# Patient Record
Sex: MALE | Race: WHITE | NOT HISPANIC OR LATINO | Employment: OTHER | URBAN - METROPOLITAN AREA
[De-identification: names, ages, dates, MRNs, and addresses within clinical notes are randomized per-mention and may not be internally consistent; named-entity substitution may affect disease eponyms.]

---

## 2017-01-19 ENCOUNTER — INITIAL CONSULT (OUTPATIENT)
Dept: DERMATOLOGY | Facility: CLINIC | Age: 82
End: 2017-01-19
Payer: MEDICARE

## 2017-01-19 VITALS — WEIGHT: 163 LBS | BODY MASS INDEX: 22.73 KG/M2

## 2017-01-19 DIAGNOSIS — L82.1 SEBORRHEIC KERATOSES: ICD-10-CM

## 2017-01-19 DIAGNOSIS — L57.0 ACTINIC KERATOSIS: Primary | ICD-10-CM

## 2017-01-19 PROCEDURE — 1157F ADVNC CARE PLAN IN RCRD: CPT | Mod: S$GLB,,, | Performed by: DERMATOLOGY

## 2017-01-19 PROCEDURE — 99999 PR PBB SHADOW E&M-EST. PATIENT-LVL II: CPT | Mod: PBBFAC,,, | Performed by: DERMATOLOGY

## 2017-01-19 PROCEDURE — 1159F MED LIST DOCD IN RCRD: CPT | Mod: S$GLB,,, | Performed by: DERMATOLOGY

## 2017-01-19 PROCEDURE — 99202 OFFICE O/P NEW SF 15 MIN: CPT | Mod: 25,S$GLB,, | Performed by: DERMATOLOGY

## 2017-01-19 PROCEDURE — 1160F RVW MEDS BY RX/DR IN RCRD: CPT | Mod: S$GLB,,, | Performed by: DERMATOLOGY

## 2017-01-19 NOTE — PROGRESS NOTES
Subjective:       Patient ID:  Phi Sainz is a 87 y.o. male who presents for   Chief Complaint   Patient presents with    Lesion     HPI Comments: Lesion on left cheek which has grown not painful no tx.  The actinic keratoses treated previously on his hands resolved, now with one on right hand no tx.      Lesion         Review of Systems   Constitutional: Negative for fever.   Skin: Negative for itching and rash.   Hematologic/Lymphatic: Does not bruise/bleed easily.        Objective:    Physical Exam   Constitutional: He appears well-developed and well-nourished. No distress.   Neurological: He is alert and oriented to person, place, and time. He is not disoriented.   Psychiatric: He has a normal mood and affect.   Skin:   Areas Examined (abnormalities noted in diagram):   Scalp / Hair Palpated and Inspected  Head / Face Inspection Performed  Neck Inspection Performed  RUE Inspected  LUE Inspection Performed                   Diagram Legend     Erythematous scaling macule/papule c/w actinic keratosis         Pigmented verrucoid papule/plaque c/w seborrheic keratosis       Assessment / Plan:        Actinic keratosis   Cryosurgery Procedure Note    Verbal consent from the patient is obtained and the patient is aware of the precancerous quality and need for treatment of these lesions. Liquid nitrogen cryosurgery is applied to the 1 actinic keratoses, as detailed in the physical exam, to produce a freeze injury.      Seborrheic keratoses  reassurance  Brochure provided               Return in about 1 year (around 1/19/2018).

## 2017-01-19 NOTE — LETTER
January 19, 2017      Yanna Santana MD  [address] [facility]  [place] [address]           [place] [facility]  [address] [facility]  [place] [address]  Phone: [phone]  Fax: [phone]          Patient: Phi Sainz   MR Number: [id number]   YOB: 1929   Date of Visit: 1/19/2017       Dear Dr. Yanna Santana:    Thank you for referring Phi Sainz to me for evaluation. Attached you will find relevant portions of my assessment and plan of care.    If you have questions, please do not hesitate to call me. I look forward to following Phi Sainz along with you.    Sincerely,    Lindsey Martinez MD    Enclosure  CC:  No Recipients    If you would like to receive this communication electronically, please contact [email or web address] or [phone] to request more information on [employer] Link access.    For providers and/or their staff who would like to refer a patient to [facility], please contact us through our one-stop-shop provider referral line, [facility] , at [phone].    If you feel you have received this communication in error or would no longer like to receive these types of communications, please e-mail [email or web address]

## 2017-01-19 NOTE — MR AVS SNAPSHOT
[place] - Dermatology   [facility]  [place] [address]  Phone: [phone]  Fax: [phone]                  Phi Sainz   2017 10:50 AM   Initial consult    Description:  Male : 10/26/1929   Provider:  Lindsey Martinez MD   Department:  [place] [facility] Dermatology           Reason for Visit     Lesion           Diagnoses this Visit        Comments    Actinic keratosis    -  Primary     Seborrheic keratoses                To Do List           Future Appointments        Provider Department Dept Phone    2017 9:30 AM LAB, [facility] [place] [facility] [phone]    2017 11:30 AM Rody Morrison MD [place] - Cardiology [phone]    3/13/2017 1:40 PM Yanna Santana MD [place] - Internal Medicine [phone]      Goals (5 Years of Data)     None      Follow-Up and Disposition     Return in about 1 year (around 2018).      [facility]     [facility] -  Assistance  Registered nurses in the [facility] Center provide clinical advisement, health education, appointment booking, and other advisory services.  Call for this free service at [phone].             Medications           Message regarding Medications     Verify the changes and/or additions to your medication regime listed below are the same as discussed with your clinician today.  If any of these changes or additions are incorrect, please notify your healthcare provider.             Verify that the below list of medications is an accurate representation of the medications you are currently taking.  If none reported, the list may be blank. If incorrect, please contact your healthcare provider. Carry this list with you in case of emergency.           Current Medications     diphenoxylate-atropine 2.5-0.025 mg (LOMOTIL) 2.5-0.025 mg per tablet Take 1 tablet by mouth 2 (two) times daily.    escitalopram oxalate (LEXAPRO) 10 MG tablet TAKE 1 TABLET BY MOUTH EVERY  DAY    lisinopril (PRINIVIL,ZESTRIL) 20 MG tablet Take 1 tablet (20 mg total) by mouth once daily.    mesalamine (LIALDA) 1.2 gram TbEC Take 1.2 g by mouth daily with breakfast.    pramipexole (MIRAPEX) 0.125 MG tablet TAKE 1 TABLET BY MOUTH 3 TIMES A DAY           Clinical Reference Information           Vital Signs - Last Recorded  Most recent update: 1/19/2017 11:11 AM by Marita Burgess MA    Wt BMI             73.9 kg (163 lb) 22.73 kg/m2         Allergies as of 1/19/2017     Combigan [Brimonidine-timolol]      Immunizations Administered on Date of Encounter - 1/19/2017     None

## 2017-01-27 ENCOUNTER — TELEPHONE (OUTPATIENT)
Dept: PAIN MEDICINE | Facility: CLINIC | Age: 82
End: 2017-01-27

## 2017-01-27 NOTE — TELEPHONE ENCOUNTER
----- Message from Joann Rhodes sent at 1/27/2017 11:33 AM CST -----  Contact: ms garcia pt's [phone] or [phone]  Ms garcia would like to speak with you regarding picking up mri reports etc.. For patient to see another

## 2017-01-31 ENCOUNTER — LAB VISIT (OUTPATIENT)
Dept: LAB | Facility: HOSPITAL | Age: 82
End: 2017-01-31
Attending: INTERNAL MEDICINE
Payer: MEDICARE

## 2017-01-31 DIAGNOSIS — E78.5 DYSLIPIDEMIA: ICD-10-CM

## 2017-01-31 DIAGNOSIS — I10 ESSENTIAL HYPERTENSION: ICD-10-CM

## 2017-01-31 LAB
ALBUMIN SERPL BCP-MCNC: 3.6 G/DL
ALP SERPL-CCNC: 68 U/L
ALT SERPL W/O P-5'-P-CCNC: 13 U/L
ANION GAP SERPL CALC-SCNC: 10 MMOL/L
AST SERPL-CCNC: 25 U/L
BILIRUB SERPL-MCNC: 1 MG/DL
BUN SERPL-MCNC: 15 MG/DL
CALCIUM SERPL-MCNC: 8.9 MG/DL
CHLORIDE SERPL-SCNC: 105 MMOL/L
CHOLEST/HDLC SERPL: 3 {RATIO}
CO2 SERPL-SCNC: 28 MMOL/L
CREAT SERPL-MCNC: 1 MG/DL
EST. GFR  (AFRICAN AMERICAN): >60 ML/MIN/1.73 M^2
EST. GFR  (NON AFRICAN AMERICAN): >60 ML/MIN/1.73 M^2
GLUCOSE SERPL-MCNC: 89 MG/DL
HDL/CHOLESTEROL RATIO: 32.8 %
HDLC SERPL-MCNC: 198 MG/DL
HDLC SERPL-MCNC: 65 MG/DL
LDLC SERPL CALC-MCNC: 119.2 MG/DL
NONHDLC SERPL-MCNC: 133 MG/DL
POTASSIUM SERPL-SCNC: 3.6 MMOL/L
PROT SERPL-MCNC: 6.8 G/DL
SODIUM SERPL-SCNC: 143 MMOL/L
TRIGL SERPL-MCNC: 69 MG/DL
TSH SERPL DL<=0.005 MIU/L-ACNC: 2.46 UIU/ML

## 2017-01-31 PROCEDURE — 80053 COMPREHEN METABOLIC PANEL: CPT

## 2017-01-31 PROCEDURE — 36415 COLL VENOUS BLD VENIPUNCTURE: CPT | Mod: PO

## 2017-01-31 PROCEDURE — 80061 LIPID PANEL: CPT

## 2017-01-31 PROCEDURE — 84443 ASSAY THYROID STIM HORMONE: CPT

## 2017-02-01 ENCOUNTER — TELEPHONE (OUTPATIENT)
Dept: CARDIOLOGY | Facility: CLINIC | Age: 82
End: 2017-02-01

## 2017-02-01 NOTE — TELEPHONE ENCOUNTER
----- Message from Rody Morrison MD sent at 2/1/2017 10:13 AM CST -----  Please mail patient the blood work results and inform the patient that we will discuss it in detail during the upcoming visit. It looks fine

## 2017-02-09 ENCOUNTER — OFFICE VISIT (OUTPATIENT)
Dept: CARDIOLOGY | Facility: CLINIC | Age: 82
End: 2017-02-09
Payer: MEDICARE

## 2017-02-09 VITALS
SYSTOLIC BLOOD PRESSURE: 119 MMHG | DIASTOLIC BLOOD PRESSURE: 58 MMHG | BODY MASS INDEX: 22.73 KG/M2 | HEIGHT: 71 IN | HEART RATE: 54 BPM | WEIGHT: 162.38 LBS

## 2017-02-09 DIAGNOSIS — R00.1 BRADYCARDIA: ICD-10-CM

## 2017-02-09 DIAGNOSIS — I10 ESSENTIAL HYPERTENSION: Primary | ICD-10-CM

## 2017-02-09 DIAGNOSIS — E78.5 DYSLIPIDEMIA: ICD-10-CM

## 2017-02-09 DIAGNOSIS — J84.9 ILD (INTERSTITIAL LUNG DISEASE): ICD-10-CM

## 2017-02-09 PROCEDURE — 1126F AMNT PAIN NOTED NONE PRSNT: CPT | Mod: S$GLB,,, | Performed by: INTERNAL MEDICINE

## 2017-02-09 PROCEDURE — 99499 UNLISTED E&M SERVICE: CPT | Mod: S$PBB,,, | Performed by: INTERNAL MEDICINE

## 2017-02-09 PROCEDURE — 1160F RVW MEDS BY RX/DR IN RCRD: CPT | Mod: S$GLB,,, | Performed by: INTERNAL MEDICINE

## 2017-02-09 PROCEDURE — 99999 PR PBB SHADOW E&M-EST. PATIENT-LVL III: CPT | Mod: PBBFAC,,, | Performed by: INTERNAL MEDICINE

## 2017-02-09 PROCEDURE — 1157F ADVNC CARE PLAN IN RCRD: CPT | Mod: S$GLB,,, | Performed by: INTERNAL MEDICINE

## 2017-02-09 PROCEDURE — 1159F MED LIST DOCD IN RCRD: CPT | Mod: S$GLB,,, | Performed by: INTERNAL MEDICINE

## 2017-02-09 PROCEDURE — 99213 OFFICE O/P EST LOW 20 MIN: CPT | Mod: S$GLB,,, | Performed by: INTERNAL MEDICINE

## 2017-02-09 RX ORDER — METHYLPREDNISOLONE 4 MG/1
4 TABLET ORAL
COMMUNITY
End: 2017-09-27

## 2017-02-09 NOTE — MR AVS SNAPSHOT
[place] - Cardiology   [facility]  [place] [address]  Phone: [phone]                  Phi Sainz   2017 1:00 PM   Office Visit    Description:  Male : 10/26/1929   Provider:  Rody Morrison MD   Department:  [place] - Cardiology           Reason for Visit     Hypertension                To Do List           Future Appointments        Provider Department Dept Phone    3/13/2017 1:40 PM Yanna Santana MD [place] - Internal Medicine [phone]      Goals (5 Years of Data)     None      [facility]     [facility] On Call Nurse Care Line -  Assistance  Registered nurses in the [facility] On Call Center provide clinical advisement, health education, appointment booking, and other advisory services.  Call for this free service at [phone].             Medications           Message regarding Medications     Verify the changes and/or additions to your medication regime listed below are the same as discussed with your clinician today.  If any of these changes or additions are incorrect, please notify your healthcare provider.        STOP taking these medications     pramipexole (MIRAPEX) 0.125 MG tablet TAKE 1 TABLET BY MOUTH 3 TIMES A DAY    mesalamine (LIALDA) 1.2 gram TbEC Take 1.2 g by mouth daily with breakfast.           Verify that the below list of medications is an accurate representation of the medications you are currently taking.  If none reported, the list may be blank. If incorrect, please contact your healthcare provider. Carry this list with you in case of emergency.           Current Medications     diphenoxylate-atropine 2.5-0.025 mg (LOMOTIL) 2.5-0.025 mg per tablet Take 1 tablet by mouth 2 (two) times daily.    escitalopram oxalate (LEXAPRO) 10 MG tablet TAKE 1 TABLET BY MOUTH EVERY DAY    lisinopril (PRINIVIL,ZESTRIL) 20 MG tablet Take 1 tablet (20 mg total) by mouth once daily.    methylPREDNISolone (MEDROL DOSEPACK) 4 mg tablet Take  "4 mg by mouth. 6 Pills on first day, 5 pills on the second day, 4 pills on third day, 3 pills on fourth day, 2 pill on fifth day, and one pill on sixth day.           Clinical Reference Information           Your Vitals Were     BP Pulse Height Weight BMI    119/58 54 5' 11" (1.803 m) 73.6 kg (162 lb 5.9 oz) 22.65 kg/m2      Blood Pressure          Most Recent Value    Right Arm BP - Sitting  114/57    Left Arm BP - Sitting  119/58    BP  (!)  119/58      Allergies as of 2/9/2017     Combigan [Brimonidine-timolol]      Immunizations Administered on Date of Encounter - 2/9/2017     None      Language Assistance Services     ATTENTION: Language assistance services are available, free of charge. Please call [phone].      ATENCIÓN: Si brennen nichols, tiene a bates disposición servicios gratuitos de asistencia lingüística. Llame al [phone].     CHÚ Ý: N?u b?n nói Ti?ng Vi?t, có các d?ch v? h? tr? ngôn ng? mi?n phí dành cho b?n. G?i s? [phone].         [place] [facility] complies with applicable Federal civil rights laws and does not discriminate on the basis of race, color, national origin, age, disability, or sex.        "

## 2017-02-09 NOTE — PROGRESS NOTES
Subjective:   Patient ID:  Phi Sainz is a 87 y.o. male who presents for follow-up of Hypertension      HPI: Overall patient is doing well.  He has chronic problems with lower back pain and now is followed by an outside orthopedic group.      Occasionally at night while getting up to urinate he feels short of breath and occasionally has swelling around both ankles (R>L).  Otherwise no specific complaints.    Nuclear stress test. CFD, normal. 6 min walk acceptable for his age and co morbidities.    Lab Results   Component Value Date     01/31/2017    K 3.6 01/31/2017     01/31/2017    CO2 28 01/31/2017    BUN 15 01/31/2017    CREATININE 1.0 01/31/2017    GLU 89 01/31/2017    AST 25 01/31/2017    ALT 13 01/31/2017    ALBUMIN 3.6 01/31/2017    PROT 6.8 01/31/2017    BILITOT 1.0 01/31/2017    WBC 6.95 09/07/2016    HGB 13.8 (L) 09/07/2016    HCT 41.8 09/07/2016    MCV 92 09/07/2016     09/07/2016    PSA 0.82 05/03/2016    TSH 2.461 01/31/2017    CHOL 198 01/31/2017    HDL 65 01/31/2017    LDLCALC 119.2 01/31/2017    TRIG 69 01/31/2017       Review of Systems   Constitution: Positive for weakness.   HENT: Negative.    Eyes: Negative.    Cardiovascular: Positive for dyspnea on exertion and leg swelling. Negative for chest pain, claudication, irregular heartbeat, near-syncope, palpitations and syncope.   Respiratory: Negative.  Negative for cough, shortness of breath, snoring and wheezing.    Endocrine: Negative.  Negative for cold intolerance, heat intolerance, polydipsia, polyphagia and polyuria.   Skin: Negative.    Musculoskeletal: Positive for arthritis, back pain and muscle weakness.   Gastrointestinal: Negative.    Genitourinary: Positive for bladder incontinence and frequency.   Neurological: Positive for loss of balance.   Psychiatric/Behavioral: Positive for depression. The patient is nervous/anxious.      @[facility]@  Objective:   Physical Exam   Constitutional: He is oriented to  "person, place, and time. He appears well-developed and well-nourished.   BP (!) 119/58  Pulse (!) 54  Ht 5' 11" (1.803 m)  Wt 73.6 kg (162 lb 5.9 oz)  BMI 22.65 kg/m2     HENT:   Head: Normocephalic.   Eyes: Pupils are equal, round, and reactive to light.   Neck: Normal range of motion. Neck supple. Carotid bruit is not present. No thyromegaly present.   Cardiovascular: Normal rate, regular rhythm, normal heart sounds and intact distal pulses.  Exam reveals no gallop and no friction rub.    No murmur heard.  Pulses:       Carotid pulses are 2+ on the right side, and 2+ on the left side.       Radial pulses are 2+ on the right side, and 2+ on the left side.        Femoral pulses are 2+ on the right side, and 2+ on the left side.       Popliteal pulses are 2+ on the right side, and 2+ on the left side.        Dorsalis pedis pulses are 2+ on the right side, and 2+ on the left side.        Posterior tibial pulses are 2+ on the right side, and 2+ on the left side.   Pulmonary/Chest: Effort normal and breath sounds normal. No respiratory distress. He has no wheezes. He has no rales. He exhibits no tenderness.   Abdominal: Soft. Bowel sounds are normal.   Musculoskeletal: Normal range of motion. He exhibits no edema.   Neurological: He is alert and oriented to person, place, and time.   Skin: Skin is warm and dry.   Psychiatric: He has a normal mood and affect.   Nursing note and vitals reviewed.      Assessment and Plan:     Problem List Items Addressed This Visit        Cardiology Problems    Hypertension - Primary    Relevant Orders    TSH    Comprehensive metabolic panel    Lipid panel       Other    Dyslipidemia    Relevant Orders    TSH    Comprehensive metabolic panel    Lipid panel    Bradycardia    ILD (interstitial lung disease)        Continue on the present regimen.  Return in about 6 months (around 8/9/2017).          "

## 2017-03-13 ENCOUNTER — OFFICE VISIT (OUTPATIENT)
Dept: INTERNAL MEDICINE | Facility: CLINIC | Age: 82
End: 2017-03-13
Payer: MEDICARE

## 2017-03-13 ENCOUNTER — LAB VISIT (OUTPATIENT)
Dept: LAB | Facility: HOSPITAL | Age: 82
End: 2017-03-13
Attending: INTERNAL MEDICINE
Payer: MEDICARE

## 2017-03-13 VITALS
SYSTOLIC BLOOD PRESSURE: 123 MMHG | WEIGHT: 164 LBS | BODY MASS INDEX: 22.88 KG/M2 | RESPIRATION RATE: 12 BRPM | HEART RATE: 57 BPM | TEMPERATURE: 98 F | DIASTOLIC BLOOD PRESSURE: 62 MMHG

## 2017-03-13 DIAGNOSIS — M48.061 SPINAL STENOSIS, LUMBAR REGION, WITHOUT NEUROGENIC CLAUDICATION: ICD-10-CM

## 2017-03-13 DIAGNOSIS — J84.9 INTERSTITIAL LUNG DISEASE: ICD-10-CM

## 2017-03-13 DIAGNOSIS — I10 ESSENTIAL HYPERTENSION: Primary | ICD-10-CM

## 2017-03-13 DIAGNOSIS — R35.1 NOCTURIA: ICD-10-CM

## 2017-03-13 LAB
BILIRUB UR QL STRIP: NEGATIVE
CLARITY UR REFRACT.AUTO: ABNORMAL
COLOR UR AUTO: YELLOW
GLUCOSE UR QL STRIP: NEGATIVE
HGB UR QL STRIP: NEGATIVE
KETONES UR QL STRIP: NEGATIVE
LEUKOCYTE ESTERASE UR QL STRIP: NEGATIVE
NITRITE UR QL STRIP: NEGATIVE
PH UR STRIP: 5 [PH] (ref 5–8)
PROT UR QL STRIP: NEGATIVE
SP GR UR STRIP: 1.02 (ref 1–1.03)
URN SPEC COLLECT METH UR: ABNORMAL
UROBILINOGEN UR STRIP-ACNC: NEGATIVE EU/DL

## 2017-03-13 PROCEDURE — 1126F AMNT PAIN NOTED NONE PRSNT: CPT | Mod: S$GLB,,, | Performed by: INTERNAL MEDICINE

## 2017-03-13 PROCEDURE — 1160F RVW MEDS BY RX/DR IN RCRD: CPT | Mod: S$GLB,,, | Performed by: INTERNAL MEDICINE

## 2017-03-13 PROCEDURE — 1157F ADVNC CARE PLAN IN RCRD: CPT | Mod: S$GLB,,, | Performed by: INTERNAL MEDICINE

## 2017-03-13 PROCEDURE — 99499 UNLISTED E&M SERVICE: CPT | Mod: S$PBB,,, | Performed by: INTERNAL MEDICINE

## 2017-03-13 PROCEDURE — 99213 OFFICE O/P EST LOW 20 MIN: CPT | Mod: S$GLB,,, | Performed by: INTERNAL MEDICINE

## 2017-03-13 PROCEDURE — 81003 URINALYSIS AUTO W/O SCOPE: CPT

## 2017-03-13 PROCEDURE — 99999 PR PBB SHADOW E&M-EST. PATIENT-LVL III: CPT | Mod: PBBFAC,,, | Performed by: INTERNAL MEDICINE

## 2017-03-13 PROCEDURE — 87086 URINE CULTURE/COLONY COUNT: CPT

## 2017-03-13 PROCEDURE — 1159F MED LIST DOCD IN RCRD: CPT | Mod: S$GLB,,, | Performed by: INTERNAL MEDICINE

## 2017-03-13 RX ORDER — GABAPENTIN 100 MG/1
300 CAPSULE ORAL 2 TIMES DAILY
COMMUNITY
End: 2020-02-14

## 2017-03-13 RX ORDER — LEVOCETIRIZINE DIHYDROCHLORIDE 5 MG/1
5 TABLET, FILM COATED ORAL NIGHTLY
Qty: 30 TABLET | Refills: 6 | Status: SHIPPED | OUTPATIENT
Start: 2017-03-13 | End: 2017-10-01 | Stop reason: SDUPTHER

## 2017-03-13 NOTE — PROGRESS NOTES
CC: followup of hypertension  HPI:  The patient is a 87 y.o. year old male with interstitial lung disease who presents to the office for followup of hypertension.  The patient denies any chest pain, shortness of breath, headache, blurred vision, excessive fatigue, nausea or vomiting.  He reports back pain persists primarily in the morning.  He is seeing another orthopedist and is taking prednisone and gabapentin, as well as doing physical therapy.  The patient reports nocturia over the last several years.  He denies any dysuria.    PAST MEDICAL HISTORY:  Past Medical History:   Diagnosis Date    Anemia     Arthritis     Herniation of intervertebral disc     Hyperlipidemia     Hypertension 3/4/2015    Macular degeneration     Spinal stenosis        SURGICAL HISTORY:  Past Surgical History:   Procedure Laterality Date    CATARACT EXTRACTION W/  INTRAOCULAR LENS IMPLANT Left 10/31/07    os ()    CATARACT EXTRACTION W/  INTRAOCULAR LENS IMPLANT Right 12/16/15    od ()       MEDS:  Medcard reviewed and updated    ALLERGIES: Allergy Card reviewed and updated    SOCIAL HISTORY:   The patient is a nonsmoker.    PE:   APPEARANCE: Well nourished, well developed, in no acute distress.    CHEST: Lungs clear to auscultation with unlabored respirations.  CARDIOVASCULAR: Normal S1, S2. No murmurs. No carotid bruits. No pedal edema.  ABDOMEN: Bowel sounds normal. Not distended. Soft. No tenderness or masses.   MUSCULOSKELETAL:  Abnormal gait, ambulates with a cane.  PSYCHIATRIC: The patient is oriented to person, place, and time and has a pleasant affect.        ASSESSMENT/PLAN:  Phi was seen today for 3 month f/u, hypertension and discuss prostate.    Diagnoses and all orders for this visit:    Essential hypertension  -     Blood pressure is controlled    Spinal stenosis, lumbar region, without neurogenic claudication  -     Continue gabapentin    Interstitial lung disease  -      Stable    Nocturia  -     Urinalysis; Future  -     Urine culture; Future    Other orders  -     levocetirizine (XYZAL) 5 MG tablet; Take 1 tablet (5 mg total) by mouth every evening.

## 2017-03-13 NOTE — MR AVS SNAPSHOT
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       These Medications        Disp Refills Start End    levocetirizine (XYZAL) 5 MG tablet 30 tablet 6 3/13/2017 3/13/2018    Take 1 tablet (5 mg total) by mouth every evening. - Oral    Pharmacy: [facility]/pharmacy #[id number] - [place] [address] Ph #: [phone]         [facility] On Call     [facility] On Call Nurse [facility] Line -  Assistance  Registered nurses in the [facility] Call Center provide clinical advisement, health education, appointment booking, and other advisory services.  Call for this free service at [phone].             Medications           Message regarding Medications     Verify the changes and/or additions to your medication regime listed below are the same as discussed with your clinician today.  If any of these changes or additions are incorrect, please notify your healthcare provider.        START taking these NEW medications        Refills    levocetirizine (XYZAL) 5 MG tablet 6    Sig: Take 1 tablet (5 mg total) by mouth every evening.    Class: Normal    Route: Oral           Verify  that the below list of medications is an accurate representation of the medications you are currently taking.  If none reported, the list may be blank. If incorrect, please contact your healthcare provider. Carry this list with you in case of emergency.           Current Medications     diphenoxylate-atropine 2.5-0.025 mg (LOMOTIL) 2.5-0.025 mg per tablet Take 1 tablet by mouth 2 (two) times daily.    escitalopram oxalate (LEXAPRO) 10 MG tablet TAKE 1 TABLET BY MOUTH EVERY DAY    gabapentin (NEURONTIN) 100 MG capsule Take 100 mg by mouth 3 (three) times daily.    lisinopril (PRINIVIL,ZESTRIL) 20 MG tablet Take 1 tablet (20 mg total) by mouth once daily.    methylPREDNISolone (MEDROL DOSEPACK) 4 mg tablet Take 4 mg by mouth. 6 Pills on first day, 5 pills on the second day, 4 pills on third day, 3 pills on fourth day, 2 pill on fifth day, and one pill on sixth day.    levocetirizine (XYZAL) 5 MG tablet Take 1 tablet (5 mg total) by mouth every evening.           Clinical Reference Information           Your Vitals Were     BP Pulse Temp Resp Weight BMI    123/62 (BP Location: Left arm, Patient Position: Sitting, BP Method: Automatic) 57 97.5 °F (36.4 °C) (Oral) 12 74.4 kg (164 lb 0.4 oz) 22.88 kg/m2      Blood Pressure          Most Recent Value    BP  123/62      Allergies as of 3/13/2017     Combigan [Brimonidine-timolol]      Immunizations Administered on Date of Encounter - 3/13/2017     None      Orders Placed During Today's Visit     Future Labs/Procedures Expected by Expires    Urinalysis  3/13/2017 3/13/2018    Urine culture  3/13/2017 3/13/2018      [facility] Sign-Up     Activating your [facility] account is as easy as 1-2-3!     1) Visit [email or web address], select Sign Up Now, enter this activation code and your date of birth, then select Next.  Activation code not generated  Current Patient Portal Status: Account disabled      2) Create a username and password to use when you visit [facility] in the future and  select a security question in case you lose your password and select Next.    3) Enter your e-mail address and click Sign Up!    Additional Information  If you have questions, please e-mail [email or web address] or call [phone] to talk to our [facility] staff. Remember, [facility] is NOT to be used for urgent needs. For medical emergencies, dial 911.         Language Assistance Services     ATTENTION: Language assistance services are available, free of charge. Please call [phone].      ATENCIÓN: Si habla español, tiene a bates disposición servicios gratuitos de asistencia lingüística. Llame al [phone].     CHÚ Ý: N?u b?n nói Ti?ng Vi?t, có các d?ch v? h? tr? ngôn ng? mi?n phí dành cho b?n. G?i s? [phone].         [place] [facility] complies with applicable Federal civil rights laws and does not discriminate on the basis of race, color, national origin, age, disability, or sex.

## 2017-03-13 NOTE — MR AVS SNAPSHOT
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       These Medications        Disp Refills Start End    levocetirizine (XYZAL) 5 MG tablet 30 tablet 6 3/13/2017 3/13/2018    Take 1 tablet (5 mg total) by mouth every evening. - Oral    Pharmacy: [facility]/pharmacy #[id number] - [place]  [address] Ph #: [phone]         [facility] On Call     [facility] On Call Nurse Care Line -  Assistance  Registered nurses in the [facility] provide clinical advisement, health education, appointment booking, and other advisory services  Call for this free service at [phone].                 Medications           Message regarding Medications     Verify the changes and/or additions to your medication regime listed below are the same as discussed with your clinician today.  If any of these changes or additions are incorrect, please notify your healthcare provider.        START taking these NEW medications        Refills    levocetirizine (XYZAL) 5 MG tablet 6    Sig: Take 1 tablet (5 mg total) by mouth every evening.    Class: Normal    Route: Oral            Verify that the below list of medications is an accurate representation of the medications you are currently taking.  If none reported, the list may be blank. If incorrect, please contact your healthcare provider. Carry this list with you in case of emergency.           Current Medications     diphenoxylate-atropine 2.5-0.025 mg (LOMOTIL) 2.5-0.025 mg per tablet Take 1 tablet by mouth 2 (two) times daily.    escitalopram oxalate (LEXAPRO) 10 MG tablet TAKE 1 TABLET BY MOUTH EVERY DAY    gabapentin (NEURONTIN) 100 MG capsule Take 100 mg by mouth 3 (three) times daily.    lisinopril (PRINIVIL,ZESTRIL) 20 MG tablet Take 1 tablet (20 mg total) by mouth once daily.    methylPREDNISolone (MEDROL DOSEPACK) 4 mg tablet Take 4 mg by mouth. 6 Pills on first day, 5 pills on the second day, 4 pills on third day, 3 pills on fourth day, 2 pill on fifth day, and one pill on sixth day.    levocetirizine (XYZAL) 5 MG tablet Take 1 tablet (5 mg total) by mouth every evening.           Clinical Reference Information           Vital Signs - Last Recorded  Most recent update: 3/13/2017  1:47 PM by Humaira Chowdhury LPN    BP Pulse Temp Resp Wt BMI    123/62 (BP Location: Left arm, Patient Position: Sitting, BP Method: Automatic) (!) 57 97.5 °F (36.4 °C) (Oral) 12 74.4 kg (164 lb 0.4 oz) 22.88 kg/m2      Blood Pressure          Most Recent Value    BP  123/62      Allergies as of 3/13/2017     Combigan [Brimonidine-timolol]      Immunizations Administered on Date of Encounter - 3/13/2017     None      Orders Placed During Today's Visit     Future Labs/Procedures Expected by Expires    Urinalysis  3/13/2017 3/13/2018    Urine culture  3/13/2017 3/13/2018      [facility] Sign-Up     Activating your [facility] account is as easy as 1-2-3!     1) Visit [email or web address], select Sign Up Now, enter this activation code and your date of birth, then select Next.  Activation code not generated  Current Patient Portal Status: Account disabled       2) Create a username and password to use when you visit [facility] in the future and select a security question in case you lose your password and select Next.    3) Enter your e-mail address and click Sign Up!    Additional Information  If you have questions, please e-mail [email or web address] or call [phone] to talk to our [facility] staff. Remember, [facility] is NOT to be used for urgent needs. For medical emergencies, dial 911.         Translation Services Information     ATTENTION: Language assistance services are available, free of charge. Please call [phone].    ATENCIÓN: Si habla simone, tiene a bates disposición servicios gratuitos de asistencia lingüística. Llame al [phone].     CHÚ Ý: N?u b?n nói Ti?ng Vi?t, có các d?ch v? h? tr? ngôn ng? mi?n phí dành cho b?n. G?i s? [phone].         [facility] complies with applicable Federal civil rights laws and does not discriminate on the basis of race, color, national origin, age, disability, or sex.

## 2017-03-15 LAB — BACTERIA UR CULT: NORMAL

## 2017-03-20 ENCOUNTER — TELEPHONE (OUTPATIENT)
Dept: INTERNAL MEDICINE | Facility: CLINIC | Age: 82
End: 2017-03-20

## 2017-03-20 NOTE — TELEPHONE ENCOUNTER
Called to inform the patient of the urine culture results there was no growth, There was no answer termed the call

## 2017-03-21 DIAGNOSIS — I10 ESSENTIAL HYPERTENSION: ICD-10-CM

## 2017-03-21 RX ORDER — LISINOPRIL 20 MG/1
20 TABLET ORAL DAILY
Qty: 30 TABLET | Refills: 6 | Status: SHIPPED | OUTPATIENT
Start: 2017-03-21 | End: 2017-11-13 | Stop reason: SDUPTHER

## 2017-03-28 ENCOUNTER — OFFICE VISIT (OUTPATIENT)
Dept: OPHTHALMOLOGY | Facility: CLINIC | Age: 82
End: 2017-03-28
Payer: MEDICARE

## 2017-03-28 DIAGNOSIS — H43.813 POSTERIOR VITREOUS DETACHMENT, BOTH EYES: ICD-10-CM

## 2017-03-28 DIAGNOSIS — H35.3130 AGE-RELATED MACULAR DEGENERATION, DRY, BOTH EYES: Primary | ICD-10-CM

## 2017-03-28 DIAGNOSIS — T81.89XS: ICD-10-CM

## 2017-03-28 DIAGNOSIS — Z96.1 PSEUDOPHAKIA OF BOTH EYES: ICD-10-CM

## 2017-03-28 DIAGNOSIS — H59.021 RETAINED LENS MATERIAL FOLLOWING CATARACT SURGERY OF RIGHT EYE: ICD-10-CM

## 2017-03-28 DIAGNOSIS — H52.209: ICD-10-CM

## 2017-03-28 PROCEDURE — 92014 COMPRE OPH EXAM EST PT 1/>: CPT | Mod: S$GLB,,, | Performed by: OPHTHALMOLOGY

## 2017-03-28 PROCEDURE — 99999 PR PBB SHADOW E&M-EST. PATIENT-LVL III: CPT | Mod: PBBFAC,,, | Performed by: OPHTHALMOLOGY

## 2017-03-28 NOTE — PROGRESS NOTES
HPI     DLS: 5/24/16    Pt here for 6 month check;    Meds: No GTTS    1. Pseudophakia, both eyes  2. Astigmatism due to surgery, sequela  3. Age-related macular degeneration, dry, both eyes        Last edited by Mary Beth Arnold on 3/28/2017  3:54 PM.         Assessment /Plan     For exam results, see Encounter Report.    There are no diagnoses linked to this encounter.  ***

## 2017-03-28 NOTE — PROGRESS NOTES
HPI     DLS: 5/24/16    Pt here for 6 month check;    Meds: No GTTS    1. Pseudophakia, both eyes  2. Astigmatism due to surgery, sequela  3. Age-related macular degeneration, dry, both eyes        Last edited by Mary Beth Arnold on 3/28/2017  3:54 PM.         Assessment /Plan     For exam results, see Encounter Report.    Age-related macular degeneration, dry, both eyes    Posterior vitreous detachment, both eyes    Retained lens material following cataract surgery of right eye    Astigmatism due to surgery, sequela    Pseudophakia of both eyes    1. Pt here for post op  complex phaco/IOL od - 12/16/2015  Dropped nucleus / lens 12/16/2015 - essentially entire lens in the vitreous cavity   -very dense mature lens - lens dislocated into the vitreous // no vitreous loss // PC IOL in sulcus with optic capture   S/P PPVx and removal of retained lens - 1/6/2016 - Ce       2.  pseudophakia OS with PCO - Mayra 10/31/2007  - BCVA 20/50-  - rec yag cap    3.  ARMD OU  - dry with drusen  - some rpe change  - hazy view od    PLAN   Phaco / IOL OD Date: 12/15/2015 -    Complicated by droped nucleus // no vit loss // PC iol in sulcus with optc capture in the intact anterior capsulorhexis   High IOP - POD #1 - decompressed    S/P PPVx - removal of retained lens 1/6/2016     Pt has done well with the PPVx and lens removal surgery   Cornea is clear / PC IOL is in place // VA improved   Pt is off all gtts   IOP ok  No iritis   Astigmatism improved with removal of all corneal sutures    OCT - few drusen ou // but no CME 4/12/2016    Refer back to colgrove - he has seen him a long time ago  He can F/U with him for yearly eye exams - re-consult me prn     I have seen and personally examined the patient.  I agree with the findings, assessment and plan of the resident and/or fellow.     Bettie Nunez MD

## 2017-04-17 ENCOUNTER — OFFICE VISIT (OUTPATIENT)
Dept: OPTOMETRY | Facility: CLINIC | Age: 82
End: 2017-04-17
Payer: MEDICARE

## 2017-04-17 DIAGNOSIS — H52.203 MYOPIA WITH ASTIGMATISM AND PRESBYOPIA, BILATERAL: Primary | ICD-10-CM

## 2017-04-17 DIAGNOSIS — H52.13 MYOPIA WITH ASTIGMATISM AND PRESBYOPIA, BILATERAL: Primary | ICD-10-CM

## 2017-04-17 DIAGNOSIS — H52.4 MYOPIA WITH ASTIGMATISM AND PRESBYOPIA, BILATERAL: Primary | ICD-10-CM

## 2017-04-17 PROCEDURE — 92015 DETERMINE REFRACTIVE STATE: CPT | Mod: S$GLB,,, | Performed by: OPTOMETRIST

## 2017-04-17 PROCEDURE — 99999 PR PBB SHADOW E&M-EST. PATIENT-LVL II: CPT | Mod: PBBFAC,,, | Performed by: OPTOMETRIST

## 2017-04-17 PROCEDURE — 99499 UNLISTED E&M SERVICE: CPT | Mod: S$GLB,,, | Performed by: OPTOMETRIST

## 2017-04-17 NOTE — PROGRESS NOTES
HPI     Wants bifocal prescription.        Last edited by Timothy Riley, OD on 4/17/2017  1:20 PM.     ROS     Negative for: Constitutional, Gastrointestinal, Neurological, Skin,   Genitourinary, Musculoskeletal, HENT, Endocrine, Cardiovascular, Eyes,   Respiratory, Psychiatric, Allergic/Imm, Heme/Lymph    Last edited by Timothy Riley, OD on 4/17/2017  1:20 PM. (History)        Assessment /Plan     For exam results, see Encounter Report.    Myopia with astigmatism and presbyopia, bilateral      1. Spec Rx given. Different lens options discussed with patient. RTC 1 year full exam.

## 2017-06-19 ENCOUNTER — TELEPHONE (OUTPATIENT)
Dept: INTERNAL MEDICINE | Facility: CLINIC | Age: 82
End: 2017-06-19

## 2017-06-19 DIAGNOSIS — R10.9 ABDOMINAL PAIN, UNSPECIFIED LOCATION: Primary | ICD-10-CM

## 2017-06-19 NOTE — TELEPHONE ENCOUNTER
----- Message from Alyssa Biswas sent at 6/16/2017 12:38 PM CDT -----  Contact: Yani Ellis/ daughter   Patient would like to get a referral.  Does the patient already have the specialty clinic appointment scheduled:  no  If yes, what date is the appointment scheduled:   no  Referral to what specialty: Gastroenterology   Reason (be specific):  Stomach pain   Does the patient want the referral with a specific physician:  no  [facility]   Comments:      ##Advise the patient that once the physician approves this either a nurse or the  will return their call##

## 2017-06-30 ENCOUNTER — OFFICE VISIT (OUTPATIENT)
Dept: GASTROENTEROLOGY | Facility: CLINIC | Age: 82
End: 2017-06-30
Payer: MEDICARE

## 2017-06-30 VITALS
HEART RATE: 73 BPM | SYSTOLIC BLOOD PRESSURE: 187 MMHG | DIASTOLIC BLOOD PRESSURE: 93 MMHG | BODY MASS INDEX: 23.55 KG/M2 | WEIGHT: 168.88 LBS

## 2017-06-30 DIAGNOSIS — R10.9 ABDOMINAL PAIN, UNSPECIFIED LOCATION: Primary | ICD-10-CM

## 2017-06-30 DIAGNOSIS — R19.8 CHANGE IN BOWEL FUNCTION: ICD-10-CM

## 2017-06-30 PROCEDURE — 1126F AMNT PAIN NOTED NONE PRSNT: CPT | Mod: S$GLB,,, | Performed by: INTERNAL MEDICINE

## 2017-06-30 PROCEDURE — 99999 PR PBB SHADOW E&M-EST. PATIENT-LVL II: CPT | Mod: PBBFAC,,, | Performed by: INTERNAL MEDICINE

## 2017-06-30 PROCEDURE — 99204 OFFICE O/P NEW MOD 45 MIN: CPT | Mod: S$GLB,,, | Performed by: INTERNAL MEDICINE

## 2017-06-30 PROCEDURE — 1159F MED LIST DOCD IN RCRD: CPT | Mod: S$GLB,,, | Performed by: INTERNAL MEDICINE

## 2017-06-30 NOTE — LETTER
July 5, 2017      Yanna Santana MD  [address]  [place] [address]           [facility]  [address]  [place] [address]  Phone: [phone]          Patient: Phi Sainz   MR Number: [id number]   YOB: 1929   Date of Visit: 6/30/2017       Dear Dr. Yanna Santana:    Thank you for referring Phi Sainz to me for evaluation. Attached you will find relevant portions of my assessment and plan of care.    If you have questions, please do not hesitate to call me. I look forward to following Phi Sainz along with you.    Sincerely,    Humphrey Kaba  CC:  No Recipients    If you would like to receive this communication electronically, please contact [email or web address] or [phone] to request more information on [employer] access.    For providers and/or their staff who would like to refer a patient to [facility], please contact us through our one-stop-shop provider referral line, Marianna Ceja, at [phone].    If you feel you have received this communication in error or would no longer like to receive these types of communications, please e-mail [email or web address]

## 2017-06-30 NOTE — PROGRESS NOTES
Subjective:       Patient ID: Phi Sainz is a 87 y.o. male.    Chief Complaint: Abdominal Pain and Diarrhea    Abdominal Pain   This is a recurrent problem. The current episode started more than 1 year ago. The onset quality is undetermined. The problem occurs intermittently. The problem has been unchanged. The pain is located in the generalized abdominal region. The pain is at a severity of 4/10. The pain is moderate. The quality of the pain is cramping. The abdominal pain does not radiate. Associated symptoms include arthralgias and diarrhea. Pertinent negatives include no constipation, fever, hematuria, nausea or vomiting. Nothing aggravates the pain. The pain is relieved by nothing. The treatment provided no relief. Prior diagnostic workup includes GI consult and lower endoscopy. There is no history of colon cancer or Crohn's disease.     - seen by outside GI, states has had endoscopic w/u, labs, stool studies so far etc without relief    Review of Systems   Constitutional: Negative for chills and fever.   HENT: Negative for postnasal drip and trouble swallowing.    Eyes: Negative for pain and visual disturbance.   Respiratory: Negative for cough, choking and shortness of breath.    Cardiovascular: Negative for chest pain and leg swelling.   Gastrointestinal: Positive for abdominal pain and diarrhea. Negative for abdominal distention, anal bleeding, blood in stool, constipation, nausea, rectal pain and vomiting.   Endocrine: Negative for cold intolerance and heat intolerance.   Genitourinary: Negative for difficulty urinating and hematuria.   Musculoskeletal: Positive for arthralgias. Negative for gait problem.   Neurological: Negative for dizziness and numbness.   Hematological: Negative for adenopathy. Does not bruise/bleed easily.   Psychiatric/Behavioral: Negative for agitation and confusion.       Objective:      Physical Exam   Constitutional: He is oriented to person, place, and time. He  appears well-developed and well-nourished. No distress.   HENT:   Head: Normocephalic and atraumatic.   Eyes: Conjunctivae are normal. No scleral icterus.   Neck: No tracheal deviation present. No thyromegaly present.   Cardiovascular: Normal rate, regular rhythm and normal heart sounds.  Exam reveals no gallop and no friction rub.    No murmur heard.  Pulmonary/Chest: Effort normal and breath sounds normal. He has no wheezes. He has no rales.   Abdominal: Soft. Bowel sounds are normal. He exhibits no distension. There is no tenderness. There is no rebound and no guarding.   Musculoskeletal: Normal range of motion. He exhibits no edema or tenderness.   Neurological: He is alert and oriented to person, place, and time.   Skin: He is not diaphoretic.   Psychiatric: He has a normal mood and affect. His behavior is normal.   Nursing note and vitals reviewed.      Urinalysis without protein  Assessment:       1. Abdominal pain, unspecified location    2. Change in bowel function        Plan:   1. Trial of Lomotil  2. Request records to avoid repeating testing

## 2017-07-10 ENCOUNTER — TELEPHONE (OUTPATIENT)
Dept: INTERNAL MEDICINE | Facility: CLINIC | Age: 82
End: 2017-07-10

## 2017-07-10 NOTE — TELEPHONE ENCOUNTER
Try and call Yani [phone] to discuss the concerns and the phone is not set up for a voice mail.. Termed the call

## 2017-07-10 NOTE — TELEPHONE ENCOUNTER
----- Message from Arlette Christianson sent at 7/5/2017  1:55 PM CDT -----  Contact: Daughter Yani [phone]  Patient would like to get medical advice.  Symptoms (please be specific):  Deep Depression    How long has patient had these symptoms:  Few weeks  Pharmacy name and phone #:  [facility] [phone] (phone) [phone] (fax)  Any drug allergies:    Comments:

## 2017-07-11 ENCOUNTER — TELEPHONE (OUTPATIENT)
Dept: GASTROENTEROLOGY | Facility: CLINIC | Age: 82
End: 2017-07-11

## 2017-07-11 ENCOUNTER — TELEPHONE (OUTPATIENT)
Dept: INTERNAL MEDICINE | Facility: CLINIC | Age: 82
End: 2017-07-11

## 2017-07-11 DIAGNOSIS — F32.A DEPRESSION, UNSPECIFIED DEPRESSION TYPE: Primary | ICD-10-CM

## 2017-07-11 RX ORDER — ESCITALOPRAM OXALATE 10 MG/1
10 TABLET ORAL DAILY
Qty: 30 TABLET | Refills: 8 | Status: SHIPPED | OUTPATIENT
Start: 2017-07-11 | End: 2018-02-24 | Stop reason: SDUPTHER

## 2017-07-11 NOTE — TELEPHONE ENCOUNTER
----- Message from Anali Ritter sent at 7/11/2017 10:26 AM CDT -----  Contact: Yani - daughter [phone]  Daughter requesting a call back regarding message she left about pt last week.

## 2017-07-11 NOTE — TELEPHONE ENCOUNTER
----- Message from Yee Aquino sent at 7/11/2017 10:36 AM CDT -----  Contact: [phone]  Pt requesting a nurse call back. Please advise.

## 2017-07-11 NOTE — TELEPHONE ENCOUNTER
Called patient @ Yani - daughter [phone] for the daughter and she states she has not been able to get the phone desiree.     Daughter states patient is Depressed, his wife is in the nursing home and he has been dealing with different concerns. The lexapro is at 5mg is not working'    Also he has been having some tremors and he cant do things because he can not do his normal ADL.

## 2017-07-11 NOTE — TELEPHONE ENCOUNTER
Recommend patient increase Lexapro to 10 mg.  And updated prescription has been sent to the pharmacy electronically.  Also, recommend referral to psychiatry.  I do not recommend Xanax, however.  Because of the sedating effects, it can increase patient's risk of fall.  Please book appointment for patient to come in one day at noon.

## 2017-07-11 NOTE — TELEPHONE ENCOUNTER
Left a message for patient to call the office back marquez regards to daughter calling for questions.

## 2017-07-12 ENCOUNTER — TELEPHONE (OUTPATIENT)
Dept: GASTROENTEROLOGY | Facility: CLINIC | Age: 82
End: 2017-07-12

## 2017-07-12 NOTE — TELEPHONE ENCOUNTER
Called and spoke with the daughter and advised she can come in the office on Friday,   Termed the call

## 2017-07-12 NOTE — TELEPHONE ENCOUNTER
Informed daughter records have not been received form Dr. Monzon/Domingo Perez.  Lomotil called into pharmacy since it was not received by pharmacy after clinic visit. Will call daughter with next steps after records are received. Daughter verbalized understanding.

## 2017-07-13 ENCOUNTER — TELEPHONE (OUTPATIENT)
Dept: INTERNAL MEDICINE | Facility: CLINIC | Age: 82
End: 2017-07-13

## 2017-07-13 NOTE — TELEPHONE ENCOUNTER
----- Message from Anali Ritter sent at 7/12/2017  1:44 PM CDT -----  Contact: Yani - daughter at [phone]  Patient would like to get medical advice.  Symptoms (please be specific):  Depression and handshakes    Pharmacy name and phone #:  [facility]/pharmacy #[id number] - [place] - [address]   [phone] (Phone)  [phone] (Fax)      Any drug allergies:  See chart  Comments: Daughter requesting Xanax to be called in to pharmacy.

## 2017-07-13 NOTE — TELEPHONE ENCOUNTER
----- Message from Sarah Bryan sent at 7/12/2017 12:04 PM CDT -----  Contact: pt daughter Yani [phone]  Would like a referral to see an Orthopedics Surgeon for his spine and legs.please advise

## 2017-07-13 NOTE — TELEPHONE ENCOUNTER
Called and spoke with Yani the daughter and advised per PCP the medication she has requested is not suggested appointment has been scheduled for 7/14/2017

## 2017-07-14 ENCOUNTER — OFFICE VISIT (OUTPATIENT)
Dept: INTERNAL MEDICINE | Facility: CLINIC | Age: 82
End: 2017-07-14
Payer: MEDICARE

## 2017-07-14 VITALS
HEART RATE: 56 BPM | TEMPERATURE: 98 F | WEIGHT: 169.56 LBS | BODY MASS INDEX: 23.65 KG/M2 | DIASTOLIC BLOOD PRESSURE: 90 MMHG | RESPIRATION RATE: 12 BRPM | SYSTOLIC BLOOD PRESSURE: 120 MMHG

## 2017-07-14 DIAGNOSIS — I10 ESSENTIAL HYPERTENSION: Primary | ICD-10-CM

## 2017-07-14 DIAGNOSIS — R10.9 ABDOMINAL PAIN, UNSPECIFIED LOCATION: ICD-10-CM

## 2017-07-14 DIAGNOSIS — M48.062 SPINAL STENOSIS, LUMBAR REGION, WITH NEUROGENIC CLAUDICATION: ICD-10-CM

## 2017-07-14 DIAGNOSIS — R25.1 TREMOR: ICD-10-CM

## 2017-07-14 DIAGNOSIS — F32.A DEPRESSION, UNSPECIFIED DEPRESSION TYPE: ICD-10-CM

## 2017-07-14 PROCEDURE — 1159F MED LIST DOCD IN RCRD: CPT | Mod: S$GLB,,, | Performed by: INTERNAL MEDICINE

## 2017-07-14 PROCEDURE — 1126F AMNT PAIN NOTED NONE PRSNT: CPT | Mod: S$GLB,,, | Performed by: INTERNAL MEDICINE

## 2017-07-14 PROCEDURE — 99999 PR PBB SHADOW E&M-EST. PATIENT-LVL III: CPT | Mod: PBBFAC,,, | Performed by: INTERNAL MEDICINE

## 2017-07-14 PROCEDURE — 99214 OFFICE O/P EST MOD 30 MIN: CPT | Mod: S$GLB,,, | Performed by: INTERNAL MEDICINE

## 2017-07-14 PROCEDURE — 99499 UNLISTED E&M SERVICE: CPT | Mod: S$PBB,,, | Performed by: INTERNAL MEDICINE

## 2017-07-14 RX ORDER — DIAZEPAM 2 MG/1
2 TABLET ORAL EVERY 6 HOURS PRN
Qty: 30 TABLET | Refills: 1 | Status: SHIPPED | OUTPATIENT
Start: 2017-07-14 | End: 2017-09-01 | Stop reason: SDUPTHER

## 2017-07-14 NOTE — PROGRESS NOTES
CC: followup of hypertension  HPI:  The patient is a 87 y.o. year old male who presents to the office for followup of hypertension.  The patient denies any chest pain, shortness of breath, headache, blurred vision, excessive fatigue, nausea or vomiting. Her daughter reports worsening tremors.  He has had no relief with medication.   The patient also continues to experience abdominal pain and diarrhea.  He also has persistent back pain and leg weakness due to spinal stenosis.  His daughter also reports depression.  His wife has been in a nursing home.  The patient reports rhinorrhea and watery eyes when eating.    PAST MEDICAL HISTORY:  Past Medical History:   Diagnosis Date    Anemia     Arthritis     Herniation of intervertebral disc     Hyperlipidemia     Hypertension 3/4/2015    Macular degeneration     Spinal stenosis        SURGICAL HISTORY:  Past Surgical History:   Procedure Laterality Date    CATARACT EXTRACTION W/  INTRAOCULAR LENS IMPLANT Left 10/31/07    os ()    CATARACT EXTRACTION W/  INTRAOCULAR LENS IMPLANT Right 12/16/15    od ()       MEDS:  Medcard reviewed and updated    ALLERGIES: Allergy Card reviewed and updated    SOCIAL HISTORY:   The patient is a nonsmoker.    PE:   APPEARANCE: Well nourished, well developed, in no acute distress.    CHEST: Lungs clear to auscultation with unlabored respirations.  CARDIOVASCULAR: Normal S1, S2. No murmurs. No carotid bruits. No pedal edema.  ABDOMEN: Bowel sounds normal. Not distended. Soft. No tenderness or masses.   MUSCULOSKELETAL:  Abnormal gait, ambulates with a walker.  PSYCHIATRIC: The patient is oriented to person, place, and time and has a pleasant affect.        ASSESSMENT/PLAN:  Phi was seen today for 3 month f/u.    Diagnoses and all orders for this visit:    Essential hypertension  -     Blood pressures mildly elevated, continue current medication    Abdominal pain, unspecified location  -     Continue Lomotil  as needed for diarrhea    Spinal stenosis, lumbar region, with neurogenic claudication  -     Continue gabapentin         Depression, unspecified depression type  -     Continue Lexapro    Tremor  -     Trial of low-dose Valium    Other orders  -     diazePAM (VALIUM) 2 MG tablet; Take 1 tablet (2 mg total) by mouth every 6 (six) hours as needed for Anxiety.

## 2017-07-18 ENCOUNTER — TELEPHONE (OUTPATIENT)
Dept: GASTROENTEROLOGY | Facility: CLINIC | Age: 82
End: 2017-07-18

## 2017-07-18 NOTE — TELEPHONE ENCOUNTER
Informed Ms. Lomeli that [facility] records have been received.  The office will call with recommendations after records have been reviewed. Verbalized understanding.

## 2017-07-24 ENCOUNTER — TELEPHONE (OUTPATIENT)
Dept: GASTROENTEROLOGY | Facility: HOSPITAL | Age: 82
End: 2017-07-24

## 2017-07-24 RX ORDER — BUDESONIDE 3 MG/1
9 CAPSULE, COATED PELLETS ORAL DAILY
Qty: 90 CAPSULE | Refills: 0 | Status: SHIPPED | OUTPATIENT
Start: 2017-07-24 | End: 2017-08-22 | Stop reason: SDUPTHER

## 2017-07-24 RX ORDER — BISMUTH SUBSALICYLATE 262 MG/1
3 TABLET, CHEWABLE ORAL 3 TIMES DAILY
COMMUNITY
End: 2017-10-16

## 2017-07-25 ENCOUNTER — TELEPHONE (OUTPATIENT)
Dept: GASTROENTEROLOGY | Facility: CLINIC | Age: 82
End: 2017-07-25

## 2017-07-25 NOTE — TELEPHONE ENCOUNTER
----- Message from Cathy Bowden sent at 7/24/2017  3:44 PM CDT -----  Contact: daughter/Yani   [phone]  Pt daughter requesting to speak with the nurse about prescriptions.  Please advise

## 2017-07-25 NOTE — TELEPHONE ENCOUNTER
----- Message from Marleny Summer sent at 7/25/2017  9:41 AM CDT -----  Contact: daughter, [phone]  Called in requesting patient's colitis medication sent to pharmacy. States she just called and they have not received it.

## 2017-07-26 ENCOUNTER — TELEPHONE (OUTPATIENT)
Dept: GASTROENTEROLOGY | Facility: CLINIC | Age: 82
End: 2017-07-26

## 2017-07-26 NOTE — TELEPHONE ENCOUNTER
----- Message from Cathy Bowden sent at 7/26/2017  9:25 AM CDT -----  Contact: [phone]/jarett Lomeli  Pt daughter requesting to speak with the nurse concerning the pt prescription . Pt daughter states this is the 3rd day she has been trying to get the prescription and speak with the nurse  Please advise

## 2017-07-26 NOTE — TELEPHONE ENCOUNTER
Spoke with daughter.  Answered all questions.  Apologized for the inconvenience. Patient will begin Entocort today and call back Monday with update.

## 2017-08-08 ENCOUNTER — OFFICE VISIT (OUTPATIENT)
Dept: PAIN MEDICINE | Facility: CLINIC | Age: 82
End: 2017-08-08
Payer: MEDICARE

## 2017-08-08 VITALS
BODY MASS INDEX: 23.66 KG/M2 | HEART RATE: 57 BPM | WEIGHT: 169 LBS | TEMPERATURE: 99 F | DIASTOLIC BLOOD PRESSURE: 92 MMHG | SYSTOLIC BLOOD PRESSURE: 163 MMHG | HEIGHT: 71 IN

## 2017-08-08 DIAGNOSIS — M51.36 DDD (DEGENERATIVE DISC DISEASE), LUMBAR: ICD-10-CM

## 2017-08-08 DIAGNOSIS — M47.819 SPONDYLOSIS WITHOUT MYELOPATHY: ICD-10-CM

## 2017-08-08 DIAGNOSIS — M48.062 SPINAL STENOSIS, LUMBAR REGION, WITH NEUROGENIC CLAUDICATION: Primary | ICD-10-CM

## 2017-08-08 DIAGNOSIS — M47.816 LUMBAR FACET ARTHROPATHY: ICD-10-CM

## 2017-08-08 PROCEDURE — 1159F MED LIST DOCD IN RCRD: CPT | Mod: S$GLB,,, | Performed by: ANESTHESIOLOGY

## 2017-08-08 PROCEDURE — 99999 PR PBB SHADOW E&M-EST. PATIENT-LVL III: CPT | Mod: PBBFAC,,, | Performed by: ANESTHESIOLOGY

## 2017-08-08 PROCEDURE — 1125F AMNT PAIN NOTED PAIN PRSNT: CPT | Mod: S$GLB,,, | Performed by: ANESTHESIOLOGY

## 2017-08-08 PROCEDURE — 99499 UNLISTED E&M SERVICE: CPT | Mod: S$PBB,,, | Performed by: ANESTHESIOLOGY

## 2017-08-08 PROCEDURE — 99214 OFFICE O/P EST MOD 30 MIN: CPT | Mod: S$GLB,,, | Performed by: ANESTHESIOLOGY

## 2017-08-08 NOTE — PROGRESS NOTES
Chronic patient Established Note (Follow up visit)      SUBJECTIVE:    Phi Sainz presents to the clinic for a follow-up appointment for lower back and legs. He is s/p Caudal RIGO with cath 1 year ago, he presents with daughter, he has been seeing Ortho spine for back and leg weakness.  Since the last visit, Phi Sainz states the pain has been worsening. Current pain intensity is 3/10.    Pain Disability Index Review:  Last 3 PDI Scores 8/8/2017 7/6/2016 6/1/2016   Pain Disability Index (PDI) 50 54 59.5       Pain Medications:    - Opioids: Lorcet (Hydrocodone/Acetaminophen)  - Adjuvant Medications: Neurontin (Gabapentin)  - Anti-Coagulants: None  - Others: see med list    Opioid Contract: no     report:  Reviewed and consistent with medication use as prescribed.    Pain Procedures:      6/3/2016   INJECTION-STEROID-EPIDURAL-CAUDAL- WITH CATH/    3/15/16 left L2-3-4-5 FACET MEDIAL BRANCH NERVE RADIOFREQUENCY NEUROTOMY (lumbar)  2/17/2016   RADIOFREQUENCY THERMOCOAGULATION ([facility])-NERVE-MEDIAN BRANCH-LUMBAR  Left L2, L3, L4, L5   2/16/16 bilateral L2-3-4-5 LUMBAR FACET MEDIAL BRANCH NERVE BLOCK  12/1/15 bilateral L2-3-4-5 LUMBAR FACET MEDIAL BRANCH NERVE BLOCK,   7/14/15 Bilateral L4-5 TRANSFORAMINAL EPIDURAL STEROID INJECTION     Physical Therapy/Home Exercise: yes    Imaging: MRI Lumbar Spine Without Contrast  Narrative     Lumbar spine without contrast    There is heterogeneous signal seen throughout the lumbar spine similar to what was seen in January of 2015. This may be seen with smoking, obesity or anemia.    The conus lies at T12.    L1/2: There is a posteriorly oriented disc protrusion resulting in mild central canal narrowing and mild narrowing of bilateral neural foramina.    L2/3: There is a circumferential disc protrusion resulting in severe central canal stenosis and mild bilateral neural foraminal narrowing.    L3/4: There is a circumferential disc protrusion. This in addition to  ligamentum flavum hypertrophy results in severe central canal stenosis and moderate bilateral neural foraminal narrowing.    L4/5: There is a circumferential disc protrusion and ligamentum flavum hypertrophy. These factors result in severe central canal stenosis, severe right neural foraminal narrowing and moderate left neural foraminal narrowing.    L5/S1: There is a circumferential disc protrusion with mild narrowing of bilateral neural foramina.    There are T2 hyperintense left renal lesions likely representing cysts but appear similar to prior exam.   Impression      There are degenerative changes at the lumbar spine which on MRI are most severe at L4-5.    ______________________________________     Electronically signed by: PAUL MCADAMS MD  Date: 09/13/16  Time: 13:55          X-Ray Lumbar Spine Ap And Lateral  Narrative     Lumbar spine AP lateral.    Findings: 2 views.  There is diffuse osteopenia.  There is loss of disc space height with degenerative endplate change and facet hypertrophy throughout the lumbar spine.  There is no fracture or dislocation.  There is calcification of the aorta.   Impression      As above.  ______________________________________     Electronically signed by: TL CHAPPELL MD  Date: 09/13/16  Time: 14:43          Allergies:   Review of patient's allergies indicates:   Allergen Reactions    Combigan [brimonidine-timolol] Other (See Comments)     dizziness       Current Medications:   Current Outpatient Prescriptions   Medication Sig Dispense Refill    bismuth subsalicylate (BISMUTH) 262 mg Chew Take 3 tablets by mouth 3 (three) times daily.       budesonide (ENTOCORT EC) 3 mg capsule Take 3 capsules (9 mg total) by mouth once daily. 90 capsule 0    diazePAM (VALIUM) 2 MG tablet Take 1 tablet (2 mg total) by mouth every 6 (six) hours as needed for Anxiety. 30 tablet 1    diphenoxylate-atropine 2.5-0.025 mg (LOMOTIL) 2.5-0.025 mg per tablet Take 1 tablet by mouth 2 (two) times  daily. (Patient taking differently: Take 1 tablet by mouth 2 (two) times daily. Pt taking prn.) 60 tablet 0    escitalopram oxalate (LEXAPRO) 10 MG tablet Take 1 tablet (10 mg total) by mouth once daily. 30 tablet 8    gabapentin (NEURONTIN) 100 MG capsule Take 100 mg by mouth 3 (three) times daily.      lisinopril (PRINIVIL,ZESTRIL) 20 MG tablet Take 1 tablet (20 mg total) by mouth once daily. 30 tablet 6    methylPREDNISolone (MEDROL DOSEPACK) 4 mg tablet Take 4 mg by mouth. 6 Pills on first day, 5 pills on the second day, 4 pills on third day, 3 pills on fourth day, 2 pill on fifth day, and one pill on sixth day.      levocetirizine (XYZAL) 5 MG tablet Take 1 tablet (5 mg total) by mouth every evening. 30 tablet 6     No current facility-administered medications for this visit.        REVIEW OF SYSTEMS:    GENERAL:  No weight loss, malaise or fevers.  HEENT:  Negative for frequent or significant headaches.  NECK:  Negative for lumps, goiter, pain and significant neck swelling.  RESPIRATORY:  Negative for cough, wheezing or shortness of breath.  CARDIOVASCULAR:  Negative for chest pain, leg swelling or palpitations.  GI:  Negative for abdominal discomfort, blood in stools or black stools or change in bowel habits.  MUSCULOSKELETAL:  See HPI.  SKIN:  Negative for lesions, rash, and itching.  PSYCH:  Positive for sleep disturbance, mood disorder and recent psychosocial stressors.  HEMATOLOGY/LYMPHOLOGY:  Negative for prolonged bleeding, bruising easily or swollen nodes.  NEURO:   No history of headaches, syncope, paralysis, seizures or tremors.  All other reviewed and negative other than HPI.       Past Medical History:  Past Medical History:   Diagnosis Date    Anemia     Arthritis     Herniation of intervertebral disc     Hyperlipidemia     Hypertension 3/4/2015    Macular degeneration     Spinal stenosis        Past Surgical History:  Past Surgical History:   Procedure Laterality Date    CATARACT  "EXTRACTION W/  INTRAOCULAR LENS IMPLANT Left 10/31/07    os ()    CATARACT EXTRACTION W/  INTRAOCULAR LENS IMPLANT Right 12/16/15    od ()       Family History:  Family History   Problem Relation Age of Onset    Cancer Father      prostate    Colon cancer Father     Hearing loss Paternal Aunt     Amblyopia Neg Hx     Blindness Neg Hx     Cataracts Neg Hx     Diabetes Neg Hx     Glaucoma Neg Hx     Hypertension Neg Hx     Macular degeneration Neg Hx     Retinal detachment Neg Hx     Strabismus Neg Hx     Stroke Neg Hx     Thyroid disease Neg Hx     Heart attack Neg Hx     Heart disease Neg Hx     Heart failure Neg Hx     Hyperlipidemia Neg Hx        Social History:  Social History     Social History    Marital status:      Spouse name: N/A    Number of children: N/A    Years of education: N/A     Occupational History    retired      Social History Main Topics    Smoking status: Never Smoker    Smokeless tobacco: Never Used    Alcohol use No    Drug use: No    Sexual activity: Yes     Partners: Female     Other Topics Concern    None     Social History Narrative    None       OBJECTIVE:    BP (!) 163/92   Pulse (!) 57   Temp 98.6 °F (37 °C)   Ht 5' 11" (1.803 m)   Wt 76.7 kg (169 lb)   BMI 23.57 kg/m²     PHYSICAL EXAMINATION:    General appearance: Well appearing, in no acute distress, alert and oriented x3.  Psych: Mood and affect appropriate.  Skin: Skin color, texture, turgor normal, no rashes or lesions, in both upper and lower body.  Head/face: Atraumatic, normocephalic. No palpable lymph nodes  Neck: No pain to palpation over the cervical paraspinous muscles. Spurling Negative. No pain with neck flexion, extension, or lateral flexion. .  Cor: RRR  Pulm: CTA  GI: Abdomen soft and non-tender.  Back: Straight leg raising in the sitting and supine positions is negative to radicular pain. moderate pain to palpation over the spine or costovertebral " angles. Normal range of motion without pain reproduction. Positive axial loading test bilateral. Positive tenderness over both SIJ, Positive FABERE and Yeoman's test on the both side.negative FADIR  Extremities: Peripheral joint ROM is full and pain free without obvious instability or laxity in all four extremities. No deformities, edema, or skin discoloration. Good capillary refill.  Musculoskeletal: Shoulder, hip  and knee provocative maneuvers are negative. Bilateral upper and lower extremity strength is normal and symmetric. No atrophy or tone abnormalities are noted.  Neuro: Bilateral upper and lower extremity coordination and muscle stretch reflexes are physiologic and symmetric. Plantar response are downgoing. No loss of sensation is noted.  Gait: antalgic uses a walker    ASSESSMENT: 87 y.o. year old male with low back pain, consistent with      Diagnosis:    1. Spinal stenosis, lumbar region, with neurogenic claudication     2. DDD (degenerative disc disease), lumbar     3. Lumbar facet arthropathy     4. Spondylosis without myelopathy           PLAN:     - I have stressed the importance of physical activity and a home exercise plan to help with pain and improve health.  - Patient can continue with medications for now since they are providing benefits, using them appropriately, and without side effects.   Counseled patient regarding the importance of activity modification, constant sleeping habits and physical therapy.  - Schedule for a Caudal RIGO with Racz Cath  to help withpain and progress with a Home exercise program.  - I have explained the risks, benefits, and alternatives of the procedure in detail. The patient voices understanding and all questions have been answered. The patient agrees to proceed as planned. Written Consent obtained.   -RTC 2-3 weeks following procedure.      Lonnie Jackson MD  08/08/2017

## 2017-08-18 ENCOUNTER — TELEPHONE (OUTPATIENT)
Dept: PAIN MEDICINE | Facility: CLINIC | Age: 82
End: 2017-08-18

## 2017-08-18 NOTE — TELEPHONE ENCOUNTER
Spoke with patient regarding time of arrival for procedure that is scheduled on 8/22/17 . Patient verbalized instructions and confirmed procedure date and time of arrival on 8/22/17 at 945 AM.

## 2017-08-22 RX ORDER — BUDESONIDE 3 MG/1
9 CAPSULE, COATED PELLETS ORAL DAILY
Qty: 90 EACH | Refills: 0 | Status: SHIPPED | OUTPATIENT
Start: 2017-08-22 | End: 2017-09-21

## 2017-09-01 RX ORDER — DIAZEPAM 2 MG/1
TABLET ORAL
Qty: 30 TABLET | Refills: 1 | Status: SHIPPED | OUTPATIENT
Start: 2017-09-01 | End: 2017-10-13 | Stop reason: SDUPTHER

## 2017-09-27 ENCOUNTER — OFFICE VISIT (OUTPATIENT)
Dept: PODIATRY | Facility: CLINIC | Age: 82
End: 2017-09-27
Payer: MEDICARE

## 2017-09-27 VITALS
HEIGHT: 71 IN | SYSTOLIC BLOOD PRESSURE: 135 MMHG | HEART RATE: 59 BPM | WEIGHT: 169 LBS | BODY MASS INDEX: 23.66 KG/M2 | DIASTOLIC BLOOD PRESSURE: 76 MMHG

## 2017-09-27 DIAGNOSIS — M48.062 SPINAL STENOSIS, LUMBAR REGION, WITH NEUROGENIC CLAUDICATION: ICD-10-CM

## 2017-09-27 DIAGNOSIS — B35.1 ONYCHOMYCOSIS WITH INGROWN TOENAIL: Primary | ICD-10-CM

## 2017-09-27 DIAGNOSIS — L60.0 ONYCHOMYCOSIS WITH INGROWN TOENAIL: Primary | ICD-10-CM

## 2017-09-27 DIAGNOSIS — R29.898 LEG WEAKNESS, BILATERAL: ICD-10-CM

## 2017-09-27 PROCEDURE — 99999 PR PBB SHADOW E&M-EST. PATIENT-LVL III: CPT | Mod: PBBFAC,,, | Performed by: PODIATRIST

## 2017-09-27 PROCEDURE — 99203 OFFICE O/P NEW LOW 30 MIN: CPT | Mod: S$GLB,,, | Performed by: PODIATRIST

## 2017-09-27 PROCEDURE — 1159F MED LIST DOCD IN RCRD: CPT | Mod: S$GLB,,, | Performed by: PODIATRIST

## 2017-09-27 PROCEDURE — 3008F BODY MASS INDEX DOCD: CPT | Mod: S$GLB,,, | Performed by: PODIATRIST

## 2017-09-27 PROCEDURE — 1126F AMNT PAIN NOTED NONE PRSNT: CPT | Mod: S$GLB,,, | Performed by: PODIATRIST

## 2017-09-27 RX ORDER — ECONAZOLE NITRATE 10 MG/G
CREAM TOPICAL DAILY
Qty: 30 G | Refills: 6 | Status: SHIPPED | OUTPATIENT
Start: 2017-09-27 | End: 2018-05-28 | Stop reason: ALTCHOICE

## 2017-09-27 RX ORDER — HYDROCODONE BITARTRATE AND ACETAMINOPHEN 5; 325 MG/1; MG/1
TABLET ORAL
Refills: 0 | Status: ON HOLD | COMMUNITY
Start: 2017-09-07 | End: 2019-03-18 | Stop reason: HOSPADM

## 2017-09-27 NOTE — PROGRESS NOTES
Subjective:      Patient ID: Phi Sainz is a 87 y.o. male.    Chief Complaint: Toe Pain (Left hallux)    Phi is a 87 y.o. male who presents to the clinic complaining of mild pain and thick and discolored toenails on both feet, especially at left hallux. Denies injury, bruising, bleeding, ingrown nail. Phi is inquiring about treatment options.      Review of Systems   Constitution: Negative for chills, fever and malaise/fatigue.   Cardiovascular: Negative for chest pain, leg swelling, orthopnea and palpitations.   Respiratory: Negative for cough, shortness of breath and wheezing.    Skin: Positive for color change, dry skin and nail changes. Negative for itching, poor wound healing and rash.   Musculoskeletal: Negative for arthritis, gout, joint pain, joint swelling, muscle weakness and myalgias.   Neurological: Positive for numbness, paresthesias and sensory change. Negative for disturbances in coordination, dizziness, focal weakness and tremors.           Objective:      Physical Exam   Cardiovascular:   Dorsalis pedis and posterior tibial pulses are diminished Bilaterally. Toes are cool to touch. Feet are warm proximally.There is decreased digital hair. Skin is atrophic, slightly hyperpigmented, and mildly edematous       Musculoskeletal:   Musculoskeletal:  Muscle strength is 5/5 in all groups bilaterally.  Metatarsophalangeal and subtalar range of motion are within normal limits without crepitus bilaterally. There is limitation of ankle dorsiflexion with knees extended and flexed Bilaterally.       Neurological:   [place] 5.07 monofilamant testing is diminished both feet. Sharp/dull sensation diminished Bilaterally. Light touch absent Bilaterally.       Skin:   Inflamed cryptotic border of nail with no drainage, pus nor malodor but mild erythema and edema of the associated nail fold, medial border, right hallux    Toenails 1-5 bilaterally are elongated by 2-3 mm, thickened by 2-3 mm,  discolored/yellowed, dystrophic, brittle with subungual debris. Mild xerosis noted, bilat. No open wounds.                 Assessment:       Encounter Diagnoses   Name Primary?    Onychomycosis with ingrown toenail - Right Foot Yes    Spinal stenosis, lumbar region, with neurogenic claudication     Leg weakness, bilateral          Plan:       Phi was seen today for toe pain.    Diagnoses and all orders for this visit:    Onychomycosis with ingrown toenail - Right Foot  -     econazole nitrate 1 % cream; Apply topically once daily. Toenails    Spinal stenosis, lumbar region, with neurogenic claudication    Leg weakness, bilateral      I counseled the patient on his conditions, their implications and medical management.    Discussed conservative vs surgical treatment of recurrent painful ingrown toenails with associated risks and benefits.  Slant back nail removal performed today. He agrees to avulsion if pain continues.    Econazole 1% topical cream prescribed for treatment of aforementioned tinea. Patient will use this medication as directed in addition to thourougly drying between toes daily, and applying powder as needed    Utilizing sterile toenail clippers I aggressively debrided the offending nail border approximately 3 mm from its edge and carried the nail plate incision down at an angle in order to wedge out the offending cryptotic portion of the nail plate. The offending border was then removed in toto. The area was cleansed with alcohol. Patient tolerated the procedure well and related significant relief.

## 2017-09-27 NOTE — PATIENT INSTRUCTIONS
Ingrown Toenail, Not Infected (Home Treatment)  An ingrown toenail occurs when the nail grows sideways into the skin next to the nail. This can cause pain, especially when wearing tight shoes. It can also lead to an infection with redness, swelling, and pus drainage. Most people respond to the treatments described here. But sometimes surgery is needed. The big toe is most often affected.   The most common cause of an ingrown toenail is trimming your nails wrong. Most people trim the nails too close to the skin and try to round the nail too tightly around the shape of the toe. When you do this, the nail can grow into the skin of your toe. It is safer to trim the nail ending in a straight line rather than a curve.  Home care  The following guidelines will help you care for your toenail at home:  · Soak the painful toe in warm water 3 to 4 times each day, for 10 to 20 minutes each time. Adding Epsom salt may be recommended by your healthcare provider. Wash the entire foot with an antibacterial soap. Then keep it dry.  · If there is redness or swelling around the toenail, apply an antibiotic ointment 3 times a day.  · Insert a small piece of rolled-up cotton under the corner of the nail. This helps the nail to grow outward, away from the cuticle.  · Wear shoes that dont put pressure on the toes, such as a sandal or open shoe. Closed shoes should be big enough in the toes so that there is no pressure on the painful toe.  · You may use acetaminophen or ibuprofen for pain, unless another pain medicine was prescribed. Talk with your healthcare provider before using these medicines if you have chronic liver or kidney disease. Also tell your provider if you have ever had a stomach ulcer or GI (gastrointestinal) bleeding.  Prevention  The following tips will help you prevent ingrown toenails:  · Avoid pointed, tight, or narrow shoes.  · Trim toenails once a month so they dont grow too long. Cut the nail straight  across.  Follow-up care  Follow up with your healthcare provider, or as advised.  When to seek medical advice  Call your healthcare provider right away if any of these occur:  · Increasing redness, pain, or swelling of the toe  · Tender red streaks in the skin leading toward the ankle  · Pus or fluid drainage from the toe  · Fever of 100.4°F (38°C) or higher, or as directed by your provider  Date Last Reviewed: 4/1/2017 © 2000-2017 [employer]. [address] [place] [address]. All rights reserved. This information is not intended as a substitute for professional medical care. Always follow your healthcare professional's instructions.        Understanding Ingrown Toenails    An ingrown nail is the result of a nail growing into the skin that surrounds it. This often occurs at either edge of the big toe. Ingrown nails may be caused by improper trimming, inherited nail deformities, injuries, fungal infections, or pressure.  Symptoms  Ingrown nails may cause pain at the tip of the toe or all the way to the base of the toe. The pain is often worse while walking. An ingrown nail may also lead to infection, inflammation, or a more serious condition. If its infected, you might see pus or redness.  Evaluation  To determine the extent of your problem, your healthcare provider examines and possibly presses the painful area. If other problems are suspected, blood tests, cultures, and X-rays may be done as well.  Treatment  If the nail isnt infected, your healthcare provider may trim the corner of it to help relieve your symptoms. He or she may need to remove one side of your nail back to the cuticle. The base of the nail may then be treated with a chemical to keep the ingrown part from growing back. Severe infections or ingrown nails may require antibiotics and temporary or permanent removal of a portion of the nail. To prevent pain, a local anesthetic may be used in these procedures. This treatment is  usually done at your healthcare provider's office.  Prevention  Many nail problems can be prevented by wearing the right shoes and trimming your nails properly. To help avoid infection, keep your feet clean and dry. If you have diabetes, talk with your healthcare provider before doing any foot self-care.  · The right shoes: Get your feet measured (your size may change as you age). Wear shoes that are supportive and roomy enough for your toes to wiggle. Look for shoes made of natural materials such as leather, which allow your feet to breathe.  · Proper trimming: To avoid problems, trim your toenails straight across without cutting down into the corners. If you cant trim your own nails, ask your healthcare provider to do so for you.  Date Last Reviewed: 10/1/2016  © 3804-0948 [employer]. [address] [place] [address], [place] [address]. All rights reserved. This information is not intended as a substitute for professional medical care. Always follow your healthcare professional's instructions.

## 2017-10-02 RX ORDER — LEVOCETIRIZINE DIHYDROCHLORIDE 5 MG/1
5 TABLET, FILM COATED ORAL NIGHTLY
Qty: 30 TABLET | Refills: 6 | Status: SHIPPED | OUTPATIENT
Start: 2017-10-02 | End: 2017-10-16

## 2017-10-09 ENCOUNTER — NURSE TRIAGE (OUTPATIENT)
Dept: ADMINISTRATIVE | Facility: CLINIC | Age: 82
End: 2017-10-09

## 2017-10-09 ENCOUNTER — HOSPITAL ENCOUNTER (EMERGENCY)
Facility: HOSPITAL | Age: 82
Discharge: HOME | End: 2017-10-09
Attending: EMERGENCY MEDICINE
Payer: MEDICARE

## 2017-10-09 VITALS
HEART RATE: 60 BPM | SYSTOLIC BLOOD PRESSURE: 175 MMHG | OXYGEN SATURATION: 96 % | RESPIRATION RATE: 18 BRPM | WEIGHT: 180 LBS | TEMPERATURE: 99 F | DIASTOLIC BLOOD PRESSURE: 95 MMHG | BODY MASS INDEX: 28.93 KG/M2 | HEIGHT: 66 IN

## 2017-10-09 DIAGNOSIS — I10 ESSENTIAL HYPERTENSION: ICD-10-CM

## 2017-10-09 DIAGNOSIS — R06.00 DYSPNEA, UNSPECIFIED TYPE: Primary | ICD-10-CM

## 2017-10-09 DIAGNOSIS — T78.40XA ALLERGIC REACTION, INITIAL ENCOUNTER: ICD-10-CM

## 2017-10-09 DIAGNOSIS — R06.00 DYSPNEA: ICD-10-CM

## 2017-10-09 LAB
ALBUMIN SERPL BCP-MCNC: 3.5 G/DL
ALP SERPL-CCNC: 74 U/L
ALT SERPL W/O P-5'-P-CCNC: 16 U/L
ANION GAP SERPL CALC-SCNC: 11 MMOL/L
AST SERPL-CCNC: 20 U/L
BASOPHILS # BLD AUTO: 0.01 K/UL
BASOPHILS NFR BLD: 0.1 %
BILIRUB SERPL-MCNC: 0.8 MG/DL
BNP SERPL-MCNC: 215 PG/ML
BUN SERPL-MCNC: 19 MG/DL
CALCIUM SERPL-MCNC: 9 MG/DL
CHLORIDE SERPL-SCNC: 106 MMOL/L
CO2 SERPL-SCNC: 26 MMOL/L
CREAT SERPL-MCNC: 1 MG/DL
D DIMER PPP IA.FEU-MCNC: 1.32 MG/L FEU
DIFFERENTIAL METHOD: ABNORMAL
EOSINOPHIL # BLD AUTO: 0 K/UL
EOSINOPHIL NFR BLD: 0 %
ERYTHROCYTE [DISTWIDTH] IN BLOOD BY AUTOMATED COUNT: 13 %
EST. GFR  (AFRICAN AMERICAN): >60 ML/MIN/1.73 M^2
EST. GFR  (NON AFRICAN AMERICAN): >60 ML/MIN/1.73 M^2
GLUCOSE SERPL-MCNC: 100 MG/DL
HCT VFR BLD AUTO: 44.7 %
HGB BLD-MCNC: 14.5 G/DL
LYMPHOCYTES # BLD AUTO: 1.5 K/UL
LYMPHOCYTES NFR BLD: 15.9 %
MCH RBC QN AUTO: 30 PG
MCHC RBC AUTO-ENTMCNC: 32.4 G/DL
MCV RBC AUTO: 93 FL
MONOCYTES # BLD AUTO: 0.8 K/UL
MONOCYTES NFR BLD: 8.5 %
NEUTROPHILS # BLD AUTO: 7.1 K/UL
NEUTROPHILS NFR BLD: 75.1 %
PLATELET # BLD AUTO: 168 K/UL
PMV BLD AUTO: 11 FL
POTASSIUM SERPL-SCNC: 3.7 MMOL/L
PROT SERPL-MCNC: 7.6 G/DL
RBC # BLD AUTO: 4.83 M/UL
SODIUM SERPL-SCNC: 143 MMOL/L
TROPONIN I SERPL DL<=0.01 NG/ML-MCNC: <0.006 NG/ML
TROPONIN I SERPL DL<=0.01 NG/ML-MCNC: <0.006 NG/ML
WBC # BLD AUTO: 9.41 K/UL

## 2017-10-09 PROCEDURE — 96361 HYDRATE IV INFUSION ADD-ON: CPT

## 2017-10-09 PROCEDURE — 25500020 PHARM REV CODE 255: Performed by: EMERGENCY MEDICINE

## 2017-10-09 PROCEDURE — 85379 FIBRIN DEGRADATION QUANT: CPT

## 2017-10-09 PROCEDURE — 80053 COMPREHEN METABOLIC PANEL: CPT

## 2017-10-09 PROCEDURE — 63600175 PHARM REV CODE 636 W HCPCS: Performed by: EMERGENCY MEDICINE

## 2017-10-09 PROCEDURE — 99285 EMERGENCY DEPT VISIT HI MDM: CPT | Mod: ,,, | Performed by: EMERGENCY MEDICINE

## 2017-10-09 PROCEDURE — 85025 COMPLETE CBC W/AUTO DIFF WBC: CPT

## 2017-10-09 PROCEDURE — 25000003 PHARM REV CODE 250: Performed by: EMERGENCY MEDICINE

## 2017-10-09 PROCEDURE — 83880 ASSAY OF NATRIURETIC PEPTIDE: CPT

## 2017-10-09 PROCEDURE — 93005 ELECTROCARDIOGRAM TRACING: CPT

## 2017-10-09 PROCEDURE — 99285 EMERGENCY DEPT VISIT HI MDM: CPT | Mod: 25

## 2017-10-09 PROCEDURE — 84484 ASSAY OF TROPONIN QUANT: CPT | Mod: 91

## 2017-10-09 PROCEDURE — 96374 THER/PROPH/DIAG INJ IV PUSH: CPT

## 2017-10-09 PROCEDURE — 93010 ELECTROCARDIOGRAM REPORT: CPT | Mod: ,,, | Performed by: INTERNAL MEDICINE

## 2017-10-09 RX ORDER — LISINOPRIL 20 MG/1
20 TABLET ORAL
Status: COMPLETED | OUTPATIENT
Start: 2017-10-09 | End: 2017-10-09

## 2017-10-09 RX ORDER — DIPHENHYDRAMINE HYDROCHLORIDE 50 MG/ML
50 INJECTION INTRAMUSCULAR; INTRAVENOUS
Status: COMPLETED | OUTPATIENT
Start: 2017-10-09 | End: 2017-10-09

## 2017-10-09 RX ORDER — DIPHENHYDRAMINE HYDROCHLORIDE 50 MG/ML
INJECTION INTRAMUSCULAR; INTRAVENOUS
Status: DISCONTINUED
Start: 2017-10-09 | End: 2017-10-09 | Stop reason: HOSPADM

## 2017-10-09 RX ADMIN — SODIUM CHLORIDE 500 ML: 0.9 INJECTION, SOLUTION INTRAVENOUS at 04:10

## 2017-10-09 RX ADMIN — LISINOPRIL 20 MG: 20 TABLET ORAL at 08:10

## 2017-10-09 RX ADMIN — IOHEXOL 100 ML: 350 INJECTION, SOLUTION INTRAVENOUS at 07:10

## 2017-10-09 RX ADMIN — DIPHENHYDRAMINE HYDROCHLORIDE 50 MG: 50 INJECTION, SOLUTION INTRAMUSCULAR; INTRAVENOUS at 07:10

## 2017-10-09 NOTE — ED TRIAGE NOTES
"Patient states "this morning I felt a shortness of breath and it was something new, something different, I didn't feel right." States that the SOB lasted for "several hours this morning, but it seems to have lessened now." Denies current SOB. States that this has never happened to him before. Denies CP, but C/O chest "tightness." States that he hasn't ever had chest tightness before now, and came "at the same time" as the SOB.  "

## 2017-10-09 NOTE — ED NOTES
Appearance: Patient resting comfortably and in no acute distress. Patient is clean and well groomed, with clothing properly fastened.  Cardiac: Heart sounds audible and without abnormalities noted. Patient has a normal rate and regular rhythm, no periphreal edema noted, capillary refill < 3 seconds.  Neuro/LOC: Eyes open spontaneously, behavior appropriate to situation, follows commands, facial expression symmetrical, purposeful motor response noted. AAOx4; The patient is awake, alert and aware of environment with an appropriate affect, and speaking appropriately.  Respiratory: Breath sounds audible, equal and clear bilaterally. Airway is open and patent, respirations are spontaneous, patient has a normal effort and rate, no accessory muscle use noted.  Skin: Intact, warm and dry. Color is consistent with ethnicity. Normal skin turgor and moist mucous membranes.  Gastro: Bowel sounds audible and active x4 quadrants. No tenderness and no distention are present.  Musculoskeletal: Patient moving all extremities spontaneously, no obvious swelling or deformities noted.  Peripheral vascular: Pulses +2 x4 upper/lower extremities.     -Patient identifiers verified and correct for this patient.  -Patient resting with bedrails up x2 for safety, bed locked in low position, and call bell within reach.

## 2017-10-09 NOTE — ED PROVIDER NOTES
Encounter Date: 10/9/2017    SCRIBE #1 NOTE: I, Dr. Au, am scribing for, and in the presence of,  Amber Rodriguez. I have scribed the entire note.       History     Chief Complaint   Patient presents with    Shortness of Breath     shortness of breath started this am.  warm to touch per ems.     Time patient was seen by the provider: 3:28 PM      The patient is a 87 y.o. male with hx of: stenosis, hyperlipidemia, DM, and CHF that presents to the ED with a complaint of acute SOB and chest pain that started after waking up this morning. This is the patient's first time having these symptoms and is usually feeling well otherwise. The pain is located in the center of his chest and described as a tightness when taking a deep breath with a severity of 5 on a 1-5 scale. His daughter stated that he looked pink and pale, but was not gasping for air. He took a trip 2 days ago and sat for a duration of 5 hours. He denies difficulty eating and drinking, fever, chills, rhinorrhea, cough, nausea, vomiting, recent falls, sick contact, or diaphoresis. He does have lower extremity edema which is baseline.  Pt did not take his morning medications this morning. He is on steroids and antihypertension medicines. He states that his pain has subsided upon arrival.         The history is provided by the patient and a relative.     Review of patient's allergies indicates:   Allergen Reactions    Combigan [brimonidine-timolol] Other (See Comments)     dizziness     Past Medical History:   Diagnosis Date    Anemia     Arthritis     Depression     Herniation of intervertebral disc     Hyperlipidemia     Hypertension 3/4/2015    Macular degeneration     Spinal stenosis      Past Surgical History:   Procedure Laterality Date    CATARACT EXTRACTION W/  INTRAOCULAR LENS IMPLANT Left 10/31/07    os ()    CATARACT EXTRACTION W/  INTRAOCULAR LENS IMPLANT Right 12/16/15    od ()     Family History   Problem  Relation Age of Onset    Cancer Father      prostate    Colon cancer Father     Hearing loss Paternal Aunt     Amblyopia Neg Hx     Blindness Neg Hx     Cataracts Neg Hx     Diabetes Neg Hx     Glaucoma Neg Hx     Hypertension Neg Hx     Macular degeneration Neg Hx     Retinal detachment Neg Hx     Strabismus Neg Hx     Stroke Neg Hx     Thyroid disease Neg Hx     Heart attack Neg Hx     Heart disease Neg Hx     Heart failure Neg Hx     Hyperlipidemia Neg Hx      Social History   Substance Use Topics    Smoking status: Never Smoker    Smokeless tobacco: Never Used    Alcohol use No     Review of Systems   Constitutional: Negative for chills, diaphoresis and fever.   HENT: Negative for rhinorrhea and sore throat.    Respiratory: Positive for shortness of breath. Negative for cough.    Cardiovascular: Positive for chest pain (Central) and leg swelling.   Gastrointestinal: Negative for nausea and vomiting.   Genitourinary: Negative for dysuria.   Musculoskeletal: Negative for back pain.   Skin: Negative for rash.   Neurological: Negative for weakness.   Hematological: Does not bruise/bleed easily.       Physical Exam     Initial Vitals [10/09/17 1243]   BP Pulse Resp Temp SpO2   (!) 180/100 64 18 98.7 °F (37.1 °C) 99 %      MAP       126.67         Physical Exam    Nursing note and vitals reviewed.  Constitutional: He appears well-developed and well-nourished. He is not diaphoretic. No distress.   Pt is resting comfortably.    HENT:   Head: Normocephalic and atraumatic.   Mouth/Throat: Oropharynx is clear and moist.   Neck: Normal range of motion. Neck supple. No JVD present.   Cardiovascular: Normal rate, regular rhythm, normal heart sounds and intact distal pulses.   Pulmonary/Chest: No accessory muscle usage. No tachypnea. No respiratory distress. He has no wheezes. He has no rhonchi. He has no rales.   Pt has bilateral crackles.   Abdominal: Soft. He exhibits no distension. There is no  tenderness.   Musculoskeletal: Normal range of motion.   Pt has no pitting edema, but has swelling bilaterally to feet which is baseline.   Lymphadenopathy:     He has no cervical adenopathy.   Neurological: He is alert and oriented to person, place, and time. He has normal strength. No cranial nerve deficit or sensory deficit.   Skin: Skin is warm and dry.         ED Course   Procedures  Labs Reviewed   CBC W/ AUTO DIFFERENTIAL - Abnormal; Notable for the following:        Result Value    Gran% 75.1 (*)     Lymph% 15.9 (*)     All other components within normal limits   B-TYPE NATRIURETIC PEPTIDE - Abnormal; Notable for the following:      (*)     All other components within normal limits   D DIMER, QUANTITATIVE - Abnormal; Notable for the following:     D-Dimer 1.32 (*)     All other components within normal limits   COMPREHENSIVE METABOLIC PANEL   TROPONIN I   TROPONIN I     EKG Readings: (Independently Interpreted)   Normal sinus at 64, normal intervals, and normal axis. There are no ischemic changes.        X-Rays:   Independently Interpreted Readings:   Chest X-Ray: No large consolidation or effusion. Chronic interstitial changes    Other Readings:  CTA of the chest: No pulmonary embolus    Medical Decision Making:   History:   Old Medical Records: I decided to obtain old medical records.  Initial Assessment:   Emergent evaluation of 87 y.o. Male with shortness of breath. Differentials include pneumonia, bronchitis, and new onset of CHF and ACS.Will order EKG, CXR, and labs and continue to monitor.    4:08  Labs reviewed and significant for mild elevation in BNP. D-dimer still pending      5:00 PM  D-dimer is elevated at 1.3.  CTA ordered.    7:00 PM  CTA performed.  On reevaluation of patient, he broke out in a diffuse erythematous rash located on the trunk.  No oral involvement.  Patient treated with Benadryl 50 g IV    9:00pm  Second troponin negative. Discussed with family CT and lab findings. I  have offered them observation. Pt states he prefer to be discharged. Discussed follow-up with pulmonology and cardiology. Pt is stable for discharge.   Independently Interpreted Test(s):   I have ordered and independently interpreted X-rays - see prior notes.  I have ordered and independently interpreted EKG Reading(s) - see prior notes  Clinical Tests:   Lab Tests: Ordered and Reviewed  Radiological Study: Ordered and Reviewed  Medical Tests: Ordered and Reviewed            Scribe Attestation:   Scribe #1: I performed the above scribed service and the documentation accurately describes the services I performed. I attest to the accuracy of the note.    Attending Attestation:           Physician Attestation for Scribe:      Comments: I, Dr. Ellen Au, personally performed the services described in this documentation. All medical record entries made by the scribe were at my direction and in my presence.  I have reviewed the chart and agree that the record reflects my personal performance and is accurate and complete. Ellen Au MD.  9:41 AM 10/10/2017            ED Course      Clinical Impression:   The primary encounter diagnosis was Dyspnea, unspecified type. Diagnoses of Dyspnea, Essential hypertension, and Allergic reaction, initial encounter were also pertinent to this visit.    Disposition:   Disposition: Discharged  Condition: Stable                        Ellen Au MD  10/10/17 0941       Ellen Au MD  10/10/17 0941

## 2017-10-09 NOTE — TELEPHONE ENCOUNTER
Reason for Disposition   MODERATE difficulty breathing (e.g., speaks in phrases, SOB even at rest, pulse 100-120) of new onset or worse than normal    Protocols used: ST BREATHING DIFFICULTY-A-OH    Spoke to patient's daughter Yani. She states patient has a new onset of sob at rest.

## 2017-10-10 NOTE — ED NOTES
Patient is resting on stretcher with call bell within reach, bed locked in the low position, and side rails up x2 for safety. Patient is in NAD. RR spontaneous, even, and unlabored. Denies pain. Will continue to monitor.

## 2017-10-10 NOTE — ED NOTES
Per family, patient covered in rash on torso immediately after returning to room from CT. Dr Au at the bedside to assess patient. Benadryl 50mg IV given. Family and patient notified to immediately notify nurse if any breathing difficulties or chest tightness occur.

## 2017-10-10 NOTE — ED NOTES
Patient looking much better after receiving benadryl. Rash that was previously noted to abdomen is almost all of the way diminished. Will continue to montior. Dr. Au made aware of patient's condition.

## 2017-10-13 RX ORDER — DIAZEPAM 2 MG/1
2 TABLET ORAL EVERY 6 HOURS PRN
Qty: 30 TABLET | Refills: 1 | Status: SHIPPED | OUTPATIENT
Start: 2017-10-13 | End: 2017-10-16 | Stop reason: SDUPTHER

## 2017-10-13 NOTE — TELEPHONE ENCOUNTER
----- Message from Anali Ritter sent at 10/13/2017  1:01 PM CDT -----  Contact: Mc CLARKE at [phone]  Mc calling to f/u on prior authorization on pt's diazePAM (VALIUM) 2 MG tablet.

## 2017-10-16 ENCOUNTER — OFFICE VISIT (OUTPATIENT)
Dept: INTERNAL MEDICINE | Facility: CLINIC | Age: 82
End: 2017-10-16
Payer: MEDICARE

## 2017-10-16 ENCOUNTER — TELEPHONE (OUTPATIENT)
Dept: INTERNAL MEDICINE | Facility: CLINIC | Age: 82
End: 2017-10-16

## 2017-10-16 VITALS
DIASTOLIC BLOOD PRESSURE: 90 MMHG | RESPIRATION RATE: 20 BRPM | BODY MASS INDEX: 22.64 KG/M2 | HEIGHT: 72 IN | WEIGHT: 167.13 LBS | TEMPERATURE: 98 F | SYSTOLIC BLOOD PRESSURE: 150 MMHG | HEART RATE: 70 BPM

## 2017-10-16 DIAGNOSIS — I10 ESSENTIAL HYPERTENSION: ICD-10-CM

## 2017-10-16 DIAGNOSIS — Z23 NEED FOR VACCINATION WITH 13-POLYVALENT PNEUMOCOCCAL CONJUGATE VACCINE: ICD-10-CM

## 2017-10-16 DIAGNOSIS — R06.02 SHORTNESS OF BREATH: Primary | ICD-10-CM

## 2017-10-16 DIAGNOSIS — J84.9 ILD (INTERSTITIAL LUNG DISEASE): ICD-10-CM

## 2017-10-16 PROCEDURE — 99999 PR PBB SHADOW E&M-EST. PATIENT-LVL IV: CPT | Mod: PBBFAC,,, | Performed by: INTERNAL MEDICINE

## 2017-10-16 PROCEDURE — G0008 ADMIN INFLUENZA VIRUS VAC: HCPCS | Mod: S$GLB,,, | Performed by: INTERNAL MEDICINE

## 2017-10-16 PROCEDURE — 99214 OFFICE O/P EST MOD 30 MIN: CPT | Mod: 25,S$GLB,, | Performed by: INTERNAL MEDICINE

## 2017-10-16 PROCEDURE — 99499 UNLISTED E&M SERVICE: CPT | Mod: S$PBB,,, | Performed by: INTERNAL MEDICINE

## 2017-10-16 PROCEDURE — 90670 PCV13 VACCINE IM: CPT | Mod: S$GLB,,, | Performed by: INTERNAL MEDICINE

## 2017-10-16 PROCEDURE — 90662 IIV NO PRSV INCREASED AG IM: CPT | Mod: S$GLB,,, | Performed by: INTERNAL MEDICINE

## 2017-10-16 PROCEDURE — G0009 ADMIN PNEUMOCOCCAL VACCINE: HCPCS | Mod: S$GLB,,, | Performed by: INTERNAL MEDICINE

## 2017-10-16 RX ORDER — ZALEPLON 5 MG/1
5 CAPSULE ORAL NIGHTLY PRN
Qty: 30 CAPSULE | Refills: 3 | Status: SHIPPED | OUTPATIENT
Start: 2017-10-16 | End: 2017-11-13 | Stop reason: SINTOL

## 2017-10-16 RX ORDER — DIAZEPAM 2 MG/1
4 TABLET ORAL EVERY 6 HOURS PRN
Qty: 60 TABLET | Refills: 1 | Status: SHIPPED | OUTPATIENT
Start: 2017-10-16 | End: 2017-11-14 | Stop reason: SDUPTHER

## 2017-10-16 NOTE — PROGRESS NOTES
CC: followup of hypertension  HPI:  The patient is a 87 y.o. year old male with interstitial lung disease who presents to the office for followup of hypertension.  The patient denies any chest pain, shortness of breath, headache, blurred vision, excessive fatigue, nausea or vomiting.  He went to the ED 1 week ago with shortness of breath and chest pain.  Cardiac enzymes and CTA were negative.  His daughter reports he complains of intermittent episodes of shortness of breath with minimal activity.  Ambulatory blood pressures have been good.  He complains of difficulty sleeping.    PAST MEDICAL HISTORY:  Past Medical History:   Diagnosis Date    Anemia     Arthritis     Depression     Herniation of intervertebral disc     Hyperlipidemia     Hypertension 3/4/2015    Macular degeneration     Spinal stenosis        SURGICAL HISTORY:  Past Surgical History:   Procedure Laterality Date    CATARACT EXTRACTION W/  INTRAOCULAR LENS IMPLANT Left 10/31/07    os ()    CATARACT EXTRACTION W/  INTRAOCULAR LENS IMPLANT Right 12/16/15    od ()       MEDS:  Medcard reviewed and updated    ALLERGIES: Allergy Card reviewed and updated    SOCIAL HISTORY:   The patient is a nonsmoker.    PE:   APPEARANCE: Well nourished, well developed, in no acute distress.    CHEST: Lungs clear to auscultation with unlabored respirations.  CARDIOVASCULAR: Normal S1, S2. No murmurs. No carotid bruits. No pedal edema.  ABDOMEN: Bowel sounds normal. Not distended. Soft. No tenderness or masses.   MUSCULOSKELETAL:  Unsteady gait.   PSYCHIATRIC: The patient is oriented to person, place, and time and has a pleasant affect.        ASSESSMENT/PLAN:  Phi was seen today for follow-up.    Diagnoses and all orders for this visit:    Shortness of breath  -     Ambulatory Referral to Pulmonology  -     2D echo with color flow doppler; Future    ILD (interstitial lung disease)  -     Ambulatory Referral to Pulmonology    Essential  hypertension  -     Blood pressure is elevated  -     Inconsistent with previous results  -     Nurse blood pressure check    Need for vaccination with 13-polyvalent pneumococcal conjugate vaccine  -     (In Office Administered) Pneumococcal Conjugate Vaccine (13 Valent) (IM)    Other orders  -     diazePAM (VALIUM) 2 MG tablet; Take 2 tablets (4 mg total) by mouth every 6 (six) hours as needed.  -     zaleplon (SONATA) 5 MG Cap; Take 1 capsule (5 mg total) by mouth nightly as needed.  -     Influenza - High Dose (65+) (PF) (IM)

## 2017-10-17 ENCOUNTER — TELEPHONE (OUTPATIENT)
Dept: PULMONOLOGY | Facility: CLINIC | Age: 82
End: 2017-10-17

## 2017-10-17 NOTE — TELEPHONE ENCOUNTER
Spoke to the patient's daughter, Yani and I offered a sooner appointment with Dr Sepulveda on 10/18/17 at 11 am. Yani declined the appointment and would like to keep the appointment made with Dr George on 10/24/17 at 1:30pm.

## 2017-10-17 NOTE — TELEPHONE ENCOUNTER
----- Message from James Sepulveda MD sent at 10/17/2017 12:44 PM CDT -----  Please put him on the waiting list then. You can use a new patient spot. Thanks, SJ  ----- Message -----  From: Martha Sy MA  Sent: 10/13/2017   4:08 PM  To: MD Dr Derick Terry,  I spoke with the patient's daughter and she does not want him to see a nurse practitioner. They're requesting an earlier appointment with you.  Thank you, Martha

## 2017-10-20 ENCOUNTER — TELEPHONE (OUTPATIENT)
Dept: INTERNAL MEDICINE | Facility: CLINIC | Age: 82
End: 2017-10-20

## 2017-10-20 ENCOUNTER — CLINICAL SUPPORT (OUTPATIENT)
Dept: CARDIOLOGY | Facility: CLINIC | Age: 82
End: 2017-10-20
Payer: MEDICARE

## 2017-10-20 DIAGNOSIS — R06.02 SHORTNESS OF BREATH: ICD-10-CM

## 2017-10-20 LAB
ESTIMATED PA SYSTOLIC PRESSURE: 42.94
MITRAL VALVE REGURGITATION: ABNORMAL
RETIRED EF AND QEF - SEE NOTES: 55 (ref 55–65)
TRICUSPID VALVE REGURGITATION: ABNORMAL

## 2017-10-20 PROCEDURE — 93306 TTE W/DOPPLER COMPLETE: CPT | Mod: S$GLB,,, | Performed by: INTERNAL MEDICINE

## 2017-10-26 ENCOUNTER — OFFICE VISIT (OUTPATIENT)
Dept: PULMONOLOGY | Facility: CLINIC | Age: 82
End: 2017-10-26
Payer: MEDICARE

## 2017-10-26 VITALS
HEART RATE: 66 BPM | SYSTOLIC BLOOD PRESSURE: 130 MMHG | BODY MASS INDEX: 23.83 KG/M2 | HEIGHT: 71 IN | DIASTOLIC BLOOD PRESSURE: 70 MMHG | WEIGHT: 170.19 LBS | OXYGEN SATURATION: 95 %

## 2017-10-26 DIAGNOSIS — J84.9 ILD (INTERSTITIAL LUNG DISEASE): Primary | ICD-10-CM

## 2017-10-26 PROCEDURE — 99499 UNLISTED E&M SERVICE: CPT | Mod: S$PBB,,, | Performed by: INTERNAL MEDICINE

## 2017-10-26 PROCEDURE — 99999 PR PBB SHADOW E&M-EST. PATIENT-LVL III: CPT | Mod: PBBFAC,,, | Performed by: INTERNAL MEDICINE

## 2017-10-26 PROCEDURE — 99214 OFFICE O/P EST MOD 30 MIN: CPT | Mod: S$GLB,,, | Performed by: INTERNAL MEDICINE

## 2017-10-26 NOTE — PROGRESS NOTES
Subjective:       Patient ID: Phi Sainz is a 88 y.o. male.    Chief Complaint: Interstitial Lung Disease and Shortness of Breath    HPI:   Phi Sainz is a 88 y.o. male who presents with     Three weeks ago-->ED visit for shortness of breath.  Dyspnea was sudden in onset and lasted most of the day.  Patient cannot specify that any particular treatment made it better, feels it just got better over time.   Discharged from the emergency room that same day having fully recovered.  No suggestion of infection.    Then seen by PMD who recommended pulmonary follow up.    No other ED visit or hospitalizations for respiratory symptoms.  Initially seen by Dr. Sepulveda and cleared for surgery, but ultimately decided against it.     Lives alone with consistent family support.  Wife in nursing.  Never smoker.  Retired  at [employer].            Review of Systems   Constitutional: Negative for fever, chills and activity change.   HENT: Negative for postnasal drip, rhinorrhea, sinus pressure and congestion.    Respiratory: Negative for cough, hemoptysis, shortness of breath and dyspnea on extertion.    Cardiovascular: Negative for chest pain and leg swelling.   Genitourinary: Negative for difficulty urinating.   Endocrine: Negative for cold intolerance and heat intolerance.    Musculoskeletal: Negative for joint swelling and myalgias.   Gastrointestinal: Negative for nausea, vomiting and abdominal pain.   Neurological: Negative for headaches.   Hematological: Negative for adenopathy. No excessive bruising.   Psychiatric/Behavioral: Negative for confusion and sleep disturbance.         Social History   Substance Use Topics    Smoking status: Never Smoker    Smokeless tobacco: Never Used    Alcohol use No       Review of patient's allergies indicates:   Allergen Reactions    Combigan [brimonidine-timolol] Other (See Comments)     dizziness    Contrast media Rash     Past Medical History:    Diagnosis Date    Anemia     Arthritis     Depression     Herniation of intervertebral disc     Hyperlipidemia     Hypertension 3/4/2015    Macular degeneration     Spinal stenosis      Past Surgical History:   Procedure Laterality Date    CATARACT EXTRACTION W/  INTRAOCULAR LENS IMPLANT Left 10/31/07    os ()    CATARACT EXTRACTION W/  INTRAOCULAR LENS IMPLANT Right 12/16/15    od ()     Current Outpatient Prescriptions on File Prior to Visit   Medication Sig    diazePAM (VALIUM) 2 MG tablet Take 2 tablets (4 mg total) by mouth every 6 (six) hours as needed.    diphenoxylate-atropine 2.5-0.025 mg (LOMOTIL) 2.5-0.025 mg per tablet Take 1 tablet by mouth 2 (two) times daily. (Patient taking differently: Take 1 tablet by mouth 2 (two) times daily. Pt taking prn.)    econazole nitrate 1 % cream Apply topically once daily. Toenails    escitalopram oxalate (LEXAPRO) 10 MG tablet Take 1 tablet (10 mg total) by mouth once daily.    gabapentin (NEURONTIN) 100 MG capsule Take 100 mg by mouth 3 (three) times daily.    hydrocodone-acetaminophen 5-325mg (NORCO) 5-325 mg per tablet TK 1 T PO Q 8 H PRN P    lisinopril (PRINIVIL,ZESTRIL) 20 MG tablet Take 1 tablet (20 mg total) by mouth once daily.    zaleplon (SONATA) 5 MG Cap Take 1 capsule (5 mg total) by mouth nightly as needed.     No current facility-administered medications on file prior to visit.        Objective:      Vitals:    10/26/17 1350   BP: 130/70   Pulse: 66     Physical Exam  Personal Diagnostic Review  {Pulmonary diagnostics:50332}  No flowsheet data found.     ECHO 10/8/17: EF=55%, trivial MR, Est. PASP: 42.94, mild TR.  CT Chest: 10/9/17: no evidence of PE, interval progression of chronic fibrotic changes consistent with UIP.      Assessment:     No orders of the defined types were placed in this encounter.    No diagnosis found.    Plan:       ***

## 2017-10-26 NOTE — LETTER
October 26, 2017      Yanna Santana MD  [address] [facility] [place] [address]           [facility]  [address]  [place] [address]  Phone: [phone]          Patient: Phi Sainz   MR Number: [id number]   YOB: 1929   Date of Visit: 10/26/2017       Dear Dr. Yanna Santana:    Thank you for referring Phi Sainz to me for evaluation. Attached you will find relevant portions of my assessment and plan of care.    If you have questions, please do not hesitate to call me. I look forward to following Phi Sainz along with you.    Sincerely,    Rohini George MD    Enclosure  CC:  No Recipients    If you would like to receive this communication electronically, please contact [email or web address] or [phone] to request more information on [employer] Link access.    For providers and/or their staff who would like to refer a patient to [facility], please contact us through our one-stop-shop provider referral line, Marianna Ceja, at [phone].    If you feel you have received this communication in error or would no longer like to receive these types of communications, please e-mail [email or web address]

## 2017-10-26 NOTE — PROGRESS NOTES
Subjective:       Patient ID: Phi Sainz is a 88 y.o. male.    Chief Complaint: Interstitial Lung Disease and Shortness of Breath    HPI:   Phi Sainz is a 88 y.o. male who presents with Interstitial Lung Disease    Three weeks ago-->ED visit for shortness of breath.  Dyspnea was sudden in onset and lasted most of the day.  Patient cannot specify that any particular treatment made it better, feels it just got better over time.   Discharged from the emergency room that same day having fully recovered.  No suggestion of infection.     Then seen by PMD who recommended pulmonary follow up.     No other ED visit or hospitalizations for respiratory symptoms.  Initially seen by Dr. Sepulveda and cleared for surgery, but ultimately decided against it.      Lives alone with consistent family support.  Wife in nursing.  Never smoker.  Retired  at [employer].    Review of Systems   Respiratory: Positive for shortness of breath and dyspnea on extertion. Negative for cough and wheezing.    Gastrointestinal: Positive for nausea and abdominal pain.        Colitis   Neurological: Positive for weakness (legs (hx/o spinal stenosis)).   All other systems reviewed and are negative.        Social History   Substance Use Topics    Smoking status: Never Smoker    Smokeless tobacco: Never Used    Alcohol use No       Review of patient's allergies indicates:   Allergen Reactions    Combigan [brimonidine-timolol] Other (See Comments)     dizziness    Contrast media Rash     Past Medical History:   Diagnosis Date    Anemia     Arthritis     Depression     Herniation of intervertebral disc     Hyperlipidemia     Hypertension 3/4/2015    Macular degeneration     Spinal stenosis      Past Surgical History:   Procedure Laterality Date    CATARACT EXTRACTION W/  INTRAOCULAR LENS IMPLANT Left 10/31/07    os ()    CATARACT EXTRACTION W/  INTRAOCULAR LENS IMPLANT Right 12/16/15    od  ()     Current Outpatient Prescriptions on File Prior to Visit   Medication Sig    diazePAM (VALIUM) 2 MG tablet Take 2 tablets (4 mg total) by mouth every 6 (six) hours as needed.    diphenoxylate-atropine 2.5-0.025 mg (LOMOTIL) 2.5-0.025 mg per tablet Take 1 tablet by mouth 2 (two) times daily. (Patient taking differently: Take 1 tablet by mouth 2 (two) times daily. Pt taking prn.)    econazole nitrate 1 % cream Apply topically once daily. Toenails    escitalopram oxalate (LEXAPRO) 10 MG tablet Take 1 tablet (10 mg total) by mouth once daily.    gabapentin (NEURONTIN) 100 MG capsule Take 100 mg by mouth 3 (three) times daily.    hydrocodone-acetaminophen 5-325mg (NORCO) 5-325 mg per tablet TK 1 T PO Q 8 H PRN P    lisinopril (PRINIVIL,ZESTRIL) 20 MG tablet Take 1 tablet (20 mg total) by mouth once daily.    zaleplon (SONATA) 5 MG Cap Take 1 capsule (5 mg total) by mouth nightly as needed.     No current facility-administered medications on file prior to visit.        Objective:      Vitals:    10/26/17 1350   BP: 130/70   Pulse: 66     Physical Exam   Constitutional: He is oriented to person, place, and time. He appears well-developed and well-nourished. No distress.   HENT:   Head: Normocephalic.   Mouth/Throat: Mallampati Score: II.   Neck: Normal range of motion.   Cardiovascular: Normal rate, regular rhythm and normal heart sounds.    No murmur heard.  Pulmonary/Chest: Effort normal and breath sounds normal. He has no wheezes. He has no rhonchi. He has no rales.   Bilateral harsh crackles (velcro) from base to mid-lung zone.   Abdominal: Soft. Bowel sounds are normal. He exhibits no distension. There is no tenderness.   Musculoskeletal: Normal range of motion. He exhibits no edema.   Neurological: He is alert and oriented to person, place, and time.   Skin: Skin is warm and dry. He is not diaphoretic. No cyanosis. Nails show no clubbing.   Psychiatric: He has a normal mood and affect. His  behavior is normal.     Personal Diagnostic Review  PFTs 2016: no evidence of obstruction or restriction.  ECHO 2017: normal EF=55%, Est PASP=43      Assessment:     Orders Placed This Encounter   Procedures    Ambulatory Referral to Pulmonary Rehab     Referral Priority:   Routine     Referral Type:   Consultation     Referral Reason:   Specialty Services Required     Requested Specialty:   Pulmonary Disease     Number of Visits Requested:   1    Stress test, pulmonary     Standing Status:   Future     Standing Expiration Date:   10/26/2018    Complete PFT with bronchodilator     Standing Status:   Future     Standing Expiration Date:   10/26/2018    LUNG VOLUMES     Standing Status:   Future     Standing Expiration Date:   10/26/2018     1. ILD (interstitial lung disease)        Plan:       Images consistent with UIP/IPF.  Patient looks far better than imaging would suggest.  PFTs from one year ago were underwhelming.      I discussed with both the patient and his daughter that this sort of ILD is a progressive, indolent disease that will quietly worsen over time resulting in substantial disability.  We talked about the limited options available for pharmacologic treatment.  The patient has a history of colitis with GI issues at baseline.  We spoke of how the side effects would likely diminish his quality of life.    For now:   -PFTs, 6MWT  -Referral to pulmonary rehab  -Follow up in three months.

## 2017-11-08 ENCOUNTER — LAB VISIT (OUTPATIENT)
Dept: LAB | Facility: HOSPITAL | Age: 82
End: 2017-11-08
Attending: INTERNAL MEDICINE
Payer: MEDICARE

## 2017-11-08 DIAGNOSIS — E78.5 DYSLIPIDEMIA: ICD-10-CM

## 2017-11-08 DIAGNOSIS — I10 ESSENTIAL HYPERTENSION: ICD-10-CM

## 2017-11-08 LAB
ALBUMIN SERPL BCP-MCNC: 3.2 G/DL
ALP SERPL-CCNC: 67 U/L
ALT SERPL W/O P-5'-P-CCNC: 12 U/L
ANION GAP SERPL CALC-SCNC: 5 MMOL/L
AST SERPL-CCNC: 16 U/L
BILIRUB SERPL-MCNC: 0.8 MG/DL
BUN SERPL-MCNC: 25 MG/DL
CALCIUM SERPL-MCNC: 9.3 MG/DL
CHLORIDE SERPL-SCNC: 104 MMOL/L
CHOLEST SERPL-MCNC: 196 MG/DL
CHOLEST/HDLC SERPL: 2.7 {RATIO}
CO2 SERPL-SCNC: 32 MMOL/L
CREAT SERPL-MCNC: 1.2 MG/DL
EST. GFR  (AFRICAN AMERICAN): >60 ML/MIN/1.73 M^2
EST. GFR  (NON AFRICAN AMERICAN): 53.7 ML/MIN/1.73 M^2
GLUCOSE SERPL-MCNC: 101 MG/DL
HDLC SERPL-MCNC: 73 MG/DL
HDLC SERPL: 37.2 %
LDLC SERPL CALC-MCNC: 111.8 MG/DL
NONHDLC SERPL-MCNC: 123 MG/DL
POTASSIUM SERPL-SCNC: 4.3 MMOL/L
PROT SERPL-MCNC: 7.3 G/DL
SODIUM SERPL-SCNC: 141 MMOL/L
TRIGL SERPL-MCNC: 56 MG/DL
TSH SERPL DL<=0.005 MIU/L-ACNC: 0.98 UIU/ML

## 2017-11-08 PROCEDURE — 80061 LIPID PANEL: CPT

## 2017-11-08 PROCEDURE — 84443 ASSAY THYROID STIM HORMONE: CPT

## 2017-11-08 PROCEDURE — 36415 COLL VENOUS BLD VENIPUNCTURE: CPT | Mod: PO

## 2017-11-08 PROCEDURE — 80053 COMPREHEN METABOLIC PANEL: CPT

## 2017-11-09 ENCOUNTER — TELEPHONE (OUTPATIENT)
Dept: CARDIOLOGY | Facility: CLINIC | Age: 82
End: 2017-11-09

## 2017-11-09 RX ORDER — LEVOCETIRIZINE DIHYDROCHLORIDE 5 MG/1
5 TABLET, FILM COATED ORAL NIGHTLY
Qty: 30 TABLET | Refills: 6 | Status: SHIPPED | OUTPATIENT
Start: 2017-11-09 | End: 2017-11-13

## 2017-11-09 NOTE — TELEPHONE ENCOUNTER
----- Message from Rody Morrison MD sent at 11/9/2017 12:36 PM CST -----  Please mail patient the blood work results and inform the patient that we will discuss it in detail during the upcoming visit. It looks good.

## 2017-11-13 ENCOUNTER — OFFICE VISIT (OUTPATIENT)
Dept: CARDIOLOGY | Facility: CLINIC | Age: 82
End: 2017-11-13
Payer: MEDICARE

## 2017-11-13 VITALS
HEART RATE: 60 BPM | SYSTOLIC BLOOD PRESSURE: 121 MMHG | WEIGHT: 169.56 LBS | HEIGHT: 71 IN | DIASTOLIC BLOOD PRESSURE: 65 MMHG | BODY MASS INDEX: 23.74 KG/M2

## 2017-11-13 DIAGNOSIS — M48.062 SPINAL STENOSIS, LUMBAR REGION, WITH NEUROGENIC CLAUDICATION: ICD-10-CM

## 2017-11-13 DIAGNOSIS — E78.5 DYSLIPIDEMIA: Primary | ICD-10-CM

## 2017-11-13 DIAGNOSIS — G25.0 ESSENTIAL TREMOR: ICD-10-CM

## 2017-11-13 DIAGNOSIS — R26.9 GAIT DISORDER: ICD-10-CM

## 2017-11-13 DIAGNOSIS — J84.9 ILD (INTERSTITIAL LUNG DISEASE): ICD-10-CM

## 2017-11-13 DIAGNOSIS — I10 ESSENTIAL HYPERTENSION: ICD-10-CM

## 2017-11-13 DIAGNOSIS — R00.1 BRADYCARDIA: ICD-10-CM

## 2017-11-13 PROCEDURE — 99999 PR PBB SHADOW E&M-EST. PATIENT-LVL III: CPT | Mod: PBBFAC,,, | Performed by: INTERNAL MEDICINE

## 2017-11-13 PROCEDURE — 99499 UNLISTED E&M SERVICE: CPT | Mod: S$PBB,,, | Performed by: INTERNAL MEDICINE

## 2017-11-13 PROCEDURE — 99214 OFFICE O/P EST MOD 30 MIN: CPT | Mod: S$GLB,,, | Performed by: INTERNAL MEDICINE

## 2017-11-13 RX ORDER — MESALAMINE 375 MG/1
1.5 CAPSULE, EXTENDED RELEASE ORAL DAILY
COMMUNITY
End: 2019-06-11 | Stop reason: SDUPTHER

## 2017-11-13 RX ORDER — LISINOPRIL 20 MG/1
20 TABLET ORAL DAILY
Qty: 30 TABLET | Refills: 6 | Status: SHIPPED | OUTPATIENT
Start: 2017-11-13 | End: 2018-05-28

## 2017-11-13 NOTE — PROGRESS NOTES
Subjective:   Patient ID:  Phi Sainz is a 88 y.o. male who presents for follow-up of Hypertension      HPI: Patient is here for follow-up of elevated blood pressure. Blood pressure is well controlled at home. Patient denies chest pain, dyspnea, palpitations.  He has chronic peripheral edema around the ankles ( DJD).  He was recently seen in ER with worsened shortness of breath.  CT showed chronic ILD. Patient is followed by pulmonary service. Repeat CFD was unchanged with preserved LV function andm mild degree of pulmonary hypertension.  Daughter hired /nurse to attend to the patient and do daily exercises        Lab Results   Component Value Date     11/08/2017    K 4.3 11/08/2017     11/08/2017    CO2 32 (H) 11/08/2017    BUN 25 (H) 11/08/2017    CREATININE 1.2 11/08/2017     11/08/2017    AST 16 11/08/2017    ALT 12 11/08/2017    ALBUMIN 3.2 (L) 11/08/2017    PROT 7.3 11/08/2017    BILITOT 0.8 11/08/2017    WBC 9.41 10/09/2017    HGB 14.5 10/09/2017    HCT 44.7 10/09/2017    MCV 93 10/09/2017     10/09/2017    PSA 0.82 05/03/2016    TSH 0.976 11/08/2017    CHOL 196 11/08/2017    HDL 73 11/08/2017    LDLCALC 111.8 11/08/2017    TRIG 56 11/08/2017       Review of Systems   Constitution: Negative.   HENT: Negative.    Eyes: Negative.    Cardiovascular: Positive for dyspnea on exertion and leg swelling. Negative for chest pain, claudication, irregular heartbeat, near-syncope, palpitations and syncope.   Respiratory: Negative.  Negative for cough, shortness of breath, snoring and wheezing.    Endocrine: Negative.  Negative for cold intolerance, heat intolerance, polydipsia, polyphagia and polyuria.   Skin: Negative.    Musculoskeletal: Positive for back pain and muscle weakness.   Gastrointestinal: Negative.    Genitourinary: Negative.    Neurological: Negative.    Psychiatric/Behavioral: Negative.        Objective:   Physical Exam   Constitutional: He is  "oriented to person, place, and time. He appears well-developed and well-nourished.   /65   Pulse 60   Ht 5' 11" (1.803 m)   Wt 76.9 kg (169 lb 8.5 oz)   BMI 23.65 kg/m²   Frail, elderly   HENT:   Head: Normocephalic.   Eyes: Pupils are equal, round, and reactive to light.   Neck: Normal range of motion. Neck supple. Carotid bruit is not present. No thyromegaly present.   Cardiovascular: Normal rate, regular rhythm, normal heart sounds and intact distal pulses.  Exam reveals no gallop and no friction rub.    No murmur heard.  Pulses:       Carotid pulses are 2+ on the right side, and 2+ on the left side.       Radial pulses are 2+ on the right side, and 2+ on the left side.        Femoral pulses are 2+ on the right side, and 2+ on the left side.       Popliteal pulses are 2+ on the right side, and 2+ on the left side.        Dorsalis pedis pulses are 2+ on the right side, and 2+ on the left side.        Posterior tibial pulses are 2+ on the right side, and 2+ on the left side.   Pulmonary/Chest: Effort normal and breath sounds normal. No respiratory distress. He has no wheezes. He has no rales. He exhibits no tenderness.   Abdominal: Soft. Bowel sounds are normal.   Musculoskeletal: Normal range of motion. He exhibits edema.   Mild bilateral everardo-ankle edema   Neurological: He is alert and oriented to person, place, and time.   Skin: Skin is warm and dry.   Psychiatric: He has a normal mood and affect.   Nursing note and vitals reviewed.        Assessment and Plan:     Problem List Items Addressed This Visit        Cardiology Problems    Hypertension    Relevant Medications    lisinopril (PRINIVIL,ZESTRIL) 20 MG tablet       Other    Essential tremor    Gait disorder    Dyslipidemia - Primary    Spinal stenosis, lumbar region, with neurogenic claudication    Bradycardia    ILD (interstitial lung disease)          Patient's Medications   New Prescriptions    No medications on file   Previous Medications    " DIAZEPAM (VALIUM) 2 MG TABLET    Take 2 tablets (4 mg total) by mouth every 6 (six) hours as needed.    DIPHENOXYLATE-ATROPINE 2.5-0.025 MG (LOMOTIL) 2.5-0.025 MG PER TABLET    Take 1 tablet by mouth 2 (two) times daily.    ECONAZOLE NITRATE 1 % CREAM    Apply topically once daily. Toenails    ESCITALOPRAM OXALATE (LEXAPRO) 10 MG TABLET    Take 1 tablet (10 mg total) by mouth once daily.    GABAPENTIN (NEURONTIN) 100 MG CAPSULE    Take 100 mg by mouth 3 (three) times daily.    HYDROCODONE-ACETAMINOPHEN 5-325MG (NORCO) 5-325 MG PER TABLET    TK 1 T PO Q 8 H PRN P    MESALAMINE (APRISO) 0.375 GRAM CP24    Take 1.5 g by mouth once daily.   Modified Medications    Modified Medication Previous Medication    LISINOPRIL (PRINIVIL,ZESTRIL) 20 MG TABLET lisinopril (PRINIVIL,ZESTRIL) 20 MG tablet       Take 1 tablet (20 mg total) by mouth once daily.    Take 1 tablet (20 mg total) by mouth once daily.   Discontinued Medications    LEVOCETIRIZINE (XYZAL) 5 MG TABLET    Take 1 tablet (5 mg total) by mouth every evening.    ZALEPLON (SONATA) 5 MG CAP    Take 1 capsule (5 mg total) by mouth nightly as needed.     Continue on the current regimen.   Exercises with home nurse encouraged.  Return in about 1 year (around 11/13/2018).

## 2017-11-15 RX ORDER — DIAZEPAM 2 MG/1
TABLET ORAL
Qty: 30 TABLET | Refills: 1 | Status: SHIPPED | OUTPATIENT
Start: 2017-11-15 | End: 2017-11-16 | Stop reason: SDUPTHER

## 2017-11-16 RX ORDER — DIAZEPAM 2 MG/1
TABLET ORAL
Qty: 30 TABLET | Refills: 1 | Status: SHIPPED | OUTPATIENT
Start: 2017-11-16 | End: 2018-01-16 | Stop reason: SDUPTHER

## 2017-11-21 ENCOUNTER — TELEPHONE (OUTPATIENT)
Dept: INTERNAL MEDICINE | Facility: CLINIC | Age: 82
End: 2017-11-21

## 2017-11-21 NOTE — TELEPHONE ENCOUNTER
----- Message from Angela Melgoza sent at 11/21/2017  2:41 PM CST -----  Contact: pt [phone]  pharmacy keeps denying refill on diazePAM (VALIUM) 2 MG tablet

## 2018-01-16 RX ORDER — DIAZEPAM 2 MG/1
TABLET ORAL
Qty: 30 TABLET | Refills: 1 | Status: SHIPPED | OUTPATIENT
Start: 2018-01-16 | End: 2018-03-22 | Stop reason: SDUPTHER

## 2018-01-22 ENCOUNTER — OFFICE VISIT (OUTPATIENT)
Dept: INTERNAL MEDICINE | Facility: CLINIC | Age: 83
End: 2018-01-22
Payer: MEDICARE

## 2018-01-22 ENCOUNTER — TELEPHONE (OUTPATIENT)
Dept: INTERNAL MEDICINE | Facility: CLINIC | Age: 83
End: 2018-01-22

## 2018-01-22 VITALS
WEIGHT: 168.19 LBS | TEMPERATURE: 98 F | BODY MASS INDEX: 23.55 KG/M2 | HEIGHT: 71 IN | SYSTOLIC BLOOD PRESSURE: 134 MMHG | RESPIRATION RATE: 18 BRPM | HEART RATE: 63 BPM | DIASTOLIC BLOOD PRESSURE: 74 MMHG

## 2018-01-22 DIAGNOSIS — J30.0 VASOMOTOR RHINITIS, UNSPECIFIED CHRONICITY: ICD-10-CM

## 2018-01-22 DIAGNOSIS — I10 ESSENTIAL HYPERTENSION: Primary | ICD-10-CM

## 2018-01-22 DIAGNOSIS — Z12.5 ENCOUNTER FOR PROSTATE CANCER SCREENING: ICD-10-CM

## 2018-01-22 PROCEDURE — 99213 OFFICE O/P EST LOW 20 MIN: CPT | Mod: S$GLB,,, | Performed by: INTERNAL MEDICINE

## 2018-01-22 PROCEDURE — 99999 PR PBB SHADOW E&M-EST. PATIENT-LVL III: CPT | Mod: PBBFAC,,, | Performed by: INTERNAL MEDICINE

## 2018-01-22 RX ORDER — AZELASTINE 1 MG/ML
1 SPRAY, METERED NASAL 2 TIMES DAILY
Qty: 30 ML | Refills: 1 | Status: SHIPPED | OUTPATIENT
Start: 2018-01-22 | End: 2018-07-24

## 2018-01-22 NOTE — PROGRESS NOTES
CC: followup of hypertension  HPI:  The patient is a 88 y.o. year old male who presents to the office for followup of hypertension.  The patient denies any chest pain, shortness of breath, headache, blurred vision, excessive fatigue, nausea or vomiting.  He does complain of watery eyes and runny nose after eating.    PAST MEDICAL HISTORY:  Past Medical History:   Diagnosis Date    Anemia     Arthritis     Depression     Herniation of intervertebral disc     Hyperlipidemia     Hypertension 3/4/2015    Macular degeneration     Spinal stenosis        SURGICAL HISTORY:  Past Surgical History:   Procedure Laterality Date    CATARACT EXTRACTION W/  INTRAOCULAR LENS IMPLANT Left 10/31/07    os ()    CATARACT EXTRACTION W/  INTRAOCULAR LENS IMPLANT Right 12/16/15    od ()       MEDS:  Medcard reviewed and updated    ALLERGIES: Allergy Card reviewed and updated    SOCIAL HISTORY:   The patient is a nonsmoker.    PE:   APPEARANCE: Well nourished, well developed, in no acute distress.    CHEST: Lungs with bilateral fine crackles.  CARDIOVASCULAR: Normal S1, S2. No murmurs. No carotid bruits. No pedal edema.  ABDOMEN: Bowel sounds normal. Not distended. Soft. No tenderness or masses.   PSYCHIATRIC: The patient is oriented to person, place, and time and has a pleasant affect.        ASSESSMENT/PLAN:  Phi was seen today for follow-up.    Diagnoses and all orders for this visit:    Essential hypertension  -     Blood pressure is controlled    Vasomotor rhinitis, unspecified chronicity  -     Astelin nasal spray    Other orders  -     azelastine (ASTELIN) 137 mcg (0.1 %) nasal spray; 1 spray (137 mcg total) by Nasal route 2 (two) times daily.

## 2018-01-31 ENCOUNTER — OFFICE VISIT (OUTPATIENT)
Dept: PODIATRY | Facility: CLINIC | Age: 83
End: 2018-01-31
Payer: MEDICARE

## 2018-01-31 VITALS
BODY MASS INDEX: 23.52 KG/M2 | HEIGHT: 71 IN | DIASTOLIC BLOOD PRESSURE: 71 MMHG | WEIGHT: 168 LBS | HEART RATE: 65 BPM | SYSTOLIC BLOOD PRESSURE: 117 MMHG

## 2018-01-31 DIAGNOSIS — B35.1 ONYCHOMYCOSIS DUE TO DERMATOPHYTE: ICD-10-CM

## 2018-01-31 DIAGNOSIS — G60.9 IDIOPATHIC PERIPHERAL NEUROPATHY: Primary | ICD-10-CM

## 2018-01-31 PROCEDURE — 99999 PR PBB SHADOW E&M-EST. PATIENT-LVL III: CPT | Mod: PBBFAC,,, | Performed by: PODIATRIST

## 2018-01-31 PROCEDURE — 99213 OFFICE O/P EST LOW 20 MIN: CPT | Mod: S$GLB,,, | Performed by: PODIATRIST

## 2018-01-31 PROCEDURE — 1159F MED LIST DOCD IN RCRD: CPT | Mod: S$GLB,,, | Performed by: PODIATRIST

## 2018-01-31 PROCEDURE — 1126F AMNT PAIN NOTED NONE PRSNT: CPT | Mod: S$GLB,,, | Performed by: PODIATRIST

## 2018-01-31 PROCEDURE — 3008F BODY MASS INDEX DOCD: CPT | Mod: S$GLB,,, | Performed by: PODIATRIST

## 2018-01-31 NOTE — PATIENT INSTRUCTIONS
Peripheral Neuropathy  Peripheral neuropathy is a condition that affects the nerves of the arms or legs. It causes a change in physical feeling. Sometimes it causes weakness in the muscles. You may feel tingling, numbness or shooting pains. Symptoms may be more common at night. Skin may be extra sensitive to light touch or temperature changes.  Neuropathy may be a complication of a chronic disease such as diabetes. A ruptured disk with pressure on the spinal nerve may also lead to the problem. Certain vitamin deficiencies may lead to it. It may also be caused by exposure to certain drugs or chemicals.  Home care  · Tell the healthcare provider about all medicines you take. This includes prescription and over-the-counter medicines, vitamins, and herbs. Ask if any of the medicines may be causing your problems. Do not make any changes to prescription medicines without talking to your healthcare provider first.  · You may be prescribed medicines to help relieve the tingling feeling or for pain. Take all medicines as directed.  · A numb hand or foot may be more prone to injury. To help protect it:  ¨ Always use oven mitts.  ¨ Test water with an unaffected hand or foot.  ¨ Use caution when trimming nails. File sharp areas.  ¨ Wear shoes that fit well to avoid pressure points, blisters, and ulcers.  ¨ Inspect your hands and feet carefully (including the soles of your feet and between your toes) at least once a week. If you see red areas, sores, or other problems, tell your healthcare provider.  Follow-up care  Follow up with your doctor or as advised by our staff. You may need further testing or evaluation.  When to seek medical advice  Call your healthcare provider right away if any of the following occur:  · Redness, swelling, cracking, or ulcer on any numb area, especially the feet  · New symptoms of numbness or muscle weakness numbness  · Loss of bowel or bladder control  · Slurred speech, confusion, or trouble  speaking, walking, or seeing  Date Last Reviewed: 9/26/2015  © 9043-1982 [employer]. [address] [place] [address], [place] [address]. All rights reserved. This information is not intended as a substitute for professional medical care. Always follow your healthcare professional's instructions.

## 2018-01-31 NOTE — PROGRESS NOTES
Subjective:      Patient ID: Phi Sainz is a 88 y.o. male.    Chief Complaint: No chief complaint on file.    Phi is a 88 y.o. male who presents for f/u neuropathy footcare. Reports painful nails. No other comlaints      Review of Systems   Constitution: Negative for chills, fever and malaise/fatigue.   Cardiovascular: Negative for chest pain, leg swelling, orthopnea and palpitations.   Respiratory: Negative for cough, shortness of breath and wheezing.    Skin: Positive for color change, dry skin and nail changes. Negative for itching, poor wound healing and rash.   Musculoskeletal: Negative for arthritis, gout, joint pain, joint swelling, muscle weakness and myalgias.   Neurological: Positive for numbness, paresthesias and sensory change. Negative for disturbances in coordination, dizziness, focal weakness and tremors.           Objective:      Physical Exam   Cardiovascular:   Dorsalis pedis and posterior tibial pulses are diminished Bilaterally. Toes are cool to touch. Feet are warm proximally.There is decreased digital hair. Skin is atrophic, slightly hyperpigmented, and mildly edematous       Musculoskeletal:   Musculoskeletal:  Muscle strength is 5/5 in all groups bilaterally.  Metatarsophalangeal and subtalar range of motion are within normal limits without crepitus bilaterally. There is limitation of ankle dorsiflexion with knees extended and flexed Bilaterally.       Neurological:   [place] 5.07 monofilamant testing is diminished both feet. Sharp/dull sensation diminished Bilaterally. Light touch absent Bilaterally.       Skin:   Inflamed cryptotic border of nail with no drainage, pus nor malodor but mild erythema and edema of the associated nail fold, medial border, right hallux    Toenails 1-5 bilaterally are elongated by 2-3 mm, thickened by 2-3 mm, discolored/yellowed, dystrophic, brittle with subungual debris. Mild xerosis noted, bilat. No open wounds.                 Assessment:        Encounter Diagnoses   Name Primary?    Idiopathic peripheral neuropathy Yes    Onychomycosis due to dermatophyte          Plan:       Diagnoses and all orders for this visit:    Idiopathic peripheral neuropathy    Onychomycosis due to dermatophyte    Procedures  I counseled the patient on his conditions, their implications and medical management.    Discussed conservative vs surgical treatment of recurrent painful ingrown toenails with associated risks and benefits.    Slant back nail removal performed today. He agrees to avulsion if pain continues.    Follow-up in about 6 months (around 7/31/2018).

## 2018-02-26 RX ORDER — ESCITALOPRAM OXALATE 10 MG/1
10 TABLET ORAL DAILY
Qty: 30 TABLET | Refills: 5 | Status: SHIPPED | OUTPATIENT
Start: 2018-02-26 | End: 2018-08-20 | Stop reason: SDUPTHER

## 2018-03-20 ENCOUNTER — HOSPITAL ENCOUNTER (OUTPATIENT)
Dept: PULMONOLOGY | Facility: CLINIC | Age: 83
Discharge: HOME | End: 2018-03-20
Payer: MEDICARE

## 2018-03-20 ENCOUNTER — OFFICE VISIT (OUTPATIENT)
Dept: PULMONOLOGY | Facility: CLINIC | Age: 83
End: 2018-03-20
Payer: MEDICARE

## 2018-03-20 VITALS
HEART RATE: 67 BPM | DIASTOLIC BLOOD PRESSURE: 68 MMHG | SYSTOLIC BLOOD PRESSURE: 116 MMHG | OXYGEN SATURATION: 98 % | BODY MASS INDEX: 23.05 KG/M2 | WEIGHT: 161 LBS | HEIGHT: 70 IN

## 2018-03-20 VITALS — HEIGHT: 70 IN | BODY MASS INDEX: 23.05 KG/M2 | WEIGHT: 161 LBS

## 2018-03-20 DIAGNOSIS — J84.9 ILD (INTERSTITIAL LUNG DISEASE): Primary | ICD-10-CM

## 2018-03-20 DIAGNOSIS — J84.9 ILD (INTERSTITIAL LUNG DISEASE): ICD-10-CM

## 2018-03-20 LAB
POST FEV1 FVC: 0.83
POST FEV1: 1.94
POST FVC: 2.33
PRE FEV1 FVC: 82
PRE FEV1: 1.95
PRE FVC: 2.37
PREDICTED FEV1 FVC: 76
PREDICTED FEV1: 2.76
PREDICTED FVC: 3.58

## 2018-03-20 PROCEDURE — 94729 DIFFUSING CAPACITY: CPT | Mod: S$GLB,,, | Performed by: INTERNAL MEDICINE

## 2018-03-20 PROCEDURE — 99213 OFFICE O/P EST LOW 20 MIN: CPT | Mod: S$GLB,,, | Performed by: INTERNAL MEDICINE

## 2018-03-20 PROCEDURE — 94060 EVALUATION OF WHEEZING: CPT | Mod: S$GLB,,, | Performed by: INTERNAL MEDICINE

## 2018-03-20 PROCEDURE — 94618 PULMONARY STRESS TESTING: CPT | Mod: S$GLB,,, | Performed by: INTERNAL MEDICINE

## 2018-03-20 PROCEDURE — 99999 PR PBB SHADOW E&M-EST. PATIENT-LVL III: CPT | Mod: PBBFAC,,, | Performed by: INTERNAL MEDICINE

## 2018-03-20 PROCEDURE — 94727 GAS DIL/WSHOT DETER LNG VOL: CPT | Mod: S$GLB,,, | Performed by: INTERNAL MEDICINE

## 2018-03-20 NOTE — PROCEDURES
Phi Sainz is a 88 y.o.  male patient, who presents for a 6 minute walk test ordered by MD. Ariel.  The diagnosis is Shortness of Breath.  The patient's BMI is 23.1 kg/m2.  Predicted distance (lower limit of normal) is 246.69 meters.      Test Results:    The test was completed without stopping.   The total time walked was 360 seconds.  During walking, the patient reported:  Dyspnea, Leg pain (leg fatigue). The patient used no assistive devices  during testing.     03/20/2018---------Distance: 83.82 meters (275 feet)     O2 Sat % Supplemental Oxygen Heart Rate Blood Pressure Vera Scale   Pre-exercise  (Resting) 98 % Room Air 64 bpm 123/69 mmHg 0   During Exercise 93 % Room Air 91 bpm 135/71 mmHg 1   Post-exercise  (Recovery) 97 % Room Air  77 bpm   mmHg       Recovery Time: 60 seconds    Performing nurse/tech: Zeina bledsoe RRT      PREVIOUS STUDY:   The patient had a previous study.  10/18/2016---------Distance: 170.69 meters (560 feet)       O2 Sat % Supplemental Oxygen Heart Rate Blood Pressure Vera Scale   Pre-exercise  (Resting) 95 % Room Air 61 bpm 136/70 mmHg 0   During Exercise 92 % Room Air 88 bpm 177/79 mmHg 3   Post-exercise  (Recovery) 96 % Room Air  71 bpm   mmHg          CLINICAL INTERPRETATION:  Six minute walk distance is 83.82 meters (275 feet) with very light dyspnea.  During exercise, there was significant desaturation while breathing room air.  Blood pressure remained stable and Heart rate increased significantly with walking.  This may represent a tachycardic response to exercise.  The patient reported non-pulmonary symptoms during exercise.  Severe exercise impairment is likely due to subjective symptoms.  Since the previous study in October 2016, exercise capacity is significantly worse.  Based upon age and body mass index, exercise capacity is less than predicted.

## 2018-03-21 NOTE — PROGRESS NOTES
Subjective:       Patient ID: Phi Sainz is a 88 y.o. male.    Chief Complaint: Breathing Problem    HPI:   Phi Sainz is a 88 y.o. male who is followed for Interstitial Lung Disease.    Since his last visit he was started on mesalamine for management of his colitis, but he is unsure of his exact diagnosis (Inflamatory bowel disease?).  He states that his abdominal issues are much better controlled since starting the medication.    He reports that he is 'about the same'.  He reports occasional bouts of shortness of breath, but says they resolve quickly and that mostly he is not bothered by respiratory symptoms.    He seems a little down today. Reports he's had to give up reading because of his vision.     Lives alone with consistent family support. (accompanied by his home health aid)  Daughter in nursing.  Never smoker.  Retired  at [employer].    Review of Systems   Respiratory: Positive for shortness of breath. Negative for cough, wheezing and dyspnea on extertion.    Musculoskeletal: Positive for gait problem (related to spinal stenosis).   Gastrointestinal: Negative for nausea and abdominal pain.        Colitis   Neurological: Positive for weakness (legs (hx/o spinal stenosis)).   All other systems reviewed and are negative.        Social History   Substance Use Topics    Smoking status: Never Smoker    Smokeless tobacco: Never Used    Alcohol use No       Review of patient's allergies indicates:   Allergen Reactions    Combigan [brimonidine-timolol] Other (See Comments)     dizziness    Contrast media Rash     Past Medical History:   Diagnosis Date    Anemia     Arthritis     Depression     Herniation of intervertebral disc     Hyperlipidemia     Hypertension 3/4/2015    Macular degeneration     Spinal stenosis      Past Surgical History:   Procedure Laterality Date    CATARACT EXTRACTION W/  INTRAOCULAR LENS IMPLANT Left 10/31/07    os ()     CATARACT EXTRACTION W/  INTRAOCULAR LENS IMPLANT Right 12/16/15    od ()     Current Outpatient Prescriptions on File Prior to Visit   Medication Sig    azelastine (ASTELIN) 137 mcg (0.1 %) nasal spray 1 spray (137 mcg total) by Nasal route 2 (two) times daily.    diazePAM (VALIUM) 2 MG tablet TAKE 1 TABLET BY MOUTH EVERY DAY AS NEEDED FOR ANXIETY    escitalopram oxalate (LEXAPRO) 10 MG tablet TAKE 1 TABLET (10 MG TOTAL) BY MOUTH ONCE DAILY.    gabapentin (NEURONTIN) 100 MG capsule Take 100 mg by mouth 3 (three) times daily.    hydrocodone-acetaminophen 5-325mg (NORCO) 5-325 mg per tablet TK 1 T PO Q 8 H PRN P    lisinopril (PRINIVIL,ZESTRIL) 20 MG tablet Take 1 tablet (20 mg total) by mouth once daily.    mesalamine (APRISO) 0.375 gram Cp24 Take 1.5 g by mouth once daily.    diphenoxylate-atropine 2.5-0.025 mg (LOMOTIL) 2.5-0.025 mg per tablet Take 1 tablet by mouth 2 (two) times daily. (Patient taking differently: Take 1 tablet by mouth 2 (two) times daily. Pt taking 5 pills a day.)    econazole nitrate 1 % cream Apply topically once daily. Toenails     No current facility-administered medications on file prior to visit.        Objective:      Vitals:    03/20/18 1330   BP: 116/68   Pulse: 67     Physical Exam   Constitutional: He is oriented to person, place, and time. He appears well-developed and well-nourished. No distress.   HENT:   Head: Normocephalic.   Mouth/Throat: Mallampati Score: II.   Neck: Normal range of motion.   Cardiovascular: Normal rate, regular rhythm and normal heart sounds.    No murmur heard.  Pulmonary/Chest: Effort normal and breath sounds normal. He has no wheezes. He has no rhonchi. He has no rales.   Bilateral harsh crackles (velcro) from base to mid-lung zone.   Abdominal: Soft. Bowel sounds are normal. He exhibits no distension. There is no tenderness.   Musculoskeletal: Normal range of motion. He exhibits no edema.   Neurological: He is alert and oriented to  person, place, and time.   Skin: Skin is warm and dry. He is not diaphoretic. No cyanosis. Nails show no clubbing.   Psychiatric: He has a normal mood and affect. His behavior is normal.     Personal Diagnostic Review  PFTs 2016: no evidence of obstruction or restriction.  ECHO 2017: normal EF=55%, Est PASP=43   10/2016 3/20/18   FVC 77PPD, 3.64L 65PPD, 2.3L   FEV1 78 PPD 71PPD   Ratio 78 76   TLC  54PPD   DLCO 80 57               O2Sat is 98% on room air.  Patient is mostly WC bound at this juncture due to spinal stenosis.      Assessment:     Orders Placed This Encounter   Procedures    Ambulatory Referral to Pulmonary Rehab     Referral Priority:   Routine     Referral Type:   Consultation     Referral Reason:   Specialty Services Required     Requested Specialty:   Pulmonary Disease     Number of Visits Requested:   1     1. ILD (interstitial lung disease)        Plan:       Imaging consistent with UIP/IPF.    · PFTs are significantly worse today, but patient denies worsening symptoms.  · We spoke about the fact that his ILD may be linked to an IBD disorder and the importance of controlling his GI symptoms to prevent worsening of his lung issues.    · He does not need oxygen at this juncture.  · Talked about the importance of advance directives and communication with his daughter (POA).  · We re-visited the concept that pharmacologic treatments for ILD are limited and often have GI side effects which may further compromise his QOL.  · Referral for pulmonary rehab

## 2018-03-22 RX ORDER — DIAZEPAM 2 MG/1
TABLET ORAL
Qty: 30 TABLET | Refills: 1 | Status: SHIPPED | OUTPATIENT
Start: 2018-03-22 | End: 2018-06-01 | Stop reason: SDUPTHER

## 2018-04-19 ENCOUNTER — TELEPHONE (OUTPATIENT)
Dept: CARDIOLOGY | Facility: CLINIC | Age: 83
End: 2018-04-19

## 2018-04-19 NOTE — TELEPHONE ENCOUNTER
----- Message from Madison Vicente MA sent at 4/19/2018  1:26 PM CDT -----  Contact: Yani Gates [phone]  She is having episodes of weakness,almost falls to the ground,when she talk,.has trouble communicating. Has been going on for 6 weeks,has happen 4-5 times. Please call last visit 11-13-17.

## 2018-04-19 NOTE — TELEPHONE ENCOUNTER
Yani called stating that pt has been having episodes of weakness, almost falls to the ground and has trouble communicating when this happens. Yani stated that this has been going on for 6 weeks and has happen 4-5 times. Yani stated that last week pt couldn't get out of the car because he was weak and couldn't respond to his nephew calling his name. Yani stated that pt has been low around 70's/60's. Yani stated that she is worried pt may be having seizures or mini strokes. Yani stated that she called Dr. Santana's office and was told that they didn't have an appointment w/Dr. Santana until 6/2018.Please advise.

## 2018-05-07 ENCOUNTER — TELEPHONE (OUTPATIENT)
Dept: INTERNAL MEDICINE | Facility: CLINIC | Age: 83
End: 2018-05-07

## 2018-05-23 ENCOUNTER — OFFICE VISIT (OUTPATIENT)
Dept: PODIATRY | Facility: CLINIC | Age: 83
End: 2018-05-23
Payer: MEDICARE

## 2018-05-23 VITALS
WEIGHT: 161 LBS | DIASTOLIC BLOOD PRESSURE: 79 MMHG | BODY MASS INDEX: 23.05 KG/M2 | HEART RATE: 58 BPM | SYSTOLIC BLOOD PRESSURE: 146 MMHG | HEIGHT: 70 IN

## 2018-05-23 DIAGNOSIS — G60.9 IDIOPATHIC PERIPHERAL NEUROPATHY: ICD-10-CM

## 2018-05-23 DIAGNOSIS — B35.1 ONYCHOMYCOSIS DUE TO DERMATOPHYTE: ICD-10-CM

## 2018-05-23 DIAGNOSIS — L60.0 INGROWN NAIL: Primary | ICD-10-CM

## 2018-05-23 PROCEDURE — 99999 PR PBB SHADOW E&M-EST. PATIENT-LVL III: CPT | Mod: PBBFAC,,, | Performed by: PODIATRIST

## 2018-05-23 PROCEDURE — 99213 OFFICE O/P EST LOW 20 MIN: CPT | Mod: 25,S$GLB,, | Performed by: PODIATRIST

## 2018-05-23 PROCEDURE — 11721 DEBRIDE NAIL 6 OR MORE: CPT | Mod: Q9,S$GLB,, | Performed by: PODIATRIST

## 2018-05-23 RX ORDER — DIPHENOXYLATE HCL/ATROPINE 2.5-.025/5
LIQUID (ML) ORAL
COMMUNITY
End: 2018-05-28 | Stop reason: SDUPTHER

## 2018-05-23 NOTE — PROGRESS NOTES
Subjective:      Patient ID: Phi Sainz is a 88 y.o. male.    Chief Complaint: Ingrown Toenail (Bilateral hallux)    Phi is a 88 y.o. male who presents to the clinic complaining of painful ingrown toenail on both feet.    Review of Systems   Constitution: Negative for chills, fever and malaise/fatigue.   HENT: Negative for hearing loss.    Cardiovascular: Positive for leg swelling. Negative for claudication.   Respiratory: Negative for shortness of breath.    Skin: Positive for color change, dry skin, nail changes and unusual hair distribution. Negative for flushing and rash.   Musculoskeletal: Negative for joint pain and myalgias.   Neurological: Negative for loss of balance, numbness, paresthesias and sensory change.   Psychiatric/Behavioral: Negative for altered mental status.           Objective:      Physical Exam   Constitutional: He is oriented to person, place, and time. He appears well-developed and well-nourished. He is cooperative.   Cardiovascular:   Pulses:       Dorsalis pedis pulses are 0 on the right side, and 0 on the left side.        Posterior tibial pulses are 0 on the right side, and 0 on the left side.   +2 PE left  +1 right   Musculoskeletal: He exhibits edema and tenderness.        Right knee: He exhibits no swelling and no ecchymosis.        Left knee: He exhibits no swelling and no ecchymosis.        Right ankle: He exhibits decreased range of motion and abnormal pulse. He exhibits no swelling and no ecchymosis. No lateral malleolus, no medial malleolus and no head of 5th metatarsal tenderness found. Achilles tendon exhibits no pain, no defect and normal Wilkinson's test results.        Left ankle: He exhibits decreased range of motion and abnormal pulse. He exhibits no swelling and no ecchymosis. No lateral malleolus, no medial malleolus and no head of 5th metatarsal tenderness found. Achilles tendon exhibits no pain and normal Wilkinson's test results.        Right lower leg: He  exhibits no tenderness, no bony tenderness, no swelling, no edema and no deformity.        Left lower leg: He exhibits no tenderness, no swelling and no edema.        Right foot: There is decreased range of motion. There is no deformity.        Left foot: There is decreased range of motion. There is no deformity.   Decreased ROM with out joint pain nor crepitation to bilateral feet and ankle joints.  Muscle strength 4/5 in all groups bilaterally  Decreased height of medial arches noted with loading of the foot b/L     Neurological: He is alert and oriented to person, place, and time.   Diminished protective sensation noted b/L       Skin: Skin is warm and dry. Capillary refill takes more than 3 seconds. No abrasion, no bruising, no burn and no ecchymosis noted.   B/L hallux medial borders mildly ingrowing. No erythema or paronychia noted. No drainage.  Nails x10 are elongated by  3-4mm's, thickened by 2-4 mm's, dystrophic, and are yellowish in  coloration . Xerosis Bilaterally. No open lesions noted.     Webspaces 1-4 b/L clean, dry, and intact.     Psychiatric: He has a normal mood and affect. His speech is normal and behavior is normal. He is attentive.   Vitals reviewed.            Assessment:       Encounter Diagnoses   Name Primary?    Ingrown nail Yes    Idiopathic peripheral neuropathy     Onychomycosis due to dermatophyte          Plan:       Phi was seen today for ingrown toenail.    Diagnoses and all orders for this visit:    Ingrown nail    Idiopathic peripheral neuropathy    Onychomycosis due to dermatophyte      I counseled the patient on his conditions, their implications and medical management.        - Shoe inspection. Patient instructed on proper foot hygeine. We discussed wearing proper shoe gear, daily foot inspections, never walking without protective shoe gear, never putting sharp instruments to feet, routine podiatric nail visits every 2-3 months.    Utilizing sterile toenail clippers I  aggressively trimmed  the offending b/L hallux medial  nail border approximately 3 mm from its edge and carried the nail plate incision down at an angle in order to wedge out the offending cryptotic portion of the nail plate. The offending border was then removed in toto. The remaining nail was grinded down with an electric  down to nail bed.  No blood was drawn. Patient tolerated the procedure well and related significant relief.  - With patient's permission, nails were aggressively reduced and debrided x 10 to their soft tissue attachment mechanically and with electric , removing all offending nail and debris. Patient relates relief following the procedure. He will continue to monitor the areas daily, inspect his feet, wear protective shoe gear when ambulatory, moisturizer to maintain skin integrity and follow in this office in approximately 2-3 months, sooner p.r.n.

## 2018-05-28 ENCOUNTER — LAB VISIT (OUTPATIENT)
Dept: LAB | Facility: HOSPITAL | Age: 83
End: 2018-05-28
Attending: INTERNAL MEDICINE
Payer: MEDICARE

## 2018-05-28 ENCOUNTER — OFFICE VISIT (OUTPATIENT)
Dept: CARDIOLOGY | Facility: CLINIC | Age: 83
End: 2018-05-28
Payer: MEDICARE

## 2018-05-28 VITALS
HEART RATE: 57 BPM | HEIGHT: 71 IN | SYSTOLIC BLOOD PRESSURE: 130 MMHG | BODY MASS INDEX: 23.12 KG/M2 | DIASTOLIC BLOOD PRESSURE: 65 MMHG | WEIGHT: 165.13 LBS

## 2018-05-28 DIAGNOSIS — I10 ESSENTIAL HYPERTENSION: Primary | ICD-10-CM

## 2018-05-28 DIAGNOSIS — I10 ESSENTIAL HYPERTENSION: ICD-10-CM

## 2018-05-28 DIAGNOSIS — R93.89 ABNORMAL CHEST CT: ICD-10-CM

## 2018-05-28 DIAGNOSIS — R00.1 BRADYCARDIA: ICD-10-CM

## 2018-05-28 DIAGNOSIS — G25.0 ESSENTIAL TREMOR: ICD-10-CM

## 2018-05-28 DIAGNOSIS — M51.37 DEGENERATION OF LUMBAR OR LUMBOSACRAL INTERVERTEBRAL DISC: ICD-10-CM

## 2018-05-28 DIAGNOSIS — E78.5 DYSLIPIDEMIA: ICD-10-CM

## 2018-05-28 DIAGNOSIS — R26.9 GAIT DISORDER: ICD-10-CM

## 2018-05-28 LAB
ALBUMIN SERPL BCP-MCNC: 3.2 G/DL
ALP SERPL-CCNC: 72 U/L
ALT SERPL W/O P-5'-P-CCNC: 11 U/L
ANION GAP SERPL CALC-SCNC: 8 MMOL/L
AST SERPL-CCNC: 19 U/L
BILIRUB SERPL-MCNC: 0.5 MG/DL
BUN SERPL-MCNC: 15 MG/DL
CALCIUM SERPL-MCNC: 8.7 MG/DL
CHLORIDE SERPL-SCNC: 105 MMOL/L
CO2 SERPL-SCNC: 28 MMOL/L
CREAT SERPL-MCNC: 1.2 MG/DL
EST. GFR  (AFRICAN AMERICAN): >60 ML/MIN/1.73 M^2
EST. GFR  (NON AFRICAN AMERICAN): 53.7 ML/MIN/1.73 M^2
GLUCOSE SERPL-MCNC: 98 MG/DL
HCT VFR BLD AUTO: 36.1 %
HGB BLD-MCNC: 11 G/DL
POTASSIUM SERPL-SCNC: 4 MMOL/L
PROT SERPL-MCNC: 6.7 G/DL
SODIUM SERPL-SCNC: 141 MMOL/L
TSH SERPL DL<=0.005 MIU/L-ACNC: 2.42 UIU/ML

## 2018-05-28 PROCEDURE — 85018 HEMOGLOBIN: CPT

## 2018-05-28 PROCEDURE — 99999 PR PBB SHADOW E&M-EST. PATIENT-LVL III: CPT | Mod: PBBFAC,,, | Performed by: INTERNAL MEDICINE

## 2018-05-28 PROCEDURE — 84443 ASSAY THYROID STIM HORMONE: CPT

## 2018-05-28 PROCEDURE — 80053 COMPREHEN METABOLIC PANEL: CPT

## 2018-05-28 PROCEDURE — 99214 OFFICE O/P EST MOD 30 MIN: CPT | Mod: S$GLB,,, | Performed by: INTERNAL MEDICINE

## 2018-05-28 PROCEDURE — 85014 HEMATOCRIT: CPT

## 2018-05-28 PROCEDURE — 99499 UNLISTED E&M SERVICE: CPT | Mod: S$GLB,,, | Performed by: INTERNAL MEDICINE

## 2018-05-28 PROCEDURE — 36415 COLL VENOUS BLD VENIPUNCTURE: CPT | Mod: PO

## 2018-05-28 NOTE — PROGRESS NOTES
"Subjective:   Patient ID:  Phi Sainz is a 88 y.o. male who presents for follow-up of Loss of Consciousness and Hypotension      HPI: Patient seen for symptoms of general weakness and "slumping".  Recently he developed diarrhea and his appetite is poor. BP record indicate low blood pressure.  Presently his nurse is holding Lisinopril.  Patient's daughter was advised to have patient evaluated in ER, but instead made an appointment with cardiology.    He is very debilitated, essentially wheel chair bound.  Patient was prescribed physical therapy , but decided not to proceed with it.    He has no specific complaints.  Today vital signs are stable.    Lab Results   Component Value Date     11/08/2017    K 4.3 11/08/2017     11/08/2017    CO2 32 (H) 11/08/2017    BUN 25 (H) 11/08/2017    CREATININE 1.2 11/08/2017     11/08/2017    AST 16 11/08/2017    ALT 12 11/08/2017    ALBUMIN 3.2 (L) 11/08/2017    PROT 7.3 11/08/2017    BILITOT 0.8 11/08/2017    WBC 9.41 10/09/2017    HGB 14.5 10/09/2017    HCT 44.7 10/09/2017    MCV 93 10/09/2017     10/09/2017    PSA 0.82 05/03/2016    TSH 0.976 11/08/2017    CHOL 196 11/08/2017    HDL 73 11/08/2017    LDLCALC 111.8 11/08/2017    TRIG 56 11/08/2017       Review of Systems   Constitution: Positive for weakness and malaise/fatigue.   HENT: Negative.    Eyes: Negative.    Cardiovascular: Positive for leg swelling. Negative for chest pain, claudication, dyspnea on exertion, irregular heartbeat, near-syncope, palpitations and syncope.   Respiratory: Positive for cough and wheezing. Negative for shortness of breath and snoring.    Endocrine: Negative.  Negative for cold intolerance, heat intolerance, polydipsia, polyphagia and polyuria.   Skin: Negative.    Musculoskeletal: Positive for arthritis, back pain and falls.   Gastrointestinal: Negative.    Genitourinary: Negative.    Neurological: Positive for dizziness, light-headedness, loss of balance " "and numbness.   Psychiatric/Behavioral: Negative.        Objective:   Physical Exam   Constitutional: He is oriented to person, place, and time. He appears well-developed and well-nourished.   /65   Pulse (!) 57   Ht 5' 11" (1.803 m)   Wt 74.9 kg (165 lb 2 oz)   BMI 23.03 kg/m²  frail      HENT:   Head: Normocephalic.   Eyes: Pupils are equal, round, and reactive to light.   Neck: Normal range of motion. Neck supple. Carotid bruit is not present. No thyromegaly present.   Cardiovascular: Normal rate, regular rhythm, normal heart sounds and intact distal pulses.  Exam reveals no gallop and no friction rub.    No murmur heard.  Pulses:       Carotid pulses are 2+ on the right side, and 2+ on the left side.       Radial pulses are 2+ on the right side, and 2+ on the left side.        Femoral pulses are 2+ on the right side, and 2+ on the left side.       Popliteal pulses are 2+ on the right side, and 2+ on the left side.        Dorsalis pedis pulses are 2+ on the right side, and 2+ on the left side.        Posterior tibial pulses are 2+ on the right side, and 2+ on the left side.   Pulmonary/Chest: Effort normal and breath sounds normal. No respiratory distress. He has no wheezes. He has no rales. He exhibits no tenderness.   celophane crackles bilaterally   Abdominal: Soft. Bowel sounds are normal.   Musculoskeletal: Normal range of motion. He exhibits edema.   1-2 + on ly left, no erythema, or tenderness   Neurological: He is alert and oriented to person, place, and time.   Skin: Skin is warm and dry.   Psychiatric: He has a normal mood and affect.   Nursing note and vitals reviewed.        Assessment and Plan:     Problem List Items Addressed This Visit        Cardiology Problems    Hypertension - Primary    Relevant Orders    Hematocrit    Hemoglobin    Comprehensive metabolic panel    TSH       Other    Essential tremor    Gait disorder    Dyslipidemia    Relevant Orders    Hematocrit    Hemoglobin    " Comprehensive metabolic panel    TSH    Degeneration of lumbar or lumbosacral intervertebral disc    Bradycardia    Relevant Orders    Hematocrit    Hemoglobin    Comprehensive metabolic panel    TSH    Abnormal chest CT          Patient's Medications   New Prescriptions    No medications on file   Previous Medications    AZELASTINE (ASTELIN) 137 MCG (0.1 %) NASAL SPRAY    1 spray (137 mcg total) by Nasal route 2 (two) times daily.    DIAZEPAM (VALIUM) 2 MG TABLET    TAKE 1 TABLET BY MOUTH EVERY DAY FOR ANXIETY    DIPHENOXYLATE-ATROPINE 2.5-0.025 MG (LOMOTIL) 2.5-0.025 MG PER TABLET    Take 1 tablet by mouth 2 (two) times daily.    ESCITALOPRAM OXALATE (LEXAPRO) 10 MG TABLET    TAKE 1 TABLET (10 MG TOTAL) BY MOUTH ONCE DAILY.    GABAPENTIN (NEURONTIN) 100 MG CAPSULE    Take 100 mg by mouth 3 (three) times daily.    HYDROCODONE-ACETAMINOPHEN 5-325MG (NORCO) 5-325 MG PER TABLET    TK 1 T PO Q 8 H PRN P    MESALAMINE (APRISO) 0.375 GRAM CP24    Take 1.5 g by mouth once daily.   Modified Medications    No medications on file   Discontinued Medications    DIPHENOXYLATE-ATROPINE 2.5-0.025 MG/5 ML (LOMOTIL) 2.5-0.025 MG/5 ML LIQUID    Lomotil    ECONAZOLE NITRATE 1 % CREAM    Apply topically once daily. Toenails    LISINOPRIL (PRINIVIL,ZESTRIL) 20 MG TABLET    Take 1 tablet (20 mg total) by mouth once daily.     40 minutes face to face time with Counseling More Than 50% of the Appointment Time was spent discussing  With patient, his daughter and a nurse caring of the elderly, debilitated patient, need for proper, balanced nutrition, and hydration, establishing physical therapy and avoiding falls and infections.   For now hold Lisinopril and administer only if BP is >140 syst,  Patient is to follow with his PCP regarding any further questions and orders as to his general follow up  Follow up as indicated

## 2018-05-29 ENCOUNTER — TELEPHONE (OUTPATIENT)
Dept: CARDIOLOGY | Facility: CLINIC | Age: 83
End: 2018-05-29

## 2018-05-29 NOTE — TELEPHONE ENCOUNTER
----- Message from Rody Morrison MD sent at 5/29/2018 10:58 AM CDT -----  Please inform the patient that blood work was fine except for lower proteins.  He needs to improve his nutrition.  There is also mild anemia, but it should not cause any problems.

## 2018-06-01 RX ORDER — DIAZEPAM 2 MG/1
TABLET ORAL
Qty: 30 TABLET | Refills: 1 | Status: SHIPPED | OUTPATIENT
Start: 2018-06-01 | End: 2018-06-27 | Stop reason: SDUPTHER

## 2018-06-12 ENCOUNTER — TELEPHONE (OUTPATIENT)
Dept: INTERNAL MEDICINE | Facility: CLINIC | Age: 83
End: 2018-06-12

## 2018-06-12 NOTE — TELEPHONE ENCOUNTER
----- Message from Bee Duque sent at 6/12/2018  1:07 PM CDT -----  Contact: Shimon @ [facility] [phone] Ext # [phone]  He is calling in regards to the patient being diagnosed with DVT he said they sent the patient to the emergency room at [facility] and they just wanted to notify you about it.

## 2018-06-12 NOTE — TELEPHONE ENCOUNTER
Daughter wanted to know who was sending the Rx for the patient as she needs to have his meds for tonight at 9. Advised since he is there at  she will need to speak with on call doctor there that is seeing him she agreed and termed the call

## 2018-06-20 ENCOUNTER — TELEPHONE (OUTPATIENT)
Dept: PULMONOLOGY | Facility: CLINIC | Age: 83
End: 2018-06-20

## 2018-06-20 NOTE — TELEPHONE ENCOUNTER
Spoke with Mr. Sainz's daughter.  Patient was diagnosed with DVT ~6/12/18 and has been placed on Lovenox since diagnosis.  In my opinion, there is no contraindication to proceeding with pulmonary rehab (studies have shown that early ambulation after DVT does not increase the risk of PE); and pulmonary rehab primarily involves ambulation and ergometry.

## 2018-06-20 NOTE — TELEPHONE ENCOUNTER
Please see message from patient's daughter re: pulmonary rehab. Patient being treated for blood clot.

## 2018-06-20 NOTE — TELEPHONE ENCOUNTER
----- Message from Madison Quinn sent at 6/20/2018  1:23 PM CDT -----  Contact: Yani / Daughter / tel: [phone]   Needs Advice    Reason for call:    Supposed to schedule and go to resp. Therapy at [facility], do they postpone the therapy until a later date when pt.'s blood clot is dissolved?   Communication Preference:  Phone   Additional Information:   Found a blood clot in his left ankle.    He gets two shots a day of a blood thinner   :   Enoxaparin.       Pls call to discuss this.

## 2018-06-22 ENCOUNTER — OFFICE VISIT (OUTPATIENT)
Dept: INTERNAL MEDICINE | Facility: CLINIC | Age: 83
End: 2018-06-22
Payer: MEDICARE

## 2018-06-22 VITALS
WEIGHT: 162.5 LBS | HEIGHT: 71 IN | SYSTOLIC BLOOD PRESSURE: 124 MMHG | DIASTOLIC BLOOD PRESSURE: 80 MMHG | HEART RATE: 60 BPM | BODY MASS INDEX: 22.75 KG/M2 | TEMPERATURE: 98 F

## 2018-06-22 DIAGNOSIS — R60.9 EDEMA, UNSPECIFIED TYPE: ICD-10-CM

## 2018-06-22 DIAGNOSIS — R13.10 DYSPHAGIA, UNSPECIFIED TYPE: ICD-10-CM

## 2018-06-22 DIAGNOSIS — I82.4Y2 DEEP VEIN THROMBOSIS (DVT) OF PROXIMAL VEIN OF LEFT LOWER EXTREMITY, UNSPECIFIED CHRONICITY: ICD-10-CM

## 2018-06-22 DIAGNOSIS — I10 ESSENTIAL HYPERTENSION: Primary | ICD-10-CM

## 2018-06-22 PROCEDURE — 99214 OFFICE O/P EST MOD 30 MIN: CPT | Mod: S$GLB,,, | Performed by: INTERNAL MEDICINE

## 2018-06-22 PROCEDURE — 99999 PR PBB SHADOW E&M-EST. PATIENT-LVL III: CPT | Mod: PBBFAC,,, | Performed by: INTERNAL MEDICINE

## 2018-06-22 RX ORDER — ENOXAPARIN SODIUM 100 MG/ML
60 INJECTION SUBCUTANEOUS EVERY 12 HOURS
Start: 2018-06-22 | End: 2018-07-24

## 2018-06-22 NOTE — PROGRESS NOTES
CC: followup of left leg DVT  HPI:  The patient is a 88 y.o. year old male who presents to the office for followup of left leg DVT.  The patient presented to pain management about 2 weeks ago.  He was noted to have swelling of his left leg.  He denied any pain at the time.  He went to the emergency department where an ultrasound was performed and showed a DVT.  He was started on Lovenox.  The patient has also had syncopal episodes secondary to low blood pressure.  His symptoms have resolved with cessation of blood pressure medication.      PAST MEDICAL HISTORY:  Past Medical History:   Diagnosis Date    Anemia     Arthritis     Depression     Herniation of intervertebral disc     Hyperlipidemia     Hypertension 3/4/2015    Macular degeneration     Spinal stenosis        SURGICAL HISTORY:  Past Surgical History:   Procedure Laterality Date    CATARACT EXTRACTION W/  INTRAOCULAR LENS IMPLANT Left 10/31/07    os ()    CATARACT EXTRACTION W/  INTRAOCULAR LENS IMPLANT Right 12/16/15    od ()       MEDS:  Medcard reviewed and updated    ALLERGIES: Allergy Card reviewed and updated    SOCIAL HISTORY:   The patient is a nonsmoker.    PE:   APPEARANCE: Well nourished, well developed, in no acute distress.    CHEST: Lungs clear to auscultation with unlabored respirations.  CARDIOVASCULAR: Normal S1, S2. No murmurs. No carotid bruits. No pedal edema.  ABDOMEN: Bowel sounds normal. Not distended. Soft. No tenderness or masses.   MUSCULOSKELETAL:  Abormal gait.  PSYCHIATRIC: The patient is oriented to person, place, and time and has a pleasant affect.        ASSESSMENT/PLAN:  Phi was seen today for follow-up.    Diagnoses and all orders for this visit:    Essential hypertension  -     Blood pressure is controlled    Deep vein thrombosis (DVT) of proximal vein of left lower extremity, unspecified chronicity  -     Cardiology Lab US Lower Extremity Veins Left; Future    Dysphagia, unspecified  type  -     Ambulatory Referral to Speech Therapy    Edema, unspecified type   -     Cardiology Lab US Lower Extremity Veins Left; Future    Other orders  -     enoxaparin (LOVENOX) 60 mg/0.6 mL Syrg; Inject 0.6 mLs (60 mg total) into the skin every 12 (twelve) hours.

## 2018-06-27 ENCOUNTER — TELEPHONE (OUTPATIENT)
Dept: INTERNAL MEDICINE | Facility: CLINIC | Age: 83
End: 2018-06-27

## 2018-06-27 RX ORDER — DIAZEPAM 2 MG/1
TABLET ORAL
Qty: 30 TABLET | Refills: 1 | Status: SHIPPED | OUTPATIENT
Start: 2018-06-27 | End: 2018-10-21 | Stop reason: SDUPTHER

## 2018-06-27 NOTE — TELEPHONE ENCOUNTER
[phone] voicemail box not set up.  I called home number and reached her.  I reminded her of all appts on books with [facility].  He has fol up 7/27 to see you.    Her only concerns:   He is on blood thinner shots  Twice daily. Has   About another week left at home.    Daughter says you were going to speak to gastro dr finley (by abbi)-    To see if he prefers Steroid for colitis or rx instead of shots.. ??    Need me to call for you?  Please advise.  Thanks payton

## 2018-06-27 NOTE — TELEPHONE ENCOUNTER
----- Message from Shari Christianson sent at 6/27/2018 11:58 AM CDT -----  Contact: Pt Daughter Yani [phone]  Pt daughter would like a call back in regards in to recent visit for blood clot. Pt daughter states she was supposed to receive a call from nurse to follow up. Please call and advise

## 2018-06-28 ENCOUNTER — HOSPITAL ENCOUNTER (OUTPATIENT)
Dept: PULMONOLOGY | Facility: OTHER | Age: 83
Discharge: HOME | End: 2018-06-28
Attending: INTERNAL MEDICINE
Payer: MEDICARE

## 2018-06-28 VITALS — HEIGHT: 71 IN | WEIGHT: 161 LBS | BODY MASS INDEX: 22.54 KG/M2

## 2018-06-28 DIAGNOSIS — J84.9 ILD (INTERSTITIAL LUNG DISEASE): ICD-10-CM

## 2018-06-28 PROCEDURE — 94618 PULMONARY STRESS TESTING: CPT

## 2018-06-28 NOTE — TELEPHONE ENCOUNTER
Called in xanax to recorder at [facility]/pharmacy #[id number] - [place], [place] [address] [phone]   As dr funez approved.

## 2018-06-28 NOTE — TELEPHONE ENCOUNTER
Spoke to patient daughter told her Dr Santana got approval to start Eliquis   Instruction given once on Eliquis to stop lovenox  Family had a question  about if patient should take second dosage  Of lovenox to night are should they just start new med  I was unsure so I spoke to DR Bran and he told me that they should just start eliquis tonight and discontinue lovenox   Patient family was given the instruction

## 2018-06-28 NOTE — TELEPHONE ENCOUNTER
Please inform patient and patient's daughter that Dr. Ray okayed the use of Eliquis.  The prescription has been sent to [facility] electronically.  Lovenox can be discontinued once Eliquis started.

## 2018-07-02 ENCOUNTER — CLINICAL SUPPORT (OUTPATIENT)
Dept: REHABILITATION | Facility: HOSPITAL | Age: 83
End: 2018-07-02
Attending: INTERNAL MEDICINE
Payer: MEDICARE

## 2018-07-02 DIAGNOSIS — R13.19 OTHER DYSPHAGIA: ICD-10-CM

## 2018-07-02 PROCEDURE — 92610 EVALUATE SWALLOWING FUNCTION: CPT | Mod: PN

## 2018-07-02 PROCEDURE — G8997 SWALLOW GOAL STATUS: HCPCS | Mod: CI,PN

## 2018-07-02 PROCEDURE — G8996 SWALLOW CURRENT STATUS: HCPCS | Mod: CK,PN

## 2018-07-03 PROBLEM — R13.19 OTHER DYSPHAGIA: Status: ACTIVE | Noted: 2018-07-03

## 2018-07-03 NOTE — PLAN OF CARE
Outpatient Neurological Rehabilitation  Speech and Language Therapy Evaluation    Date: 7/2/2018   Start Time:  1315  Stop Time:  1400    Name: Phi Sainz   MRN: [id number]    Therapy Diagnosis: Dysphagia Physician: Yanna Santana MD  Physician Orders: speech therapy evaluate and treat for dysphagia  Medical Diagnosis: dysphagia    Visit #:  1  Visits Authorized: 1  Date of Evaluation:  7/2/18  Insurance Authorization Period: June 22, 2018 to June 22, 2019  Plan of Care Certification:    7/2/18 to 9/2/18     Procedure Min.   Swallowing and Oral Function Evaluation    45 minutes        Total Minutes: 45  Total Untimed Units: 1  Charges Billed/# of units: 1    Precautions:Standard and Fall  Subjective   Date of Onset: approx 2016 per pt report  History of Current Condition:   Pt reports h/o watery eyes for 1-2 years during meal times.  Past Medical History: Phi Sainz  has a past medical history of Anemia; Arthritis; Depression; Herniation of intervertebral disc; Hyperlipidemia; Hypertension (3/4/2015); Macular degeneration; and Spinal stenosis.  Phi Sainz  has a past surgical history that includes Cataract extraction w/  intraocular lens implant (Left, 10/31/07) and Cataract extraction w/  intraocular lens implant (Right, 12/16/15).  Medical Hx and Allergies:  Phi has a current medication list which includes the following prescription(s): apixaban, azelastine, diazepam, diphenoxylate-atropine 2.5-0.025 mg, enoxaparin, escitalopram oxalate, gabapentin, hydrocodone-acetaminophen, and apriso.   Review of patient's allergies indicates:   Allergen Reactions    Combigan [brimonidine-timolol] Other (See Comments)     dizziness    Contrast media Rash     Imaging: None found.  Prior Therapy:  None reported.   Social History:  Pt lives at home by himself with intermittent supervision from home health nurses and family. Wife in a nursing home.   Prior Level of Function: Assisted  Current  Level of Function: Assisted  Pain:   0/10  Pain Location / Description: n/a  Nutrition:  Regular and thin liquid diet  Patient Therapy Goals:  Family concerned and would like to improve swallow function. Pt does not appear to have any particular goals.     Objective   Auditory Comprehension: Appeared adequate in conversation    Reading Comprehension: -Not addressed this session.     Verbal Expression: Not formally assessed. Appeared to be WFL for conversation.    Written Expression: -Not addressed this session.     Cognition: Pt alert and attentive. Pt participate readily in trials.  Further cognition not addressed.     Motor Speech/Fluency/Voice:  No significant dysarthria or dysphonia noted.  Adequate fluency in conversation.     Oral Motor Exam:  Generalized weakness  Oral Musculature:  Structure Abnormalities:  Dentition: no dentures. Multiple teeth missing including molars.   Secretion Management: adequate  Mucosal Quality: adequate  Mandibular Strength and Mobility: adequate  Oral Labial Strength and Mobility: decreased strength  Lingual Strength and Mobility: decreased strength   Velar Elevation: adequate  Buccal Strength and Mobility: decreased strength  Volitional Cough: weak  Volitional Swallow: difficult to initiate  Voice Prior to PO Intake: clear vocal quality  Oral Musculature Comments: grossly symmetrical     Diadochokinetic (DDK) rates for rapid repetition of 1 - 3 syllable utterances were WNL. Pt produced /pu/ x 27 in 10 seconds, /tu/ x 20 in 10 seconds, /ku/ x 21 in 10 seconds.  /putuku/ x 10 in 10 seconds.     Swallowing: Mr. Sainz completed a clinical swallow evaluation.  He readily accepted po trials of thin liquids 2 oz via cup and straw, pudding x 3, peaches x 3, cracker x 2.  He presents with mild-moderate oropharyngeal dysphagia c/b decreased oral transit of soft solids and solid consistencies, trace oral residue post soft solid and mod oral residue post solid consistencies both cleared  "via liquid wash, coughing post thin liquids, significant watery eyes. No anterior spillage noted. Pt reports "watery eyes during meal times" has been occurring for 1-2 years. Overt signs/symptoms of aspiration noted including immediate coughing post thin liquids and watery eyes. Pt would benefit from a modified barium swallow study to observe the swallow process, visualize pharyngeal function and strength, rule out aspiration, determine safest form of po, and determine best course of treatment.    Note: no pacemaker, defibrillator, or history of seizure disorder. Pt may be a good candidate for neuromuscular electrical stimulation pending results of MBSS.  Discussed briefly with pt; however, pt appeared hesitant to proceed with therapy.  Pt did not want to make additional appointments at this time. Pt stated "can you guarantee improvement?" Discussed possible outcomes dependent on participation in therapy and home program.      Hearing: No hearing aids. Adequate in conversation.    Functional Communication Measure (FCM):   Severity Modifier for Medicare G-Code:  Swallowing  Current status:  FCM:     - CJ  Projected status:  FCM:      - CI    Treatment   Treatment Time In: n/a  Treatment Time Out: n/a  Total Treatment Time: n/a  n/a    Education: POC, role of SLP, aspiration precautions, course of medical disease affect on therapy diagnosis, scheduling/ cancellation policy and insurance limitations  was discussed with pt. Pt's student nurse was present. Reviewed home exercise program with pt and student nurse.  Patient and student nurse expressed understanding.     Home Program: Oral motor exercises including labial and lingual exercises to improve oral musculature and improve swallow safety.    Assessment   Pierre presents to [facility] s/p medical diagnosis of  dysphagia.  Demonstrates impairments including limitations as described in the problem list. he presents with " mild-moderate oropharyngeal dysphagia c/b decreased oral transit of soft solids and solid consistencies, trace oral residue post soft solid and mod oral residue post solid consistencies both cleared via liquid wash, coughing post thin liquids, significant watery eyes. Positive prognostic factors include transportation. Negative prognostic factors include age, motivation, questionable compliance. Barriers to progress include none.  Patient will benefit from skilled, outpatient neurological rehabilitation speech therapy.  Rehab Potential: fair  Environmental Concerns/Cultural/Spiritual/Developmental/Educational Needs: Pt's spiritual, cultural and educational needs considered and pt agreeable to plan of care and goals.    Short Term Goals (4 weeks):   1. Pt will participate in oral motor exercises (labial, lingual, and buccal) exercises x 40 to improve oral strength with and without neuromuscular electrical stimulation.   2. Pt will recall and utilize safe swallow strategies with min A to improve swallow safety.   3. Pt will participate in trials of thin liquids x 10 with no overt signs/symptoms of aspiration with and without neuromuscular electrical stimulation.   4. Pt will participate in trials of solids x 10 with no oral residue to improve swallow safety  with and without neuromuscular electrical stimulation.   Goals to be updated post MBSS.     Long Term Goals (8 weeks):   1. Pt will participate in swallow exercises and utilize strategies to improve overall swallow safety and decrease the risk of aspiration in his everyday environment.     Plan     Plan of Care Certification Period: 7/2/18 to 9/2/18    Recommended Treatment Plan:  Patient will participate in the [facility] neurological rehabilitation program for speech therapy 2 times per week for 8 weeks to address his  Swallow deficits, to educate patient and their family, and to participate in a home exercise program.    Other Recommendations:   1. MBSS to  "evaluate the swallow process, view function of swallow muscles, rule out aspiration, and determine safest form of po    July Null,  Clinician    "I certify that I was present in the room directing the student and service delivery and guiding them using my skilled judgement. As the co-signing therapist, I have reviewed the student's documentation, and am responsible for the treatment, assessment and plan."       Therapist's Name:   Luz Stratton M.S.CCC-SLP  Speech Language Pathologist      Date: 7/2/2018     "

## 2018-07-09 ENCOUNTER — HOSPITAL ENCOUNTER (OUTPATIENT)
Dept: PULMONOLOGY | Facility: OTHER | Age: 83
Discharge: HOME | End: 2018-07-09
Attending: INTERNAL MEDICINE
Payer: MEDICARE

## 2018-07-09 VITALS — BODY MASS INDEX: 22.45 KG/M2 | WEIGHT: 161 LBS

## 2018-07-09 PROCEDURE — G0237 THERAPEUTIC PROCD STRG ENDUR: HCPCS

## 2018-07-09 PROCEDURE — G0238 OTH RESP PROC, INDIV: HCPCS

## 2018-07-09 NOTE — PROGRESS NOTES
[facility]  Pulmonary Rehab  Session Summary    SUMMARY     Session Data  Session Number: 2  Time In: 1330  Time Out: 1500  Weight: 73 kg (161 lb)  Target Heart Rate Zone: Minimum: 79 bpm  Target Heart Rate Zone: Maximum: 106 bpm      Pre Exercise Vitals  SpO2: 96 %  Pulse: 66  BP: 156/85  Respiration Rate: 18 per minute  Rating of Perceived Exertion (RPE) Scale: 5      During Exercise Vitals  SpO2: 96 %  Pulse: 60  Rating of Perceived Exertion (RPE) Scale: 5      Post Exercise Vitals  SpO2: 96 %  Supplemental O2?: No  Pulse: 63  BP: 157/84  Respiration Rate: 18 per minute  Rating of Perceived Exertion (RPE) Scale: 5       Modality  Modality: Leg Free Weights            Arm Ergometer  Time: 5 minutes  RPM: 30 RPMs  Level: 1            Nustep  Time: 10 minutes  Steps: 50  Load: 1      Recumbent Bike  Time: 5 minutes  RPM: 32 RPMs  Level: 1                  Biceps  lbs: 3 lbs  Sets: 3  Reps: 10          Triceps  lbs: 3 lbs  Sets: 3  Reps: 10      Lower Body  lbs: 3 lbs  Sets: 2  Reps: 10    Education        Therapist Notes  Patient arrived on time with a good attitude. Blood pressure was elevated. RPE 2-5. No complaints. Overall patient done well.Randee Harrison, CRT

## 2018-07-11 ENCOUNTER — TELEPHONE (OUTPATIENT)
Dept: CARDIOLOGY | Facility: CLINIC | Age: 83
End: 2018-07-11

## 2018-07-11 ENCOUNTER — HOSPITAL ENCOUNTER (OUTPATIENT)
Dept: PULMONOLOGY | Facility: OTHER | Age: 83
Discharge: HOME | End: 2018-07-11
Attending: INTERNAL MEDICINE
Payer: MEDICARE

## 2018-07-11 VITALS — WEIGHT: 162.06 LBS | BODY MASS INDEX: 22.6 KG/M2

## 2018-07-11 PROCEDURE — G0239 OTH RESP PROC, GROUP: HCPCS

## 2018-07-11 PROCEDURE — G0238 OTH RESP PROC, INDIV: HCPCS

## 2018-07-11 NOTE — TELEPHONE ENCOUNTER
Pt's daughter, Yani called stating that she wanted you to know that pt was in  for a blood clot.  Yani stated that pt had an x-ray at  a few days after his appointment w/you and it showed a blood clot in his left leg and he was admitted. Yani stated that the ER doctor spoke w/Dr. Santana and she advised them to start pt on Lovenox and he took it for 3 weeks and is now on Eliquis 5mg. Yani stated that she will try to get the records from  for you to review. Please advise.

## 2018-07-11 NOTE — TELEPHONE ENCOUNTER
----- Message from Tawnya Roman sent at 7/11/2018  1:51 PM CDT -----  Contact: Patient's daughter  The Pt's daughter is calling to report that her father was in the hospital with a blood clot. Please call her back @ [phone]. They did see his PCP. Thanks, Tawnya

## 2018-07-11 NOTE — PROGRESS NOTES
[facility]  Pulmonary Rehab  Session Summary    SUMMARY     Session Data  Session Number: 3  Time In: 1330  Time Out: 1500  Weight: 73.5 kg (162 lb 0.6 oz)  Target Heart Rate Zone: Minimum: 79 bpm  Target Heart Rate Zone: Maximum: 106 bpm  Patient Motivation: Good  Patient Effort: Good      Pre Exercise Vitals  SpO2: 95 %  Pulse: 66  BP: 155/87  Respiration Rate: 18 per minute  Rating of Perceived Exertion (RPE) Scale: 5      During Exercise Vitals  SpO2: 95 %  Pulse: 66  Rating of Perceived Exertion (RPE) Scale: 5      Post Exercise Vitals  SpO2: 94 %  Pulse: 62  BP: 152/86  Respiration Rate: 18 per minute  Rating of Perceived Exertion (RPE) Scale: 5       Modality  Modality: Leg Free Weights             Arm Ergometer  Time: 10 minutes  RPM: 16 RPMs  Level: 1            Nustep  Time: 10 minutes  Steps: 60  Load: 1      Recumbent Bike  Time: 10 minutes  RPM: 45 RPMs  Level: 1                  Biceps  lbs: 3 lbs  Sets: 3  Reps: 10            Triceps  lbs: 3 lbs  Sets: 3  Reps: 10      Lower Body  lbs: 3 lbs  Sets: 2  Reps: 10    Education        Therapist Notes Patient arrived on time with a good attitude. Blood pressure was in normal range. RPE 3-5. Overall patient done well.Randee Harrison, CRT

## 2018-07-16 ENCOUNTER — HOSPITAL ENCOUNTER (OUTPATIENT)
Dept: PULMONOLOGY | Facility: OTHER | Age: 83
Discharge: HOME | End: 2018-07-16
Attending: INTERNAL MEDICINE
Payer: MEDICARE

## 2018-07-16 VITALS — BODY MASS INDEX: 22.6 KG/M2 | WEIGHT: 162.06 LBS

## 2018-07-16 PROCEDURE — G0237 THERAPEUTIC PROCD STRG ENDUR: HCPCS

## 2018-07-16 PROCEDURE — 27000221 HC OXYGEN, UP TO 24 HOURS

## 2018-07-16 PROCEDURE — G0238 OTH RESP PROC, INDIV: HCPCS

## 2018-07-16 NOTE — PROGRESS NOTES
[facility]  Pulmonary Rehab  Session Summary    SUMMARY     Session Data  Session Number: 4  Time In: 1330  Time Out: 1500  Weight: 73.5 kg (162 lb 0.6 oz)  Target Heart Rate Zone: Minimum: 79 bpm  Target Heart Rate Zone: Maximum: 106 bpm  Patient Motivation: Good  Patient Effort: Good      Pre Exercise Vitals  SpO2: 95 %  Pulse: 63  BP: 142/73  Respiration Rate: 18 per minute  Rating of Perceived Exertion (RPE) Scale: 5      During Exercise Vitals  SpO2: 95 %  Pulse: 63  Rating of Perceived Exertion (RPE) Scale: 5      Post Exercise Vitals  SpO2: 96 %  Pulse: 82  BP: 145/77  Rating of Perceived Exertion (RPE) Scale: 5       Modality  Modality: Leg Free Weights            Arm Ergometer  Time: 10 minutes  RPM: 20 RPMs  Level: 2            Nustep  Time: 10 minutes  Steps: 51  Load: 2      Recumbent Bike  Time: 5 minutes  RPM: 30 RPMs  Level: 2                  Biceps  lbs: 3 lbs  Sets: 3  Reps: 10            Triceps  lbs: 3 lbs  Sets: 3  Reps: 10      Lower Body  lbs: 3 lbs  Sets: 2  Reps: 10    Education        Therapist Notes Patient arrived on time with a great attitude. Blood pressure was in elevated. RPE 3-5. No complaints. Overall patient done well.Randee Harrison, CRT

## 2018-07-18 ENCOUNTER — HOSPITAL ENCOUNTER (OUTPATIENT)
Dept: PULMONOLOGY | Facility: OTHER | Age: 83
Discharge: HOME | End: 2018-07-18
Attending: INTERNAL MEDICINE
Payer: MEDICARE

## 2018-07-18 VITALS — BODY MASS INDEX: 22.72 KG/M2 | WEIGHT: 162.94 LBS

## 2018-07-18 PROCEDURE — G0238 OTH RESP PROC, INDIV: HCPCS

## 2018-07-18 PROCEDURE — G0237 THERAPEUTIC PROCD STRG ENDUR: HCPCS

## 2018-07-18 NOTE — PROGRESS NOTES
[facility]  Pulmonary Rehab  Session Summary    SUMMARY     Session Data  Session Number: 5  Time In: 1330  Time Out: 1500  Weight: 73.9 kg (162 lb 14.7 oz)  Target Heart Rate Zone: Minimum: 79 bpm  Target Heart Rate Zone: Maximum: 106 bpm  Patient Motivation: Good  Patient Effort: Good      Pre Exercise Vitals  SpO2: 97 %  Pulse: 92  BP: 129/72  Respiration Rate: 20 per minute      During Exercise Vitals  SpO2: 97 %  Pulse: 89  Respiration Rate: 20 per minute      Post Exercise Vitals  SpO2: 98 %  Pulse: 54  BP: 161/84  Respiration Rate: 20 per minute  Rating of Perceived Exertion (RPE) Scale: 3       Modality  Modality: Nustep      Arm Ergometer  Time: 10 minutes  RPM: 18 RPMs (.58 distance)  Level: 2      Nustep  Time: 10 minutes  Steps: 50 (.31 distance)  Load: 2      Recumbent Bike  Time: 5 minutes  RPM: 36 RPMs (.7 distance)  Level: 2      Biceps  lbs: 3 lbs  Sets: 3  Reps: 10      Chest  lbs: 3 lbs  Sets: 3  Reps: 10      Triceps  lbs: 3 lbs  Sets: 3  Reps: 10      Lower Body  lbs: 3 lbs (Right leg only)  Sets: 2  Reps: 10    Education    Therapist Notes Pt arrived to therapy session and completed exercise routine without complications.  Pt appears to be in no apparent respiratory distress.Daniel Cardenas, CRT

## 2018-07-23 ENCOUNTER — HOSPITAL ENCOUNTER (OUTPATIENT)
Dept: PULMONOLOGY | Facility: OTHER | Age: 83
Discharge: HOME | End: 2018-07-23
Attending: INTERNAL MEDICINE
Payer: MEDICARE

## 2018-07-23 VITALS — BODY MASS INDEX: 22.69 KG/M2 | WEIGHT: 162.69 LBS

## 2018-07-23 PROCEDURE — G0237 THERAPEUTIC PROCD STRG ENDUR: HCPCS

## 2018-07-23 PROCEDURE — G0238 OTH RESP PROC, INDIV: HCPCS

## 2018-07-23 NOTE — PROGRESS NOTES
[facility]  Pulmonary Rehab  Session Summary    SUMMARY     Session Data  Session Number: 6  Time In: 1330  Time Out: 1500  Weight: 73.8 kg (162 lb 11.2 oz)  Target Heart Rate Zone: Minimum: 79 bpm  Target Heart Rate Zone: Maximum: 106 bpm  Patient Motivation: Good  Patient Effort: Good      Pre Exercise Vitals  SpO2: 91 %  Supplemental O2?: No  Pulse: 68  BP: 126/72  Respiration Rate: 18 per minute      During Exercise Vitals  SpO2: 91 %  Pulse: 68      Post Exercise Vitals  SpO2: 96 %  Supplemental O2?: No  Pulse: 61  BP: 119/78  Respiration Rate: 18 per minute  Rating of Perceived Exertion (RPE) Scale: 4       Modality  Modality: Leg Free Weights      Arm Ergometer  Time: 10 minutes  RPM: 20 RPMs (.68 distance)  Level: 2      Nustep  Time: 10 minutes  Steps: 57 (.32 distance)  Load: 2      Recumbent Bike  Time: 5 minutes  RPM: 30 RPMs (.7 distance)  Level: 2      Biceps  lbs: 3 lbs  Sets: 3  Reps: 10      Chest  lbs: 3 lbs  Sets: 3  Reps: 10      Triceps  lbs: 3 lbs  Sets: 3  Reps: 10      Lower Body  lbs: 3 lbs  Sets: 2  Reps: 10    Education        Therapist Notes   Patient arrived to session on time. Exercises completed with no complaints, RPE 3-4. No changes made this session, plans to maintain levels and durations at next session. -Cheryl Humphrey, RRT

## 2018-07-24 ENCOUNTER — CLINICAL SUPPORT (OUTPATIENT)
Dept: CARDIOLOGY | Facility: CLINIC | Age: 83
End: 2018-07-24
Attending: INTERNAL MEDICINE
Payer: MEDICARE

## 2018-07-24 ENCOUNTER — OFFICE VISIT (OUTPATIENT)
Dept: OPHTHALMOLOGY | Facility: CLINIC | Age: 83
End: 2018-07-24
Payer: MEDICARE

## 2018-07-24 DIAGNOSIS — H04.203 BILATERAL EPIPHORA: ICD-10-CM

## 2018-07-24 DIAGNOSIS — Z96.1 PSEUDOPHAKIA OF BOTH EYES: ICD-10-CM

## 2018-07-24 DIAGNOSIS — G51.8 CROCODILE TEARS SYNDROME: ICD-10-CM

## 2018-07-24 DIAGNOSIS — H35.3130 BILATERAL NONEXUDATIVE AGE-RELATED MACULAR DEGENERATION, UNSPECIFIED STAGE: ICD-10-CM

## 2018-07-24 DIAGNOSIS — H02.883 MEIBOMIAN GLAND DYSFUNCTION (MGD) OF BOTH EYES: ICD-10-CM

## 2018-07-24 DIAGNOSIS — G51.8: Primary | ICD-10-CM

## 2018-07-24 DIAGNOSIS — H01.02A SQUAMOUS BLEPHARITIS OF UPPER AND LOWER EYELIDS OF BOTH EYES: ICD-10-CM

## 2018-07-24 DIAGNOSIS — H01.02B SQUAMOUS BLEPHARITIS OF UPPER AND LOWER EYELIDS OF BOTH EYES: ICD-10-CM

## 2018-07-24 DIAGNOSIS — H59.021 RETAINED LENS MATERIAL FOLLOWING CATARACT SURGERY OF RIGHT EYE: ICD-10-CM

## 2018-07-24 DIAGNOSIS — H43.813 POSTERIOR VITREOUS DETACHMENT, BOTH EYES: ICD-10-CM

## 2018-07-24 DIAGNOSIS — H02.886 MEIBOMIAN GLAND DYSFUNCTION (MGD) OF BOTH EYES: ICD-10-CM

## 2018-07-24 DIAGNOSIS — R60.9 EDEMA, UNSPECIFIED TYPE: ICD-10-CM

## 2018-07-24 DIAGNOSIS — I82.4Y2 DEEP VEIN THROMBOSIS (DVT) OF PROXIMAL VEIN OF LEFT LOWER EXTREMITY, UNSPECIFIED CHRONICITY: ICD-10-CM

## 2018-07-24 PROCEDURE — 99999 PR PBB SHADOW E&M-EST. PATIENT-LVL II: CPT | Mod: PBBFAC,,, | Performed by: OPHTHALMOLOGY

## 2018-07-24 PROCEDURE — 92012 INTRM OPH EXAM EST PATIENT: CPT | Mod: S$GLB,,, | Performed by: OPHTHALMOLOGY

## 2018-07-24 PROCEDURE — 93971 EXTREMITY STUDY: CPT | Mod: S$GLB,,, | Performed by: INTERNAL MEDICINE

## 2018-07-24 RX ORDER — LISINOPRIL 5 MG/1
5 TABLET ORAL DAILY PRN
COMMUNITY
End: 2018-09-11

## 2018-07-24 RX ORDER — ERYTHROMYCIN 5 MG/G
OINTMENT OPHTHALMIC NIGHTLY
Qty: 1 TUBE | Refills: 12 | Status: SHIPPED | OUTPATIENT
Start: 2018-07-24 | End: 2018-08-03

## 2018-07-24 NOTE — PROGRESS NOTES
"        Assessment /Plan     For exam results, see Encounter Report.    Gustatory lacrimation syndrome    Bilateral epiphora    Bilateral nonexudative age-related macular degeneration, unspecified stage    Meibomian gland dysfunction (MGD) of both eyes  -     erythromycin (ROMYCIN) ophthalmic ointment; Place into both eyes every evening. for 10 days  Dispense: 1 Tube; Refill: 12    Squamous blepharitis of upper and lower eyelids of both eyes  -     erythromycin (ROMYCIN) ophthalmic ointment; Place into both eyes every evening. for 10 days  Dispense: 1 Tube; Refill: 12    Posterior vitreous detachment, both eyes    Retained lens material following cataract surgery of right eye    Pseudophakia of both eyes    Crocodile tears syndrome        1.  epiphora / gustatory / lacrimal reflex "crocodile tears "   When chews his food - eyes and nose start running a lot    - very dramatic according to pt and to his daughter and his nurse     2. MGD / blepharitis    Burning   / itchy eyes       3. S/P   complex phaco/IOL od - 12/16/2015  Dropped nucleus / lens 12/16/2015 - essentially entire lens in the vitreous cavity   -very dense mature lens - lens dislocated into the vitreous // no vitreous loss // PC IOL in sulcus with optic capture   S/P PPVx and removal of retained lens - 1/6/2016 - [facility]     4.  pseudophakia OS with PCO - Mayra 10/31/2007    5.  ARMD OU  - dry with drusen - seen on OCT   - some rpe change  - hazy view od    PLAN   Treat blepharitis - WC/LH/AT's EES ointment   If epiphora persist - can consult Dr Maldonado     F/U w/  holly -if he needs / wants new glasses / yearly exams     I have seen and personally examined the patient.  I agree with the findings, assessment and plan of the resident and/or fellow.     Bettie Nunez MD  "

## 2018-07-25 ENCOUNTER — HOSPITAL ENCOUNTER (OUTPATIENT)
Dept: PULMONOLOGY | Facility: OTHER | Age: 83
Discharge: HOME | End: 2018-07-25
Attending: INTERNAL MEDICINE
Payer: MEDICARE

## 2018-07-25 VITALS — BODY MASS INDEX: 22.23 KG/M2 | WEIGHT: 159.38 LBS

## 2018-07-25 PROCEDURE — G0237 THERAPEUTIC PROCD STRG ENDUR: HCPCS

## 2018-07-25 PROCEDURE — G0238 OTH RESP PROC, INDIV: HCPCS

## 2018-07-25 NOTE — PROGRESS NOTES
[facility]  Pulmonary Rehab  Session Summary    SUMMARY     Session Data  Session Number: 7  Time In: 1330  Time Out: 1500  Weight: 72.3 kg (159 lb 6.3 oz)  Target Heart Rate Zone: Minimum: 79 bpm  Target Heart Rate Zone: Maximum: 106 bpm  Patient Motivation: Good  Patient Effort: Good      Pre Exercise Vitals  SpO2: 92 %  Supplemental O2?: No  Pulse: 70  BP: 98/56  Respiration Rate: 18 per minute      During Exercise Vitals  SpO2: 94 %  Pulse: 70      Post Exercise Vitals  SpO2: 96 %  Supplemental O2?: No  Pulse: 59  BP: 118/65  Respiration Rate: 18 per minute  Rating of Perceived Exertion (RPE) Scale: 4       Modality  Modality: Leg Free Weights      Arm Ergometer  Time: 10 minutes  RPM: 20 RPMs (.65 distance)  Level: 2      Nustep  Time: 10 minutes  Steps: 55 (.33 distance)  Load: 2      Recumbent Bike  Time: 5 minutes  RPM: 30 RPMs (.7 distance)  Level: 2      Biceps  lbs: 3 lbs  Sets: 3  Reps: 10      Chest  lbs: 3 lbs  Sets: 3  Reps: 10      Triceps  lbs: 3 lbs  Sets: 3  Reps: 10      Lower Body  lbs: 3 lbs  Sets: 2  Reps: 10    Education      Therapist Notes   Patient arrived to session with positive attitude. Patient's blood pressure was slightly low, patient reported no symptoms and sitter reported blood pressure was also low this morning. Patient completed all exercises with no complaints, RPE 4. No changes made this session. -Cheryl Humphrey, RRT

## 2018-07-27 ENCOUNTER — HOSPITAL ENCOUNTER (OUTPATIENT)
Dept: RADIOLOGY | Facility: HOSPITAL | Age: 83
Discharge: HOME | End: 2018-07-27
Attending: INTERNAL MEDICINE
Payer: MEDICARE

## 2018-07-27 ENCOUNTER — OFFICE VISIT (OUTPATIENT)
Dept: INTERNAL MEDICINE | Facility: CLINIC | Age: 83
End: 2018-07-27
Payer: MEDICARE

## 2018-07-27 VITALS
WEIGHT: 164.88 LBS | BODY MASS INDEX: 23.08 KG/M2 | SYSTOLIC BLOOD PRESSURE: 128 MMHG | TEMPERATURE: 98 F | RESPIRATION RATE: 18 BRPM | HEART RATE: 62 BPM | OXYGEN SATURATION: 95 % | DIASTOLIC BLOOD PRESSURE: 78 MMHG | HEIGHT: 71 IN

## 2018-07-27 DIAGNOSIS — K59.00 CONSTIPATION, UNSPECIFIED CONSTIPATION TYPE: ICD-10-CM

## 2018-07-27 DIAGNOSIS — I82.4Y2 DEEP VEIN THROMBOSIS (DVT) OF PROXIMAL VEIN OF LEFT LOWER EXTREMITY, UNSPECIFIED CHRONICITY: Primary | ICD-10-CM

## 2018-07-27 DIAGNOSIS — R22.42 LOCALIZED SWELLING, MASS AND LUMP, LEFT LOWER LIMB: ICD-10-CM

## 2018-07-27 DIAGNOSIS — H61.20 IMPACTED CERUMEN, UNSPECIFIED LATERALITY: ICD-10-CM

## 2018-07-27 PROCEDURE — 74018 RADEX ABDOMEN 1 VIEW: CPT | Mod: TC,PO

## 2018-07-27 PROCEDURE — 99999 PR PBB SHADOW E&M-EST. PATIENT-LVL IV: CPT | Mod: PBBFAC,,, | Performed by: INTERNAL MEDICINE

## 2018-07-27 PROCEDURE — 99499 UNLISTED E&M SERVICE: CPT | Mod: S$GLB,,, | Performed by: INTERNAL MEDICINE

## 2018-07-27 PROCEDURE — 74018 RADEX ABDOMEN 1 VIEW: CPT | Mod: 26,,, | Performed by: RADIOLOGY

## 2018-07-27 PROCEDURE — 99214 OFFICE O/P EST MOD 30 MIN: CPT | Mod: S$GLB,,, | Performed by: INTERNAL MEDICINE

## 2018-07-27 NOTE — PROGRESS NOTES
CC: followup of DVT  HPI:  The patient is a 88 y.o. year old male who presents to the office for followup of DVT.  The patient denies any chest pain, shortness of breath or headache.  Repeat ultrasound reveals persistent DVT.  Patient is taking Eliquis without any evidence of bleeding.  He does report intermittent left leg swelling.  His daughter reports almost daily loose stools, occasionally twice a day and occasional accidents.  He had taken magnesium and Colace recently for impaction.      PAST MEDICAL HISTORY:  Past Medical History:   Diagnosis Date    Anemia     Arthritis     Depression     Herniation of intervertebral disc     Hyperlipidemia     Hypertension 3/4/2015    Macular degeneration     Spinal stenosis        SURGICAL HISTORY:  Past Surgical History:   Procedure Laterality Date    CATARACT EXTRACTION W/  INTRAOCULAR LENS IMPLANT Left 10/31/07    Dr. Nunez    CATARACT EXTRACTION W/  INTRAOCULAR LENS IMPLANT Right 12/16/15    Dr. Nunez       MEDS:  Medcard reviewed and updated    ALLERGIES: Allergy Card reviewed and updated    SOCIAL HISTORY:   The patient is a nonsmoker.    PE:   APPEARANCE: Well nourished, well developed, in no acute distress.    EARS: TM is obscured on the left.    NOSE: Mucosa pink. Airway clear.  CHEST: Lungs clear to auscultation with unlabored respirations.  CARDIOVASCULAR: Normal S1, S2. No murmurs. No carotid bruits. Positive pedal edema of left leg.  ABDOMEN: Bowel sounds normal. Not distended. Soft. No tenderness or masses.   MUSCULOSKELETAL:  In wheelchair.  PSYCHIATRIC: The patient is oriented to person, place, and time and has a pleasant affect.        ASSESSMENT/PLAN:  Phi was seen today for follow-up.    Diagnoses and all orders for this visit:    Deep vein thrombosis (DVT) of proximal vein of left lower extremity, unspecified chronicity  -     Cardiology Lab US Lower Extremity Veins Left; Future    Constipation, unspecified constipation type  -      X-Ray Abdomen AP 1 View; Future    Localized swelling, mass and lump, left lower limb   -     Cardiology Lab US Lower Extremity Veins Left; Future    Impacted cerumen, unspecified laterality  -     Ambulatory Referral to ENT

## 2018-07-29 ENCOUNTER — TELEPHONE (OUTPATIENT)
Dept: INTERNAL MEDICINE | Facility: CLINIC | Age: 83
End: 2018-07-29

## 2018-07-29 NOTE — TELEPHONE ENCOUNTER
Please inform patient that x-ray of abdomen shows no acute abnormalities.  Recommend patient increase fiber in diet.  Once he stops having stools daily, recommend restarting Colace daily.  If he develops diarrhea while on Colace, he will need to stop the Colace.

## 2018-07-30 ENCOUNTER — HOSPITAL ENCOUNTER (OUTPATIENT)
Dept: PULMONOLOGY | Facility: OTHER | Age: 83
Discharge: HOME | End: 2018-07-30
Attending: INTERNAL MEDICINE
Payer: MEDICARE

## 2018-07-30 VITALS — WEIGHT: 165.13 LBS | BODY MASS INDEX: 23.03 KG/M2

## 2018-07-30 PROCEDURE — G0238 OTH RESP PROC, INDIV: HCPCS

## 2018-07-30 PROCEDURE — G0237 THERAPEUTIC PROCD STRG ENDUR: HCPCS

## 2018-07-30 NOTE — PROGRESS NOTES
[facility]  Pulmonary Rehab  30 Day Evaluation and Recertification    SUMMARY       Summary of Progress: Phi Sainz has been doing good in rehab. He is increasing his  exercise tolerance and endurance.    Attends:     Patient attends 2 days a week and has completed 8 visits.  The patient's current compliance is 100%.    Problems this Certification Period:   none    Exercise Capacity Summary    Pulmonary Rehab First Session Flowsheet Values      Pulmonary Rehab Session from 7/25/2018 in [facility] Pulmonary Rehab Session from 7/23/2018 in [facility] Pulmonary Rehab Session from 7/18/2018 in [facility] Pulmonary Rehab Session from 7/16/2018 in [facility] Pulmonary Rehab Session from 7/11/2018 in [facility] Pulmonary Rehab Session from 7/9/2018 in [facility]   Session Number  7  6  5  4  3  2   Time  10 minutes  10 minutes  10 minutes  10 minutes  10 minutes  5 minutes   RPM  20 RPMs [.65 distance]  20 RPMs [.68 distance]  18 RPMs [.58 distance]  20 RPMs  16 RPMs  30 RPMs   Level  2  2  2  2  1  1   Time  10 minutes  10 minutes  10 minutes  10 minutes  10 minutes  10 minutes   Steps  55 [.33 distance]  57 [.32 distance]  50 [.31 distance]  51  60  50   Load  2  2  2  2  1  1   Time  5 minutes  5 minutes  5 minutes  5 minutes  10 minutes  5 minutes   RPM  30 RPMs [.7 distance]  30 RPMs [.7 distance]  36 RPMs [.7 distance]  30 RPMs  45 RPMs  32 RPMs   Level  2  2  2  2  1  1   lbs  3 lbs  3 lbs  3 lbs  3 lbs  3 lbs  3 lbs   Sets  3  3  3  3  3  3   Reps  10  10  10  10  10  10   lbs  3 lbs  3 lbs  3 lbs  --  --  --   Sets  3  3  3  --  --  --   Reps  10  10  10  --  --  --   lbs  3 lbs  3 lbs  3 lbs  3 lbs  3 lbs  3 lbs   Sets  3  3  3  3  3  3   Reps  10  10  10  10  10  10   lbs  3 lbs  3 lbs  3 lbs [Right leg only]  3 lbs  3 lbs  3 lbs   Sets  2  2  2  2  2  2   Reps   10  10  10  10  10  10          Pulmonary Rehab Last Session Flowsheet Values      Most Recent Value   Session Number  8   Time  10 minutes   RPM  20 RPMs [.70 distance]   Level  2   Time  10 minutes   Steps  55 [.33 distance]   Load  2   Time  5 minutes   RPM  30 RPMs [.7 distance]   Level  2   lbs  3 lbs   Sets  3   Reps  10   lbs  3 lbs   Sets  3   Reps  10   lbs  3 lbs   Sets  3   Reps  10   lbs  3 lbs   Sets  2   Reps  10          Education: Received literature on home exercise routine      Goals: To maintain healthy eating habits and get stronger       Updated Exercise Prescription:  Endurance Training: increase as tolerated  Strength Training: increase as tolerated  Re-Evaluate in 30 days: yes       I certify that the patient is making progress in pulmonary rehabilitation, is physically able to participate, medically stable and remains motivated.

## 2018-07-30 NOTE — PROGRESS NOTES
[facility]  Pulmonary Rehab  Session Summary    SUMMARY     Session Data  Session Number: 8  Time In: 1330  Time Out: 1500  Weight: 74.9 kg (165 lb 2 oz)  Target Heart Rate Zone: Minimum: 79 bpm  Target Heart Rate Zone: Maximum: 106 bpm  Patient Motivation: Good  Patient Effort: Good      Pre Exercise Vitals  SpO2: 97 %  Pulse: 62  BP: 130/68  Respiration Rate: 20 per minute      During Exercise Vitals  SpO2: 97 %  Pulse: 64  Respiration Rate: 20 per minute      Post Exercise Vitals  SpO2: 95 %  Pulse: 60  BP: 146/81  Respiration Rate: 20 per minute  Rating of Perceived Exertion (RPE) Scale: 4      Nustep  Time: 10 minutes  Steps: 55 (.33 distance)  Load: 2      Recumbent Bike  Time: 5 minutes  RPM: 30 RPMs (.7 distance)  Level: 2    Biceps  lbs: 3 lbs  Sets: 3  Reps: 10      Chest  lbs: 3 lbs  Sets: 3  Reps: 10      Triceps  lbs: 3 lbs  Sets: 3  Reps: 10      Lower Body  lbs: 3 lbs  Sets: 2  Reps: 10    Education    Therapist Notes Pt arrived to therapy session and completed exercise routine without complications.  Pt appears to be in no apparent respiratory distress.Daniel Cardenas, CRT

## 2018-08-01 ENCOUNTER — HOSPITAL ENCOUNTER (OUTPATIENT)
Dept: PULMONOLOGY | Facility: OTHER | Age: 83
Discharge: HOME | End: 2018-08-01
Attending: INTERNAL MEDICINE
Payer: MEDICARE

## 2018-08-01 VITALS — WEIGHT: 164.69 LBS | BODY MASS INDEX: 22.97 KG/M2

## 2018-08-01 PROCEDURE — G0238 OTH RESP PROC, INDIV: HCPCS

## 2018-08-01 PROCEDURE — G0237 THERAPEUTIC PROCD STRG ENDUR: HCPCS

## 2018-08-01 NOTE — PROGRESS NOTES
[facility]  Pulmonary Rehab  Session Summary    SUMMARY     Session Data  Session Number: 9  Time In: 1330  Time Out: 1500  Weight: 74.7 kg (164 lb 10.9 oz)  Target Heart Rate Zone: Minimum: 79 bpm  Target Heart Rate Zone: Maximum: 106 bpm  Patient Motivation: Good  Patient Effort: Good      Pre Exercise Vitals  SpO2: 97 %  Pulse: 60  BP: 103/66  Respiration Rate: 20 per minute      During Exercise Vitals  SpO2: 97 %  Pulse: 76  Respiration Rate: 20 per minute      Post Exercise Vitals  SpO2: 97 %  Pulse: 58  BP: 124/69  Respiration Rate: 20 per minute  Rating of Perceived Exertion (RPE) Scale: 4       Modality  Modality: Recumbent Bike      Arm Ergometer  Time: 10 minutes  RPM: 22 RPMs (.70 distance)  Level: 2      Nustep  Time: 10 minutes  Steps: 59 (.33 distance)  Load: 2      Recumbent Bike  Time: 5 minutes  RPM: 30 RPMs (1.7 distance)  Level: 2      Biceps  lbs: 3 lbs  Sets: 3  Reps: 10      Chest  lbs: 3 lbs  Sets: 3  Reps: 10      Triceps  lbs: 3 lbs  Sets: 3  Reps: 10      Lower Body  lbs: 3 lbs  Sets: 2  Reps: 10    Education    Therapist Notes Pt arrived to therapy session and completed exercise routine without complications.  Pt appears to be in no apparent respiratory distress.Daniel Cardenas, CRT

## 2018-08-06 ENCOUNTER — HOSPITAL ENCOUNTER (OUTPATIENT)
Dept: PULMONOLOGY | Facility: OTHER | Age: 83
Discharge: HOME | End: 2018-08-06
Attending: INTERNAL MEDICINE
Payer: MEDICARE

## 2018-08-06 VITALS — BODY MASS INDEX: 22.69 KG/M2 | WEIGHT: 162.69 LBS

## 2018-08-06 PROCEDURE — G0237 THERAPEUTIC PROCD STRG ENDUR: HCPCS

## 2018-08-06 PROCEDURE — G0238 OTH RESP PROC, INDIV: HCPCS

## 2018-08-06 NOTE — PROGRESS NOTES
[facility]  Pulmonary Rehab  Session Summary    SUMMARY     Session Data  Session Number: 10  Time In: 1330  Time Out: 1500  Weight: 73.8 kg (162 lb 11.2 oz)  Target Heart Rate Zone: Minimum: 79 bpm  Target Heart Rate Zone: Maximum: 106 bpm  Patient Motivation: Good  Patient Effort: Good      Pre Exercise Vitals  SpO2: 95 %  Pulse: 60  BP: 156/80  Respiration Rate: 20 per minute      During Exercise Vitals  SpO2: 95 %  Pulse: 66  Respiration Rate: 20 per minute      Post Exercise Vitals  SpO2: 97 %  Pulse: 58  BP: 157/88  Respiration Rate: 20 per minute  Rating of Perceived Exertion (RPE) Scale: 6       Modality  Modality: Recumbent Bike        Arm Ergometer  Time: 10 minutes  RPM: 20 RPMs (.63 distance)  Level: 2      Nustep  Time: 10 minutes  Steps: 64 (.36 distance)  Load: 2      Recumbent Bike  Time: 5 minutes  RPM: 30 RPMs (.9 distance)  Level: 2      Biceps  lbs: 3 lbs  Sets: 3  Reps: 10      Chest  lbs: 3 lbs  Sets: 3  Reps: 10      Triceps  lbs: 3 lbs  Sets: 3  Reps: 10      Lower Body  lbs: 3 lbs  Sets: 2  Reps: 10    Education    Therapist Notes Pt arrived to therapy session and completed exercise routine without complications.  Pt appears to be in no apparent respiratory distress.Daniel Cardenas, CRT

## 2018-08-08 ENCOUNTER — HOSPITAL ENCOUNTER (OUTPATIENT)
Dept: PULMONOLOGY | Facility: OTHER | Age: 83
Discharge: HOME | End: 2018-08-08
Attending: INTERNAL MEDICINE
Payer: MEDICARE

## 2018-08-08 VITALS — BODY MASS INDEX: 22.38 KG/M2 | WEIGHT: 160.5 LBS

## 2018-08-08 PROCEDURE — G0238 OTH RESP PROC, INDIV: HCPCS

## 2018-08-08 PROCEDURE — G0237 THERAPEUTIC PROCD STRG ENDUR: HCPCS

## 2018-08-08 NOTE — PROGRESS NOTES
[facility]  Pulmonary Rehab  Session Summary    SUMMARY     Session Data  Session Number: 11  Time In: 1330  Time Out: 1500  Weight: 72.8 kg (160 lb 7.9 oz)  Target Heart Rate Zone: Minimum: 79 bpm  Target Heart Rate Zone: Maximum: 106 bpm      Pre Exercise Vitals  SpO2: 96 %  Pulse: 82  BP: 144/74  Respiration Rate: 18 per minute      During Exercise Vitals  SpO2: 96 %  Pulse: 88  Respiration Rate: 18 per minute      Post Exercise Vitals  SpO2: 98 %  Pulse: 58  BP: 167/87  Respiration Rate: 20 per minute       Modality  Modality: Recumbent Bike      Arm Ergometer  Time: 10 minutes  RPM: 18 RPMs (.65 distance)  Level: 2      Nustep  Time: 10 minutes  Steps: 64 (.34 distance)  Load: 2      Recumbent Bike  Time: 5 minutes  RPM: 30 RPMs (.7 distance)  Level: 2        Biceps  lbs: 3 lbs  Sets: 3  Reps: 10      Chest  lbs: 3 lbs  Sets: 3  Reps: 10      Triceps  lbs: 3 lbs  Sets: 3  Reps: 10      Lower Body  lbs: 3 lbs  Sets: 2  Reps: 10    Education    Therapist Notes Pt arrived to therapy session and completed exercise routine without complications.  Pt appears to be in no apparent respiratory distress.Daniel Cardenas, CRT

## 2018-08-13 ENCOUNTER — HOSPITAL ENCOUNTER (OUTPATIENT)
Dept: PULMONOLOGY | Facility: OTHER | Age: 83
Discharge: HOME | End: 2018-08-13
Attending: INTERNAL MEDICINE
Payer: MEDICARE

## 2018-08-13 VITALS — BODY MASS INDEX: 21.37 KG/M2 | WEIGHT: 153.25 LBS

## 2018-08-13 PROCEDURE — G0238 OTH RESP PROC, INDIV: HCPCS

## 2018-08-13 PROCEDURE — G0237 THERAPEUTIC PROCD STRG ENDUR: HCPCS

## 2018-08-13 NOTE — PROGRESS NOTES
[facility]  Pulmonary Rehab  Session Summary    SUMMARY     Session Data  Session Number: 12  Time In: 1330  Time Out: 1500  Weight: 69.5 kg (153 lb 3.5 oz)  Target Heart Rate Zone: Minimum: 79 bpm  Target Heart Rate Zone: Maximum: 106 bpm  Patient Motivation: Good  Patient Effort: Good      Pre Exercise Vitals  SpO2: 97 %  Pulse: 72  BP: 140/75  Respiration Rate: 20 per minute      During Exercise Vitals  SpO2: 97 %  Pulse: 78  Respiration Rate: 20 per minute      Post Exercise Vitals  SpO2: 98 %  Pulse: 65  BP: 142/88  Respiration Rate: 18 per minute  Rating of Perceived Exertion (RPE) Scale: 5       Modality  Modality: Recumbent Bike             Arm Ergometer  Time: 10 minutes  RPM: 20 RPMs(.27 distance)  Level: 2      Nustep  Time: 10 minutes  Steps: 61(.37 distance)  Load: 2      Recumbent Bike  Time: 5 minutes  RPM: 21 RPMs(.7 distance)  Level: 2      Biceps  lbs: 3 lbs  Sets: 3  Reps: 10      Chest  lbs: 3 lbs  Sets: 3  Reps: 10      Triceps  lbs: 3 lbs  Sets: 3  Reps: 10      Lower Body  lbs: 3 lbs  Sets: 2  Reps: 10    Education    Therapist Notes Pt arrived to therapy session and completed exercise routine without complications.  Pt appears to be in no  Apparent respiratory distress.Daniel Cardenas, CRT

## 2018-08-15 ENCOUNTER — HOSPITAL ENCOUNTER (OUTPATIENT)
Dept: PULMONOLOGY | Facility: OTHER | Age: 83
Discharge: HOME | End: 2018-08-15
Attending: INTERNAL MEDICINE
Payer: MEDICARE

## 2018-08-15 VITALS — BODY MASS INDEX: 21.98 KG/M2 | WEIGHT: 157.63 LBS

## 2018-08-15 PROCEDURE — G0237 THERAPEUTIC PROCD STRG ENDUR: HCPCS

## 2018-08-15 PROCEDURE — G0238 OTH RESP PROC, INDIV: HCPCS

## 2018-08-15 NOTE — PROGRESS NOTES
[facility]  Pulmonary Rehab  Session Summary    SUMMARY     Session Data  Session Number: 13  Time In: 1330  Time Out: 1500  Weight: 71.5 kg (157 lb 10.1 oz)  Target Heart Rate Zone: Minimum: 79 bpm  Target Heart Rate Zone: Maximum: 106 bpm  Patient Motivation: Good  Patient Effort: Good      Pre Exercise Vitals  SpO2: 97 %  Pulse: 60  BP: 128/73  Respiration Rate: 20 per minute      During Exercise Vitals  SpO2: 97 %  Pulse: 64  Respiration Rate: 20 per minute      Post Exercise Vitals  SpO2: 99 %  Pulse: 56  BP: 159/80  Respiration Rate: 20 per minute  Rating of Perceived Exertion (RPE) Scale: 5       Modality  Modality: Leg Free Weights      Arm Ergometer  Time: 7 minutes  RPM: 21 RPMs(.45 distance)  Level: 2      Nustep  Time: 10 minutes  Steps: 60(.35 distance)  Load: 2      Recumbent Bike  Time: 5 minutes  RPM: 25 RPMs(.8 distance)  Level: 2      Biceps  lbs: 3 lbs  Sets: 3  Reps: 10      Chest  lbs: 3 lbs  Sets: 3  Reps: 10      Triceps  lbs: 3 lbs  Sets: 3  Reps: 10      Lower Body  lbs: 3 lbs  Sets: 2  Reps: 10    Education    Therapist Notes Pt arrived to therapy session and completed exercise routine without complications.  Pt appears to be in no apparent respiratory distress.Daniel Cardenas, CRT

## 2018-08-17 ENCOUNTER — OFFICE VISIT (OUTPATIENT)
Dept: UROLOGY | Facility: CLINIC | Age: 83
End: 2018-08-17
Payer: MEDICARE

## 2018-08-17 VITALS
SYSTOLIC BLOOD PRESSURE: 167 MMHG | HEART RATE: 60 BPM | DIASTOLIC BLOOD PRESSURE: 90 MMHG | BODY MASS INDEX: 21.98 KG/M2 | TEMPERATURE: 98 F | WEIGHT: 157 LBS | HEIGHT: 71 IN

## 2018-08-17 DIAGNOSIS — I10 ESSENTIAL HYPERTENSION: ICD-10-CM

## 2018-08-17 DIAGNOSIS — N13.8 BPH WITH OBSTRUCTION/LOWER URINARY TRACT SYMPTOMS: Primary | ICD-10-CM

## 2018-08-17 DIAGNOSIS — N40.1 BPH WITH OBSTRUCTION/LOWER URINARY TRACT SYMPTOMS: Primary | ICD-10-CM

## 2018-08-17 DIAGNOSIS — N32.81 OAB (OVERACTIVE BLADDER): ICD-10-CM

## 2018-08-17 PROCEDURE — 51798 US URINE CAPACITY MEASURE: CPT | Mod: S$GLB,,, | Performed by: UROLOGY

## 2018-08-17 PROCEDURE — 99204 OFFICE O/P NEW MOD 45 MIN: CPT | Mod: S$GLB,,, | Performed by: UROLOGY

## 2018-08-17 PROCEDURE — 99999 PR PBB SHADOW E&M-EST. PATIENT-LVL III: CPT | Mod: PBBFAC,,, | Performed by: UROLOGY

## 2018-08-17 PROCEDURE — 51798 US URINE CAPACITY MEASURE: CPT | Mod: PBBFAC,PO | Performed by: UROLOGY

## 2018-08-17 RX ORDER — TAMSULOSIN HYDROCHLORIDE 0.4 MG/1
0.4 CAPSULE ORAL
Qty: 30 CAPSULE | Refills: 12 | Status: SHIPPED | OUTPATIENT
Start: 2018-08-17 | End: 2018-12-17

## 2018-08-17 NOTE — PROGRESS NOTES
HPI:  Phi Sainz is a 88 y.o. year old male that  presents with   Chief Complaint   Patient presents with    Urinary Incontinence    Urinary Frequency   .  This patient referred himself to the office with urinary urgency and urinary frequency.    He comes with his daughter and also with his caregiver    The symptoms have been present for while.  Patient denies dysuria.  He has no case strength stream with nocturia times 2-3.  He last voided a couple hours ago and bladder scan postvoid residual in the office today is okay approximately 150 cc    Patient has significant mobility issues and uses a walker and also needs help while walking    There is no family history of kidney bladder prostate cancer the patient has never had urologic operations    Chart review shows progress no from PCP from last month being followup for DVT.  Cardiology note from May of this year shows hypertension.  PSA in May 2016 was 0.82.  In May of this year GFR was 53.7.  In March of last year patient negative urine culture.  In July of this year patient had KUB which showed no stones.  This image reviewed by me in agree with this finding      Past Medical History:   Diagnosis Date    Allergy     Anemia     Arthritis     Depression     Herniation of intervertebral disc     Hyperlipidemia     Hypertension 3/4/2015    Macular degeneration     Spinal stenosis      Social History     Socioeconomic History    Marital status:      Spouse name: Not on file    Number of children: Not on file    Years of education: Not on file    Highest education level: Not on file   Social Needs    Financial resource strain: Not on file    Food insecurity - worry: Not on file    Food insecurity - inability: Not on file    Transportation needs - medical: Not on file    Transportation needs - non-medical: Not on file   Occupational History    Occupation: retired   Tobacco Use    Smoking status: Never Smoker    Smokeless tobacco:  "Never Used   Substance and Sexual Activity    Alcohol use: No     Alcohol/week: 0.0 oz    Drug use: No    Sexual activity: Yes     Partners: Female   Other Topics Concern    Not on file   Social History Narrative    Not on file     Past Surgical History:   Procedure Laterality Date    CATARACT EXTRACTION W/  INTRAOCULAR LENS IMPLANT Left 10/31/07    Dr. Nunez    CATARACT EXTRACTION W/  INTRAOCULAR LENS IMPLANT Right 12/16/15    Dr. Nunez     Family History   Problem Relation Age of Onset    Cancer Father         prostate    Colon cancer Father     Hearing loss Paternal Aunt     Amblyopia Neg Hx     Blindness Neg Hx     Cataracts Neg Hx     Diabetes Neg Hx     Glaucoma Neg Hx     Hypertension Neg Hx     Macular degeneration Neg Hx     Retinal detachment Neg Hx     Strabismus Neg Hx     Stroke Neg Hx     Thyroid disease Neg Hx     Heart attack Neg Hx     Heart disease Neg Hx     Heart failure Neg Hx     Hyperlipidemia Neg Hx            Review of Systems  The patient has no chest pains.  The patient has no shortness of breath  Patient wears  no      glasses.  All other review of systems are negative.      Physical Exam:  BP (!) 167/90 (BP Location: Left arm, Patient Position: Sitting, BP Method: Medium (Automatic))   Pulse 60   Temp 98 °F (36.7 °C) (Oral)   Ht 5' 11" (1.803 m)   Wt 71.2 kg (157 lb)   BMI 21.90 kg/m²   General appearance: alert, cooperative, no distress.  Patient is frail looking and sitting in a wheelchair  Constitutional:Oriented to person, place, and time.appears well-developed and well-nourished.   HEENT: Normocephalic, atraumatic, neck symmetrical, no nasal discharge   Eyes: conjunctivae/corneas clear, PERRL, EOM's intact  Lungs: clear to auscultation bilaterally, no dullness to percussion bilaterally  Heart: regular rate and rhythm without rub; no displacement of the PMI   Abdomen: soft, non-tender; bowel sounds normoactive; no organomegaly  :  " Penis/perineum without lesions, scrotum without rash/cysts, epididymis nontender bilaterally, urethral meatus in normal location normal size, no penile plaques palpated, prostate:   Smooth, enlarged, and benign-feeling                    seminal vesicles not palpated.  No rectal masses, sphincter tone normal.  Testes equal in size without masses  Extremities: extremities symmetric; no clubbing, cyanosis, or edema  Integument: Skin color, texture, turgor normal; no rashes; hair distrubution normal  Neurologic: Alert and oriented X 3, normal strength, normal coordination and gait  Psychiatric: no pressured speech; normal affect; no evidence of impaired cognition     LABS:    Complete Blood Count  Lab Results   Component Value Date    RBC 4.83 10/09/2017    HGB 11.0 (L) 05/28/2018    HCT 36.1 (L) 05/28/2018    MCV 93 10/09/2017    MCH 30.0 10/09/2017    MCHC 32.4 10/09/2017    RDW 13.0 10/09/2017     10/09/2017    MPV 11.0 10/09/2017    GRAN 7.1 10/09/2017    GRAN 75.1 (H) 10/09/2017    LYMPH 1.5 10/09/2017    LYMPH 15.9 (L) 10/09/2017    MONO 0.8 10/09/2017    MONO 8.5 10/09/2017    EOS 0.0 10/09/2017    BASO 0.01 10/09/2017    EOSINOPHIL 0.0 10/09/2017    BASOPHIL 0.1 10/09/2017    DIFFMETHOD Automated 10/09/2017       Comprehensive Metabolic Panel  Lab Results   Component Value Date    GLU 98 05/28/2018    BUN 15 05/28/2018    CREATININE 1.2 05/28/2018     05/28/2018    K 4.0 05/28/2018     05/28/2018    PROT 6.7 05/28/2018    ALBUMIN 3.2 (L) 05/28/2018    BILITOT 0.5 05/28/2018    AST 19 05/28/2018    ALKPHOS 72 05/28/2018    CO2 28 05/28/2018    ALT 11 05/28/2018    ANIONGAP 8 05/28/2018    EGFRNONAA 53.7 (A) 05/28/2018    ESTGFRAFRICA >60.0 05/28/2018       PSA  Lab Results   Component Value Date    PSA 0.82 05/03/2016         Assessment:    ICD-10-CM ICD-9-CM    1. BPH with obstruction/lower urinary tract symptoms N40.1 600.01 POCT Bladder Scan    N13.8 599.69    2. OAB (overactive bladder)  N32.81 596.51    3. Essential hypertension I10 401.9      The primary encounter diagnosis was BPH with obstruction/lower urinary tract symptoms. Diagnoses of OAB (overactive bladder) and Essential hypertension were also pertinent to this visit.      Plan:  1 and 2.  BPH and overactive bladder. Plan.  I discussed at length in detail with the patient and his daughter and caregiver that I think his symptoms are related to BPH and resulted overactive bladder. I recommend avoiding anticholinergics.  We will give Flomax a try.  Follow-up 6 weeks for recheck.  BPH.  Medical therapy for BPH discussed with the patient.  Patient wants to give it a try.  Prescription therefore written for tamsulosin 0.4 mg daily.  Possible side effects including stuffy nose,dizziness, discussed.    3.   Elevated blood pressure.  Plan.  This finding pointed out to the patient.  I recommend that the patient follow up with the patient's PCP for this.  Orders Placed This Encounter   Procedures    POCT Bladder Scan           Vito Morales MD    PLEASE NOTE:  Please be advised that portions of this note were dictated using voice recognition software and may contain dictation related errors in spelling/grammar/appropriate pronouns/syntax or other errors that might have not been found and or corrected on text review.

## 2018-08-20 RX ORDER — ESCITALOPRAM OXALATE 10 MG/1
10 TABLET ORAL DAILY
Qty: 30 TABLET | Refills: 5 | Status: SHIPPED | OUTPATIENT
Start: 2018-08-20 | End: 2019-03-13

## 2018-08-28 ENCOUNTER — OFFICE VISIT (OUTPATIENT)
Dept: OPTOMETRY | Facility: CLINIC | Age: 83
End: 2018-08-28
Payer: MEDICARE

## 2018-08-28 DIAGNOSIS — H01.02B SQUAMOUS BLEPHARITIS OF UPPER AND LOWER EYELIDS OF BOTH EYES: Primary | ICD-10-CM

## 2018-08-28 DIAGNOSIS — H01.02A SQUAMOUS BLEPHARITIS OF UPPER AND LOWER EYELIDS OF BOTH EYES: Primary | ICD-10-CM

## 2018-08-28 DIAGNOSIS — H52.4 MYOPIA WITH ASTIGMATISM AND PRESBYOPIA, BILATERAL: ICD-10-CM

## 2018-08-28 DIAGNOSIS — H52.203 MYOPIA WITH ASTIGMATISM AND PRESBYOPIA, BILATERAL: ICD-10-CM

## 2018-08-28 DIAGNOSIS — H52.13 MYOPIA WITH ASTIGMATISM AND PRESBYOPIA, BILATERAL: ICD-10-CM

## 2018-08-28 PROCEDURE — 99999 PR PBB SHADOW E&M-EST. PATIENT-LVL II: CPT | Mod: PBBFAC,,, | Performed by: OPTOMETRIST

## 2018-08-28 PROCEDURE — 92012 INTRM OPH EXAM EST PATIENT: CPT | Mod: S$GLB,,, | Performed by: OPTOMETRIST

## 2018-08-28 PROCEDURE — 92015 DETERMINE REFRACTIVE STATE: CPT | Mod: S$GLB,,, | Performed by: OPTOMETRIST

## 2018-08-28 NOTE — PROGRESS NOTES
HPI     VIMAL 07/2018 with Dr. Nunez. Pt began using erythromycin sarah x 10 days   and warm compresses bid since VIMAL. Pt reports tearing of eyes when trying   to read through his bifocal. No scratches on lenses and pt reports comfort   with wear.    Last edited by Timothy Riley, OD on 8/28/2018  1:34 PM. (History)            Assessment /Plan     For exam results, see Encounter Report.    Squamous blepharitis of upper and lower eyelids of both eyes    Myopia with astigmatism and presbyopia, bilateral      1. Cont warm compress and use emycin oint for flare ups. Lids look good today.   Add art tears for lubrication.PF.  2. Spec Rx given. Different lens options discussed with patient. RTC 1 year full exam.

## 2018-09-05 ENCOUNTER — TELEPHONE (OUTPATIENT)
Dept: CARDIOLOGY | Facility: CLINIC | Age: 83
End: 2018-09-05

## 2018-09-05 DIAGNOSIS — E78.5 DYSLIPIDEMIA: ICD-10-CM

## 2018-09-05 DIAGNOSIS — I10 HYPERTENSION, ESSENTIAL: Primary | ICD-10-CM

## 2018-09-07 ENCOUNTER — TELEPHONE (OUTPATIENT)
Dept: INTERNAL MEDICINE | Facility: CLINIC | Age: 83
End: 2018-09-07

## 2018-09-07 ENCOUNTER — CLINICAL SUPPORT (OUTPATIENT)
Dept: CARDIOLOGY | Facility: CLINIC | Age: 83
End: 2018-09-07
Attending: INTERNAL MEDICINE
Payer: MEDICARE

## 2018-09-07 DIAGNOSIS — R22.42 LOCALIZED SWELLING, MASS AND LUMP, LEFT LOWER LIMB: ICD-10-CM

## 2018-09-07 DIAGNOSIS — I82.4Y2 DEEP VEIN THROMBOSIS (DVT) OF PROXIMAL VEIN OF LEFT LOWER EXTREMITY, UNSPECIFIED CHRONICITY: ICD-10-CM

## 2018-09-07 PROCEDURE — 99284 EMERGENCY DEPT VISIT MOD MDM: CPT | Mod: 25

## 2018-09-07 PROCEDURE — 93971 EXTREMITY STUDY: CPT | Mod: PBBFAC,PO | Performed by: INTERNAL MEDICINE

## 2018-09-07 NOTE — TELEPHONE ENCOUNTER
Called and spoke with Daughter and advised per  PCP she needs to come to UC appt was scheduled with Dr Escobedo for Saturday clinic @ 8:40 am. Also advised per PCP she can administer Plain Robitusin she understood and advised she will Keep her scheduled appt with PCP. Termed the call

## 2018-09-07 NOTE — TELEPHONE ENCOUNTER
"----- Message from Angy Doe sent at 9/7/2018  1:05 PM CDT -----  Contact: jose daughter  [phone]  Patient would like to get medical advice.    Symptoms (please be specific):  Cough, breathing is crackly with cough, a lot of mucus     How long has patient had these symptoms:  Couple of days     Pharmacy name and phone # (DON'T enter "on file" or "in chart"):  [id number] - [place], [place] - [address] [phone] (Phone)     Any drug allergies:      Would you prefer a response via [employer]?:  no    Comments:    "

## 2018-09-07 NOTE — PROGRESS NOTES
Routine Venous ultrasound complete. Paperwork emailed to reading physician. Civas entered and images sent.

## 2018-09-08 ENCOUNTER — HOSPITAL ENCOUNTER (EMERGENCY)
Facility: HOSPITAL | Age: 83
Discharge: HOME | End: 2018-09-08
Attending: EMERGENCY MEDICINE
Payer: MEDICARE

## 2018-09-08 VITALS
HEART RATE: 58 BPM | TEMPERATURE: 98 F | RESPIRATION RATE: 16 BRPM | WEIGHT: 165 LBS | BODY MASS INDEX: 23.1 KG/M2 | HEIGHT: 71 IN | SYSTOLIC BLOOD PRESSURE: 134 MMHG | DIASTOLIC BLOOD PRESSURE: 97 MMHG | OXYGEN SATURATION: 95 %

## 2018-09-08 DIAGNOSIS — J06.9 VIRAL URI: Primary | ICD-10-CM

## 2018-09-08 DIAGNOSIS — R05.9 COUGH: ICD-10-CM

## 2018-09-08 DIAGNOSIS — J84.112 CHRONIC IDIOPATHIC PULMONARY FIBROSIS: ICD-10-CM

## 2018-09-08 LAB
ALBUMIN SERPL BCP-MCNC: 3.4 G/DL
ALP SERPL-CCNC: 72 U/L
ALT SERPL W/O P-5'-P-CCNC: 15 U/L
ANION GAP SERPL CALC-SCNC: 8 MMOL/L
AST SERPL-CCNC: 16 U/L
BILIRUB SERPL-MCNC: 0.6 MG/DL
BUN SERPL-MCNC: 25 MG/DL
CALCIUM SERPL-MCNC: 8.8 MG/DL
CHLORIDE SERPL-SCNC: 102 MMOL/L
CO2 SERPL-SCNC: 28 MMOL/L
CREAT SERPL-MCNC: 1.3 MG/DL
ERYTHROCYTE [DISTWIDTH] IN BLOOD BY AUTOMATED COUNT: 13.6 %
EST. GFR  (AFRICAN AMERICAN): 56 ML/MIN/1.73 M^2
EST. GFR  (NON AFRICAN AMERICAN): 49 ML/MIN/1.73 M^2
GLUCOSE SERPL-MCNC: 98 MG/DL
HCT VFR BLD AUTO: 39.6 %
HGB BLD-MCNC: 12.1 G/DL
MCH RBC QN AUTO: 29.1 PG
MCHC RBC AUTO-ENTMCNC: 30.6 G/DL
MCV RBC AUTO: 95 FL
PLATELET # BLD AUTO: 161 K/UL
PMV BLD AUTO: 11 FL
POTASSIUM SERPL-SCNC: 4.1 MMOL/L
PROT SERPL-MCNC: 6.7 G/DL
RBC # BLD AUTO: 4.16 M/UL
SODIUM SERPL-SCNC: 138 MMOL/L
WBC # BLD AUTO: 9.04 K/UL

## 2018-09-08 PROCEDURE — 85027 COMPLETE CBC AUTOMATED: CPT

## 2018-09-08 PROCEDURE — 80053 COMPREHEN METABOLIC PANEL: CPT

## 2018-09-08 RX ORDER — BENZONATATE 100 MG/1
100 CAPSULE ORAL 3 TIMES DAILY PRN
Qty: 20 CAPSULE | Refills: 0 | Status: SHIPPED | OUTPATIENT
Start: 2018-09-08 | End: 2018-09-15

## 2018-09-08 NOTE — ED NOTES
PT RESTING QUIETLY ON ED STRETCHER WITH EYES CLOSED. SIDE RAILS UP X2. FAMILY AT BEDSIDE. RESPIRATIONS EVEN AND UNLABORED. NADN. PT AWAITING MD BLACK-JANIE. PT AND FAMILY UPDATED WITH POC. V/U.

## 2018-09-08 NOTE — ED NOTES
Dr. Denny at bedside, updating pt and family on test results, discharge plan, and follow up care.

## 2018-09-08 NOTE — ED PROVIDER NOTES
"Encounter Date: 9/7/2018    SCRIBE #1 NOTE: I, Tali Castelan, am scribing for, and in the presence of, Dr. Denny.       History     Chief Complaint   Patient presents with    Cough     88y M to ED with family. pt with cough and congestion x3 days. family reports pt having difficulty clearing secretions tonight and "gurgling."     Phi Sainz is a 88 y.o. male who  has a past medical history of Allergy, Anemia, Arthritis, Depression, Herniation of intervertebral disc, Hyperlipidemia, Hypertension (3/4/2015), Macular degeneration, and Spinal stenosis.    The patient presents to the ED due to cough and congestion for 3-4 days. Family reports pt having postnasal drip and difficulty clearing secretions. Pt is not able to bring up any sputum with cough. He has taken 2 doses of mucinex today. He does not use any types of breathing tx or inhaler at home. Denies any fever, n/v. No hx of COPD or CHF.       The history is provided by the patient.     Review of patient's allergies indicates:   Allergen Reactions    Combigan [brimonidine-timolol]      Made him dizziness    Contrast media Rash     Past Medical History:   Diagnosis Date    Allergy     Anemia     Arthritis     Depression     Herniation of intervertebral disc     Hyperlipidemia     Hypertension 3/4/2015    Macular degeneration     Spinal stenosis      Past Surgical History:   Procedure Laterality Date    Bilateral MBB  Bilateral 2/16/2016    [facility]    BLOCK-NERVE-MEDIAL BRANCH-LUMBAR** Bilateral MBB ** Bilateral 12/22/2015    [facility]    BLOCK-NERVE-MEDIAL BRANCH-LUMBAR** BILATERAL MBB ** Bilateral 12/1/2015    [facility]    CATARACT EXTRACTION W/  INTRAOCULAR LENS IMPLANT Left 10/31/07    Dr. Nunez    CATARACT EXTRACTION W/  INTRAOCULAR LENS IMPLANT Right 12/16/15    Dr. Nunez    " INJECTION-STEROID-EPIDURAL-CAUDAL- WITH CATH N/A 6/14/2016    [facility]    INSERTION-INTRAOCULAR LENS (IOL) Right 12/16/2015    [facility] at [facility]    PHACOEMULSIFICATION-ASPIRATION-CATARACT Right 12/16/2015    [facility]    RADIOFREQUENCY THERMOCOAGULATION (RFTC)-NERVE-MEDIAN BRANCH-LUMBAR Left L2, L3, L4, L5 Left 3/15/2016    [facility]    REPAIR-RETINA (VITRECTOMY) Right 1/6/2016    [facility]     Family History   Problem Relation Age of Onset    Cancer Father         prostate    Colon cancer Father     Hearing loss Paternal Aunt     Amblyopia Neg Hx     Blindness Neg Hx     Cataracts Neg Hx     Diabetes Neg Hx     Glaucoma Neg Hx     Hypertension Neg Hx     Macular degeneration Neg Hx     Retinal detachment Neg Hx     Strabismus Neg Hx     Stroke Neg Hx     Thyroid disease Neg Hx     Heart attack Neg Hx     Heart disease Neg Hx     Heart failure Neg Hx     Hyperlipidemia Neg Hx      Social History     Tobacco Use    Smoking status: Never Smoker    Smokeless tobacco: Never Used   Substance Use Topics    Alcohol use: No     Alcohol/week: 0.0 oz    Drug use: No     Review of Systems   Constitutional: Negative for chills, fatigue and fever.   HENT: Positive for congestion and postnasal drip. Negative for sore throat and voice change.    Eyes: Negative for photophobia, pain and redness.   Respiratory: Positive for cough. Negative for choking and shortness of breath.    Cardiovascular: Negative for chest pain, palpitations and leg swelling.   Gastrointestinal: Negative for abdominal pain, diarrhea, nausea and vomiting.   Genitourinary: Negative for dysuria, frequency and urgency.   Musculoskeletal: Negative for back pain, neck pain and neck stiffness.   Skin: Negative for rash.    Neurological: Negative for seizures, speech difficulty, light-headedness, numbness and headaches.   Hematological: Does not bruise/bleed easily.       Physical Exam     Initial Vitals [09/07/18 2254]   BP Pulse Resp Temp SpO2   138/73 71 16 98.4 °F (36.9 °C) (!) 92 %      MAP       --         Physical Exam    Nursing note and vitals reviewed.  Constitutional: He appears well-developed and well-nourished. No distress.   Well-appearing, NAD   HENT:   Head: Normocephalic and atraumatic.   Mouth/Throat: Oropharynx is clear and moist.   Eyes: EOM are normal. Pupils are equal, round, and reactive to light.   Neck: Normal range of motion. Neck supple. No tracheal deviation present.   Cardiovascular: Normal rate, regular rhythm, normal heart sounds and intact distal pulses.   Pulmonary/Chest: Effort normal and breath sounds normal. No accessory muscle usage or stridor. No tachypnea and no bradypnea. No respiratory distress. He has no decreased breath sounds. He has no wheezes. Rales: mild, bilateral bases.   No distress.   Abdominal: Soft. Bowel sounds are normal. He exhibits no distension and no mass. There is no tenderness.   Musculoskeletal: Normal range of motion. He exhibits no edema.   Neurological: He is alert and oriented to person, place, and time. He has normal strength. No cranial nerve deficit or sensory deficit.   Skin: Skin is warm and dry. No rash noted.   Psychiatric: He has a normal mood and affect. His behavior is normal.         ED Course   Procedures  Labs Reviewed   CBC WITHOUT DIFFERENTIAL - Abnormal; Notable for the following components:       Result Value    RBC 4.16 (*)     Hemoglobin 12.1 (*)     Hematocrit 39.6 (*)     MCHC 30.6 (*)     All other components within normal limits   COMPREHENSIVE METABOLIC PANEL - Abnormal; Notable for the following components:    BUN, Bld 25 (*)     Albumin 3.4 (*)     eGFR if  56 (*)     eGFR if non  49 (*)     All other components  within normal limits          Imaging Results          X-Ray Chest 1 View (Final result)  Result time 09/08/18 01:28:58    Final result by Virgilio Amaya MD (09/08/18 01:28:58)                 Impression:      Stable examination findings of chronic pulmonary interstitial disease.      Electronically signed by: Virgilio Amaya MD  Date:    09/08/2018  Time:    01:28             Narrative:    EXAMINATION:  XR CHEST 1 VIEW    CLINICAL HISTORY:  Cough    TECHNIQUE:  Single frontal view of the chest was performed.    COMPARISON:  Chest x-ray dated 10/09/2017 and CT scan of the chest dated 10/09/2017.    FINDINGS:  The trachea is unremarkable.  There is stable enlargement of the cardiomediastinal silhouette.  The hemidiaphragms are unremarkable.  There is no evidence of free air beneath the hemidiaphragms.  No pleural effusions are present.  There is no evidence of a pneumothorax.  There is no evidence of pneumomediastinum.  There is stable appearance of chronic interstitial opacities.  No new focal consolidation is present.  There are degenerative changes in the osseous structures.                                 Medical Decision Making:   History:   Old Medical Records: I decided to obtain old medical records.  Initial Assessment:   88 y.o. Male presents with cough and nasal congestion for the last couple of days. No fever or significant sputum production.  Vitals unremarkable on arrival.  Plan to obtain labs, CXR and reassess.  Differential Diagnosis:   Differential Diagnosis includes, but is not limited to:  Sepsis, meningitis, cavernous sinus thrombosis, nasal foreign body, otitis media/external, nasal polyp, bacterial sinusitis, allergic rhinitis, influenza, bacterial/viral pharyngitis, peritonsillar abscess, retropharyngeal abscess, bacterial/viral pneumonia.    Clinical Tests:   Lab Tests: Reviewed and Ordered  Radiological Study: Reviewed and Ordered  ED Management:  Labs unremarkable.  CXR revealed stable, chronic  pulmonary interstitial disease, no acute infiltrates.  Will prescribe tessalon for cough. No indication for ABX at this time.  Counseled extensively on symptomatic and supportive care and importance of f/u with PCP.  Given strict return precautions including fever, worsening symptoms, SOB, or any other concerns.  Upon re-evaluation, the patient's status has remained stable.  After complete ED evaluation, clinical impression is most consistent with viral URI, cough.  At this time, I feel there is no emergent condition requiring further evaluation or admission. I believe the patient is stable for discharge from the ED. The patient and any additional family present were updated with test results, overall clinical impression, and recommended further plan of care. All questions were answered. The patient expressed understanding and agreed with current plan for discharge with PCP follow-up within 1 week. Strict return precautions were provided, including return/worsening of current symptoms or any other concerns.                         Clinical Impression:     1. Viral URI    2. Cough    3. Chronic idiopathic pulmonary fibrosis           Disposition:   Disposition: Discharged  Condition: Stable           I, Dr. Heath Denny, personally performed the services described in this documentation. All medical record entries made by the scribe were at my direction and in my presence.  I have reviewed the chart and agree that the record reflects my personal performance and is accurate and complete.     Heath Denny MD.             Heath Denny MD  10/06/18 [id number]

## 2018-09-11 ENCOUNTER — OFFICE VISIT (OUTPATIENT)
Dept: INTERNAL MEDICINE | Facility: CLINIC | Age: 83
End: 2018-09-11
Payer: MEDICARE

## 2018-09-11 ENCOUNTER — TELEPHONE (OUTPATIENT)
Dept: INTERNAL MEDICINE | Facility: CLINIC | Age: 83
End: 2018-09-11

## 2018-09-11 VITALS
SYSTOLIC BLOOD PRESSURE: 113 MMHG | RESPIRATION RATE: 14 BRPM | BODY MASS INDEX: 22.04 KG/M2 | TEMPERATURE: 99 F | WEIGHT: 157.44 LBS | DIASTOLIC BLOOD PRESSURE: 62 MMHG | HEIGHT: 71 IN | HEART RATE: 58 BPM

## 2018-09-11 DIAGNOSIS — I82.4Y2 DEEP VEIN THROMBOSIS (DVT) OF PROXIMAL VEIN OF LEFT LOWER EXTREMITY, UNSPECIFIED CHRONICITY: Primary | ICD-10-CM

## 2018-09-11 DIAGNOSIS — J06.9 UPPER RESPIRATORY TRACT INFECTION, UNSPECIFIED TYPE: ICD-10-CM

## 2018-09-11 DIAGNOSIS — I95.9 HYPOTENSION, UNSPECIFIED HYPOTENSION TYPE: ICD-10-CM

## 2018-09-11 PROCEDURE — 99213 OFFICE O/P EST LOW 20 MIN: CPT | Mod: S$PBB,,, | Performed by: INTERNAL MEDICINE

## 2018-09-11 PROCEDURE — 1101F PT FALLS ASSESS-DOCD LE1/YR: CPT | Mod: CPTII,,, | Performed by: INTERNAL MEDICINE

## 2018-09-11 PROCEDURE — 99999 PR PBB SHADOW E&M-EST. PATIENT-LVL IV: CPT | Mod: PBBFAC,,, | Performed by: INTERNAL MEDICINE

## 2018-09-11 PROCEDURE — 99214 OFFICE O/P EST MOD 30 MIN: CPT | Mod: PBBFAC,PO | Performed by: INTERNAL MEDICINE

## 2018-09-11 NOTE — PROGRESS NOTES
CC: followup of DVT  HPI:  The patient is a 88 y.o. year old male who presents to the office for followup of DVT.  The patient denies any chest pain, shortness of breath, headache, blurred vision, excessive fatigue, nausea or vomiting.  Review of ultrasound reveals essentially no change.  Lower extremity swelling has improved    PAST MEDICAL HISTORY:  Past Medical History:   Diagnosis Date    Allergy     Anemia     Arthritis     Depression     Herniation of intervertebral disc     Hyperlipidemia     Hypertension 3/4/2015    Macular degeneration     Spinal stenosis        SURGICAL HISTORY:  Past Surgical History:   Procedure Laterality Date    Bilateral MBB  Bilateral 2/16/2016    [facility]    BLOCK-NERVE-MEDIAL BRANCH-LUMBAR** Bilateral MBB ** Bilateral 12/22/2015    [facility] by [facility] at [facility]    BLOCK-NERVE-MEDIAL BRANCH-LUMBAR** BILATERAL MBB ** Bilateral 12/1/2015    [facility] by [facility] at [facility]    CATARACT EXTRACTION W/  INTRAOCULAR LENS IMPLANT Left 10/31/07    Dr. Nunez    CATARACT EXTRACTION W/  INTRAOCULAR LENS IMPLANT Right 12/16/15    Dr. Nunez    INJECTION-STEROID-EPIDURAL-CAUDAL- WITH CATH N/A 6/14/2016    [facility] by [facility] at [facility]    INSERTION-INTRAOCULAR LENS (IOL) Right 12/16/2015    [facility]    PHACOEMULSIFICATION-ASPIRATION-CATARACT Right 12/16/2015    [facility]    RADIOFREQUENCY THERMOCOAGULATION (RFTC)-NERVE-MEDIAN BRANCH-LUMBAR  Left L2, L3, L4, L5 Left 3/15/2016    Performed by [facility] at [facility]    REPAIR-RETINA (VITRECTOMY) Right 1/6/2016    [facility] by [facility] at [facility]       MEDS:  Medcard reviewed and updated    ALLERGIES: Allergy Card reviewed and updated    SOCIAL HISTORY:   The patient is a  nonsmoker.    PE:   APPEARANCE: Well nourished, well developed, in no acute distress.    CHEST: Lungs with coarse breath sounds bilaterally.  CARDIOVASCULAR: Normal S1, S2. No murmurs. No carotid bruits. No pedal edema.  ABDOMEN: Bowel sounds normal. Not distended. Soft. No tenderness or masses. .  PSYCHIATRIC: The patient is oriented to person, place, and time and has a pleasant affect.        ASSESSMENT/PLAN:  Phi was seen today for follow-up.    Diagnoses and all orders for this visit:    Deep vein thrombosis (DVT) of proximal vein of left lower extremity, unspecified chronicity  -     Ambulatory consult to Vascular Medicine    Upper respiratory tract infection, unspecified type  -     take Mucinex over-the-counter and encourage hydration    Hypotension, unspecified hypotension type  -     encourage hydration

## 2018-09-14 ENCOUNTER — CLINICAL SUPPORT (OUTPATIENT)
Dept: OTOLARYNGOLOGY | Facility: CLINIC | Age: 83
End: 2018-09-14
Payer: MEDICARE

## 2018-09-14 ENCOUNTER — OFFICE VISIT (OUTPATIENT)
Dept: OTOLARYNGOLOGY | Facility: CLINIC | Age: 83
End: 2018-09-14
Payer: MEDICARE

## 2018-09-14 VITALS
WEIGHT: 157.44 LBS | SYSTOLIC BLOOD PRESSURE: 108 MMHG | HEART RATE: 66 BPM | HEIGHT: 71 IN | BODY MASS INDEX: 22.04 KG/M2 | DIASTOLIC BLOOD PRESSURE: 63 MMHG

## 2018-09-14 DIAGNOSIS — H61.23 BILATERAL IMPACTED CERUMEN: ICD-10-CM

## 2018-09-14 DIAGNOSIS — R13.10 DYSPHAGIA, UNSPECIFIED TYPE: Primary | ICD-10-CM

## 2018-09-14 DIAGNOSIS — H90.3 SENSORINEURAL HEARING LOSS, BILATERAL: Primary | ICD-10-CM

## 2018-09-14 DIAGNOSIS — H90.3 SENSORINEURAL HEARING LOSS (SNHL), BILATERAL: ICD-10-CM

## 2018-09-14 DIAGNOSIS — J38.7 PRESBYLARYNX: ICD-10-CM

## 2018-09-14 PROCEDURE — 92553 AUDIOMETRY AIR & BONE: CPT | Mod: PBBFAC,PO | Performed by: AUDIOLOGIST-HEARING AID FITTER

## 2018-09-14 PROCEDURE — 92567 TYMPANOMETRY: CPT | Mod: PBBFAC,PO | Performed by: AUDIOLOGIST-HEARING AID FITTER

## 2018-09-14 PROCEDURE — 99214 OFFICE O/P EST MOD 30 MIN: CPT | Mod: PBBFAC,PO,25 | Performed by: OTOLARYNGOLOGY

## 2018-09-14 PROCEDURE — 99211 OFF/OP EST MAY X REQ PHY/QHP: CPT | Mod: PBBFAC,27,PO | Performed by: AUDIOLOGIST-HEARING AID FITTER

## 2018-09-14 PROCEDURE — 1101F PT FALLS ASSESS-DOCD LE1/YR: CPT | Mod: CPTII,,, | Performed by: OTOLARYNGOLOGY

## 2018-09-14 PROCEDURE — 99204 OFFICE O/P NEW MOD 45 MIN: CPT | Mod: 25,S$PBB,, | Performed by: OTOLARYNGOLOGY

## 2018-09-14 PROCEDURE — 31575 DIAGNOSTIC LARYNGOSCOPY: CPT | Mod: S$PBB,,, | Performed by: OTOLARYNGOLOGY

## 2018-09-14 PROCEDURE — 99999 PR PBB SHADOW E&M-EST. PATIENT-LVL IV: CPT | Mod: PBBFAC,,, | Performed by: OTOLARYNGOLOGY

## 2018-09-14 PROCEDURE — 99999 PR PBB SHADOW E&M-EST. PATIENT-LVL I: CPT | Mod: PBBFAC,,, | Performed by: AUDIOLOGIST-HEARING AID FITTER

## 2018-09-14 PROCEDURE — 31575 DIAGNOSTIC LARYNGOSCOPY: CPT | Mod: PBBFAC,PO | Performed by: OTOLARYNGOLOGY

## 2018-09-14 NOTE — PROGRESS NOTES
Starla Hickey, CCC-A  [facility] [address]  [address]  [place] [address]      Patient: Phi Sainz   MRN: [id number]  : 10/26/1929  RIVERA: 2018       AUDIOLOGICAL EVALUATION    CASE HISTORY:    Phi Sainz, 88 y.o., was seen on the above date for an audiological evaluation following cerumen removal by Dr. Walls. Patient reported hearing loss in both ears. No further history significant to hearing loss was reported.    TEST RESULTS:    Imittance testing revealed normal middle ear compliance (Type A) in both ears. Could not assess speech as patient was tested outside of [place] in wheelchair. Pure tone testing indicated that Phi Sainz has a mild sloping to profound sensorineural hearing loss in both ears.    RECOMMENDATIONS:   It is recommended that he:  Continue to receive audiological monitoring annually.  Receive binaural hearing aids to improve speech understanding.    If you should have any questions or concerns regarding the above information, please do not hesitate to contact me at [phone].      _______________________________  Rosario Hickey, CCC-A  Clinical Audiologist    enclosure: audiogram

## 2018-09-14 NOTE — PROGRESS NOTES
"  Chief Complaint   Patient presents with    Ear Fullness     right ear worse    Cough     started two months ago, coughing when eating and drinking    .     HPI:    Mr. Sainz is an 88 year old male who presents with dysphagia. The patient describes multiple episode(s) with gradual onset, located in the high throat, and symptoms lasting a few seconds. Patient reports the feeling of "food or liquids going down the wrong way and a choking sensation with swallowing.". Associated with the dysphagia, patient also complains of aspiration. Patient denies food sticking.  Patient denies regurgitation of undigested food.    Also here with reports of bilateral aural fullness right worse than left. This has been present for a few weeks and seems to be worsening. He notes decreased haring bilaterally.  He denies tinnitus.  He denies vertigo.  He denies previous noise exposure.         Past Medical History:   Diagnosis Date    Allergy     Anemia     Arthritis     Depression     Herniation of intervertebral disc     Hyperlipidemia     Hypertension 3/4/2015    Macular degeneration     Spinal stenosis      Social History     Socioeconomic History    Marital status:      Spouse name: Not on file    Number of children: Not on file    Years of education: Not on file    Highest education level: Not on file   Social Needs    Financial resource strain: Not on file    Food insecurity - worry: Not on file    Food insecurity - inability: Not on file    Transportation needs - medical: Not on file    Transportation needs - non-medical: Not on file   Occupational History    Occupation: retired   Tobacco Use    Smoking status: Never Smoker    Smokeless tobacco: Never Used   Substance and Sexual Activity    Alcohol use: No     Alcohol/week: 0.0 oz    Drug use: No    Sexual activity: Yes     Partners: Female   Other Topics Concern    Not on file   Social History Narrative    Not on file     Past Surgical " History:   Procedure Laterality Date    Bilateral MBB  Bilateral 2/16/2016    [facility] at [facility]    BLOCK-NERVE-MEDIAL BRANCH-LUMBAR** Bilateral MBB ** Bilateral 12/22/2015    [facility] by [facility] at [facility]    BLOCK-NERVE-MEDIAL BRANCH-LUMBAR** BILATERAL MBB ** Bilateral 12/1/2015    [facility] by [facility] at [facility]    CATARACT EXTRACTION W/  INTRAOCULAR LENS IMPLANT Left 10/31/07    Dr. Nunez    CATARACT EXTRACTION W/  INTRAOCULAR LENS IMPLANT Right 12/16/15    Dr. Nunez    INJECTION-STEROID-EPIDURAL-CAUDAL- WITH CATH N/A 6/14/2016    [facility] by [facility] at [facility]    INSERTION-INTRAOCULAR LENS (IOL) Right 12/16/2015    [facility]    PHACOEMULSIFICATION-ASPIRATION-CATARACT Right 12/16/2015    [facility]    RADIOFREQUENCY THERMOCOAGULATION (RFTC)-NERVE-MEDIAN BRANCH-LUMBAR Left L2, L3, L4, L5 Left 3/15/2016    [facility] by [facility] at [facility]    REPAIR-RETINA (VITRECTOMY) Right 1/6/2016    [facility]     Family History   Problem Relation Age of Onset    Cancer Father         prostate    Colon cancer Father     Hearing loss Paternal Aunt     Amblyopia Neg Hx     Blindness Neg Hx     Cataracts Neg Hx     Diabetes Neg Hx     Glaucoma Neg Hx     Hypertension Neg Hx     Macular degeneration Neg Hx     Retinal detachment Neg Hx     Strabismus Neg Hx     Stroke Neg Hx     Thyroid disease Neg Hx     Heart attack Neg Hx     Heart disease Neg Hx     Heart failure Neg Hx     Hyperlipidemia Neg Hx            Review of Systems  General: negative for chills, fever or weight loss  Psychological: negative for mood changes or depression  Ophthalmic: negative for blurry vision, photophobia or eye pain  ENT: see HPI  Respiratory: no cough,  shortness of breath, or wheezing  Cardiovascular: no chest pain or dyspnea on exertion  Gastrointestinal: no abdominal pain, change in bowel habits, or black/ bloody stools  Musculoskeletal: negative for gait disturbance or muscular weakness  Neurological: no syncope or seizures; no ataxia  Dermatological: negative for puritis,  rash and jaundice  Hematologic/lymphatic: no easy bruising, no new lumps or bumps      Physical Exam:    Vitals:    09/14/18 1102   BP: 108/63   Pulse: 66       Constitutional: Well appearing / communicating without difficutly.  NAD.  Eyes: EOM I Bilaterally  Head/Face: Normocephalic.  Negative paranasal sinus pressure/tenderness.  Salivary glands WNL.  House Brackmann I Bilaterally.    Right Ear: Auricle normal appearance. External Auditory Canal with complete cerumen impaction present.  Left Ear: Auricle normal appearance. External Auditory Canal with complete cerumen impaction present.   Nose: No gross nasal septal deviation. Inferior Turbinates 3+ bilaterally. No septal perforation. No masses/lesions. External nasal skin appears normal without masses/lesions.  Oral Cavity: Gingiva/lips within normal limits.  Dentition/gingiva healthy appearing. Mucus membranes moist. Floor of mouth soft, no masses palpated. Oral Tongue mobile. Hard Palate appears normal.    Oropharynx: Base of tongue appears normal. No masses/lesions noted. Tonsillar fossa/pharyngeal wall without lesions. Posterior oropharynx WNL.  Soft palate without masses. Midline uvula.   Neck/Lymphatic: No LAD I-VI bilaterally.  No thyromegaly.  No masses noted on exam.    Mirror laryngoscopy/nasopharyngoscopy: Active gag reflex.  Unable to perform.    Neuro/Psychiatric: AOx3.  Normal mood and affect.   Cardiovascular: Normal carotid pulses bilaterally, no increasing jugular venous distention noted at cervical region bilaterally.    Respiratory: Normal respiratory effort, no stridor, no retractions noted.      See separate procedure  note for FFL.         Audiogram reviewed personally by myself and in detail with the patient today.           Assessment:    ICD-10-CM ICD-9-CM    1. 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 Sensorineural hearing loss (SNHL), bilateral H90.3 389.18    4. Bilateral impacted cerumen H61.23 380.4      The primary encounter diagnosis was Dysphagia, unspecified type. Diagnoses of Presbylarynx, Sensorineural hearing loss (SNHL), bilateral, and Bilateral impacted cerumen were also pertinent to this visit.      Plan:  Orders Placed This Encounter   Procedures    Fl Modified Barium Swallow Speech    Ambulatory referral to Speech Therapy    SLP video swallow     Dysphagia with concern for aspiration.  Refer to speech therapy and obtain MBSS.     We reviewed the findings from his recent scope examination and that the findings are consistent with presbylarynx.  Presbylarynx describes age-related changes to the soft tissues of the larynx including the loss of vocal fold tone and elasticity and bowing of the vocal cords.  This can affect voice quality including hoarseness, breathy voice, reduced voice volume, and unstable pitch.  I would recommend speech therapy when the patient is motivated.    We reviewed the patient's recent audiogram and hearing loss in detail.  We also discussed that he is a good candidate for hearing aids, if and when he the patient is motivated.  He was given handouts with information and pricing of hearing aids, and will contact audiology when ready to proceed.  We also discussed the use hearing protection when exposed to loud noise, including lawn equipment.       Cerumen removed today. Advised to avoid the use of q-tips and that she may use an OTC removal aid such as Debrox.     Thank you kindly for allowing me to participate in the patient's care.       Katerina  Titi Maravilla MD

## 2018-09-14 NOTE — LETTER
September 14, 2018      Yanna Santana MD  [address] [address]           [facility]  [address], [address]  [place] [place] [facility] [address]  Phone: [phone]  Fax: [phone]          Patient: Phi Sainz   MR Number: [id number]   YOB: 1929   Date of Visit: 9/14/2018       Dear Dr. Yanna Santana:    Thank you for referring Phi Sainz to me for evaluation. Attached you will find relevant portions of my assessment and plan of care.    If you have questions, please do not hesitate to call me. I look forward to following Phi Sainz along with you.    Sincerely,    Katerina Maravilla MD    Enclosure  CC:  No Recipients    If you would like to receive this communication electronically, please contact [email or web address] or [phone] to request more information on [employer] access.    For providers and/or their staff who would like to refer a patient to [facility], please contact us through our one-stop-shop provider referral line, [facility], at [phone].    If you feel you have received this communication in error or would no longer like to receive these types of communications, please e-mail [email or web address]

## 2018-09-14 NOTE — PROCEDURES
Procedures     Due to indication in patient's history, presentation or risk factors,  a fiber optic exam was performed.    SEPARATE PROCEDURE NOTE:    ANESTHESIA:  Topical xylocaine with shona-synephrine    FINDINGS:  Normal exam      PROCEDURE:  After verbal consent was obtained, the flexible scope was passed through the patient's nasal cavity without difficulty.  The nasopharynx (adenoid pad) and eustachian tube orifices were first visualized and were found to be normal, without masses or irregularity.  The posterior pharyngeal wall and base of tongue were then examined and no mass or irregular tissue was seen.  The scope was then advanced to the larynx, and the epiglottis, valleculae, and piriform sinuses were normal, without masses or mucosal irregularity.  The false vocal folds and true vocal folds were then examined and were found to have normal mobility (full abduction and adduction). There was atrophy of bilateral TVC with bowing of the vocal cords with 1 mm glottic gap at the mid cord level.  no masses or mucosal irregularity was seen.  The arytenoids and the interarytenoid area were normal. The patient tolerated the procedure well without complications.

## 2018-09-19 ENCOUNTER — TELEPHONE (OUTPATIENT)
Dept: PULMONOLOGY | Facility: CLINIC | Age: 83
End: 2018-09-19

## 2018-09-19 NOTE — TELEPHONE ENCOUNTER
----- Message from Catie Green sent at 9/17/2018  2:09 PM CDT -----  Contact: Yani / Daughter [phone]  PT had to visit ED for shortness of breath. Yani will like to schedule an appointment with the doctor as soon as possible.

## 2018-09-20 ENCOUNTER — OFFICE VISIT (OUTPATIENT)
Dept: CARDIOLOGY | Facility: CLINIC | Age: 83
End: 2018-09-20
Payer: MEDICARE

## 2018-09-20 VITALS
HEIGHT: 71 IN | WEIGHT: 156.44 LBS | SYSTOLIC BLOOD PRESSURE: 101 MMHG | DIASTOLIC BLOOD PRESSURE: 56 MMHG | HEART RATE: 64 BPM | BODY MASS INDEX: 21.9 KG/M2

## 2018-09-20 DIAGNOSIS — G25.0 ESSENTIAL TREMOR: ICD-10-CM

## 2018-09-20 DIAGNOSIS — E78.5 DYSLIPIDEMIA: ICD-10-CM

## 2018-09-20 DIAGNOSIS — R53.81 PHYSICAL DEBILITY: ICD-10-CM

## 2018-09-20 DIAGNOSIS — I10 ESSENTIAL HYPERTENSION: Primary | ICD-10-CM

## 2018-09-20 DIAGNOSIS — R00.1 BRADYCARDIA: ICD-10-CM

## 2018-09-20 DIAGNOSIS — J84.9 ILD (INTERSTITIAL LUNG DISEASE): ICD-10-CM

## 2018-09-20 DIAGNOSIS — I82.509 CHRONIC DEEP VEIN THROMBOSIS (DVT) OF LOWER EXTREMITY, UNSPECIFIED LATERALITY, UNSPECIFIED VEIN: ICD-10-CM

## 2018-09-20 PROBLEM — O22.30 DVT (DEEP VEIN THROMBOSIS) IN PREGNANCY: Status: ACTIVE | Noted: 2018-09-20

## 2018-09-20 PROCEDURE — 99213 OFFICE O/P EST LOW 20 MIN: CPT | Mod: PBBFAC,PO | Performed by: INTERNAL MEDICINE

## 2018-09-20 PROCEDURE — 99215 OFFICE O/P EST HI 40 MIN: CPT | Mod: S$PBB,,, | Performed by: INTERNAL MEDICINE

## 2018-09-20 PROCEDURE — 99499 UNLISTED E&M SERVICE: CPT | Mod: S$GLB,,, | Performed by: INTERNAL MEDICINE

## 2018-09-20 PROCEDURE — 99999 PR PBB SHADOW E&M-EST. PATIENT-LVL III: CPT | Mod: PBBFAC,,, | Performed by: INTERNAL MEDICINE

## 2018-09-20 PROCEDURE — 1101F PT FALLS ASSESS-DOCD LE1/YR: CPT | Mod: CPTII,,, | Performed by: INTERNAL MEDICINE

## 2018-09-20 NOTE — LETTER
September 20, 2018      Yanna Santana MD  [address] [facility]  [place] [address]           [place] [facility]  [address] [facility] [address]  Phone: [phone]          Patient: Phi Sainz   MR Number: [id number]   YOB: 1929   Date of Visit: 9/20/2018       Dear Dr. Yanna Santana:    Thank you for referring Phi Sainz to me for evaluation. Attached you will find relevant portions of my assessment and plan of care.    If you have questions, please do not hesitate to call me. I look forward to following Phi Sainz along with you.    Sincerely,    Rody Morrison MD    Enclosure  CC:  No Recipients    If you would like to receive this communication electronically, please contact [email or web address] or [phone] to request more information on [employer] access.    For providers and/or their staff who would like to refer a patient to [facility], please contact us through our one-stop-shop provider referral line, [facility], at [phone].    If you feel you have received this communication in error or would no longer like to receive these types of communications, please e-mail [email or web address]

## 2018-09-20 NOTE — PROGRESS NOTES
"Subjective:   Patient ID:  Phi Sainz is a 88 y.o. male who presents for follow-up of Deep Vein Thrombosis and Hypotension      HPI: Since the last visit patient was diagnosed with DVT.  He is now on Eliquis.      He has episodes of low blood pressure and periods of "black outs' with no loss of postural tone.  He is otherwise asymptomatic; wheel chair bound.  He has a sitter, who also helps with physical activity.    Lab Results   Component Value Date     09/08/2018    K 4.1 09/08/2018     09/08/2018    CO2 28 09/08/2018    BUN 25 (H) 09/08/2018    CREATININE 1.3 09/08/2018    GLU 98 09/08/2018    AST 16 09/08/2018    ALT 15 09/08/2018    ALBUMIN 3.4 (L) 09/08/2018    PROT 6.7 09/08/2018    BILITOT 0.6 09/08/2018    WBC 9.04 09/08/2018    HGB 12.1 (L) 09/08/2018    HCT 39.6 (L) 09/08/2018    MCV 95 09/08/2018     09/08/2018    PSA 0.82 05/03/2016    TSH 2.425 05/28/2018    CHOL 196 11/08/2017    HDL 73 11/08/2017    LDLCALC 111.8 11/08/2017    TRIG 56 11/08/2017       Review of Systems   Constitution: Positive for weakness, malaise/fatigue and weight loss.   HENT: Negative.    Eyes: Negative.    Cardiovascular: Positive for dyspnea on exertion and leg swelling. Negative for chest pain, claudication, irregular heartbeat, near-syncope, palpitations and syncope.   Respiratory: Positive for cough and sputum production. Negative for shortness of breath, snoring and wheezing.    Endocrine: Negative.  Negative for cold intolerance, heat intolerance, polydipsia, polyphagia and polyuria.   Skin: Negative.    Musculoskeletal: Positive for back pain, falls and muscle weakness.   Gastrointestinal: Negative.    Genitourinary: Negative.    Neurological: Positive for dizziness, light-headedness, loss of balance and tremors.   Psychiatric/Behavioral: Negative.        Objective:   Physical Exam   Constitutional: He is oriented to person, place, and time. He appears well-developed and well-nourished. " "  BP (!) 101/56   Pulse 64   Ht 5' 11" (1.803 m)   Wt 70.9 kg (156 lb 6.7 oz)   BMI 21.82 kg/m²      HENT:   Head: Normocephalic.   Eyes: Pupils are equal, round, and reactive to light.   Neck: Normal range of motion. Neck supple. Carotid bruit is not present. No thyromegaly present.   Cardiovascular: Normal rate, regular rhythm, normal heart sounds and intact distal pulses. Exam reveals no gallop and no friction rub.   No murmur heard.  Pulses:       Carotid pulses are 2+ on the right side, and 2+ on the left side.       Radial pulses are 2+ on the right side, and 2+ on the left side.        Femoral pulses are 2+ on the right side, and 2+ on the left side.       Popliteal pulses are 2+ on the right side, and 2+ on the left side.        Dorsalis pedis pulses are 2+ on the right side, and 2+ on the left side.        Posterior tibial pulses are 2+ on the right side, and 2+ on the left side.   Pulmonary/Chest: Effort normal and breath sounds normal. No respiratory distress. He has no wheezes. He has no rales. He exhibits no tenderness.   Abdominal: Soft. Bowel sounds are normal.   Musculoskeletal: Normal range of motion. He exhibits no edema.   Neurological: He is alert and oriented to person, place, and time.   Skin: Skin is warm and dry.   Psychiatric: He has a normal mood and affect.   Nursing note and vitals reviewed.        Assessment and Plan:     Problem List Items Addressed This Visit        Cardiology Problems    Hypertension - Primary    DVT (deep vein thrombosis) in pregnancy       Other    Essential tremor    Dyslipidemia    Bradycardia    ILD (interstitial lung disease)    Physical debility             Medication List           Accurate as of 9/20/18  4:47 PM. If you have any questions, ask your nurse or doctor.               CHANGE how you take these medications    diphenoxylate-atropine 2.5-0.025 mg 2.5-0.025 mg per tablet  Commonly known as:  LOMOTIL  Take 1 tablet by mouth 2 (two) times " daily.  What changed:  additional instructions        CONTINUE taking these medications    apixaban 5 mg Tab  Commonly known as:  ELIQUIS  Take 1 tablet (5 mg total) by mouth 2 (two) times daily.     APRISO 0.375 gram Cp24  Generic drug:  mesalamine     diazePAM 2 MG tablet  Commonly known as:  VALIUM  TAKE 1 TABLET BY MOUTH EVERY DAY AS NEEDED FOR ANXIETY     escitalopram oxalate 10 MG tablet  Commonly known as:  LEXAPRO  TAKE 1 TABLET (10 MG TOTAL) BY MOUTH ONCE DAILY.     gabapentin 100 MG capsule  Commonly known as:  NEURONTIN     HYDROcodone-acetaminophen 5-325 mg per tablet  Commonly known as:  NORCO     tamsulosin 0.4 mg Cap  Commonly known as:  FLOMAX  Take 1 capsule (0.4 mg total) by mouth after dinner.        Reviewed venous Doppler.  DVT is probably chronic.  The echolucent mass in the left calf is probably cystic in nature. Will review with Dr. Lockhart.  Would recommend 5 mg BID Eliquis up to 6 months and then either d/c or continue 2.5mg BID to prevent further DVT in a patient who is very sedentary.  Symptoms may be suggestive of autonomic dysfunction and exacerbated by certain medications patient takes. Suggest Wilson sodium intake and hydration. If this will not suffice will try Midodrine.  Suggest neurology for transient loss of consciousness without loss pf postural tone evaluation. Defer to PCP.        Follow-up in about 6 months (around 3/20/2019).

## 2018-09-26 ENCOUNTER — TELEPHONE (OUTPATIENT)
Dept: NEUROLOGY | Facility: CLINIC | Age: 83
End: 2018-09-26

## 2018-09-26 NOTE — TELEPHONE ENCOUNTER
----- Message from Reyes Cabrera sent at 9/26/2018 12:10 PM CDT -----  Needs Advice    Reason for call: Yani is calling to schedule a NP w/ the doctor for seizures, per Dr. Morrison's request. [facility] would not allow me to schedule, due to the doctor not showing up in list for seizures. Pt last saw Dr. Lal on 08/10/15.        Communication Preference: Yani(daughter) @ [phone]    Additional Information:

## 2018-09-27 ENCOUNTER — HOSPITAL ENCOUNTER (OUTPATIENT)
Dept: RADIOLOGY | Facility: OTHER | Age: 83
Discharge: HOME | End: 2018-09-27
Attending: OTOLARYNGOLOGY
Payer: MEDICARE

## 2018-09-27 DIAGNOSIS — R13.10 DYSPHAGIA, UNSPECIFIED TYPE: ICD-10-CM

## 2018-09-27 PROCEDURE — 74230 X-RAY XM SWLNG FUNCJ C+: CPT | Mod: 26,,, | Performed by: RADIOLOGY

## 2018-09-27 PROCEDURE — G8998 SWALLOW D/C STATUS: HCPCS | Mod: CK

## 2018-09-27 PROCEDURE — 92611 MOTION FLUOROSCOPY/SWALLOW: CPT

## 2018-09-27 PROCEDURE — G8996 SWALLOW CURRENT STATUS: HCPCS | Mod: CK

## 2018-09-27 PROCEDURE — 74230 X-RAY XM SWLNG FUNCJ C+: CPT | Mod: TC

## 2018-09-27 PROCEDURE — G8997 SWALLOW GOAL STATUS: HCPCS | Mod: CK

## 2018-09-28 ENCOUNTER — OFFICE VISIT (OUTPATIENT)
Dept: UROLOGY | Facility: CLINIC | Age: 83
End: 2018-09-28
Payer: MEDICARE

## 2018-09-28 VITALS
HEIGHT: 71 IN | WEIGHT: 156 LBS | HEART RATE: 67 BPM | BODY MASS INDEX: 21.84 KG/M2 | SYSTOLIC BLOOD PRESSURE: 134 MMHG | DIASTOLIC BLOOD PRESSURE: 82 MMHG | TEMPERATURE: 98 F

## 2018-09-28 DIAGNOSIS — N13.8 BPH WITH OBSTRUCTION/LOWER URINARY TRACT SYMPTOMS: Primary | ICD-10-CM

## 2018-09-28 DIAGNOSIS — N40.1 BPH WITH OBSTRUCTION/LOWER URINARY TRACT SYMPTOMS: Primary | ICD-10-CM

## 2018-09-28 LAB
BILIRUB SERPL-MCNC: NORMAL MG/DL
BLOOD URINE, POC: NORMAL
COLOR, POC UA: YELLOW
GLUCOSE UR QL STRIP: NORMAL
KETONES UR QL STRIP: NORMAL
LEUKOCYTE ESTERASE URINE, POC: NORMAL
NITRITE, POC UA: NORMAL
PH, POC UA: 5
POC RESIDUAL URINE VOLUME: 198 ML (ref 0–100)
POC RESIDUAL URINE VOLUME: 198 ML (ref 0–100)
PROTEIN, POC: NORMAL
SPECIFIC GRAVITY, POC UA: 1.01
UROBILINOGEN, POC UA: NORMAL

## 2018-09-28 PROCEDURE — 99214 OFFICE O/P EST MOD 30 MIN: CPT | Mod: S$PBB,25,, | Performed by: UROLOGY

## 2018-09-28 PROCEDURE — 99999 PR PBB SHADOW E&M-EST. PATIENT-LVL III: CPT | Mod: PBBFAC,,, | Performed by: UROLOGY

## 2018-09-28 PROCEDURE — 51798 US URINE CAPACITY MEASURE: CPT | Mod: PBBFAC,PO | Performed by: UROLOGY

## 2018-09-28 PROCEDURE — 99213 OFFICE O/P EST LOW 20 MIN: CPT | Mod: PBBFAC,PO | Performed by: UROLOGY

## 2018-09-28 PROCEDURE — 51741 ELECTRO-UROFLOWMETRY FIRST: CPT | Mod: 26,S$PBB,, | Performed by: UROLOGY

## 2018-09-28 PROCEDURE — 81002 URINALYSIS NONAUTO W/O SCOPE: CPT | Mod: PBBFAC,PO | Performed by: UROLOGY

## 2018-09-28 PROCEDURE — 1101F PT FALLS ASSESS-DOCD LE1/YR: CPT | Mod: CPTII,,, | Performed by: UROLOGY

## 2018-09-28 PROCEDURE — 51741 ELECTRO-UROFLOWMETRY FIRST: CPT | Mod: PBBFAC,PO | Performed by: UROLOGY

## 2018-09-28 RX ORDER — FINASTERIDE 5 MG/1
5 TABLET, FILM COATED ORAL DAILY
Qty: 90 TABLET | Refills: 3 | Status: SHIPPED | OUTPATIENT
Start: 2018-09-28 | End: 2018-12-17

## 2018-09-28 NOTE — PROGRESS NOTES
"This patient was last seen by me August 17, 2018 for evaluation of BPH and overactive bladder at which time he was started on Flomax    Patient now comes in follow-up and has a peak urinary flow of 5.8 mL/sec on a voided volume of 35 cc.  Patient has an okay postvoid residual of 198 cc    Patient has no dysuria    Patient comes with his caregiver who helps with a history    Patient states that the Flomax has helped with his voiding pattern.  He is to still has some urgency and urge incontinence mostly at night and mostly related to his decreased mobility    Physical exam reveals reveals a well-developed well-nourished patient  in no acute distress.  Patient is alert and oriented ×3 with normal mood and affect.  Respiratory effort is normal and there is no peripheral edema.  Skin is normal to inspection and palpation    /82 (BP Location: Left arm, Patient Position: Sitting, BP Method: Medium (Automatic))   Pulse 67   Temp 97.8 °F (36.6 °C) (Oral)   Ht 5' 11" (1.803 m)   Wt 70.8 kg (156 lb)   BMI 21.76 kg/m²   Review of Systems  General ROS: negative for chills, fever or weight loss  Respiratory ROS: no cough, shortness of breath, or wheezing  Cardiovascular ROS: no chest pain or dyspnea on exertion  Musculoskeletal ROS: negative for gait disturbance or muscular weakness    Family History   Problem Relation Age of Onset    Cancer Father         prostate    Colon cancer Father     Hearing loss Paternal Aunt     Amblyopia Neg Hx     Blindness Neg Hx     Cataracts Neg Hx     Diabetes Neg Hx     Glaucoma Neg Hx     Hypertension Neg Hx     Macular degeneration Neg Hx     Retinal detachment Neg Hx     Strabismus Neg Hx     Stroke Neg Hx     Thyroid disease Neg Hx     Heart attack Neg Hx     Heart disease Neg Hx     Heart failure Neg Hx     Hyperlipidemia Neg Hx      Past Medical History:   Diagnosis Date    Allergy     Anemia     Arthritis     Depression     Herniation of intervertebral " disc     Hyperlipidemia     Hypertension 3/4/2015    Macular degeneration     Spinal stenosis      Family History   Problem Relation Age of Onset    Cancer Father         prostate    Colon cancer Father     Hearing loss Paternal Aunt     Amblyopia Neg Hx     Blindness Neg Hx     Cataracts Neg Hx     Diabetes Neg Hx     Glaucoma Neg Hx     Hypertension Neg Hx     Macular degeneration Neg Hx     Retinal detachment Neg Hx     Strabismus Neg Hx     Stroke Neg Hx     Thyroid disease Neg Hx     Heart attack Neg Hx     Heart disease Neg Hx     Heart failure Neg Hx     Hyperlipidemia Neg Hx      Social History     Tobacco Use    Smoking status: Never Smoker    Smokeless tobacco: Never Used   Substance Use Topics    Alcohol use: No     Alcohol/week: 0.0 oz       Impression:      ICD-10-CM ICD-9-CM    1. BPH with obstruction/lower urinary tract symptoms N40.1 600.01 POCT URINE DIPSTICK WITHOUT MICROSCOPE    N13.8 599.69 POCT Bladder Scan       Plan:    #1.  BPH.  Plan.  Patient is symptoms have improved on Flomax.  Will add finasteride which I discussed can take up to 6 months to help.  Patient understands that both these medicines are indefinite medications.    At this point follow up with me on an as-needed basis    Today I spent 25 minutes with the patient, more than 50% of which was spent counseling and coordinating care concerning treatment of issues as detailed above.    PLEASE NOTE:  Please be advised that portions of this note were dictated using voice recognition software and may contain dictation related errors in spelling/grammar/appropriate pronouns/syntax or other errors that might have not been found and or corrected on text review.

## 2018-09-29 NOTE — PROCEDURES
Modified Barium Swallow    Patient Name:  Phi Saniz   MRN:  [id number]      Recommendations:     Recommendations:    General Recommendations:    1. Recommend outpatient speech pathology for remediation of dysphagia, training of compensatory strategies to reduce instances of aspiration    Diet recommendations, ASPIRATION Precautions  1. Mechanical soft solids, thin liquids  2. Small 3-5 ml sips of liquids with head in a chin down/tuck position  3. Small bites of food with controlled oral swallow: maintain bolus in oral cavity while chewing, do not allow passive leak to pharyngeal while chewing  4. Effortful swallows  5. 2 swallows per bite of food and sip of liquid  6.  1 bite/sip at a time, A  7. Avoid talking while eating,   8. Eliminate distractions,   9. HOB to 90 degrees and   10. Remain upright 30 minutes post meal     Referral     Reason for Referral  Patient was referred for a Modified Barium Swallow Study to assess the oral and pharyngeal swallow due to coughing noted with meals. Pt seated at a 90 degree angle and a lateral view of the head and neck was taken     Diagnosis: <principal problem not specified>     History:     Phi Sainz is a 88 y.o. male who  has a past medical history of Allergy, Anemia, Arthritis, Depression, Herniation of intervertebral disc, Hyperlipidemia, Hypertension (3/4/2015), Macular degeneration, and Spinal stenosis.     The patient presents to the ED 9/7 due to cough and congestion for 3-4 days. Family reports pt having postnasal drip and difficulty clearing secretions. Pt is not able to bring up any sputum with cough. No hx of COPD or CHF.     Caregiver present for session. Pt and caregiver reporting significant cough with meals. He uses liquid supplements for nutrition.     Past Medical History:   Diagnosis Date    Allergy     Anemia     Arthritis     Depression     Herniation of intervertebral disc     Hyperlipidemia     Hypertension 3/4/2015     Macular degeneration     Spinal stenosis        Objective:       Consistencies Assessed  · Thin 3, 5 mls sips via spoon and cup  · Nectar thick 3-5 mls via spoon and cup  · Puree 1/2 tsp amounts  · Solids small pieces of solids    Oral Preparation/Oral Phase  · Mildly dcr lip seal  · dcr ability to form a cohesive bolus  · Slow mastication of solids  · dcr controlled oral swallow and controlled a-p transport of liquids, purees and solids  · Premature bolus loss to the pharynx after the swallow    Pharyngeal Phase   THIN LIQUIDS: 3,5 mls via spoon and cup: delayed trigger of the swallow reflex, 1 second longer than normal, with premature spillage to the valleculae. AIRWAY PENETRATION to the laryngeal vestibule and to the  vocal cords on 5/6 swallows. SILENT TRACE ASPIRATION during the swallow 2/6 swallows. Use of a Chin tuck during the swallow prevent airway penetration to the vocal cords.  Volitional cough did not eject trace aspiration amounts from the airway. MINIMAL RESIDUE after the swallow in the valleculae and pyriform sinuses.     NECTAR THICK LIQUIDS: 3. 5 mls via spoon and cup.delayed trigger of the swallow reflex, 1 second longer than normal, with premature spillage to the valleculae. AIRWAY PENETRATION to the laryngeal ventricle and to the  vocal cords on 5/6 swallows. SILENT ASPIRATION during the swallow 1/6 swallows. MINIMAL to MILD RESIDUE after the swallow in the valleculae and pyriform sinuses, on the posterior pharyngeal walls, epiglottic undercoating. Residuals noted to leak into airway entrance. Volitional cough was weak and he was not able to eject material from the airway or airway entrance.    PUREES: delayed trigger of the swallow reflex, 1 sec longer than normal, with premature spillage to the valleculae. PENETRATION of the airway before and during the swallow 3/4 swallows. TRACE, SILENT ASPIRATION before and during the swallow 2/4 swallows. MIINIMAL to MILD RESIDUE after the swallow in the  valleculae and pyriform sinuses, on the posterior pharyngeal walls, epiglottic undercoating.     SOLIDS: delayed trigger of the swallow reflex, 2-4 secs longer than normal, with premature spillage to the valleculae and into the airway entrance. Premature bolus loss from the oral cavity into the pharynx while chewing, increased airway threat. PENETRATION of the airway before and during the swallow and SILENT ASPIRATION of a trace amount 1/3 swallows . MINIMAL RESIDUE after the swallow in the valleculae and pyriform sinuses.      Cervical Esophageal Phase  · Reduced degree of opening of the cricopharyngeal muscle during the swallow    Assessment:     Impressions  · Moderate oral and pharyngeal dysphagia due to: oral motor weakness, delayed trigger of the swallow reflex, dcr laryngeal elevation, dcr laryngeal sensation, dcr cricopharyngeal opening.  · NOMS: LEVEL 4: Swallowing is safe however will require moderate cues to use compensatory strategies, and/or the individual has moderate diet restrictions and/or still requires tube feeding and/or oral supplements     Prognosis: Good with speech pathology intervention    Education  Recorded results, instance of aspiration, use of small 3 mls sips with chin tuck to reduce aspiration, use of controlled oral swallow to reduce aspiration of solids and airway threat while chewing discussed. Pt able to demonstrate understanding. However, will need ongoing training of strategies to improve safety of oral intake. Friend/caregiver present for education.  Written instructions to be provided to help with carryover at home. [facility]       Plan:   ·       Discharge recommendations:    outpatient or home health SLP services for remediation of dysphagia    Time Tracking:   SLP Treatment Date:   09/27/18  Speech Start Time:  1335  Speech Stop Time:  1400     Speech Total Time (min):  25 min     G Codes 8996 CK                   8997 CK                  8998 CK    Christina Coto, CCC-SLP  09/29/2018

## 2018-10-01 ENCOUNTER — TELEPHONE (OUTPATIENT)
Dept: OTOLARYNGOLOGY | Facility: CLINIC | Age: 83
End: 2018-10-01

## 2018-10-01 DIAGNOSIS — R13.10 DYSPHAGIA, UNSPECIFIED TYPE: Primary | ICD-10-CM

## 2018-10-01 NOTE — TELEPHONE ENCOUNTER
Please notify patient that the swallow test identified weakness in his swallowing with evidence of liquids going down the wrong way intermittently on the test.  I recommend speech therapy for swallow therapy.  I have placed and order. Please ensure the patient has follow up appointment scheduled as per my last note.

## 2018-10-03 ENCOUNTER — TELEPHONE (OUTPATIENT)
Dept: OTOLARYNGOLOGY | Facility: CLINIC | Age: 83
End: 2018-10-03

## 2018-10-03 NOTE — TELEPHONE ENCOUNTER
Spoke with Micheal she states on the form that was faxed it was marked off patient needing medical equipment. Told her will refax the form marked off patient is needing home health. Form faxed on 10/3

## 2018-10-03 NOTE — TELEPHONE ENCOUNTER
----- Message from Melissa Salas sent at 10/3/2018 10:18 AM CDT -----  Contact: Micehal bernstein/ [employer]/ [phone]  Rep called in to speak about patient's authorization request. She needs to get some clarification.    Please call.

## 2018-10-11 ENCOUNTER — TELEPHONE (OUTPATIENT)
Dept: PODIATRY | Facility: CLINIC | Age: 83
End: 2018-10-11

## 2018-10-11 ENCOUNTER — OFFICE VISIT (OUTPATIENT)
Dept: NEUROLOGY | Facility: CLINIC | Age: 83
End: 2018-10-11
Payer: MEDICARE

## 2018-10-11 VITALS
DIASTOLIC BLOOD PRESSURE: 70 MMHG | HEIGHT: 71 IN | SYSTOLIC BLOOD PRESSURE: 121 MMHG | HEART RATE: 65 BPM | BODY MASS INDEX: 21.76 KG/M2

## 2018-10-11 DIAGNOSIS — G20.A1 PARKINSON'S DISEASE: Primary | ICD-10-CM

## 2018-10-11 PROCEDURE — 99205 OFFICE O/P NEW HI 60 MIN: CPT | Mod: S$PBB,,, | Performed by: PSYCHIATRY & NEUROLOGY

## 2018-10-11 PROCEDURE — 99212 OFFICE O/P EST SF 10 MIN: CPT | Mod: PBBFAC | Performed by: PSYCHIATRY & NEUROLOGY

## 2018-10-11 PROCEDURE — 99999 PR PBB SHADOW E&M-EST. PATIENT-LVL II: CPT | Mod: PBBFAC,,, | Performed by: PSYCHIATRY & NEUROLOGY

## 2018-10-11 PROCEDURE — 1101F PT FALLS ASSESS-DOCD LE1/YR: CPT | Mod: CPTII,,, | Performed by: PSYCHIATRY & NEUROLOGY

## 2018-10-11 RX ORDER — CARBIDOPA AND LEVODOPA 25; 100 MG/1; MG/1
1 TABLET ORAL 3 TIMES DAILY
Qty: 90 TABLET | Refills: 11 | Status: SHIPPED | OUTPATIENT
Start: 2018-10-11 | End: 2019-01-08 | Stop reason: SDUPTHER

## 2018-10-11 NOTE — TELEPHONE ENCOUNTER
----- Message from Nohelia Julian sent at 10/11/2018 12:05 PM CDT -----  Contact: Daughter(jose)  Daughter is calling to see if pt can be seen on 10/16 anything before 1pm can be reached @ [phone]

## 2018-10-11 NOTE — PROGRESS NOTES
Name: Phi Sainz  MRN: [id number]   CSN: [id number]      Date: 10/11/2018    HISTORY OF PRESENT ILLNESS (HPI)  The patient is 89 y/o WM  The patient was initially referred for consultation by Cardiologist Dr Shirley for possible epilepsy  The patient was  accompanied today by family members who provided some of the history.    HISTORY OF PRESENT ILLNESS:  Phi Sainz presents along with his caretaker   and daughter for evaluation of brief blackout episodes, which have been ongoing   on and off for the past several months.  His caretaker describes them to me as   consisting of very brief pauses in his ability to respond verbally, typically at   breakfast, lasting only a few seconds.  He is able to remember these episodes   and after each individual episode, can recall exactly what the caretaker has   said to him.  It is described as more of a motor freezing type behavior.  He has   been worked up by Cardiology for this and there was some suspicion that these   might be epileptic phenomena.  He has no motor symptoms during these episodes   nor any other suggestions of seizure-like behavior.    Mr. Sainz has had a history of what was diagnosed as essential tremor   several years ago, which has progressed over time.  In addition to this, he has   significant back pain and gait disturbance and uses a walker and/or wheelchair.    In my assessment of him today, it was fairly obvious that Mr. Sainz was   extremely bradykinetic and exhibited multiple signs of parkinsonism, which   prompted me to ask questions about this.  The patient exhibits significant   postural instability and bradykinesia as well as micrographia and hypophonia on   a regular basis.  He often pauses while walking as well before being prompted to   initiate gait again.  His tremor is both postural and resting and involves the   bilateral upper extremities at this point.  His movements are slow and his   digestion is slow as well.  To my  knowledge, he has never been diagnosed or   treated for parkinsonism in the past.  His neurological decline has been   attributed to a combination of failure to thrive and back pain from what I can   tell.      NBB/HN  dd: 10/11/2018 13:48:46 (CDT)  td: 10/11/2018 14:16:33 (CDT)  Doc ID   #[id number]  Job ID #[id number]    CC:       [id number]        Seizure Triggers  Sleep Deprivation - None  Other medications - None  Psych/stress - None  Photic stimulation - None  Hyperventilation - None  Medical Problems - None  Menses - No  Sensory Stimulation (light, sound, etc) - None  Missed dose of meds - None    AED Treatments  Present regimen  None    Prior treatments  None    Not tried  acetazolamide (Diamox, AZM)  amantadine  carbamazepine (Tegretol, CBZ)  clobezam (Onfi or Frizium, CLB)  ethosuximide (Zarontin, ESM)  eslicarbazine (Aptiom, ESL)  felbamate (felbatol, FBM)  gabapentin (Neurontin, GBP)  lacosamide (Vimpat, LCM)   lamotrigine (Lamictal, LTG)   levetiracetam (Keppra, LEV)  methsuximide (Celontin, MSM)  methyphenytoin (Mesantion, MHT)  oxcarbazepine (Trileptal OXC)  perampanel (Fycompa, FCP)   phenobarbital (Pb)  phenytoin (Dilantin, PHT)  pregabalin (Lyrica, PGB)  primidone (Mysoline, PRM)  retigabine (Potiga, RTG)  rufinamide (Banzel, RUF)  tiagabine (Gabatril,  TGB)  topiramate (Topamax, TPM)  viagabatrin, (Sabril, VGB)  vagal nerve stimulator (VNS)  valproic acid (Depakote, VPA)  zonisamide (Zonegran, ZNS)  Benzodiazepines  diazepam - rectal (Diastatl)  diazepam - oral (Valium, DZ)  clonazepam (Klonopin, CZP)  clorazepate (Tranxene, CLZ)  Ativan  Brain Stimulation  Vagal Nerve Stimulation-n/a  DBS- n/a    Compliance method  Memory - yes  Mom or Spouse - Yes  Pill Box - no  [place] calendar - no  Turn over medication bottle - no  Phone alarm - no        Potential Epilepsy Risk Factors:   Pregnancy/Labor/Delivery - full term uncomplicated pregnancy labor and vaginal delivery  Febrile seizures - none  Head injury  -  none  CNS infection - none     Stroke - none  Family Hx of Sz - none    PAST MEDICAL HISTORY:   Active Ambulatory Problems     Diagnosis Date Noted    Pseudophakia - Left Eye 08/14/2012    ARMD (age related macular degeneration) - Both Eyes 08/14/2012    Essential tremor 03/05/2013    Gait disorder 03/05/2013    Hypertension 03/04/2015    Dyslipidemia 03/04/2015    Lumbago 04/23/2015    Thoracic or lumbosacral neuritis or radiculitis, unspecified 04/23/2015    Acquired spondylolisthesis 04/23/2015    Degeneration of lumbar or lumbosacral intervertebral disc 04/23/2015    Spondylosis without myelopathy 04/23/2015    Spinal stenosis, lumbar region, 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/01/2015    DDD (degenerative disc disease), lumbar 07/01/2015    Lumbar facet arthropathy 07/01/2015    Posterior vitreous detachment, both eyes 12/29/2015    Osteoarthritis of spine 01/21/2016    Leg weakness, bilateral 06/30/2016    Bradycardia 07/12/2016    Abnormal chest CT     ILD (interstitial lung disease)     Other dysphagia 07/03/2018    Physical debility 09/20/2018    DVT (deep vein thrombosis) in pregnancy 09/20/2018    Chronic deep vein thrombosis (DVT) 09/20/2018     Resolved Ambulatory Problems     Diagnosis Date Noted    Nuclear sclerosis - Right Eye 08/14/2012    Senile nuclear sclerosis 02/04/2015    Spinal stenosis, lumbar region, without neurogenic claudication 04/23/2015    Senile nuclear cataract 12/16/2015    Retained lens material following cataract surgery of right eye 12/29/2015     Past Medical History:   Diagnosis Date    Allergy     Anemia     Arthritis     Depression     Herniation of intervertebral disc     Hyperlipidemia     Hypertension 3/4/2015    Macular degeneration     Spinal stenosis         PAST SURGICAL HISTORY:        FAMILY HISTORY:   Family History   Problem Relation Age of Onset    Cancer Father         prostate    Colon cancer Father     Hearing loss Paternal  Aunt     Amblyopia Neg Hx     Blindness Neg Hx     Cataracts Neg Hx     Diabetes Neg Hx     Glaucoma Neg Hx     Hypertension Neg Hx     Macular degeneration Neg Hx     Retinal detachment Neg Hx     Strabismus Neg Hx     Stroke Neg Hx     Thyroid disease Neg Hx     Heart attack Neg Hx     Heart disease Neg Hx     Heart failure Neg Hx     Hyperlipidemia Neg Hx          SOCIAL HISTORY:   Social History     Socioeconomic History    Marital status:      Spouse name: Not on file    Number of children: Not on file    Years of education: Not on file    Highest education level: Not on file   Social Needs    Financial resource strain: Not on file    Food insecurity - worry: Not on file    Food insecurity - inability: Not on file    Transportation needs - medical: Not on file    Transportation needs - non-medical: Not on file   Occupational History    Occupation: retired   Tobacco Use    Smoking status: Never Smoker    Smokeless tobacco: Never Used   Substance and Sexual Activity    Alcohol use: No     Alcohol/week: 0.0 oz    Drug use: No    Sexual activity: Yes     Partners: Female   Other Topics Concern    Not on file   Social History Narrative    Not on file        SUBSTANCE USE:  Social History     Tobacco Use    Smoking status: Never Smoker    Smokeless tobacco: Never Used   Substance and Sexual Activity    Alcohol use: No     Alcohol/week: 0.0 oz    Drug use: No    Sexual activity: Yes     Partners: Female      Social History     Tobacco Use    Smoking status: Never Smoker    Smokeless tobacco: Never Used   Substance Use Topics    Alcohol use: No     Alcohol/week: 0.0 oz        ALLERGIES: Combigan [brimonidine-timolol] and Contrast media       Review of Systems   Constitutional: Negative for chills, diaphoresis, fever, malaise/fatigue and weight loss.   HENT: Negative for ear pain, hearing loss, nosebleeds and tinnitus.    Eyes: Negative for blurred vision, double vision,  photophobia and pain.   Respiratory: Negative for cough, hemoptysis and shortness of breath.    Cardiovascular: Negative for chest pain, palpitations, orthopnea and leg swelling.   Gastrointestinal: Positive for constipation. Negative for abdominal pain, blood in stool, diarrhea, heartburn, nausea and vomiting.   Genitourinary: Negative for dysuria and hematuria.   Musculoskeletal: Positive for back pain and falls. Negative for joint pain and myalgias.   Skin: Negative for itching and rash.   Neurological: Positive for dizziness, tremors, focal weakness and weakness. Negative for tingling, sensory change, speech change, seizures, loss of consciousness and headaches.   Endo/Heme/Allergies: Negative for environmental allergies. Does not bruise/bleed easily.   Psychiatric/Behavioral: Negative for depression, hallucinations, memory loss and substance abuse. The patient is not nervous/anxious and does not have insomnia.          PHYSICAL EXAM:    There were no vitals taken for this visit.      Neurologic Exam      Higher Cortical Function:    Patient is a well developed, pleasant, well groomed individual appearing their stated age  Oriented - intact to person, place and time    Fund of knowledge was appropriate.    Language - Speech was fluent without evidence for an aphasia.  Recent and remote memory intact  Attention span and concentration intact  Shoulder shrug normal  Cranial Nerves II - XII:    EOMs were intact with normal smooth and no nystagmus.    PERRLA. Funduscopic exam - disc were flat with normal A/V ratio and no exudates or hemorrhages.   Visual fields were full to confrontation.    Motor - facial movement was symmetrical and normal.    Facial sensory - Light touch and pin prick sensations were normal.    Bradykinetic overall with masked facies and hypophonia  Hearing was normal.  Palate moved well and was symmetrical    Tongue movement was full & the patient could speak without difficulty.  Motor: Power,  bulk and tone were normal in all extremities.  Cogwheel rigidity bilaterally  Coordination:  Normal  Gait:  Very unsteady, stooped with retropulsion, magnetic at times    Deep tendon reflexes:    Reflex L R   Bicpets 2+ 2+   Tricepts 2+ 2+   Brachio-radialis 2+ 2+   Knee 2+ 2+   Ankle 2+ 2+   Babinski No No     Tremor: resting, postural, R>L  Cardiovascular:  No edema  Skin: No rash         IMPRESSION/PLAN:    Phi Sainz presents for evaluation of possible seizures, but on further   questioning, his spells consist of brief motor arrests only and had been   improving in recent months and did not have strongly suggestive features of   seizure-like activity.  On the other hand, Mr. Sainz has multiple signs and   symptoms of Parkinson disease in my opinion and it is very possible that these   episodes could represent motor freezes.  Other evidence of Parkinsonism includes   dramatic bradykinesia and masked facies with hypophonia evident today on exam.    He has a multiyear history of a resting and postural tremor.  He has   significant gait instability with retropulsion and falls.  He has cogwheel   rigidity on exam today and slow digestion.  He also has micrographia.  He has   not been formally diagnosed with Parkinsonism and was told in fact that he had   essential tremor several years ago and this has not been followed up since then.    My suspicion is that many of his current complaints including the spells and   freezes and gait disturbances are likely related to Parkinsonism.  Since I do   not see Parkinson's patient in my practice I am going to refer him for further   evaluation and followup with Dr. Harshad Forrester, our movement disorders   specialist here.  In the meantime, I am going to prescribe Sinemet 25/100 mg to   him to take three times a day in the interim as a therapeutic trial.  I am   hoping that he will have some positive clinical response to the Sinemet before   meeting with Dr. Forrester to  assist in diagnosis and future therapy.  I do not   have a high suspicion of epilepsy in this gentleman.  I gave them my card and   asked them to call me if the situation changes or if they are unable to meet   with Dr. Forrester at the prescribed time.      JADEN/IN  dd: 10/11/2018 14:01:30 (CDT)  td: 10/11/2018 14:15:29 (CDT)  Doc ID   #[id number]  Job ID #[id number]    CC:       [id number]

## 2018-10-11 NOTE — LETTER
October 11, 2018      Allen Lal MD  [address]  [place] [address]           [facility] - Neurology Epilepsy  [address] [facility]  [facility] [address]  Phone: [phone]  Fax: [phone]          Patient: Phi Sainz   MR Number: [id number]   YOB: 1929   Date of Visit: 10/11/2018       Dear Dr. Allen Lal:    Thank you for referring Phi Sainz to me for evaluation. Attached you will find relevant portions of my assessment and plan of care.    If you have questions, please do not hesitate to call me. I look forward to following Phi Sainz along with you.    Sincerely,    Yaya Ni MD    Enclosure  CC:  No Recipients    If you would like to receive this communication electronically, please contact [email or web address] or [phone] to request more information on [facility] access.    For providers and/or their staff who would like to refer a patient to [facility], please contact us through our one-stop-shop provider referral line, [facility], at [phone].    If you feel you have received this communication in error or would no longer like to receive these types of communications, please e-mail [email or web address]

## 2018-10-11 NOTE — TELEPHONE ENCOUNTER
attempted to contact pt daughter, unable to leave message at provided number. Provider is not in [place] on requested date.

## 2018-10-15 RX ORDER — APIXABAN 5 MG/1
TABLET, FILM COATED ORAL
Qty: 60 TABLET | Refills: 3 | Status: SHIPPED | OUTPATIENT
Start: 2018-10-15 | End: 2018-12-19 | Stop reason: DRUGHIGH

## 2018-10-16 ENCOUNTER — OFFICE VISIT (OUTPATIENT)
Dept: NEUROLOGY | Facility: CLINIC | Age: 83
End: 2018-10-16
Payer: MEDICARE

## 2018-10-16 ENCOUNTER — TELEPHONE (OUTPATIENT)
Dept: NEUROLOGY | Facility: CLINIC | Age: 83
End: 2018-10-16

## 2018-10-16 VITALS
DIASTOLIC BLOOD PRESSURE: 85 MMHG | SYSTOLIC BLOOD PRESSURE: 157 MMHG | WEIGHT: 159.63 LBS | HEIGHT: 71 IN | HEART RATE: 58 BPM | BODY MASS INDEX: 22.35 KG/M2

## 2018-10-16 DIAGNOSIS — G20.A1 PARKINSON'S DISEASE: Primary | ICD-10-CM

## 2018-10-16 PROCEDURE — 99213 OFFICE O/P EST LOW 20 MIN: CPT | Mod: PBBFAC | Performed by: PSYCHIATRY & NEUROLOGY

## 2018-10-16 PROCEDURE — 1101F PT FALLS ASSESS-DOCD LE1/YR: CPT | Mod: CPTII,,, | Performed by: PSYCHIATRY & NEUROLOGY

## 2018-10-16 PROCEDURE — 99999 PR PBB SHADOW E&M-EST. PATIENT-LVL III: CPT | Mod: PBBFAC,,, | Performed by: PSYCHIATRY & NEUROLOGY

## 2018-10-16 PROCEDURE — 99215 OFFICE O/P EST HI 40 MIN: CPT | Mod: S$PBB,,, | Performed by: PSYCHIATRY & NEUROLOGY

## 2018-10-16 NOTE — TELEPHONE ENCOUNTER
----- Message from Lazaro Flanagan sent at 10/16/2018  4:49 PM CDT -----  Contact: Pt. [phone]  Needs Advice    Reason for call:The patient would like to speak to someone regarding rescheduling his appointment for January. He cancelled the appointment by accident. Please contact the patient to discuss further.          Communication Preference:PHONE     Additional Information:

## 2018-10-16 NOTE — TELEPHONE ENCOUNTER
Spoke with patient and will book patient with same appointment as before. Patient canceled on accident.

## 2018-10-16 NOTE — PROGRESS NOTES
"Name: Phi Sainz  MRN: [id number]   CSN: [id number]      Date: 10/16/2018    Referring physician:  Yaya Ni MD  [address] [place] [address]    Chief Complaint / Interval History: No chief complaint on file.      History of Present Illness (HPI):  Patient is 87 yo M presents with 4 years duration of tremors, bradykinesia, and gait abnormality. Patient was seen in neurologist 4 years ago with resting tremor, managed conservatively. His tremor had progressed, and progressively developed shuffling gait. Daughter and granddaughter also reports that patient had been having "staring spells" where patient would appear to be locked in with blank expression that self-resolves. Recently patient developed orthostatic hypotension, and was assessed by Cardiologist who then referred patient to Neurologist Dr. Lal. Patient was seen in Neurology clinic last week, and his symptoms were suspicious for Parkinson's Disease. Patient was prescribed Sinemet 25/100 TID. Today in clinic, patient reports significant improvement of his symptoms. He has not had another staring spell since initiating medication. Patient continues to have dysphagia, and chokes on thin liquids. He is scheduled for Speech therapy and ENT assessment.     Nonmotor/Premotor ROS:  Hyposmia (HENT)?N/A  RBD/sleep issues (Constitutional)?Yes  Depression/anxiety (Psychiatric)?Yes  Fatigue (Constitutional)?No  Constipation (GI)?No  Urinary issues ()?No  Sexual dysfunction ()?N/A  Orthostasis (Cardiovascular)?Yes  Leg swelling (Cardiovascular)? No  Falls (Musculoskeletal)?Yes  Cognitive impairment (Neurologic)?No  Psychoses (Psychiatric)?No  Pain/Paresthesia (Neurologic)?No  Visual changes (Eyes)?No  Moles / skin changes (Skin)?N/A  Stridor / SOB (Pulm)?No  Bruising (Heme)?No    Past Medical History: The patient  has a past medical history of Allergy, Anemia, Arthritis, Depression, Herniation of intervertebral disc, Hyperlipidemia, " "Hypertension (3/4/2015), Macular degeneration, and Spinal stenosis.    Social History: The patient  reports that  has never smoked. he has never used smokeless tobacco. He reports that he does not drink alcohol or use drugs.    Family History: Their family history includes Cancer in his father; Colon cancer in his father; Hearing loss in his paternal aunt.    Allergies: Combigan [brimonidine-timolol] and Contrast media     Meds:   Current Outpatient Medications on File Prior to Visit   Medication Sig Dispense Refill    carbidopa-levodopa  mg (SINEMET)  mg per tablet Take 1 tablet by mouth 3 (three) times daily. 90 tablet 11    diazePAM (VALIUM) 2 MG tablet TAKE 1 TABLET BY MOUTH EVERY DAY AS NEEDED FOR ANXIETY 30 tablet 1    diphenoxylate-atropine 2.5-0.025 mg (LOMOTIL) 2.5-0.025 mg per tablet Take 1 tablet by mouth 2 (two) times daily. (Patient taking differently: Take 1 tablet by mouth 2 (two) times daily. Pt taking one pill twice a day.) 60 tablet 0    ELIQUIS 5 mg Tab TAKE 1 TABLET BY MOUTH TWICE A DAY 60 tablet 3    escitalopram oxalate (LEXAPRO) 10 MG tablet TAKE 1 TABLET (10 MG TOTAL) BY MOUTH ONCE DAILY. 30 tablet 5    finasteride (PROSCAR) 5 mg tablet Take 1 tablet (5 mg total) by mouth once daily. 90 tablet 3    gabapentin (NEURONTIN) 100 MG capsule Take 100 mg by mouth 3 (three) times daily. Pt taking one pill twice a day.      hydrocodone-acetaminophen 5-325mg (NORCO) 5-325 mg per tablet Pt taking one pill once a day.  0    mesalamine (APRISO) 0.375 gram Cp24 Take 1.5 g by mouth once daily. Pt taking 4 pills in am.      tamsulosin (FLOMAX) 0.4 mg Cap Take 1 capsule (0.4 mg total) by mouth after dinner. 30 capsule 12     No current facility-administered medications on file prior to visit.        Exam:  BP (!) 157/85   Pulse (!) 58   Ht 5' 11" (1.803 m)   Wt 72.4 kg (159 lb 9.8 oz)   BMI 22.26 kg/m²     Constitutional  Well-developed, well-nourished, appears stated age "   Ophthalmoscopic  No papilledema with no hemorrhages or exudates bilaterally   Cardiovascular  Radial pulses 2+ and symmetric, no LE edema bilaterally   Neurological    * Mental status  MOCA = N/A     - Orientation  Oriented to person, place, time, and situation     - Memory   Intact recent and remote     - Attention/concentration  Attentive, vigilant during exam     - Language  Naming & repetition intact, +2-step commands     - Fund of knowledge  Aware of current events     - Executive  Well-organized thoughts     - Other     * Cranial nerves       - CN II  PERRL, visual fields full to confrontation     - CN III, IV, VI  Extraocular movements full, normal pursuits and saccades     - CN V  Sensation V1 - V3 intact     - CN VII  Face strong and symmetric bilaterally     - CN VIII  Hearing intact bilaterally     - CN IX, X  Palate raises midline and symmetric     - CN XI  SCM and trapezius 5/5 bilaterally     - CN XII  Tongue midline   * Motor  Muscle bulk normal, strength 5/5 throughout   * Sensory   Intact to temperature and vibration throughout   * Coordination  No dysmetria with finger-to-nose or heel-to-shin   * Gait  See below.   * Deep tendon reflexes  2+ and symmetric throughout   Babinski downgoing bilaterally   * Specialized movement exam  No hypophonic speech.    Yes facial masking.   No cogwheel rigidity.     No bradykinesia.   Mild tremor with rest, posture, kinesis, or intention.    No other dystonia, chorea, athetosis, myoclonus, or tics.   No motor impersistence.   Shuffled gait.   No shortened stride length.   No abnormal arm swing.     Yes postural instability.           Medical Record Review:  - Lab Results:  Office Visit on 09/28/2018   Component Date Value Ref Range Status    Color,  09/28/2018 yellow   Final    Spec Grav  09/28/2018 1.015   Final    pH,  09/28/2018 5   Final    WBC,  09/28/2018 neg   Final    Nitrite,  09/28/2018 neg   Final    Protein 09/28/2018 neg   Final     Glucose, UA 09/28/2018 normal   Final    Ketones, UA 09/28/2018 neg   Final    Urobilinogen, UA 09/28/2018 normal   Final    Bilirubin 09/28/2018 neg   Final    Blood, UA 09/28/2018 neg   Final    POC Residual Urine Volume 09/28/2018 198* 0 - 100 mL Final   Admission on 09/08/2018, Discharged on 09/08/2018   Component Date Value Ref Range Status    WBC 09/08/2018 9.04  3.90 - 12.70 K/uL Final    RBC 09/08/2018 4.16* 4.60 - 6.20 M/uL Final    Hemoglobin 09/08/2018 12.1* 14.0 - 18.0 g/dL Final    Hematocrit 09/08/2018 39.6* 40.0 - 54.0 % Final    MCV 09/08/2018 95  82 - 98 fL Final    MCH 09/08/2018 29.1  27.0 - 31.0 pg Final    MCHC 09/08/2018 30.6* 32.0 - 36.0 g/dL Final    RDW 09/08/2018 13.6  11.5 - 14.5 % Final    Platelets 09/08/2018 161  150 - 350 K/uL Final    MPV 09/08/2018 11.0  9.2 - 12.9 fL Final    Sodium 09/08/2018 138  136 - 145 mmol/L Final    Potassium 09/08/2018 4.1  3.5 - 5.1 mmol/L Final    Chloride 09/08/2018 102  95 - 110 mmol/L Final    CO2 09/08/2018 28  23 - 29 mmol/L Final    Glucose 09/08/2018 98  70 - 110 mg/dL Final    BUN, Bld 09/08/2018 25* 8 - 23 mg/dL Final    Creatinine 09/08/2018 1.3  0.5 - 1.4 mg/dL Final    Calcium 09/08/2018 8.8  8.7 - 10.5 mg/dL Final    Total Protein 09/08/2018 6.7  6.0 - 8.4 g/dL Final    Albumin 09/08/2018 3.4* 3.5 - 5.2 g/dL Final    Total Bilirubin 09/08/2018 0.6  0.1 - 1.0 mg/dL Final    Alkaline Phosphatase 09/08/2018 72  55 - 135 U/L Final    AST 09/08/2018 16  10 - 40 U/L Final    ALT 09/08/2018 15  10 - 44 U/L Final    Anion Gap 09/08/2018 8  8 - 16 mmol/L Final    eGFR if  09/08/2018 56* >60 mL/min/1.73 m^2 Final    eGFR if non African American 09/08/2018 49* >60 mL/min/1.73 m^2 Final   Office Visit on 08/17/2018   Component Date Value Ref Range Status    POC Residual Urine Volume 09/28/2018 198* 0 - 100 mL Final           Diagnoses:          1. Parkinson's disease         Medical Decision Making:  Stage  4 Parkinson's Disease, reports significant improvement of symptoms since starting Sinemet 25/100 TID. Still has postural instability and gait difficulty. Will continue current dosing and re-assess in 3 months.       Juanpablo Olivera MD  PGY-1  Pager: [phone]    Patient assessed and management discussed with Dr. Forrester.   ====================    Patient seen and examined.  I agree with the history, exam, assessment and plan within the resident's note.        Harshad Forrester MD, MPH  Division of Movement and Memory Disorders  [facility]

## 2018-10-22 RX ORDER — DIAZEPAM 2 MG/1
TABLET ORAL
Qty: 30 TABLET | Refills: 1 | Status: SHIPPED | OUTPATIENT
Start: 2018-10-22 | End: 2019-02-01

## 2018-10-23 ENCOUNTER — TELEPHONE (OUTPATIENT)
Dept: CARDIOLOGY | Facility: CLINIC | Age: 83
End: 2018-10-23

## 2018-10-23 ENCOUNTER — TELEPHONE (OUTPATIENT)
Dept: NEUROLOGY | Facility: CLINIC | Age: 83
End: 2018-10-23

## 2018-10-23 DIAGNOSIS — R00.1 BRADYCARDIA: Primary | ICD-10-CM

## 2018-10-23 NOTE — TELEPHONE ENCOUNTER
Pt's daughter, Yani called stating that pt has continued to have low b/p ranging in the 80's/60's P80's. Yani stated that pt stood up today and felt weak and felt like he was going to fall. Yani stated that pt has been eating salt and drinking Powerade and it has not helped w/low b/p. Yani stated that you wanted pt to change his diet to see if it would help w/low b/p and it has not helped. Yani wants to know if you want pt to decrease or stop his Eliquis and if you want to give pt some medication for his low b/p. Yani also wanted you to know that pt was diagnosed w/stage 4 Parkinson's and has been taking medication for it. Yani stated that since pt has been taking the medication for the Parkinson's he has been doing much better. Please advise.

## 2018-10-23 NOTE — TELEPHONE ENCOUNTER
----- Message from Reyes Cabrera sent at 10/23/2018 11:34 AM CDT -----  Needs Advice    Reason for call: Yani is calling to reschedule canceled appt on 01/23/19. Yani said she accidentally canceled appt by text.        Communication Preference: Yani (daughter) @ [phone]    Additional Information:

## 2018-10-25 NOTE — TELEPHONE ENCOUNTER
Thanks for the info.  Spot on.  Push as much cold H2O as can tolerate early in the day.  Other alternatives to midodrine are fludrocortisone 0.1 to start and then droxidopa (Northera) titrating from 100-600 TID.

## 2018-10-26 ENCOUNTER — PATIENT MESSAGE (OUTPATIENT)
Dept: UROLOGY | Facility: CLINIC | Age: 83
End: 2018-10-26

## 2018-10-26 ENCOUNTER — OFFICE VISIT (OUTPATIENT)
Dept: OTOLARYNGOLOGY | Facility: CLINIC | Age: 83
End: 2018-10-26
Payer: MEDICARE

## 2018-10-26 VITALS
HEART RATE: 58 BPM | SYSTOLIC BLOOD PRESSURE: 105 MMHG | DIASTOLIC BLOOD PRESSURE: 58 MMHG | TEMPERATURE: 98 F | WEIGHT: 164 LBS | BODY MASS INDEX: 22.88 KG/M2

## 2018-10-26 DIAGNOSIS — R49.0 DYSPHONIA: ICD-10-CM

## 2018-10-26 DIAGNOSIS — H90.3 SENSORINEURAL HEARING LOSS (SNHL), BILATERAL: ICD-10-CM

## 2018-10-26 DIAGNOSIS — G20.A1 PARKINSON'S DISEASE: ICD-10-CM

## 2018-10-26 DIAGNOSIS — R13.10 DYSPHAGIA, UNSPECIFIED TYPE: Primary | ICD-10-CM

## 2018-10-26 DIAGNOSIS — J38.7 PRESBYLARYNX: ICD-10-CM

## 2018-10-26 PROCEDURE — 99999 PR PBB SHADOW E&M-EST. PATIENT-LVL IV: CPT | Mod: PBBFAC,,, | Performed by: OTOLARYNGOLOGY

## 2018-10-26 PROCEDURE — 1101F PT FALLS ASSESS-DOCD LE1/YR: CPT | Mod: CPTII,,, | Performed by: OTOLARYNGOLOGY

## 2018-10-26 PROCEDURE — 31575 DIAGNOSTIC LARYNGOSCOPY: CPT | Mod: S$PBB,,, | Performed by: OTOLARYNGOLOGY

## 2018-10-26 PROCEDURE — 99214 OFFICE O/P EST MOD 30 MIN: CPT | Mod: PBBFAC,PN | Performed by: OTOLARYNGOLOGY

## 2018-10-26 PROCEDURE — 99214 OFFICE O/P EST MOD 30 MIN: CPT | Mod: 25,S$PBB,, | Performed by: OTOLARYNGOLOGY

## 2018-10-26 PROCEDURE — 31575 DIAGNOSTIC LARYNGOSCOPY: CPT | Mod: PBBFAC,PN | Performed by: OTOLARYNGOLOGY

## 2018-10-26 PROCEDURE — 99499 UNLISTED E&M SERVICE: CPT | Mod: S$GLB,,, | Performed by: OTOLARYNGOLOGY

## 2018-10-26 NOTE — PROGRESS NOTES
"  Chief Complaint   Patient presents with    Follow-up   .     HPI:    Mr. Sainz is an 88 year old male who presents with dysphagia. The patient describes multiple episode(s) with gradual onset, located in the high throat, and symptoms lasting a few seconds. Patient reports the feeling of "food or liquids going down the wrong way and a choking sensation with swallowing.". Associated with the dysphagia, patient also complains of aspiration. Patient denies food sticking.  Patient denies regurgitation of undigested food.    Also here with reports of bilateral aural fullness right worse than left. This has been present for a few weeks and seems to be worsening. He notes decreased haring bilaterally.  He denies tinnitus.  He denies vertigo.  He denies previous noise exposure.     Interval HPI 10/25/2018:  Mr. Sainz is following up today regarding dysphagia.  He reports that he has been on modified diet but has not been in formal speech therapy. Since his last visit, he has been recently diagnosed with Parkinson's disorder.  He has recently been started on Sinemet.  He feels some of his swallowing difficulty is improving since starting the medication on 10/12/2018. He relays that he has not started speech therapy and would like to see if his swallowing improves with his Parkinson's therapy.     Past Medical History:   Diagnosis Date    Allergy     Anemia     Arthritis     Depression     Herniation of intervertebral disc     Hyperlipidemia     Hypertension 3/4/2015    Macular degeneration     Spinal stenosis      Social History     Socioeconomic History    Marital status:      Spouse name: Not on file    Number of children: Not on file    Years of education: Not on file    Highest education level: Not on file   Social Needs    Financial resource strain: Not on file    Food insecurity - worry: Not on file    Food insecurity - inability: Not on file    Transportation needs - medical: Not on " file    Transportation needs - non-medical: Not on file   Occupational History    Occupation: retired   Tobacco Use    Smoking status: Never Smoker    Smokeless tobacco: Never Used   Substance and Sexual Activity    Alcohol use: No     Alcohol/week: 0.0 oz    Drug use: No    Sexual activity: Yes     Partners: Female   Other Topics Concern    Not on file   Social History Narrative    Not on file     Past Surgical History:   Procedure Laterality Date    Bilateral MBB  Bilateral 2/16/2016    Performed by [facility] at [facility]    BLOCK-NERVE-MEDIAL BRANCH-LUMBAR** Bilateral MBB ** Bilateral 12/22/2015    [facility] at [facility]    BLOCK-NERVE-MEDIAL BRANCH-LUMBAR** BILATERAL MBB ** Bilateral 12/1/2015    [facility] by [facility] at [facility]    CATARACT EXTRACTION W/  INTRAOCULAR LENS IMPLANT Left 10/31/07    Dr. Nunez    CATARACT EXTRACTION W/  INTRAOCULAR LENS IMPLANT Right 12/16/15    Dr. Nunez    INJECTION-STEROID-EPIDURAL-CAUDAL- WITH CATH N/A 6/14/2016    [facility] by [facility]    INSERTION-INTRAOCULAR LENS (IOL) Right 12/16/2015    [facility]    PHACOEMULSIFICATION-ASPIRATION-CATARACT Right 12/16/2015    [facility]    RADIOFREQUENCY THERMOCOAGULATION (RFTC)-NERVE-MEDIAN BRANCH-LUMBAR Left L2, L3, L4, L5 Left 3/15/2016    Per[facility] by [facility] at [facility]    REPAIR-RETINA (VITRECTOMY) Right 1/6/2016    [facility]cinas [facility] at [facility]     Family History   Problem Relation Age of Onset    Cancer Father         prostate    Colon cancer Father     Hearing loss Paternal Aunt     Amblyopia Neg Hx     Blindness Neg Hx     Cataracts Neg Hx     Diabetes Neg Hx     Glaucoma Neg Hx     Hypertension Neg Hx     Macular degeneration Neg Hx     Retinal  detachment Neg Hx     Strabismus Neg Hx     Stroke Neg Hx     Thyroid disease Neg Hx     Heart attack Neg Hx     Heart disease Neg Hx     Heart failure Neg Hx     Hyperlipidemia Neg Hx            Review of Systems  General: negative for chills, fever or weight loss  Psychological: negative for mood changes or depression  Ophthalmic: negative for blurry vision, photophobia or eye pain  ENT: see HPI  Respiratory: no cough, shortness of breath, or wheezing  Cardiovascular: no chest pain or dyspnea on exertion  Gastrointestinal: no abdominal pain, change in bowel habits, or black/ bloody stools  Musculoskeletal: negative for gait disturbance or muscular weakness  Neurological: no syncope or seizures; no ataxia  Dermatological: negative for puritis,  rash and jaundice  Hematologic/lymphatic: no easy bruising, no new lumps or bumps      Physical Exam:    Vitals:    10/26/18 1114   BP: (!) 105/58   Pulse: (!) 58   Temp: 97.6 °F (36.4 °C)       Constitutional: Well appearing / communicating without difficutly.  NAD.  Eyes: EOM I Bilaterally  Head/Face: Normocephalic.  Negative paranasal sinus pressure/tenderness.  Salivary glands WNL.  House Brackmann I Bilaterally.    Right Ear: Auricle normal appearance. External Auditory Canal with complete cerumen impaction present.  Left Ear: Auricle normal appearance. External Auditory Canal with complete cerumen impaction present.   Nose: No gross nasal septal deviation. Inferior Turbinates 3+ bilaterally. No septal perforation. No masses/lesions. External nasal skin appears normal without masses/lesions.  Oral Cavity: Gingiva/lips within normal limits.  Dentition/gingiva healthy appearing. Mucus membranes moist. Floor of mouth soft, no masses palpated. Oral Tongue mobile. Hard Palate appears normal.    Oropharynx: Base of tongue appears normal. No masses/lesions noted. Tonsillar fossa/pharyngeal wall without lesions. Posterior oropharynx WNL.  Soft palate without masses.  Midline uvula.   Neck/Lymphatic: No LAD I-VI bilaterally.  No thyromegaly.  No masses noted on exam.    Mirror laryngoscopy/nasopharyngoscopy: Active gag reflex.  Unable to perform.    Neuro/Psychiatric: AOx3.  Normal mood and affect.   Cardiovascular: Normal carotid pulses bilaterally, no increasing jugular venous distention noted at cervical region bilaterally.    Respiratory: Normal respiratory effort, no stridor, no retractions noted.      See separate procedure note for FFL.     MBSS 10/27/2018: Impressions  · Moderate oral and pharyngeal dysphagia due to: oral motor weakness, delayed trigger of the swallow reflex, dcr laryngeal elevation, dcr laryngeal sensation, dcr cricopharyngeal opening.  · NOMS: LEVEL 4: Swallowing is safe however will require moderate cues to use compensatory strategies, and/or the individual has moderate diet restrictions and/or still requires tube feeding and/or oral supplements        Assessment:    ICD-10-CM ICD-9-CM    1. 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 Sensorineural hearing loss (SNHL), bilateral H90.3 389.18    4. Dysphonia R49.0 784.42    5. Parkinson's disease G20 332.0      The primary encounter diagnosis was Dysphagia, unspecified type. Diagnoses of Presbylarynx, Sensorineural hearing loss (SNHL), bilateral, Dysphonia, and Parkinson's disease were also pertinent to this visit.      Plan:  Orders Placed This Encounter   Procedures    Fl Modified Barium Swallow Speech    SLP video swallow     Parkinson's can certainly lead to both dysphonia and dysphagia. Continue current therapy as per Dr. Forrester.   Dysphagia with concern for aspiration.  Continue aspiration precautions, modified diet, and recommend speech therapy.  He relays that his neurologist has mentioned placing a referral for Parkinson's specific PT/ST.  I reiterated that I feel he should be in speech therapy for  his dysphagia. We discussed that Parkinson's is a progressive disorder and therapy is an adjunct to pharmaceutical medications.   Follow up in 3 months with repeat MBSS.      We reviewed the findings from his recent scope examination and that the findings are consistent with presbylarynx.  Presbylarynx describes age-related changes to the soft tissues of the larynx including the loss of vocal fold tone and elasticity and bowing of the vocal cords.  This can affect voice quality including hoarseness, breathy voice, reduced voice volume, and unstable pitch.  I would recommend speech therapy when the patient is motivated.    Thank you kindly for allowing me to participate in the patient's care.   This visit was 25 minutes in duration, with over 50% of the time spent in direct face-to-face counseling and coordination of care regarding the issues outlined above      Katerina Maravilla MD

## 2018-10-26 NOTE — TELEPHONE ENCOUNTER
Yani notified, verbalized understanding and stated that she will call Dr. Forrester's office to discuss Northera, Flurocortisone and Midodrine.

## 2018-10-31 ENCOUNTER — CLINICAL SUPPORT (OUTPATIENT)
Dept: CARDIOLOGY | Facility: CLINIC | Age: 83
End: 2018-10-31
Attending: INTERNAL MEDICINE
Payer: MEDICARE

## 2018-10-31 DIAGNOSIS — R00.1 BRADYCARDIA: ICD-10-CM

## 2018-10-31 PROCEDURE — 93224 XTRNL ECG REC UP TO 48 HRS: CPT | Mod: S$GLB,,, | Performed by: INTERNAL MEDICINE

## 2018-10-31 NOTE — PROGRESS NOTES
24 Holter monitor placed as ordered. Patient and/or family verbalized understanding of instructions.

## 2018-11-07 ENCOUNTER — TELEPHONE (OUTPATIENT)
Dept: UROLOGY | Facility: CLINIC | Age: 83
End: 2018-11-07

## 2018-11-07 ENCOUNTER — OFFICE VISIT (OUTPATIENT)
Dept: PODIATRY | Facility: CLINIC | Age: 83
End: 2018-11-07
Payer: MEDICARE

## 2018-11-07 ENCOUNTER — TELEPHONE (OUTPATIENT)
Dept: CARDIOLOGY | Facility: CLINIC | Age: 83
End: 2018-11-07

## 2018-11-07 ENCOUNTER — PATIENT MESSAGE (OUTPATIENT)
Dept: UROLOGY | Facility: CLINIC | Age: 83
End: 2018-11-07

## 2018-11-07 VITALS
HEART RATE: 62 BPM | SYSTOLIC BLOOD PRESSURE: 110 MMHG | BODY MASS INDEX: 22.96 KG/M2 | WEIGHT: 164 LBS | DIASTOLIC BLOOD PRESSURE: 64 MMHG | HEIGHT: 71 IN

## 2018-11-07 DIAGNOSIS — B35.1 ONYCHOMYCOSIS DUE TO DERMATOPHYTE: Primary | ICD-10-CM

## 2018-11-07 DIAGNOSIS — M48.062 SPINAL STENOSIS, LUMBAR REGION, WITH NEUROGENIC CLAUDICATION: ICD-10-CM

## 2018-11-07 DIAGNOSIS — G60.9 IDIOPATHIC PERIPHERAL NEUROPATHY: ICD-10-CM

## 2018-11-07 PROCEDURE — 11721 DEBRIDE NAIL 6 OR MORE: CPT | Mod: Q9,S$GLB,, | Performed by: PODIATRIST

## 2018-11-07 PROCEDURE — 99999 PR PBB SHADOW E&M-EST. PATIENT-LVL III: CPT | Mod: PBBFAC,,, | Performed by: PODIATRIST

## 2018-11-07 PROCEDURE — 99499 UNLISTED E&M SERVICE: CPT | Mod: S$GLB,,, | Performed by: PODIATRIST

## 2018-11-07 RX ORDER — MIDODRINE HYDROCHLORIDE 2.5 MG/1
2.5 TABLET ORAL 3 TIMES DAILY
COMMUNITY
End: 2018-11-07 | Stop reason: CLARIF

## 2018-11-07 NOTE — TELEPHONE ENCOUNTER
Okay to stop both Flomax and Proscar.    Have patient follow up with me on an as-needed basis.

## 2018-11-07 NOTE — TELEPHONE ENCOUNTER
----- Message from Rody Morrison MD sent at 11/7/2018 12:34 PM CST -----  Mr. Sainz,  Please review results of your Holter monitor.  It showed extra beats from the lower and the upper chamber of your heart. Because you do have slower heart beat at times and your heart function is normal I think we should focus on treating your low blood pressure.  I would again encourage you to wear support socks, be liberal with sodium and water and start low dose Midodrine 2.5mg TID.  Please monitor your blood pressure and let us know if your symptoms have improved.

## 2018-11-07 NOTE — TELEPHONE ENCOUNTER
Yani notified, verbalized understanding. Yani stated that she will speak w/Dr. Forrester's office for Midodrine prescription.

## 2018-11-08 NOTE — PROGRESS NOTES
Subjective:      Patient ID: Phi Sainz is a 89 y.o. male.    Chief Complaint: Nail Problem (ingrown nail bilateral big toe ) and Routine Foot Care (bilateral feet )    Phi Sainz is a 89 y.o. male who presents to the clinic complaining of painful ingrown toenail on both feet.  Worse in tight shoes.  No drainage noted from the area.  No recent history of trauma to the feet.  He has history of neurogenic claudication and chronic DVT.  He is on Eliquis.      PCP:  Yanna Santana MD  Last PCP visit:  9/11/18      Past Medical History:   Diagnosis Date    Allergy     Anemia     Arthritis     Depression     Herniation of intervertebral disc     Hyperlipidemia     Hypertension 3/4/2015    Macular degeneration     Spinal stenosis          Current Outpatient Medications on File Prior to Visit   Medication Sig Dispense Refill    carbidopa-levodopa  mg (SINEMET)  mg per tablet Take 1 tablet by mouth 3 (three) times daily. 90 tablet 11    diazePAM (VALIUM) 2 MG tablet TAKE 1 TABLET BY MOUTH EVERY DAY AS NEEDED 30 tablet 1    diphenoxylate-atropine 2.5-0.025 mg (LOMOTIL) 2.5-0.025 mg per tablet Take 1 tablet by mouth 2 (two) times daily. (Patient taking differently: Take 1 tablet by mouth 2 (two) times daily. Pt taking one pill twice a day.) 60 tablet 0    ELIQUIS 5 mg Tab TAKE 1 TABLET BY MOUTH TWICE A DAY 60 tablet 3    escitalopram oxalate (LEXAPRO) 10 MG tablet TAKE 1 TABLET (10 MG TOTAL) BY MOUTH ONCE DAILY. 30 tablet 5    finasteride (PROSCAR) 5 mg tablet Take 1 tablet (5 mg total) by mouth once daily. 90 tablet 3    gabapentin (NEURONTIN) 100 MG capsule Take 300 mg by mouth 2 (two) times daily. Pt taking one pill twice a day.      hydrocodone-acetaminophen 5-325mg (NORCO) 5-325 mg per tablet Pt taking one pill once a day.  0    mesalamine (APRISO) 0.375 gram Cp24 Take 1.5 g by mouth once daily. Pt taking 4 pills in am.      tamsulosin (FLOMAX) 0.4 mg Cap Take 1  "capsule (0.4 mg total) by mouth after dinner. 30 capsule 12     No current facility-administered medications on file prior to visit.          Review of patient's allergies indicates:   Allergen Reactions    Combigan [brimonidine-timolol]      Made him dizziness    Contrast media Rash         Social History     Socioeconomic History    Marital status:      Spouse name: Not on file    Number of children: Not on file    Years of education: Not on file    Highest education level: Not on file   Social Needs    Financial resource strain: Not on file    Food insecurity - worry: Not on file    Food insecurity - inability: Not on file    Transportation needs - medical: Not on file    Transportation needs - non-medical: Not on file   Occupational History    Occupation: retired   Tobacco Use    Smoking status: Never Smoker    Smokeless tobacco: Never Used   Substance and Sexual Activity    Alcohol use: No     Alcohol/week: 0.0 oz    Drug use: No    Sexual activity: Yes     Partners: Female   Other Topics Concern    Not on file   Social History Narrative    Not on file           Review of Systems   Constitution: Negative for chills, fever and malaise/fatigue.   HENT: Negative for hearing loss.    Cardiovascular: Positive for leg swelling. Negative for claudication.   Respiratory: Negative for shortness of breath.    Skin: Positive for color change, dry skin, nail changes and unusual hair distribution. Negative for flushing and rash.   Musculoskeletal: Negative for joint pain and myalgias.   Neurological: Negative for loss of balance, numbness, paresthesias and sensory change.   Psychiatric/Behavioral: Negative for altered mental status.           Objective:        Vitals:    11/07/18 1030   BP: 110/64   Pulse: 62   Weight: 74.4 kg (164 lb 0.4 oz)   Height: 5' 11" (1.803 m)           Physical Exam   Constitutional: He is oriented to person, place, and time. He appears well-developed and well-nourished. " He is cooperative.   Cardiovascular:   Pulses:       Dorsalis pedis pulses are 0 on the right side, and 0 on the left side.        Posterior tibial pulses are 0 on the right side, and 0 on the left side.   +2 PE left  +1 right   Musculoskeletal: He exhibits edema and tenderness.        Right knee: He exhibits no swelling and no ecchymosis.        Left knee: He exhibits no swelling and no ecchymosis.        Right ankle: He exhibits decreased range of motion and abnormal pulse. He exhibits no swelling and no ecchymosis. No lateral malleolus, no medial malleolus and no head of 5th metatarsal tenderness found. Achilles tendon exhibits no pain, no defect and normal Wilkinson's test results.        Left ankle: He exhibits decreased range of motion and abnormal pulse. He exhibits no swelling and no ecchymosis. No lateral malleolus, no medial malleolus and no head of 5th metatarsal tenderness found. Achilles tendon exhibits no pain and normal Wilkinson's test results.        Right lower leg: He exhibits no tenderness, no bony tenderness, no swelling, no edema and no deformity.        Left lower leg: He exhibits no tenderness, no swelling and no edema.        Right foot: There is decreased range of motion. There is no deformity.        Left foot: There is decreased range of motion. There is no deformity.   Decreased ROM with out joint pain nor crepitation to bilateral feet and ankle joints.  Muscle strength 4/5 in all groups bilaterally  Decreased height of medial arches noted with loading of the foot b/L     Neurological: He is alert and oriented to person, place, and time.   Diminished protective sensation noted b/L       Skin: Skin is warm and dry. Capillary refill takes more than 3 seconds. No abrasion, no bruising, no burn and no ecchymosis noted.   B/L hallux medial borders mildly ingrowing. No erythema or paronychia noted. No drainage.  Nails x10 are elongated by  3-4mm's, thickened by 2-4 mm's, dystrophic, and are  yellowish in  coloration . Xerosis Bilaterally. No open lesions noted.     Webspaces 1-4 b/L clean, dry, and intact.     Psychiatric: He has a normal mood and affect. His speech is normal and behavior is normal. He is attentive.   Vitals reviewed.            Assessment:       Encounter Diagnoses   Name Primary?    Spinal stenosis, lumbar region, with neurogenic claudication     Onychomycosis due to dermatophyte Yes    Idiopathic peripheral neuropathy          Plan:       Phi was seen today for nail problem and routine foot care.    Diagnoses and all orders for this visit:    Onychomycosis due to dermatophyte  -     Routine Foot Care    Spinal stenosis, lumbar region, with neurogenic claudication  -     Routine Foot Care    Idiopathic peripheral neuropathy  -     Routine Foot Care      I counseled the patient on his conditions, their implications and medical management.    - Shoe inspection. Patient instructed on proper foot hygeine. We discussed wearing proper shoe gear, daily foot inspections, never walking without protective shoe gear, never putting sharp instruments to feet, routine podiatric nail visits every 2-3 months.      Routine Foot Care  Date/Time: 11/7/2018 9:08 AM  Performed by: Alissa Balck DPM  Authorized by: Alissa Black DPM     Consent Done?:  Yes (Verbal)    Nail Care Type:  Debride  Location(s): All  (Left 1st Toe, Left 3rd Toe, Left 2nd Toe, Left 4th Toe, Left 5th Toe, Right 1st Toe, Right 2nd Toe, Right 3rd Toe, Right 4th Toe and Right 5th Toe)  Patient tolerance:  Patient tolerated the procedure well with no immediate complications     With patient's permission, the toenails mentioned above were aggressively reduced and debrided using a nail nipper, removing all offending nail and debris. The patient will continue to monitor the areas daily, inspect the feet, wear protective shoe gear when ambulatory, and moisturizer to maintain skin integrity.             Alissa Black DPM  NPI:  [id number]         [facility]  [address] [address]  Dept: [phone]  Dept Fax: [phone]

## 2018-11-09 DIAGNOSIS — I10 HYPERTENSION, ESSENTIAL: Primary | ICD-10-CM

## 2018-11-09 RX ORDER — MIDODRINE HYDROCHLORIDE 2.5 MG/1
2.5 TABLET ORAL 3 TIMES DAILY
COMMUNITY
End: 2018-11-09 | Stop reason: SDUPTHER

## 2018-11-09 RX ORDER — MIDODRINE HYDROCHLORIDE 2.5 MG/1
2.5 TABLET ORAL 3 TIMES DAILY
Qty: 90 TABLET | Refills: 6 | Status: SHIPPED | OUTPATIENT
Start: 2018-11-09 | End: 2019-03-13

## 2018-12-12 ENCOUNTER — LAB VISIT (OUTPATIENT)
Dept: LAB | Facility: HOSPITAL | Age: 83
End: 2018-12-12
Attending: INTERNAL MEDICINE
Payer: MEDICARE

## 2018-12-12 DIAGNOSIS — E78.5 DYSLIPIDEMIA: ICD-10-CM

## 2018-12-12 DIAGNOSIS — I10 HYPERTENSION, ESSENTIAL: ICD-10-CM

## 2018-12-12 LAB
ALBUMIN SERPL BCP-MCNC: 3.5 G/DL
ALP SERPL-CCNC: 75 U/L
ALT SERPL W/O P-5'-P-CCNC: 5 U/L
ANION GAP SERPL CALC-SCNC: 9 MMOL/L
AST SERPL-CCNC: 16 U/L
BILIRUB SERPL-MCNC: 0.8 MG/DL
BUN SERPL-MCNC: 17 MG/DL
CALCIUM SERPL-MCNC: 9.4 MG/DL
CHLORIDE SERPL-SCNC: 104 MMOL/L
CHOLEST SERPL-MCNC: 209 MG/DL
CHOLEST/HDLC SERPL: 3.4 {RATIO}
CO2 SERPL-SCNC: 29 MMOL/L
CREAT SERPL-MCNC: 1.1 MG/DL
EST. GFR  (AFRICAN AMERICAN): >60 ML/MIN/1.73 M^2
EST. GFR  (NON AFRICAN AMERICAN): 59.2 ML/MIN/1.73 M^2
GLUCOSE SERPL-MCNC: 86 MG/DL
HDLC SERPL-MCNC: 61 MG/DL
HDLC SERPL: 29.2 %
LDLC SERPL CALC-MCNC: 131 MG/DL
NONHDLC SERPL-MCNC: 148 MG/DL
POTASSIUM SERPL-SCNC: 3.8 MMOL/L
PROT SERPL-MCNC: 7.4 G/DL
SODIUM SERPL-SCNC: 142 MMOL/L
TRIGL SERPL-MCNC: 85 MG/DL
TSH SERPL DL<=0.005 MIU/L-ACNC: 2.9 UIU/ML

## 2018-12-12 PROCEDURE — 80061 LIPID PANEL: CPT

## 2018-12-12 PROCEDURE — 36415 COLL VENOUS BLD VENIPUNCTURE: CPT | Mod: PO

## 2018-12-12 PROCEDURE — 84443 ASSAY THYROID STIM HORMONE: CPT

## 2018-12-12 PROCEDURE — 80053 COMPREHEN METABOLIC PANEL: CPT

## 2018-12-13 ENCOUNTER — TELEPHONE (OUTPATIENT)
Dept: CARDIOLOGY | Facility: CLINIC | Age: 83
End: 2018-12-13

## 2018-12-13 NOTE — TELEPHONE ENCOUNTER
----- Message from Rody Morrison MD sent at 12/13/2018 10:31 AM CST -----  Please review.  We will discuss the results during your upcoming visit with me. It looks good.

## 2018-12-17 ENCOUNTER — OFFICE VISIT (OUTPATIENT)
Dept: INTERNAL MEDICINE | Facility: CLINIC | Age: 83
End: 2018-12-17
Payer: MEDICARE

## 2018-12-17 ENCOUNTER — PATIENT MESSAGE (OUTPATIENT)
Dept: CARDIOLOGY | Facility: CLINIC | Age: 83
End: 2018-12-17

## 2018-12-17 ENCOUNTER — PATIENT MESSAGE (OUTPATIENT)
Dept: NEUROLOGY | Facility: CLINIC | Age: 83
End: 2018-12-17

## 2018-12-17 VITALS
BODY MASS INDEX: 22.69 KG/M2 | HEIGHT: 71 IN | TEMPERATURE: 98 F | DIASTOLIC BLOOD PRESSURE: 80 MMHG | WEIGHT: 162.06 LBS | SYSTOLIC BLOOD PRESSURE: 110 MMHG | HEART RATE: 64 BPM

## 2018-12-17 DIAGNOSIS — G20.A1 PARKINSON DISEASE: ICD-10-CM

## 2018-12-17 DIAGNOSIS — K13.0 LIP LESION: Primary | ICD-10-CM

## 2018-12-17 PROCEDURE — 1101F PT FALLS ASSESS-DOCD LE1/YR: CPT | Mod: CPTII,S$GLB,, | Performed by: INTERNAL MEDICINE

## 2018-12-17 PROCEDURE — G0008 ADMIN INFLUENZA VIRUS VAC: HCPCS | Mod: S$GLB,,, | Performed by: INTERNAL MEDICINE

## 2018-12-17 PROCEDURE — 99999 PR PBB SHADOW E&M-EST. PATIENT-LVL III: CPT | Mod: PBBFAC,,, | Performed by: INTERNAL MEDICINE

## 2018-12-17 PROCEDURE — 90662 IIV NO PRSV INCREASED AG IM: CPT | Mod: S$GLB,,, | Performed by: INTERNAL MEDICINE

## 2018-12-17 PROCEDURE — 99213 OFFICE O/P EST LOW 20 MIN: CPT | Mod: 25,S$GLB,, | Performed by: INTERNAL MEDICINE

## 2018-12-17 RX ORDER — TRIAMCINOLONE ACETONIDE 1 MG/G
PASTE DENTAL 2 TIMES DAILY
Qty: 5 G | Refills: 3 | Status: SHIPPED | OUTPATIENT
Start: 2018-12-17 | End: 2019-01-16

## 2018-12-17 NOTE — PROGRESS NOTES
CC: followup of DVT  HPI:  The patient is a 89 y.o. year old male who presents to the office for followup of DVT.  The patient denies any chest pain, shortness of breath, headache, blurred vision, excessive fatigue, nausea or vomiting.  He was diagnosed with Parkinson's recently.  He complains of weakness and return of hand tremors following institution of sinemet.  He complains of lesion involving upper lip for about 2-1/2 months.  He states he initially had a fever blister, but had some trauma shaving.    PAST MEDICAL HISTORY:  Past Medical History:   Diagnosis Date    Allergy     Anemia     Arthritis     Depression     Herniation of intervertebral disc     Hyperlipidemia     Hypertension 3/4/2015    Macular degeneration     Spinal stenosis        MEDS:  Medcard reviewed and updated    ALLERGIES: Allergy Card reviewed and updated    SOCIAL HISTORY:   The patient is a nonsmoker.    PE:   APPEARANCE: Well nourished, well developed, in no acute distress.   CHEST: Lungs clear to auscultation with unlabored respirations.  CARDIOVASCULAR: Normal S1, S2. No murmurs. No carotid bruits. No pedal edema.  ABDOMEN: Bowel sounds normal. Not distended. Soft. No tenderness or masses.    SKIN: Positive scar of right upper lip.  PSYCHIATRIC: The patient is oriented to person, place, and time and has a pleasant affect.        ASSESSMENT/PLAN:  Phi was seen today for follow-up.    Diagnoses and all orders for this visit:    Lip lesion  -     prescribed Kenalog in [facility]  -     if symptoms fail to improve within 2 weeks, will refer to ENT    Parkinson disease  -     follow-up by Neurology    Other orders  -     triamcinolone acetonide 0.1% (KENALOG) 0.1 % paste; Place onto teeth 2 (two) times daily.

## 2018-12-18 ENCOUNTER — PATIENT MESSAGE (OUTPATIENT)
Dept: OTOLARYNGOLOGY | Facility: CLINIC | Age: 83
End: 2018-12-18

## 2018-12-19 ENCOUNTER — TELEPHONE (OUTPATIENT)
Dept: INTERNAL MEDICINE | Facility: CLINIC | Age: 83
End: 2018-12-19

## 2018-12-19 ENCOUNTER — PATIENT MESSAGE (OUTPATIENT)
Dept: CARDIOLOGY | Facility: CLINIC | Age: 83
End: 2018-12-19

## 2018-12-19 DIAGNOSIS — I82.509 CHRONIC DEEP VEIN THROMBOSIS (DVT) OF LOWER EXTREMITY, UNSPECIFIED LATERALITY, UNSPECIFIED VEIN: Primary | ICD-10-CM

## 2018-12-19 DIAGNOSIS — R32 URINARY INCONTINENCE, UNSPECIFIED TYPE: Primary | ICD-10-CM

## 2018-12-19 NOTE — TELEPHONE ENCOUNTER
----- Message from Shari Christianson sent at 12/19/2018 11:38 AM CST -----  Contact: PT daughter Yani [phone]  Pt would like to know if there are alternative medications to slow down or stop his diarrhea and the urge to constantly use the rtestroom. Please call and advise

## 2018-12-20 ENCOUNTER — TELEPHONE (OUTPATIENT)
Dept: NEUROLOGY | Facility: CLINIC | Age: 83
End: 2018-12-20

## 2018-12-20 ENCOUNTER — PATIENT MESSAGE (OUTPATIENT)
Dept: INTERNAL MEDICINE | Facility: CLINIC | Age: 83
End: 2018-12-20

## 2018-12-20 NOTE — TELEPHONE ENCOUNTER
Called pt daughter and mentioned that she would like a response either through the portal or over the phone regarding her message she sent back on the 17th.     My dad's nurse and other family members have noticed a plateau about 3 to 4 weeks ago , and now he is showing signs of regression .   He is exhibiting symptoms of increased hand tremors , low voice , dizziness, leg weakness, and unsteady gait.    What are your suggestions ?

## 2018-12-20 NOTE — TELEPHONE ENCOUNTER
----- Message from Reyes Cabrera sent at 12/20/2018  2:40 PM CST -----  Needs Advice    Reason for call: Yani is asking to speak w/ the nurse or doctor regarding the [facility] message she sent on 12/17/18        Communication Preference: Yani (daughter) @ [phone]    Additional Information:

## 2018-12-21 ENCOUNTER — PATIENT MESSAGE (OUTPATIENT)
Dept: INTERNAL MEDICINE | Facility: CLINIC | Age: 83
End: 2018-12-21

## 2018-12-21 NOTE — TELEPHONE ENCOUNTER
Recommend U/A and urine culyure for urinary incontinence.  It is possible an infection may be contributing to his symptoms.

## 2019-01-07 ENCOUNTER — OFFICE VISIT (OUTPATIENT)
Dept: PODIATRY | Facility: CLINIC | Age: 84
End: 2019-01-07
Payer: MEDICARE

## 2019-01-07 ENCOUNTER — PATIENT MESSAGE (OUTPATIENT)
Dept: CARDIOLOGY | Facility: CLINIC | Age: 84
End: 2019-01-07

## 2019-01-07 VITALS — WEIGHT: 162 LBS | BODY MASS INDEX: 22.68 KG/M2 | HEIGHT: 71 IN

## 2019-01-07 DIAGNOSIS — B35.1 DERMATOPHYTOSIS OF NAIL: ICD-10-CM

## 2019-01-07 DIAGNOSIS — L60.0 INGROWN NAIL: Primary | ICD-10-CM

## 2019-01-07 DIAGNOSIS — M48.062 SPINAL STENOSIS, LUMBAR REGION, WITH NEUROGENIC CLAUDICATION: ICD-10-CM

## 2019-01-07 DIAGNOSIS — M79.674 TOE PAIN, RIGHT: ICD-10-CM

## 2019-01-07 PROCEDURE — 11721 DEBRIDE NAIL 6 OR MORE: CPT | Mod: Q9,S$GLB,, | Performed by: PODIATRIST

## 2019-01-07 PROCEDURE — 11721 ROUTINE FOOT CARE: ICD-10-PCS | Mod: Q9,S$GLB,, | Performed by: PODIATRIST

## 2019-01-07 PROCEDURE — 99213 PR OFFICE/OUTPT VISIT, EST, LEVL III, 20-29 MIN: ICD-10-PCS | Mod: 25,S$GLB,, | Performed by: PODIATRIST

## 2019-01-07 PROCEDURE — 1101F PR PT FALLS ASSESS DOC 0-1 FALLS W/OUT INJ PAST YR: ICD-10-PCS | Mod: CPTII,S$GLB,, | Performed by: PODIATRIST

## 2019-01-07 PROCEDURE — 1101F PT FALLS ASSESS-DOCD LE1/YR: CPT | Mod: CPTII,S$GLB,, | Performed by: PODIATRIST

## 2019-01-07 PROCEDURE — 99213 OFFICE O/P EST LOW 20 MIN: CPT | Mod: 25,S$GLB,, | Performed by: PODIATRIST

## 2019-01-07 PROCEDURE — 99999 PR PBB SHADOW E&M-EST. PATIENT-LVL II: ICD-10-PCS | Mod: PBBFAC,,, | Performed by: PODIATRIST

## 2019-01-07 PROCEDURE — 99999 PR PBB SHADOW E&M-EST. PATIENT-LVL II: CPT | Mod: PBBFAC,,, | Performed by: PODIATRIST

## 2019-01-07 NOTE — PROGRESS NOTES
Subjective:      Patient ID: Phi Sainz is a 89 y.o. male.    Chief Complaint: Follow-up (Nail Fungus) and Ingrown Toenail (Bilateral great toes)    Phi Sainz is a 89 y.o. male who presents to the clinic complaining of painful ingrown hallux toenail on both feet.  Worse in tight shoes.  No drainage noted from the area.  No recent history of trauma to the feet.  He has history of neurogenic claudication and chronic DVT.  He is on Eliquis.    He is using kerasal on his toenails.   Also complains of right toe pain, medial aspect.  Worse with direct palpation.  No trauma noted.        PCP:  Yanna Santana MD  Last PCP visit:  12/17/18      Past Medical History:   Diagnosis Date    Allergy     Anemia     Arthritis     Depression     Herniation of intervertebral disc     Hyperlipidemia     Hypertension 3/4/2015    Macular degeneration     Parkinson disease     Spinal stenosis          Current Outpatient Medications on File Prior to Visit   Medication Sig Dispense Refill    apixaban (ELIQUIS) 2.5 mg Tab Take 1 tablet (2.5 mg total) by mouth 2 (two) times daily. 60 tablet 6    carbidopa-levodopa  mg (SINEMET)  mg per tablet Take 1 tablet by mouth 3 (three) times daily. 90 tablet 11    diazePAM (VALIUM) 2 MG tablet TAKE 1 TABLET BY MOUTH EVERY DAY AS NEEDED 30 tablet 1    diphenoxylate-atropine 2.5-0.025 mg (LOMOTIL) 2.5-0.025 mg per tablet Take 1 tablet by mouth 2 (two) times daily. (Patient taking differently: Take 1 tablet by mouth 2 (two) times daily. Pt taking one pill twice a day.) 60 tablet 0    escitalopram oxalate (LEXAPRO) 10 MG tablet TAKE 1 TABLET (10 MG TOTAL) BY MOUTH ONCE DAILY. 30 tablet 5    gabapentin (NEURONTIN) 100 MG capsule Take 300 mg by mouth 2 (two) times daily. Pt taking one pill twice a day.      hydrocodone-acetaminophen 5-325mg (NORCO) 5-325 mg per tablet Pt taking one pill once a day.  0    mesalamine (APRISO) 0.375 gram Cp24 Take 1.5 g by  mouth once daily. Pt taking 4 pills in am.      midodrine (PROAMATINE) 2.5 MG Tab Take 1 tablet (2.5 mg total) by mouth 3 (three) times daily. 90 tablet 6    triamcinolone acetonide 0.1% (KENALOG) 0.1 % paste Place onto teeth 2 (two) times daily. 5 g 3     No current facility-administered medications on file prior to visit.          Review of patient's allergies indicates:   Allergen Reactions    Combigan [brimonidine-timolol]      Made him dizziness    Contrast media Rash         Social History     Socioeconomic History    Marital status:      Spouse name: Not on file    Number of children: Not on file    Years of education: Not on file    Highest education level: Not on file   Social Needs    Financial resource strain: Not on file    Food insecurity - worry: Not on file    Food insecurity - inability: Not on file    Transportation needs - medical: Not on file    Transportation needs - non-medical: Not on file   Occupational History    Occupation: retired   Tobacco Use    Smoking status: Never Smoker    Smokeless tobacco: Never Used   Substance and Sexual Activity    Alcohol use: No     Alcohol/week: 0.0 oz    Drug use: No    Sexual activity: Yes     Partners: Female   Other Topics Concern    Not on file   Social History Narrative    Not on file           Review of Systems   Constitution: Negative for chills, fever and malaise/fatigue.   HENT: Negative for hearing loss.    Cardiovascular: Positive for leg swelling. Negative for claudication.   Respiratory: Negative for shortness of breath.    Skin: Positive for color change, dry skin, nail changes and unusual hair distribution. Negative for flushing and rash.   Musculoskeletal: Negative for joint pain and myalgias.   Neurological: Negative for loss of balance, numbness, paresthesias and sensory change.   Psychiatric/Behavioral: Negative for altered mental status.           Objective:        Vitals:    01/07/19 1020   Weight: 73.5 kg (162  "lb)   Height: 5' 11" (1.803 m)           Physical Exam   Constitutional: He is oriented to person, place, and time. He appears well-developed and well-nourished. He is cooperative.   Cardiovascular:   Pulses:       Dorsalis pedis pulses are 0 on the right side, and 0 on the left side.        Posterior tibial pulses are 0 on the right side, and 0 on the left side.   +2 PE left  +1 right   Musculoskeletal: He exhibits edema and tenderness.        Right knee: He exhibits no swelling and no ecchymosis.        Left knee: He exhibits no swelling and no ecchymosis.        Right ankle: He exhibits decreased range of motion and abnormal pulse. He exhibits no swelling and no ecchymosis. No lateral malleolus, no medial malleolus and no head of 5th metatarsal tenderness found. Achilles tendon exhibits no pain, no defect and normal Wilkinson's test results.        Left ankle: He exhibits decreased range of motion and abnormal pulse. He exhibits no swelling and no ecchymosis. No lateral malleolus, no medial malleolus and no head of 5th metatarsal tenderness found. Achilles tendon exhibits no pain and normal Wilkinson's test results.        Right lower leg: He exhibits no tenderness, no bony tenderness, no swelling, no edema and no deformity.        Left lower leg: He exhibits no tenderness, no swelling and no edema.        Right foot: There is decreased range of motion. There is no deformity.        Left foot: There is decreased range of motion. There is no deformity.   Decreased ROM with out joint pain nor crepitation to bilateral feet and ankle joints.  Muscle strength 4/5 in all groups bilaterally  Decreased height of medial arches noted with loading of the foot b/L     Neurological: He is alert and oriented to person, place, and time.   Diminished protective sensation noted b/L       Skin: Skin is warm and dry. Capillary refill takes more than 3 seconds. No abrasion, no bruising, no burn and no ecchymosis noted.   B/L hallux " medial borders mildly ingrowing. No erythema or paronychia noted. No drainage.  Nails x10 are elongated by  3-4mm's, thickened by 2-4 mm's, dystrophic, and are yellowish in  coloration . Xerosis Bilaterally. No open lesions noted.     Webspaces 1-4 b/L clean, dry, and intact.    Superficial skin sore, medial right 5th toe due to abutting adjacent toe.  No open wound.  No drainage.  No redness. Minimally macerated.      Psychiatric: He has a normal mood and affect. His speech is normal and behavior is normal. He is attentive.   Vitals reviewed.            Assessment:       Problem List Items Addressed This Visit     Spinal stenosis, lumbar region, with neurogenic claudication    Relevant Orders    Routine Foot Care      Other Visit Diagnoses     Ingrown nail    -  Primary    Dermatophytosis of nail        Relevant Orders    Routine Foot Care    Toe pain, right                  Plan:         - I counseled the patient on his conditions, their implications and medical management.    - Toe spacer in between the right 4th and 5th toes as demonstrated.  Wide toe box shoes.    - Shoe inspection. Patient instructed on proper foot hygeine. We discussed wearing proper shoe gear, daily foot inspections, never walking without protective shoe gear, never putting sharp instruments to feet, routine podiatric nail visits every 2-3 months.        Routine Foot Care  Date/Time: 1/7/2019 11:18 AM  Performed by: Alissa Black DPM  Authorized by: Alissa Black DPM     Consent Done?:  Yes (Verbal)    Nail Care Type:  Debride  Location(s): All  (Left 1st Toe, Left 3rd Toe, Left 2nd Toe, Left 4th Toe, Left 5th Toe, Right 1st Toe, Right 2nd Toe, Right 3rd Toe, Right 4th Toe and Right 5th Toe)  Patient tolerance:  Patient tolerated the procedure well with no immediate complications     With patient's permission, the toenails mentioned above were aggressively reduced and debrided using a nail nipper, removing all offending nail and debris. The  patient will continue to monitor the areas daily, inspect the feet, wear protective shoe gear when ambulatory, and moisturizer to maintain skin integrity.             Alissa Black DPM  NPI: [id number]         [facility] - PODIATRY  [address] [place] [id number]  Dept: [phone]  Dept Fax: [phone]

## 2019-01-08 ENCOUNTER — OFFICE VISIT (OUTPATIENT)
Dept: NEUROLOGY | Facility: CLINIC | Age: 84
End: 2019-01-08
Payer: MEDICARE

## 2019-01-08 VITALS
DIASTOLIC BLOOD PRESSURE: 84 MMHG | HEART RATE: 54 BPM | BODY MASS INDEX: 23.34 KG/M2 | SYSTOLIC BLOOD PRESSURE: 178 MMHG | HEIGHT: 71 IN | WEIGHT: 166.69 LBS

## 2019-01-08 DIAGNOSIS — G20.A1 PARKINSON'S DISEASE: Primary | ICD-10-CM

## 2019-01-08 PROCEDURE — 1101F PR PT FALLS ASSESS DOC 0-1 FALLS W/OUT INJ PAST YR: ICD-10-PCS | Mod: CPTII,S$GLB,, | Performed by: PSYCHIATRY & NEUROLOGY

## 2019-01-08 PROCEDURE — 99499 UNLISTED E&M SERVICE: CPT | Mod: S$GLB,,, | Performed by: PSYCHIATRY & NEUROLOGY

## 2019-01-08 PROCEDURE — 99999 PR PBB SHADOW E&M-EST. PATIENT-LVL III: CPT | Mod: PBBFAC,,, | Performed by: PSYCHIATRY & NEUROLOGY

## 2019-01-08 PROCEDURE — 99499 RISK ADDL DX/OHS AUDIT: ICD-10-PCS | Mod: S$GLB,,, | Performed by: PSYCHIATRY & NEUROLOGY

## 2019-01-08 PROCEDURE — 99215 PR OFFICE/OUTPT VISIT, EST, LEVL V, 40-54 MIN: ICD-10-PCS | Mod: S$GLB,,, | Performed by: PSYCHIATRY & NEUROLOGY

## 2019-01-08 PROCEDURE — 99999 PR PBB SHADOW E&M-EST. PATIENT-LVL III: ICD-10-PCS | Mod: PBBFAC,,, | Performed by: PSYCHIATRY & NEUROLOGY

## 2019-01-08 PROCEDURE — 99215 OFFICE O/P EST HI 40 MIN: CPT | Mod: S$GLB,,, | Performed by: PSYCHIATRY & NEUROLOGY

## 2019-01-08 PROCEDURE — 1101F PT FALLS ASSESS-DOCD LE1/YR: CPT | Mod: CPTII,S$GLB,, | Performed by: PSYCHIATRY & NEUROLOGY

## 2019-01-08 RX ORDER — CARBIDOPA AND LEVODOPA 25; 100 MG/1; MG/1
1.5 TABLET ORAL 3 TIMES DAILY
Qty: 135 TABLET | Refills: 11 | Status: SHIPPED | OUTPATIENT
Start: 2019-01-08 | End: 2019-02-18 | Stop reason: SDUPTHER

## 2019-01-08 NOTE — PROGRESS NOTES
"Name: Phi Sainz  MRN: [id number]   CSN: [id number]      Date: 01/08/2019    Referring physician:  No referring provider defined for this encounter.    Chief Complaint / Interval History: No chief complaint on file.  - generally more slow in the last three months  - still on current ldopa dosing  - walking has slowed down some  - still lower BP in the past, watching it now on less Flomax  - on Midodrine now until a few days ago, was running too high  - no recent staring spells  - ready for more meds if able  - no falls  - some speeding bowels, does have colitis      History of Present Illness (HPI):  Patient is 89 yo M presents with 4 years duration of tremors, bradykinesia, and gait abnormality. Patient was seen in neurologist 4 years ago with resting tremor, managed conservatively. His tremor had progressed, and progressively developed shuffling gait. Daughter and granddaughter also reports that patient had been having "staring spells" where patient would appear to be locked in with blank expression that self-resolves. Recently patient developed orthostatic hypotension, and was assessed by Cardiologist who then referred patient to Neurologist Dr. Lal. Patient was seen in Neurology clinic last week, and his symptoms were suspicious for Parkinson's Disease. Patient was prescribed Sinemet 25/100 TID. Today in clinic, patient reports significant improvement of his symptoms. He has not had another staring spell since initiating medication. Patient continues to have dysphagia, and chokes on thin liquids. He is scheduled for Speech therapy and ENT assessment.     Nonmotor/Premotor ROS:  Hyposmia (HENT)?N/A  RBD/sleep issues (Constitutional)?Yes  Depression/anxiety (Psychiatric)?Yes  Fatigue (Constitutional)?No  Constipation (GI)?No  Urinary issues ()?No  Sexual dysfunction ()?N/A  Orthostasis (Cardiovascular)?Yes  Leg swelling (Cardiovascular)? No  Falls (Musculoskeletal)?Yes  Cognitive impairment " (Neurologic)?No  Psychoses (Psychiatric)?No  Pain/Paresthesia (Neurologic)?No  Visual changes (Eyes)?No  Moles / skin changes (Skin)?N/A  Stridor / SOB (Pulm)?No  Bruising (Heme)?No    Past Medical History: The patient  has a past medical history of Allergy, Anemia, Arthritis, Depression, Herniation of intervertebral disc, Hyperlipidemia, Hypertension (3/4/2015), Macular degeneration, Parkinson disease, and Spinal stenosis.    Social History: The patient  reports that  has never smoked. he has never used smokeless tobacco. He reports that he does not drink alcohol or use drugs.    Family History: Their family history includes Cancer in his father; Colon cancer in his father; Hearing loss in his paternal aunt.    Allergies: Combigan [brimonidine-timolol] and Contrast media     Meds:   Current Outpatient Medications on File Prior to Visit   Medication Sig Dispense Refill    apixaban (ELIQUIS) 2.5 mg Tab Take 1 tablet (2.5 mg total) by mouth 2 (two) times daily. 60 tablet 6    carbidopa-levodopa  mg (SINEMET)  mg per tablet Take 1 tablet by mouth 3 (three) times daily. 90 tablet 11    diazePAM (VALIUM) 2 MG tablet TAKE 1 TABLET BY MOUTH EVERY DAY AS NEEDED 30 tablet 1    diphenoxylate-atropine 2.5-0.025 mg (LOMOTIL) 2.5-0.025 mg per tablet Take 1 tablet by mouth 2 (two) times daily. (Patient taking differently: Take 1 tablet by mouth 2 (two) times daily. Pt taking one pill twice a day.) 60 tablet 0    escitalopram oxalate (LEXAPRO) 10 MG tablet TAKE 1 TABLET (10 MG TOTAL) BY MOUTH ONCE DAILY. 30 tablet 5    gabapentin (NEURONTIN) 100 MG capsule Take 300 mg by mouth 2 (two) times daily. Pt taking one pill twice a day.      hydrocodone-acetaminophen 5-325mg (NORCO) 5-325 mg per tablet Pt taking one pill once a day.  0    mesalamine (APRISO) 0.375 gram Cp24 Take 1.5 g by mouth once daily. Pt taking 4 pills in am.      midodrine (PROAMATINE) 2.5 MG Tab Take 1 tablet (2.5 mg total) by mouth 3 (three)  "times daily. 90 tablet 6    triamcinolone acetonide 0.1% (KENALOG) 0.1 % paste Place onto teeth 2 (two) times daily. 5 g 3     No current facility-administered medications on file prior to visit.        Exam:  BP (!) 178/84   Pulse (!) 54   Ht 5' 11" (1.803 m)   Wt 75.6 kg (166 lb 10.7 oz)   BMI 23.25 kg/m²     Constitutional  Well-developed, well-nourished, appears stated age   Ophthalmoscopic  No papilledema with no hemorrhages or exudates bilaterally   Cardiovascular  Radial pulses 2+ and symmetric, no LE edema bilaterally   Neurological    * Mental status  MOCA = N/A     - Orientation  Oriented to person, place, time, and situation     - Memory   Intact recent and remote     - Attention/concentration  Attentive, vigilant during exam     - Language  Naming & repetition intact, +2-step commands     - Fund of knowledge  Aware of current events     - Executive  Well-organized thoughts     - Other     * Cranial nerves       - CN II  PERRL, visual fields full to confrontation     - CN III, IV, VI  Extraocular movements full, normal pursuits and saccades     - CN V  Sensation V1 - V3 intact     - CN VII  Face strong and symmetric bilaterally     - CN VIII  Hearing intact bilaterally     - CN IX, X  Palate raises midline and symmetric     - CN XI  SCM and trapezius 5/5 bilaterally     - CN XII  Tongue midline   * Motor  Muscle bulk normal, strength 5/5 throughout   * Sensory   Intact to temperature and vibration throughout   * Coordination  No dysmetria with finger-to-nose or heel-to-shin   * Gait  See below.   * Deep tendon reflexes  2+ and symmetric throughout   Babinski downgoing bilaterally   * Specialized movement exam  ++ hypophonic speech.    + facial masking.   No cogwheel rigidity.     No bradykinesia.   Mild tremor on R with rest    No other dystonia, chorea, athetosis, myoclonus, or tics.   No motor impersistence.   Shuffled gait.   ++ shortened stride length.   ++ abnormal arm swing.     + postural " instability, falls with narrow gait          Medical Record Review:  - Lab Results:  Lab Visit on 12/12/2018   Component Date Value Ref Range Status    Sodium 12/12/2018 142  136 - 145 mmol/L Final    Potassium 12/12/2018 3.8  3.5 - 5.1 mmol/L Final    Chloride 12/12/2018 104  95 - 110 mmol/L Final    CO2 12/12/2018 29  23 - 29 mmol/L Final    Glucose 12/12/2018 86  70 - 110 mg/dL Final    BUN, Bld 12/12/2018 17  8 - 23 mg/dL Final    Creatinine 12/12/2018 1.1  0.5 - 1.4 mg/dL Final    Calcium 12/12/2018 9.4  8.7 - 10.5 mg/dL Final    Total Protein 12/12/2018 7.4  6.0 - 8.4 g/dL Final    Albumin 12/12/2018 3.5  3.5 - 5.2 g/dL Final    Total Bilirubin 12/12/2018 0.8  0.1 - 1.0 mg/dL Final    Alkaline Phosphatase 12/12/2018 75  55 - 135 U/L Final    AST 12/12/2018 16  10 - 40 U/L Final    ALT 12/12/2018 5* 10 - 44 U/L Final    Anion Gap 12/12/2018 9  8 - 16 mmol/L Final    eGFR if African American 12/12/2018 >60.0  >60 mL/min/1.73 m^2 Final    eGFR if non African American 12/12/2018 59.2* >60 mL/min/1.73 m^2 Final    TSH 12/12/2018 2.901  0.400 - 4.000 uIU/mL Final    Cholesterol 12/12/2018 209* 120 - 199 mg/dL Final    Triglycerides 12/12/2018 85  30 - 150 mg/dL Final    HDL 12/12/2018 61  40 - 75 mg/dL Final    LDL Cholesterol 12/12/2018 131.0  63.0 - 159.0 mg/dL Final    HDL/Chol Ratio 12/12/2018 29.2  20.0 - 50.0 % Final    Total Cholesterol/HDL Ratio 12/12/2018 3.4  2.0 - 5.0 Final    Non-HDL Cholesterol 12/12/2018 148  mg/dL Final       Problem List Items Addressed This Visit     Parkinson's disease - Primary    Overview     Diagnosed 2018, tremor dominant with prominent gait dysfunction, good early ldopa response.  Some non-motor complication of hypotension.         Current Assessment & Plan     - increase cd/ld 25/100 1.5 TID  - keep other meds the same  - Azilect next as needed  - note to primary care doctor  - home health with PT           Relevant Orders    Ambulatory Referral to  Home Health                Harshad Forrester MD, MPH  Division of Movement and Memory Disorders  [facility]

## 2019-01-08 NOTE — ASSESSMENT & PLAN NOTE
- increase cd/ld 25/100 1.5 TID  - keep other meds the same  - Azilect next as needed  - note to primary care doctor  - home health with PT

## 2019-01-15 ENCOUNTER — PATIENT MESSAGE (OUTPATIENT)
Dept: NEUROLOGY | Facility: CLINIC | Age: 84
End: 2019-01-15

## 2019-01-21 ENCOUNTER — PATIENT MESSAGE (OUTPATIENT)
Dept: NEUROLOGY | Facility: CLINIC | Age: 84
End: 2019-01-21

## 2019-02-01 ENCOUNTER — OFFICE VISIT (OUTPATIENT)
Dept: DERMATOLOGY | Facility: CLINIC | Age: 84
End: 2019-02-01
Payer: MEDICARE

## 2019-02-01 DIAGNOSIS — D48.5 NEOPLASM OF UNCERTAIN BEHAVIOR OF SKIN: Primary | ICD-10-CM

## 2019-02-01 DIAGNOSIS — L82.1 SEBORRHEIC KERATOSES: ICD-10-CM

## 2019-02-01 DIAGNOSIS — L81.4 LENTIGINES: ICD-10-CM

## 2019-02-01 PROCEDURE — 11102 PR TANGENTIAL BIOPSY, SKIN, SINGLE LESION: ICD-10-PCS | Mod: S$GLB,,, | Performed by: DERMATOLOGY

## 2019-02-01 PROCEDURE — 99213 OFFICE O/P EST LOW 20 MIN: CPT | Mod: 25,S$GLB,, | Performed by: DERMATOLOGY

## 2019-02-01 PROCEDURE — 88305 TISSUE SPECIMEN TO PATHOLOGY, DERMATOLOGY: ICD-10-PCS | Mod: 26,,, | Performed by: PATHOLOGY

## 2019-02-01 PROCEDURE — 1101F PR PT FALLS ASSESS DOC 0-1 FALLS W/OUT INJ PAST YR: ICD-10-PCS | Mod: CPTII,S$GLB,, | Performed by: DERMATOLOGY

## 2019-02-01 PROCEDURE — 1101F PT FALLS ASSESS-DOCD LE1/YR: CPT | Mod: CPTII,S$GLB,, | Performed by: DERMATOLOGY

## 2019-02-01 PROCEDURE — 88305 TISSUE EXAM BY PATHOLOGIST: CPT | Performed by: PATHOLOGY

## 2019-02-01 PROCEDURE — 99213 PR OFFICE/OUTPT VISIT, EST, LEVL III, 20-29 MIN: ICD-10-PCS | Mod: 25,S$GLB,, | Performed by: DERMATOLOGY

## 2019-02-01 PROCEDURE — 99999 PR PBB SHADOW E&M-EST. PATIENT-LVL III: ICD-10-PCS | Mod: PBBFAC,,, | Performed by: DERMATOLOGY

## 2019-02-01 PROCEDURE — 88305 TISSUE EXAM BY PATHOLOGIST: CPT | Mod: 26,,, | Performed by: PATHOLOGY

## 2019-02-01 PROCEDURE — 11102 TANGNTL BX SKIN SINGLE LES: CPT | Mod: S$GLB,,, | Performed by: DERMATOLOGY

## 2019-02-01 PROCEDURE — 99999 PR PBB SHADOW E&M-EST. PATIENT-LVL III: CPT | Mod: PBBFAC,,, | Performed by: DERMATOLOGY

## 2019-02-01 NOTE — PROGRESS NOTES
Subjective:       Patient ID:  Phi Sainz is a 89 y.o. male who presents for   Chief Complaint   Patient presents with    Lesion     History of Present Illness: The patient presents with chief complaint of spot.  Location: upper lip  Duration: 8 months  Signs/Symptoms: none    Prior treatments: tac cream          Review of Systems   Constitutional: Negative for fever.   Skin: Negative for itching and rash.   Hematologic/Lymphatic: Bruises/bleeds easily.        Objective:    Physical Exam   Constitutional: He appears well-developed and well-nourished. No distress.   Neurological: He is alert and oriented to person, place, and time. He is not disoriented.   Psychiatric: He has a normal mood and affect.   Skin:   Areas Examined (abnormalities noted in diagram):   Head / Face Inspection Performed  Neck Inspection Performed  Chest / Axilla Inspection Performed  Abdomen Inspection Performed  Back Inspection Performed  RUE Inspected  LUE Inspection Performed                   Diagram Legend     Erythematous scaling macule/papule c/w actinic keratosis       Vascular papule c/w angioma      Pigmented verrucoid papule/plaque c/w seborrheic keratosis      Yellow umbilicated papule c/w sebaceous hyperplasia      Irregularly shaped tan macule c/w lentigo     1-2 mm smooth white papules consistent with Milia      Movable subcutaneous cyst with punctum c/w epidermal inclusion cyst      Subcutaneous movable cyst c/w pilar cyst      Firm pink to brown papule c/w dermatofibroma      Pedunculated fleshy papule(s) c/w skin tag(s)      Evenly pigmented macule c/w junctional nevus     Mildly variegated pigmented, slightly irregular-bordered macule c/w mildly atypical nevus      Flesh colored to evenly pigmented papule c/w intradermal nevus       Pink pearly papule/plaque c/w basal cell carcinoma      Erythematous hyperkeratotic cursted plaque c/w SCC      Surgical scar with no sign of skin cancer recurrence      Open and  "closed comedones      Inflammatory papules and pustules      Verrucoid papule consistent consistent with wart     Erythematous eczematous patches and plaques     Dystrophic onycholytic nail with subungual debris c/w onychomycosis     Umbilicated papule    Erythematous-base heme-crusted tan verrucoid plaque consistent with inflamed seborrheic keratosis     Erythematous Silvery Scaling Plaque c/w Psoriasis     See annotation      Assessment / Plan:      Pathology Orders:     Normal Orders This Visit    Tissue Specimen To Pathology, Dermatology     Questions:    Directional Terms:  Other(comment)    Clinical Information:  r/o ca, non healing ulcer    Specific Site:  right upper lip        Neoplasm of uncertain behavior of skin  -     Tissue Specimen To Pathology, Dermatology  Shave biopsy performed after verbal consent including risk of infection, scar, recurrence, need for additional treatment of site. . Hemostasis achieved with  monsels. No complications. Dressing applied. Wound care explained. Will need further rx if + ca      Seborrheic keratoses  reassurance  Brochure provided      Lentigines  The "ABCD" rules to observe pigmented lesions were reviewed.               Follow-up in about 6 months (around 8/1/2019).  "

## 2019-02-05 ENCOUNTER — TELEPHONE (OUTPATIENT)
Dept: NEUROLOGY | Facility: CLINIC | Age: 84
End: 2019-02-05

## 2019-02-05 NOTE — TELEPHONE ENCOUNTER
----- Message from Rodolfo Gill sent at 2/5/2019  1:12 PM CST -----  Contact: Pt's Daughter Yani Ellis [phone]  Pt's Daughter Yani Ellis would like to be called back asap regarding scheduling Home Health and Physical Therapy for her dad. Daughter stated she hasn't heard from anyone since 1/8/19.    Daughter can be reached at [phone].    Thanks

## 2019-02-05 NOTE — TELEPHONE ENCOUNTER
Orders for Home Health has been sent to [facility] as original orders were sent to [facility] and they can not  [facility]. Medical Necessity form, last office notes, and order has been faxed to [facility] for POS. Patient's daughter called and could not leave information on voice at number below.

## 2019-02-11 ENCOUNTER — TELEPHONE (OUTPATIENT)
Dept: NEUROLOGY | Facility: CLINIC | Age: 84
End: 2019-02-11

## 2019-02-11 NOTE — TELEPHONE ENCOUNTER
----- Message from Sheri Desouza sent at 2/11/2019  1:26 PM CST -----  Contact: Yani (daughter) @ [phone]  Pt was last seen 1-8-19 and pts daughter was advised to contact Dr Forrester if pt had a  cognitive decline.  Pls call to discuss asap.  She says she sent a portal message on 1-21-19 and has not received a response yet.

## 2019-02-12 ENCOUNTER — TELEPHONE (OUTPATIENT)
Dept: DERMATOLOGY | Facility: CLINIC | Age: 84
End: 2019-02-12

## 2019-02-12 NOTE — TELEPHONE ENCOUNTER
Pt was scheduled for consult on 2/21/19, at 3:00 for bx proven BCC right upper lip. Pt (daughter) confirmed that this will only be a consult appointment.

## 2019-02-12 NOTE — TELEPHONE ENCOUNTER
----- Message from Cy Henriquez sent at 2/12/2019  1:27 PM CST -----  Contact: pt jose  Pt called to speak to nurse about appts that was discuss yesterday.          Pt callback number [phone]

## 2019-02-14 ENCOUNTER — OFFICE VISIT (OUTPATIENT)
Dept: PODIATRY | Facility: CLINIC | Age: 84
End: 2019-02-14
Payer: MEDICARE

## 2019-02-14 VITALS — WEIGHT: 166 LBS | BODY MASS INDEX: 23.24 KG/M2 | HEIGHT: 71 IN

## 2019-02-14 DIAGNOSIS — B35.1 DERMATOPHYTOSIS OF NAIL: Primary | ICD-10-CM

## 2019-02-14 DIAGNOSIS — L60.0 INGROWN NAIL: ICD-10-CM

## 2019-02-14 DIAGNOSIS — M48.062 SPINAL STENOSIS, LUMBAR REGION, WITH NEUROGENIC CLAUDICATION: ICD-10-CM

## 2019-02-14 PROCEDURE — 1101F PT FALLS ASSESS-DOCD LE1/YR: CPT | Mod: CPTII,S$GLB,, | Performed by: PODIATRIST

## 2019-02-14 PROCEDURE — 99999 PR PBB SHADOW E&M-EST. PATIENT-LVL III: ICD-10-PCS | Mod: PBBFAC,,, | Performed by: PODIATRIST

## 2019-02-14 PROCEDURE — 99213 PR OFFICE/OUTPT VISIT, EST, LEVL III, 20-29 MIN: ICD-10-PCS | Mod: S$GLB,,, | Performed by: PODIATRIST

## 2019-02-14 PROCEDURE — 99213 OFFICE O/P EST LOW 20 MIN: CPT | Mod: S$GLB,,, | Performed by: PODIATRIST

## 2019-02-14 PROCEDURE — 1101F PR PT FALLS ASSESS DOC 0-1 FALLS W/OUT INJ PAST YR: ICD-10-PCS | Mod: CPTII,S$GLB,, | Performed by: PODIATRIST

## 2019-02-14 PROCEDURE — 99999 PR PBB SHADOW E&M-EST. PATIENT-LVL III: CPT | Mod: PBBFAC,,, | Performed by: PODIATRIST

## 2019-02-15 ENCOUNTER — TELEPHONE (OUTPATIENT)
Dept: DERMATOLOGY | Facility: CLINIC | Age: 84
End: 2019-02-15

## 2019-02-15 NOTE — TELEPHONE ENCOUNTER
Spoke with jose and let her know that Dr. Hazel's staff is out of the office until next week and that I will send message over for staff to call her back. Daughter verbalized understanding.

## 2019-02-15 NOTE — TELEPHONE ENCOUNTER
----- Message from Marita Aquino sent at 2/15/2019 11:37 AM CST -----  Contact: patient daughter  [phone]-Yani Daughter-please call above patient daughter at number in message need to speak with the nurse thanks

## 2019-02-15 NOTE — TELEPHONE ENCOUNTER
Pt is scheduled for consult on 2/21/19 for BCC right upper lip. Pt daughter (Yani) called and wanted to make sure that this appt is only for consult and not surgery. She was informed that this appt is only for consultation. She appreciated that call back.

## 2019-02-15 NOTE — TELEPHONE ENCOUNTER
----- Message from Lorna Diaz MA sent at 2/15/2019 11:45 AM CST -----  Contact: patient daughter  Pt's daughter wants to know what will happen at the appointment on Thursday. Pt's daughter is very nervous and would like someone to call him back.  ----- Message -----  From: Marita Aquino  Sent: 2/15/2019  11:37 AM  To: Yasemin Kinney S Staff    [phone]-Yani Daughter-please call above patient daughter at number in message need to speak with the nurse thanks

## 2019-02-18 ENCOUNTER — OFFICE VISIT (OUTPATIENT)
Dept: NEUROLOGY | Facility: CLINIC | Age: 84
End: 2019-02-18
Payer: MEDICARE

## 2019-02-18 DIAGNOSIS — G20.A1 PARKINSON DISEASE: Primary | ICD-10-CM

## 2019-02-18 PROCEDURE — 1101F PR PT FALLS ASSESS DOC 0-1 FALLS W/OUT INJ PAST YR: ICD-10-PCS | Mod: CPTII,S$GLB,, | Performed by: NURSE PRACTITIONER

## 2019-02-18 PROCEDURE — 99212 OFFICE O/P EST SF 10 MIN: CPT | Mod: S$GLB,,, | Performed by: NURSE PRACTITIONER

## 2019-02-18 PROCEDURE — 99999 PR PBB SHADOW E&M-EST. PATIENT-LVL III: CPT | Mod: PBBFAC,,, | Performed by: NURSE PRACTITIONER

## 2019-02-18 PROCEDURE — 99499 RISK ADDL DX/OHS AUDIT: ICD-10-PCS | Mod: S$GLB,,, | Performed by: NURSE PRACTITIONER

## 2019-02-18 PROCEDURE — 99499 UNLISTED E&M SERVICE: CPT | Mod: S$GLB,,, | Performed by: NURSE PRACTITIONER

## 2019-02-18 PROCEDURE — 99212 PR OFFICE/OUTPT VISIT, EST, LEVL II, 10-19 MIN: ICD-10-PCS | Mod: S$GLB,,, | Performed by: NURSE PRACTITIONER

## 2019-02-18 PROCEDURE — 99999 PR PBB SHADOW E&M-EST. PATIENT-LVL III: ICD-10-PCS | Mod: PBBFAC,,, | Performed by: NURSE PRACTITIONER

## 2019-02-18 PROCEDURE — 1101F PT FALLS ASSESS-DOCD LE1/YR: CPT | Mod: CPTII,S$GLB,, | Performed by: NURSE PRACTITIONER

## 2019-02-18 RX ORDER — RASAGILINE 1 MG/1
1 TABLET ORAL DAILY
Qty: 30 TABLET | Refills: 5 | Status: SHIPPED | OUTPATIENT
Start: 2019-02-18 | End: 2019-09-12 | Stop reason: SDUPTHER

## 2019-02-18 RX ORDER — CARBIDOPA AND LEVODOPA 25; 100 MG/1; MG/1
TABLET ORAL
Qty: 90 TABLET | Refills: 11 | Status: SHIPPED | OUTPATIENT
Start: 2019-02-18 | End: 2020-03-03

## 2019-02-18 NOTE — PROGRESS NOTES
Subjective:      Patient ID: Phi Sainz is a 89 y.o. male.    Chief Complaint: Follow-up (Ingrown, Bilateral)    Phi Sainz is a 89 y.o. male who presents to the clinic complaining of painful ingrown hallux toenail on both feet.  Worse in tight shoes.  No drainage noted from the area.  No recent history of trauma to the feet.  He has history of neurogenic claudication and chronic DVT.  He is on Eliquis.    He is using kerasal on his toenails.    complains of right toe pain, medial aspect.  Worse with direct palpation.  No trauma noted.        PCP:  Yanna Santana MD  Last PCP visit:  12/17/18      Past Medical History:   Diagnosis Date    Allergy     Anemia     Arthritis     Depression     Herniation of intervertebral disc     Hyperlipidemia     Hypertension 3/4/2015    Macular degeneration     Parkinson disease     Spinal stenosis          Current Outpatient Medications on File Prior to Visit   Medication Sig Dispense Refill    apixaban (ELIQUIS) 2.5 mg Tab Take 1 tablet (2.5 mg total) by mouth 2 (two) times daily. 60 tablet 6    diphenoxylate-atropine 2.5-0.025 mg (LOMOTIL) 2.5-0.025 mg per tablet Take 1 tablet by mouth 2 (two) times daily. (Patient taking differently: Take 1 tablet by mouth 2 (two) times daily. Pt taking one pill twice a day.) 60 tablet 0    escitalopram oxalate (LEXAPRO) 10 MG tablet TAKE 1 TABLET (10 MG TOTAL) BY MOUTH ONCE DAILY. 30 tablet 5    gabapentin (NEURONTIN) 100 MG capsule Take 300 mg by mouth 2 (two) times daily. Pt taking one pill twice a day.      hydrocodone-acetaminophen 5-325mg (NORCO) 5-325 mg per tablet Pt taking one pill once a day.  0    mesalamine (APRISO) 0.375 gram Cp24 Take 1.5 g by mouth once daily. Pt taking 4 pills in am.      midodrine (PROAMATINE) 2.5 MG Tab Take 1 tablet (2.5 mg total) by mouth 3 (three) times daily. 90 tablet 6    [DISCONTINUED] carbidopa-levodopa  mg (SINEMET)  mg per tablet Take 1.5 tablets  "by mouth 3 (three) times daily. 135 tablet 11     No current facility-administered medications on file prior to visit.          Review of patient's allergies indicates:   Allergen Reactions    Combigan [brimonidine-timolol]      Made him dizziness    Contrast media Rash         Social History     Socioeconomic History    Marital status:      Spouse name: Not on file    Number of children: Not on file    Years of education: Not on file    Highest education level: Not on file   Social Needs    Financial resource strain: Not on file    Food insecurity - worry: Not on file    Food insecurity - inability: Not on file    Transportation needs - medical: Not on file    Transportation needs - non-medical: Not on file   Occupational History    Occupation: retired   Tobacco Use    Smoking status: Never Smoker    Smokeless tobacco: Never Used   Substance and Sexual Activity    Alcohol use: No     Alcohol/week: 0.0 oz    Drug use: No    Sexual activity: Yes     Partners: Female   Other Topics Concern    Not on file   Social History Narrative    Not on file           Review of Systems   Constitution: Negative for chills, fever and malaise/fatigue.   HENT: Negative for hearing loss.    Cardiovascular: Positive for leg swelling. Negative for claudication.   Respiratory: Negative for shortness of breath.    Skin: Positive for color change, dry skin, nail changes and unusual hair distribution. Negative for flushing and rash.   Musculoskeletal: Negative for joint pain and myalgias.   Neurological: Negative for loss of balance, numbness, paresthesias and sensory change.   Psychiatric/Behavioral: Negative for altered mental status.           Objective:        Vitals:    02/14/19 1222   Weight: 75.3 kg (166 lb)   Height: 5' 11" (1.803 m)           Physical Exam   Constitutional: He is oriented to person, place, and time. He appears well-developed and well-nourished. He is cooperative.   Cardiovascular: "   Pulses:       Dorsalis pedis pulses are 0 on the right side, and 0 on the left side.        Posterior tibial pulses are 0 on the right side, and 0 on the left side.   +2 PE left  +1 right   Musculoskeletal: He exhibits edema and tenderness.        Right knee: He exhibits no swelling and no ecchymosis.        Left knee: He exhibits no swelling and no ecchymosis.        Right ankle: He exhibits decreased range of motion and abnormal pulse. He exhibits no swelling and no ecchymosis. No lateral malleolus, no medial malleolus and no head of 5th metatarsal tenderness found. Achilles tendon exhibits no pain, no defect and normal Wilkinson's test results.        Left ankle: He exhibits decreased range of motion and abnormal pulse. He exhibits no swelling and no ecchymosis. No lateral malleolus, no medial malleolus and no head of 5th metatarsal tenderness found. Achilles tendon exhibits no pain and normal Wilkinson's test results.        Right lower leg: He exhibits no tenderness, no bony tenderness, no swelling, no edema and no deformity.        Left lower leg: He exhibits no tenderness, no swelling and no edema.        Right foot: There is decreased range of motion. There is no deformity.        Left foot: There is decreased range of motion. There is no deformity.   Decreased ROM with out joint pain nor crepitation to bilateral feet and ankle joints.  Muscle strength 4/5 in all groups bilaterally  Decreased height of medial arches noted with loading of the foot b/L     Neurological: He is alert and oriented to person, place, and time.   Diminished protective sensation noted b/L       Skin: Skin is warm and dry. Capillary refill takes more than 3 seconds. No abrasion, no bruising, no burn and no ecchymosis noted.   B/L hallux medial borders mildly ingrowing. No erythema or paronychia noted. No drainage.  Nails x10 are elongated by  3-4mm's, thickened by 2-4 mm's, dystrophic, and are yellowish in  coloration . Xerosis  Bilaterally. No open lesions noted.     Webspaces 1-4 b/L clean, dry, and intact.    Superficial skin sore, medial right 5th toe due to abutting adjacent toe.  No open wound.  No drainage.  No redness. Minimally macerated.      Psychiatric: He has a normal mood and affect. His speech is normal and behavior is normal. He is attentive.   Vitals reviewed.            Assessment:       Problem List Items Addressed This Visit     Spinal stenosis, lumbar region, with neurogenic claudication      Other Visit Diagnoses     Dermatophytosis of nail    -  Primary    Ingrown nail                  Plan:         - I counseled the patient on his conditions, their implications and medical management.    - Toe spacer in between the right 4th and 5th toes as demonstrated.  Wide toe box shoes.    - Shoe inspection. Patient instructed on proper foot hygeine. We discussed wearing proper shoe gear, daily foot inspections, never walking without protective shoe gear, never putting sharp instruments to feet, routine podiatric nail visits every 2-3 months.              Alissa Black DPM  NPI: [id number]         [facility] - PODIATRY  [address] [place] [id number]  Dept: [phone]  Dept Fax: [phone]

## 2019-02-18 NOTE — PROGRESS NOTES
Name: Phi Sainz  MRN: [id number]   CSN: [id number]      Date: 2-18-19      Referring physician:  Aaareferral Self  No address on file    Subjective:      Chief Complaint: memory     History of Present Illness (HPI):    Phi Sainz is a 89 y.o.  male who presents today for an evaluation of memory loss, self referred per schedule but was recently seen by Dr. Forrester 1-9-19 for PD. At that time, cd/ld increased to 1.5 tabs TID. PT thru HH ordered. He is accompanied by daughter and granddaughter. He arrives 25 min late for his visit today.     He feels his memory is worse.   He is having delusions - thinking someone is there to pick him up. This happens more at night. He does not have hallucinations.   They feel his memory is worse and he is having more issues with finding words with increase of cd/ld.   This all started with increase of cd/ld to 1.5 tabs TID (7-noon-7).     He agrees with the hx his daughter and granddaughter have given.    They all agree that the increase of cd/ld otherwise helped his physical symptoms.         Review of Systems   Neurological:        Diff with word find  Memory not as good on increase cd/ld   Psychiatric/Behavioral: Negative for hallucinations.        Delusions       Past Medical History: The patient  has a past medical history of Allergy, Anemia, Arthritis, Depression, Herniation of intervertebral disc, Hyperlipidemia, Hypertension (3/4/2015), Macular degeneration, Parkinson disease, and Spinal stenosis.    Social History: The patient  reports that  has never smoked. he has never used smokeless tobacco. He reports that he does not drink alcohol or use drugs.    Family History: Their family history includes Cancer in his father; Colon cancer in his father; Hearing loss in his paternal aunt.    Allergies: Combigan [brimonidine-timolol] and Contrast media     Meds:   Current Outpatient Medications on File Prior to Visit   Medication Sig Dispense Refill    apixaban  (ELIQUIS) 2.5 mg Tab Take 1 tablet (2.5 mg total) by mouth 2 (two) times daily. 60 tablet 6    diphenoxylate-atropine 2.5-0.025 mg (LOMOTIL) 2.5-0.025 mg per tablet Take 1 tablet by mouth 2 (two) times daily. (Patient taking differently: Take 1 tablet by mouth 2 (two) times daily. Pt taking one pill twice a day.) 60 tablet 0    escitalopram oxalate (LEXAPRO) 10 MG tablet TAKE 1 TABLET (10 MG TOTAL) BY MOUTH ONCE DAILY. 30 tablet 5    gabapentin (NEURONTIN) 100 MG capsule Take 300 mg by mouth 2 (two) times daily. Pt taking one pill twice a day.      hydrocodone-acetaminophen 5-325mg (NORCO) 5-325 mg per tablet Pt taking one pill once a day.  0    mesalamine (APRISO) 0.375 gram Cp24 Take 1.5 g by mouth once daily. Pt taking 4 pills in am.      midodrine (PROAMATINE) 2.5 MG Tab Take 1 tablet (2.5 mg total) by mouth 3 (three) times daily. 90 tablet 6    [DISCONTINUED] carbidopa-levodopa  mg (SINEMET)  mg per tablet Take 1.5 tablets by mouth 3 (three) times daily. 135 tablet 11     No current facility-administered medications on file prior to visit.        Objective:     Physical Exam:      Constitutional  Well-developed, well-nourished, appears stated age     Flat affect.   Content to let family answer for him but will respond when asked directly.    WC  NO evidence of tremor today.     Laboratory Results:  Lab Visit on 12/12/2018   Component Date Value Ref Range Status    Sodium 12/12/2018 142  136 - 145 mmol/L Final    Potassium 12/12/2018 3.8  3.5 - 5.1 mmol/L Final    Chloride 12/12/2018 104  95 - 110 mmol/L Final    CO2 12/12/2018 29  23 - 29 mmol/L Final    Glucose 12/12/2018 86  70 - 110 mg/dL Final    BUN, Bld 12/12/2018 17  8 - 23 mg/dL Final    Creatinine 12/12/2018 1.1  0.5 - 1.4 mg/dL Final    Calcium 12/12/2018 9.4  8.7 - 10.5 mg/dL Final    Total Protein 12/12/2018 7.4  6.0 - 8.4 g/dL Final    Albumin 12/12/2018 3.5  3.5 - 5.2 g/dL Final    Total Bilirubin 12/12/2018 0.8  0.1  - 1.0 mg/dL Final    Alkaline Phosphatase 12/12/2018 75  55 - 135 U/L Final    AST 12/12/2018 16  10 - 40 U/L Final    ALT 12/12/2018 5* 10 - 44 U/L Final    Anion Gap 12/12/2018 9  8 - 16 mmol/L Final    eGFR if African American 12/12/2018 >60.0  >60 mL/min/1.73 m^2 Final    eGFR if non African American 12/12/2018 59.2* >60 mL/min/1.73 m^2 Final    TSH 12/12/2018 2.901  0.400 - 4.000 uIU/mL Final    Cholesterol 12/12/2018 209* 120 - 199 mg/dL Final    Triglycerides 12/12/2018 85  30 - 150 mg/dL Final    HDL 12/12/2018 61  40 - 75 mg/dL Final    LDL Cholesterol 12/12/2018 131.0  63.0 - 159.0 mg/dL Final    HDL/Chol Ratio 12/12/2018 29.2  20.0 - 50.0 % Final    Total Cholesterol/HDL Ratio 12/12/2018 3.4  2.0 - 5.0 Final    Non-HDL Cholesterol 12/12/2018 148  mg/dL Final           Assessment and Plan     Parkinson disease  -     carbidopa-levodopa  mg (SINEMET)  mg per tablet; Take 1 tablet three times daily.  Dispense: 90 tablet; Refill: 11  -     rasagiline (AZILECT) 1 mg Tab; Take 1 tablet (1 mg total) by mouth once daily.  Dispense: 30 tablet; Refill: 5        Medical Decision Making:    Reduce cd/ld from 1.5 tabs to 1 tab.   In 1-2 weeks, add rasagiline 1 mg daily.   Call for problems.   Follow up with Dr. Forrester as scheduled.     I spent 12 minutes face-to-face with the patient and family with >98% of the time spent with counseling and education regarding:  - results of data, diagnosis, and recommendations stated above  - risks and benefits of cd/ld and rasagiline  -discussed rasagline with excitalopram      Adriana Carcamo, DNP, NP-C  Division of Movement and Memory Disorders  [facility]  [phone]

## 2019-02-18 NOTE — PATIENT INSTRUCTIONS
Reduce carbidopa / levodopa 25/100 to 1 tablet three times daily to see if this helps.     In 1-2 weeks, begin rasagiline (Azilect) 1 mg daily.

## 2019-02-21 ENCOUNTER — INITIAL CONSULT (OUTPATIENT)
Dept: DERMATOLOGY | Facility: CLINIC | Age: 84
End: 2019-02-21
Payer: MEDICARE

## 2019-02-21 VITALS
HEART RATE: 57 BPM | WEIGHT: 166 LBS | HEIGHT: 71 IN | BODY MASS INDEX: 23.24 KG/M2 | SYSTOLIC BLOOD PRESSURE: 153 MMHG | DIASTOLIC BLOOD PRESSURE: 84 MMHG

## 2019-02-21 DIAGNOSIS — C44.01 BASAL CELL CARCINOMA OF SKIN OF RIGHT UPPER LIP: Primary | ICD-10-CM

## 2019-02-21 PROCEDURE — 99214 PR OFFICE/OUTPT VISIT, EST, LEVL IV, 30-39 MIN: ICD-10-PCS | Mod: S$GLB,,, | Performed by: DERMATOLOGY

## 2019-02-21 PROCEDURE — 99999 PR PBB SHADOW E&M-EST. PATIENT-LVL IV: CPT | Mod: PBBFAC,,, | Performed by: DERMATOLOGY

## 2019-02-21 PROCEDURE — 99999 PR PBB SHADOW E&M-EST. PATIENT-LVL IV: ICD-10-PCS | Mod: PBBFAC,,, | Performed by: DERMATOLOGY

## 2019-02-21 PROCEDURE — 99214 OFFICE O/P EST MOD 30 MIN: CPT | Mod: S$GLB,,, | Performed by: DERMATOLOGY

## 2019-02-21 PROCEDURE — 1101F PR PT FALLS ASSESS DOC 0-1 FALLS W/OUT INJ PAST YR: ICD-10-PCS | Mod: CPTII,S$GLB,, | Performed by: DERMATOLOGY

## 2019-02-21 PROCEDURE — 1101F PT FALLS ASSESS-DOCD LE1/YR: CPT | Mod: CPTII,S$GLB,, | Performed by: DERMATOLOGY

## 2019-02-21 NOTE — PROGRESS NOTES
REFERRING MD:  Lindsey Martinez M.D.    CHIEF COMPLAINT:  New patient being consulted for Mohs' surgery evaluation.    HISTORY OF PRESENT ILLNESS:  89 y.o. male presents with a 1 year(s) history of growth on the right upper lip.  Not healing, bleeding.    Positive for scabbing.  Positive for crusting.  Positive for bleeding.  Negative for itching.    Biopsy consistent with basal cell carcinoma.     No prior treatment.    Pacemaker: No  Defibrillator: No  Artificial joints: No  Artificial heart valves: No    PAST MEDICAL HISTORY:  Past Medical History:   Diagnosis Date    Allergy     Anemia     Arthritis     Basal cell carcinoma     Depression     Herniation of intervertebral disc     Hyperlipidemia     Hypertension 3/4/2015    Macular degeneration     Parkinson disease     Spinal stenosis             SOCIAL HISTORY:  Dependencies:  never smoked    PERTINENT MEDICATIONS:  See medications list.  Apixaban    ALLERGIES:  Combigan [brimonidine-timolol] and Contrast media    ROS:  Skin: See HPI and No dry skin, injection site reaction, or itchy skin  Constitutional: No fatigue, fever, malaise, weight loss, or night sweats.  Cardiovascular: No chest pain, palpitations, or edema.  Respiratory: No coughing, wheezing, SOB, or sputum production.    PE:  Physical Exam   HENT:   Mouth/Throat:           General: Mood and affect normal. Alert and orient X3. Normal appearance.  Head/Face:  no suspicious lesions  Lips/Teeth/Gums: R cutaneous upper lip at medial vermilion with an ill-defined 10 x 10 mm pink pearly plaque located 2.5 cm from right oral commissure and 4.5 cm from left oral commissure.  Lymph nodes: Bilateral pre-auricular, post-auricular, anterior cervical, posterior cervical, sublingual, submental, and occipital are not enlarged          IMPRESSION:  Biopsy proven basal cell carcinoma nodular, right upper lip, path# [id number].    PLAN:  The diagnosis and the pathology report were discussed in detail with the  patient. Treatment options were reviewed, including Mohs Micrographic Surgery, radiation, topical therapy, and standard excision.  After careful review of patient's history and physical exam, and after discussion of treatment options, the decision was made to perform Mohs micrographic surgery.  Everything discussed in presence of daughter and caregiver who agree with the plan.    Scheduled patient for Mohs Micrographic Surgery. Risks, benefits, and alternatives of Mohs' surgery discussed with the patient. Discussed repair options including complex closure, skin flap, skin graft and second intention healing with the patient. Pre-operative instructions provided to the patient. Disc risk of lip thinning, asymmetry.   Disc possible need for ENT if large defect. Also will message Dr. Esposito to see if he can stop Eliquis a few days prior to procedure.  Family states Dr Esposito had already offered to stop Eliquis in the past but patient just continued on his own per family decision.    Spent 30 minutes in coordination of care and/or consultation with patient discussing diagnosis, treatment options, risks and benefits of each. All questions answered.

## 2019-02-26 ENCOUNTER — TELEPHONE (OUTPATIENT)
Dept: DERMATOLOGY | Facility: CLINIC | Age: 84
End: 2019-02-26

## 2019-02-26 ENCOUNTER — OFFICE VISIT (OUTPATIENT)
Dept: URGENT CARE | Facility: CLINIC | Age: 84
End: 2019-02-26
Payer: MEDICARE

## 2019-02-26 ENCOUNTER — PATIENT MESSAGE (OUTPATIENT)
Dept: NEUROLOGY | Facility: CLINIC | Age: 84
End: 2019-02-26

## 2019-02-26 VITALS
DIASTOLIC BLOOD PRESSURE: 74 MMHG | BODY MASS INDEX: 23.24 KG/M2 | WEIGHT: 166 LBS | RESPIRATION RATE: 16 BRPM | HEIGHT: 71 IN | TEMPERATURE: 98 F | SYSTOLIC BLOOD PRESSURE: 128 MMHG

## 2019-02-26 DIAGNOSIS — R30.0 DYSURIA: ICD-10-CM

## 2019-02-26 DIAGNOSIS — R35.0 URINARY FREQUENCY: Primary | ICD-10-CM

## 2019-02-26 LAB
BILIRUB UR QL STRIP: NEGATIVE
GLUCOSE UR QL STRIP: NEGATIVE
KETONES UR QL STRIP: NEGATIVE
LEUKOCYTE ESTERASE UR QL STRIP: POSITIVE
PH, POC UA: 6 (ref 5–8)
POC BLOOD, URINE: POSITIVE
POC NITRATES, URINE: NEGATIVE
PROT UR QL STRIP: NEGATIVE
SP GR UR STRIP: 1.01 (ref 1–1.03)
UROBILINOGEN UR STRIP-ACNC: NORMAL (ref 0.3–2.2)

## 2019-02-26 PROCEDURE — 1101F PT FALLS ASSESS-DOCD LE1/YR: CPT | Mod: CPTII,S$GLB,, | Performed by: FAMILY MEDICINE

## 2019-02-26 PROCEDURE — 81003 POCT URINALYSIS, DIPSTICK, AUTOMATED, W/O SCOPE: ICD-10-PCS | Mod: QW,S$GLB,, | Performed by: FAMILY MEDICINE

## 2019-02-26 PROCEDURE — 81003 URINALYSIS AUTO W/O SCOPE: CPT | Mod: QW,S$GLB,, | Performed by: FAMILY MEDICINE

## 2019-02-26 PROCEDURE — 1101F PR PT FALLS ASSESS DOC 0-1 FALLS W/OUT INJ PAST YR: ICD-10-PCS | Mod: CPTII,S$GLB,, | Performed by: FAMILY MEDICINE

## 2019-02-26 PROCEDURE — 99214 OFFICE O/P EST MOD 30 MIN: CPT | Mod: S$GLB,,, | Performed by: FAMILY MEDICINE

## 2019-02-26 PROCEDURE — 99214 PR OFFICE/OUTPT VISIT, EST, LEVL IV, 30-39 MIN: ICD-10-PCS | Mod: S$GLB,,, | Performed by: FAMILY MEDICINE

## 2019-02-26 RX ORDER — CIPROFLOXACIN 500 MG/1
500 TABLET ORAL 2 TIMES DAILY
Qty: 20 TABLET | Refills: 0 | Status: SHIPPED | OUTPATIENT
Start: 2019-02-26 | End: 2019-03-08

## 2019-02-26 NOTE — PATIENT INSTRUCTIONS
Confusion  Confusion is a change in a persons ability to think clearly. There may be trouble recognizing familiar people and places or knowing what day it is. Memory, judgment, and decision-making may also be affected. In severe cases, the person may have limited or no response to being spoken to.  Confusion is usually a sign of an underlying problem. It may occur suddenly. Or it may develop gradually over time. Causes of confusion include brain injury, medicines, alcohol, withdrawal from certain medicines or illegal drugs, and infection. Heart attack and stroke may cause it. Confusion can also be a sign of dementia or a mental illness.  Treatment will depend on the cause of the problem. If the issue is a medicine, stopping the medicine may help. The healthcare provider will perform an evaluation and certain tests may be done. Follow up with the healthcare provider for the results.  Home care  · Be sure someone is with the confused person at all times. He or she should not be left alone or unsupervised.  · Tell the healthcare provider about all medicines that the person takes. These include prescription, over-the-counter, herbs, and supplements.  · Dehydration can increase confusion. Ask the healthcare provider how much fluid the person should be drinking. Offer liquids and ensure that they are taken.  · Keep all medicines in a secure place under the caregivers control. To prevent overdose, a confused person should take medicines only under the supervision of a caregiver.  · To help a person with confusion:  ¨ Establish a daily routine. Change can be a source of stress for someone with confusion. Make and keep a time schedule for common tasks such as bathing, dressing, taking medicines, meals, going for walks, shopping, naps and bed time.  ¨ Speak slowly and clearly with a gentle tone of voice. Use short simple words and sentences. Ask one question at a time. Do not interrupt, criticize or argue. Be calm and  supportive. Use friendly facial expressions. Use pointing and touching to help communicate. If there has been loss of long-term memory, do not ask questions about past events. This would only cause frustration for the person.  ¨ Use lists, signs, family photos, clocks and calendars as memory aids. Label cabinets and drawers. Try to distract, not confront, the patient. When he/she becomes frustrated or upset, redirect his/her attention to eating or some other activity of interest.  ¨ If this proves to be due to a permanent condition, talk to the healthcare provider or a  about getting a Power of  for healthcare and for financial decisions. It is best to do this while the person can still sign legal documents and make his or her own decisions. Otherwise, a court order will be required.  Follow-up care  Follow up with the person's healthcare provider or as advised for further testing or changes in medical care.  When to seek medical advice  Call the healthcare provider for any of the following:  · Frequent falling  · Refusal to eat or drink  · Violent behavior or behavior too difficult to manage at home  · New hallucinations or delusions  · Increased drowsiness  · Complaints of headache or numbness or weakness of the face, arm or leg  · Nausea or vomiting  · Slurred speech or trouble speaking, walking, or seeing  · Fainting spell, dizziness or seizure  · Unexplained fever over 100.4º F (38.0º C) or as directed by the healthcare provider  Date Last Reviewed: 8/23/2015  © 1584-5685 [employer]. [address] [place] [address], [place] [address]. All rights reserved. This information is not intended as a substitute for professional medical care. Always follow your healthcare professional's instructions.

## 2019-02-26 NOTE — TELEPHONE ENCOUNTER
Spoke to pt's daughter about pt stopping Eliquis 3 days prior to Mohs surgery, ok per Dr. Morrison. Pt's daughter verbally stated understanding.    ----- Message from Rody Morrison MD sent at 2/26/2019  8:39 AM CST -----  Regarding: RE: Eliquis and Mohs surgery  Yes, he can stop for 72 hours and restart 24 hours later unless otherwise specified  ----- Message -----  From: Bobbi Black PA-C  Sent: 2/25/2019   4:36 PM  To: Rody Morrison MD  Subject: Eliquis and Mohs surgery                         Hi Dr. Morrison,    Pt is scheduled for Mohs surgery for a basal cell carcinoma on the right upper lip on 3/13/19. Can patient stop Eliquis a few days prior to procedure? Thanks.    Bobbi

## 2019-02-26 NOTE — PROGRESS NOTES
"Subjective:       Patient ID: Phi Sainz is a 89 y.o. male.    Vitals:  height is 5' 11" (1.803 m) and weight is 75.3 kg (166 lb). His oral temperature is 97.6 °F (36.4 °C). His blood pressure is 128/74. His respiration is 16.     Chief Complaint: No chief complaint on file.    Pt has altered mental status and urinary frequency for 2 weeks. Pt is taking medication with side affect of altered mental status. Dose was adjusted but symptoms did not resolve.       Urinary Frequency    This is a new problem. The current episode started 1 to 4 weeks ago. The problem has been unchanged. The patient is experiencing no pain. There has been no fever. He is not sexually active. There is no history of pyelonephritis. Associated symptoms include frequency. Pertinent negatives include no chills, hematuria, nausea, urgency, vomiting or rash. There is no history of diabetes mellitus, kidney stones, recurrent UTIs or a single kidney.       Constitution: Negative for chills and fever.   Neck: Negative for painful lymph nodes.   Gastrointestinal: Negative for abdominal pain, nausea and vomiting.   Genitourinary: Positive for frequency. Negative for dysuria, urgency, urine decreased, hematuria, history of kidney stones, genital trauma, painful intercourse, genital sore, penile discharge, painful ejaculation, penile pain, penile swelling, scrotal swelling and testicular pain.   Musculoskeletal: Negative for back pain.   Skin: Negative for rash and lesion.   Hematologic/Lymphatic: Negative for swollen lymph nodes.       Objective:      Physical Exam   Constitutional: He is oriented to person, place, and time. He appears well-developed and well-nourished. He is cooperative.  Non-toxic appearance. He does not appear ill. No distress.   Alert and oriented to person and place   HENT:   Head: Normocephalic and atraumatic.   Right Ear: Hearing, tympanic membrane, external ear and ear canal normal.   Left Ear: Hearing, tympanic " membrane, external ear and ear canal normal.   Nose: Nose normal. No mucosal edema, rhinorrhea or nasal deformity. No epistaxis. Right sinus exhibits no maxillary sinus tenderness and no frontal sinus tenderness. Left sinus exhibits no maxillary sinus tenderness and no frontal sinus tenderness.   Mouth/Throat: Uvula is midline, oropharynx is clear and moist and mucous membranes are normal. No trismus in the jaw. Normal dentition. No uvula swelling. No posterior oropharyngeal erythema.   Eyes: Conjunctivae and lids are normal. Right eye exhibits no discharge. Left eye exhibits no discharge. No scleral icterus.   Sclera clear bilat   Neck: Trachea normal, normal range of motion, full passive range of motion without pain and phonation normal. Neck supple.   Cardiovascular: Normal rate, regular rhythm, normal heart sounds, intact distal pulses and normal pulses. Exam reveals no friction rub.   No murmur heard.  Pulmonary/Chest: Effort normal and breath sounds normal. No respiratory distress.   Abdominal: Soft. Normal appearance and bowel sounds are normal. He exhibits no distension, no pulsatile midline mass and no mass. There is no tenderness.   Musculoskeletal: Normal range of motion. He exhibits no edema or deformity.   Neurological: He is alert and oriented to person, place, and time. He exhibits normal muscle tone. Coordination normal.   Skin: Skin is warm, dry and intact. He is not diaphoretic. No pallor.   Psychiatric: He has a normal mood and affect. His speech is normal and behavior is normal. Judgment and thought content normal. Cognition and memory are normal.   Nursing note and vitals reviewed.      Assessment:       1. Urinary frequency    2. Dysuria        Plan:         Urinary frequency  -     POCT Urinalysis, Dipstick, Automated, W/O Scope    Dysuria    Other orders  -     ciprofloxacin HCl (CIPRO) 500 MG tablet; Take 1 tablet (500 mg total) by mouth 2 (two) times daily. for 10 days  Dispense: 20 tablet;  Refill: 0    Force fluid  FU with PCP

## 2019-03-04 ENCOUNTER — TELEPHONE (OUTPATIENT)
Dept: ADMINISTRATIVE | Facility: CLINIC | Age: 84
End: 2019-03-04

## 2019-03-04 NOTE — TELEPHONE ENCOUNTER
Home Health SOC 02/08/2019 - 04/08/2019 with [facility] ([place] - Dr. Harshad Thompson. Patient received PTand OT services.

## 2019-03-13 ENCOUNTER — PROCEDURE VISIT (OUTPATIENT)
Dept: DERMATOLOGY | Facility: CLINIC | Age: 84
End: 2019-03-13
Payer: MEDICARE

## 2019-03-13 ENCOUNTER — PROCEDURE VISIT (OUTPATIENT)
Dept: OTOLARYNGOLOGY | Facility: CLINIC | Age: 84
End: 2019-03-13
Payer: MEDICARE

## 2019-03-13 VITALS
SYSTOLIC BLOOD PRESSURE: 136 MMHG | BODY MASS INDEX: 23.24 KG/M2 | HEIGHT: 71 IN | WEIGHT: 166 LBS | DIASTOLIC BLOOD PRESSURE: 89 MMHG | HEART RATE: 68 BPM

## 2019-03-13 DIAGNOSIS — K13.0: Primary | ICD-10-CM

## 2019-03-13 DIAGNOSIS — C44.01 BASAL CELL CARCINOMA, LIP: Primary | ICD-10-CM

## 2019-03-13 PROCEDURE — 17312 MOHS ADDL STAGE: CPT | Mod: S$GLB,,, | Performed by: DERMATOLOGY

## 2019-03-13 PROCEDURE — 99499 NO LOS: ICD-10-PCS | Mod: S$GLB,,, | Performed by: DERMATOLOGY

## 2019-03-13 PROCEDURE — 17311 MOHS 1 STAGE H/N/HF/G: CPT | Mod: S$GLB,,, | Performed by: DERMATOLOGY

## 2019-03-13 PROCEDURE — 40510 PARTIAL EXCISION OF LIP: CPT | Mod: S$GLB,,, | Performed by: OTOLARYNGOLOGY

## 2019-03-13 PROCEDURE — 40510 PR PARTIAL EXCIS LIP,WEDGE PRIM CLOS: ICD-10-PCS | Mod: S$GLB,,, | Performed by: OTOLARYNGOLOGY

## 2019-03-13 PROCEDURE — 17312: ICD-10-PCS | Mod: S$GLB,,, | Performed by: DERMATOLOGY

## 2019-03-13 PROCEDURE — 99499 UNLISTED E&M SERVICE: CPT | Mod: S$GLB,,, | Performed by: DERMATOLOGY

## 2019-03-13 PROCEDURE — 17311: ICD-10-PCS | Mod: S$GLB,,, | Performed by: DERMATOLOGY

## 2019-03-13 RX ORDER — OXYCODONE AND ACETAMINOPHEN 5; 325 MG/1; MG/1
1 TABLET ORAL
Qty: 20 TABLET | Refills: 0 | Status: SHIPPED | OUTPATIENT
Start: 2019-03-13 | End: 2019-03-21 | Stop reason: HOSPADM

## 2019-03-13 RX ORDER — CEPHALEXIN 500 MG/1
500 CAPSULE ORAL 3 TIMES DAILY
Qty: 21 CAPSULE | Refills: 0 | Status: SHIPPED | OUTPATIENT
Start: 2019-03-13 | End: 2019-03-21

## 2019-03-13 NOTE — PROGRESS NOTES
PROCEDURE: Mohs' Micrographic Surgery    INDICATION: Location in mask areas of face including central face, nose, eyelids, eyebrows, lips, chin, preauricular, temple, and ear. Size > 1.0 cm on face. Biopsy-proven skin cancer of cosmetically and functionally important areas, including head, neck, genital, hand, foot, or areas known for having difficulty in healing, such as the lower anterior legs. Tumors with aggressive clinical behavior (rapidly growing, greater than 1 cm in diameter). Tumor with ill-defined borders.    REFERRING MD: Lindsey Martinez M.D.    CASE NUMBER:     ANESTHETIC: 7 cc 0.5% Lidocaine with Epi 1:200,000 mixed 1:1 with 0.5% Bupivacaine    SURGICAL PREP: Betadine    SURGEON: Nirmal Hazel MD    ASSISTANTS: Bobbi Black PA-C, Cara Quinonez MA and Josie Bran, Surg Tech    PREOPERATIVE DIAGNOSIS: basal cell carcinoma    POSTOPERATIVE DIAGNOSIS: basal cell carcinoma    PATHOLOGIC DIAGNOSIS: basal cell carcinoma- nodular, infiltrating, superifical    HISTOLOGY OF SPECIMENS IN FIRST STAGE:   Tumor Type: Tumor seen. Superficial basal cell carcinoma: Foci of basaloid cells with peripheral palisading and focal retraction artifact arising along the dermoepidermal junction and extending into the papillary dermis.  Nodular basal cell carcinoma: Nodular tumor in dermis composed of basaloid cells exhibiting peripheral palisading and retraction artifact.  Infiltrative basal cell carcinoma: Basaloid tumor in dermis characterized by thin elongated strands of basaloid cells infiltrating between dermal collagen bundles.   Depth of Invasion: epidermis, dermis, subcutaneous tissue and muscle  Perineural Invasion: No    HISTOLOGY OF SPECIMENS IN SUBSEQUENT STAGES:  Tumor Type: Tumor seen. Same as in first stage.   Depth of Invasion: epidermis, dermis, subcutaneous tissue and muscle  Perineural Invasion: No    STAGES OF MOHS' SURGERY PERFORMED: 3    TUMOR-FREE PLANE ACHIEVED: Yes - with deep tissue/muscle  removal    HEMOSTASIS: electrocoagulation and 4-0 vicryl suture ties     SPECIMENS: 11 (3 in stage A, 5 in stage B and 3 in stage C)    LOCATION: right upper lip. Patient verified location with hand held mirror.    INITIAL LESION SIZE: 1.0 x 1.1 cm    FINAL DEFECT SIZE: 2.2 x 2.5 cm    WOUND REPAIR/DISPOSITION: The patient tolerated Mohs' Micrographic Surgery for a basal cell carcinoma very well. When the tumor was completely removed, a repair of the surgical defect was undertaken.      PROCEDURE: Lip Wedge Repair    INDICATION: Status post Mohs' Micrographic Surgery for basal cell carcinoma.    CASE NUMBER:     ANESTHETIC: 4 cc 1% Lidocaine with Epinephrine 1:100,000    SURGICAL PREP: Betadine    SURGEON: Nirmal Hazel MD and Malachi Anton MD    ASSISTANTS: Cara Quinonez MA    LOCATION: right upper lip    DEFECT SIZE: 2.2 x 2.5 cm    WOUND REPAIR/DISPOSITION:  After the patient's carcinoma had been completely removed with Mohs' Micrographic Surgery, a repair of the surgical defect was undertaken. We enlisted the help of Dr. Malachi Anton to perform the lip wedge repair. The patient was returned to the operating suite where the area of right upper lip was prepped, draped, and anesthetized in the usual sterile fashion. Prior to administration of local anesthetic, the vermilion border was marked with a sterile marking pen. A full thickness lip wedge was designed in the surrounding tissue of the right upper lip. A Burow's triangle was drawn in superiorly to help with tissue movement. Then with a #15 blade the muscle and mucosa was excised by Dr. Anton and he widely undermined the area in the subcutaneous and submuscular tissue plane. Then, electrocoagulation was used to obtain meticulous hemostasis. Then, 5-0 Monocryl buried vertical mattress sutures were placed by Dr. Anton into the muscular plane to close the wound and tang the cutaneous wound edge thereby approximating the muscle layer.  The inner  "mucosa was approximated with 4-0 Chromic gut simple interrupted sutures by me. The cutaneous wound edges were closed by Dr. Anton using interrupted 5-0 Prolene sutures. Care was taken to realign the vermilion border.    The patient tolerated the procedure well.  Dr. Anton performed the majority of the repair with some assistance by me.    The area was cleaned and dressed appropriately and the patient was given wound care instructions, as well as an appointment for follow-up evaluation. Patient was placed on Percocet 5 prn postop pain and Keflex 500 mg TID x 7 days.  Pt will f/u with Dr. Anton in 3 weeks for possible vermilion revision depending on how he heals.    SIZE OF Repair: 4 cm    Vitals:    03/13/19 0740 03/13/19 1221   BP: 116/68 136/89   BP Location: Left arm    Patient Position: Sitting    BP Method: Small (Automatic)    Pulse: 67 68   Weight: 75.3 kg (166 lb)    Height: 5' 11" (1.803 m)                "

## 2019-03-13 NOTE — PROGRESS NOTES
Procedure: Lip wedge excision and closure    Indications: Mohs defect R upper lip    Anesthesia: Local anesthesia, 1% lidocaine w/1:100,000 epinephrine    Informed Consent: Obtained    Time Out: Performed    Procedure in Detail: An completion incision of the remaining orbicularis oris muscle and mucosa was made, oriented to the RSTLs.The #15 blade was used to carry dissection medially and laterally,  the muscle from skin and mucosal layers. Hemostasis was achieved with electrocautery. The incision was closed in layers with simple, interrupted 5-0 monocryl sutures in the muscle, and 5-0 prolene sutures for skin apposition, 5-0 chromic gut sutures for mucosal apposition. The patient tolerated the procedure well.    Complications: None    EBL: 2ml

## 2019-03-14 NOTE — OP NOTE
DATE OF PROCEDURE:  03/13/2019.    SURGEON:  Malachi Anton M.D.    ASSISTANT SURGEON:  Nirmal Hazel M.D.    ANESTHESIA:  Local anesthesia.    PROCEDURE PERFORMED:  Wedge excision of lip with layered closure.    PREOPERATIVE DIAGNOSIS AND INDICATIONS FOR PROCEDURE:  This is an 89-year-old   gentleman who underwent Mohs micrographic surgery for an infiltrative basal cell   carcinoma, who presents now for definitive reconstruction.    POSTOPERATIVE DIAGNOSIS:  This is an 89-year-old gentleman who underwent Mohs   micrographic surgery for an infiltrative basal cell carcinoma, who presents now   for definitive reconstruction.    PROCEDURE IN DETAIL:  After obtaining informed consent, the patient was taken to   the Procedure Room and local anesthesia was induced with 1% lidocaine with   1:100,000 epinephrine to augment the prior Marcaine that was given for the most   portion of the procedure.  Sufficient time was allowed for this to take effect.    The area was prepped and draped in a standard sterile fashion.  A completion   wedge excision was made through the remaining orbicularis oris muscle of the   right upper lip, thus completing the wedge excision.  The orbicularis oris   muscle was then  from the skin and subcutaneous tissue superficially as   well as the minor salivary gland tissue and mucosa deeply and the muscle layer   was advanced and secured with simple interrupted 5-0 Monocryl sutures.  The   mucosal layer was then advanced.  A small dog ear deformity was removed and this   was secured with simple interrupted 4-0 chromic gut suture.  The vermilion   border was reapproximated and the white roll reapproximated with a vertical   mattress suture and the remaining skin edges were opposed with simple   interrupted 5-0 Prolene sutures.  This concluded the procedure.  The patient was   then awakened in the Procedure Room without difficulty.  There were no   complications, and estimated blood loss was  less than 5 mL.      RDW/HN  dd: 03/13/2019 14:46:06 (CDT)  td: 03/13/2019 15:58:30 (CDT)  Doc ID   #[id number]  Job ID #[id number]    CC:

## 2019-03-16 ENCOUNTER — HOSPITAL ENCOUNTER (OUTPATIENT)
Facility: HOSPITAL | Age: 84
LOS: 1 days | Discharge: HOME | End: 2019-03-18
Attending: EMERGENCY MEDICINE | Admitting: INTERNAL MEDICINE
Payer: MEDICARE

## 2019-03-16 ENCOUNTER — NURSE TRIAGE (OUTPATIENT)
Dept: ADMINISTRATIVE | Facility: CLINIC | Age: 84
End: 2019-03-16

## 2019-03-16 DIAGNOSIS — R40.4 TRANSIENT ALTERATION OF AWARENESS: ICD-10-CM

## 2019-03-16 DIAGNOSIS — R53.1 GENERALIZED WEAKNESS: ICD-10-CM

## 2019-03-16 DIAGNOSIS — H35.30 ARMD (AGE RELATED MACULAR DEGENERATION): ICD-10-CM

## 2019-03-16 DIAGNOSIS — E86.0 DEHYDRATION: ICD-10-CM

## 2019-03-16 DIAGNOSIS — R53.81 PHYSICAL DEBILITY: ICD-10-CM

## 2019-03-16 DIAGNOSIS — N17.9 AKI (ACUTE KIDNEY INJURY): Primary | ICD-10-CM

## 2019-03-16 DIAGNOSIS — R53.83 FATIGUE, UNSPECIFIED TYPE: ICD-10-CM

## 2019-03-16 DIAGNOSIS — G20.A1 PARKINSON DISEASE: ICD-10-CM

## 2019-03-16 DIAGNOSIS — R41.82 ALTERED MENTAL STATE: ICD-10-CM

## 2019-03-16 DIAGNOSIS — N30.00 ACUTE CYSTITIS WITHOUT HEMATURIA: ICD-10-CM

## 2019-03-16 PROBLEM — C00.9 LIP CANCER: Status: ACTIVE | Noted: 2019-03-16

## 2019-03-16 LAB
ALBUMIN SERPL BCP-MCNC: 3.4 G/DL
ALP SERPL-CCNC: 67 U/L
ALT SERPL W/O P-5'-P-CCNC: <5 U/L
ANION GAP SERPL CALC-SCNC: 10 MMOL/L
ANISOCYTOSIS BLD QL SMEAR: SLIGHT
AST SERPL-CCNC: 22 U/L
BACTERIA #/AREA URNS HPF: ABNORMAL /HPF
BASOPHILS NFR BLD: 0 %
BILIRUB SERPL-MCNC: 1.3 MG/DL
BILIRUB UR QL STRIP: NEGATIVE
BUN SERPL-MCNC: 21 MG/DL
CALCIUM SERPL-MCNC: 9 MG/DL
CHLORIDE SERPL-SCNC: 103 MMOL/L
CLARITY UR: CLEAR
CO2 SERPL-SCNC: 26 MMOL/L
COLOR UR: YELLOW
CREAT SERPL-MCNC: 1.5 MG/DL
CREAT UR-MCNC: 135 MG/DL
DIFFERENTIAL METHOD: ABNORMAL
EOSINOPHIL NFR BLD: 0 %
ERYTHROCYTE [DISTWIDTH] IN BLOOD BY AUTOMATED COUNT: 13 %
ERYTHROCYTE [DISTWIDTH] IN BLOOD BY AUTOMATED COUNT: 13 %
EST. GFR  (AFRICAN AMERICAN): 47 ML/MIN/1.73 M^2
EST. GFR  (NON AFRICAN AMERICAN): 41 ML/MIN/1.73 M^2
GLUCOSE SERPL-MCNC: 99 MG/DL
GLUCOSE UR QL STRIP: NEGATIVE
HCT VFR BLD AUTO: 39.8 %
HCT VFR BLD AUTO: 39.8 %
HGB BLD-MCNC: 12.6 G/DL
HGB BLD-MCNC: 12.6 G/DL
HGB UR QL STRIP: NEGATIVE
HYALINE CASTS #/AREA URNS LPF: 3 /LPF
KETONES UR QL STRIP: NEGATIVE
LACTATE SERPL-SCNC: 1.1 MMOL/L
LEUKOCYTE ESTERASE UR QL STRIP: ABNORMAL
LIPASE SERPL-CCNC: <3 U/L
LYMPHOCYTES NFR BLD: 9 %
MCH RBC QN AUTO: 29.9 PG
MCH RBC QN AUTO: 29.9 PG
MCHC RBC AUTO-ENTMCNC: 31.7 G/DL
MCHC RBC AUTO-ENTMCNC: 31.7 G/DL
MCV RBC AUTO: 94 FL
MCV RBC AUTO: 94 FL
MICROSCOPIC COMMENT: ABNORMAL
MONOCYTES NFR BLD: 8 %
NEUTROPHILS NFR BLD: 82 %
NEUTS BAND NFR BLD MANUAL: 1 %
NITRITE UR QL STRIP: NEGATIVE
PH UR STRIP: 6 [PH] (ref 5–8)
PLATELET # BLD AUTO: 174 K/UL
PLATELET # BLD AUTO: 174 K/UL
PLATELET BLD QL SMEAR: ABNORMAL
PMV BLD AUTO: 11.2 FL
PMV BLD AUTO: 11.2 FL
POTASSIUM SERPL-SCNC: 4.3 MMOL/L
PROT SERPL-MCNC: 7 G/DL
PROT UR QL STRIP: ABNORMAL
RBC # BLD AUTO: 4.22 M/UL
RBC # BLD AUTO: 4.22 M/UL
RBC #/AREA URNS HPF: 1 /HPF (ref 0–4)
SODIUM SERPL-SCNC: 139 MMOL/L
SODIUM UR-SCNC: 36 MMOL/L
SP GR UR STRIP: 1.01 (ref 1–1.03)
SQUAMOUS #/AREA URNS HPF: 3 /HPF
TROPONIN I SERPL DL<=0.01 NG/ML-MCNC: 0.01 NG/ML
TROPONIN I SERPL DL<=0.01 NG/ML-MCNC: <0.006 NG/ML
TSH SERPL DL<=0.005 MIU/L-ACNC: 1.83 UIU/ML
URN SPEC COLLECT METH UR: ABNORMAL
UROBILINOGEN UR STRIP-ACNC: NEGATIVE EU/DL
WBC # BLD AUTO: 13.37 K/UL
WBC # BLD AUTO: 13.37 K/UL
WBC #/AREA URNS HPF: 15 /HPF (ref 0–5)
WBC CLUMPS URNS QL MICRO: ABNORMAL

## 2019-03-16 PROCEDURE — 80053 COMPREHEN METABOLIC PANEL: CPT

## 2019-03-16 PROCEDURE — 84484 ASSAY OF TROPONIN QUANT: CPT

## 2019-03-16 PROCEDURE — 87077 CULTURE AEROBIC IDENTIFY: CPT

## 2019-03-16 PROCEDURE — 25000003 PHARM REV CODE 250: Performed by: STUDENT IN AN ORGANIZED HEALTH CARE EDUCATION/TRAINING PROGRAM

## 2019-03-16 PROCEDURE — 87186 SC STD MICRODIL/AGAR DIL: CPT

## 2019-03-16 PROCEDURE — 94761 N-INVAS EAR/PLS OXIMETRY MLT: CPT

## 2019-03-16 PROCEDURE — 87088 URINE BACTERIA CULTURE: CPT

## 2019-03-16 PROCEDURE — 96365 THER/PROPH/DIAG IV INF INIT: CPT

## 2019-03-16 PROCEDURE — 93005 ELECTROCARDIOGRAM TRACING: CPT

## 2019-03-16 PROCEDURE — G0378 HOSPITAL OBSERVATION PER HR: HCPCS

## 2019-03-16 PROCEDURE — 84300 ASSAY OF URINE SODIUM: CPT

## 2019-03-16 PROCEDURE — 83605 ASSAY OF LACTIC ACID: CPT

## 2019-03-16 PROCEDURE — 96361 HYDRATE IV INFUSION ADD-ON: CPT

## 2019-03-16 PROCEDURE — 25000003 PHARM REV CODE 250: Performed by: EMERGENCY MEDICINE

## 2019-03-16 PROCEDURE — 93010 ELECTROCARDIOGRAM REPORT: CPT | Mod: ,,, | Performed by: INTERNAL MEDICINE

## 2019-03-16 PROCEDURE — 84484 ASSAY OF TROPONIN QUANT: CPT | Mod: 91

## 2019-03-16 PROCEDURE — 81000 URINALYSIS NONAUTO W/SCOPE: CPT

## 2019-03-16 PROCEDURE — 84443 ASSAY THYROID STIM HORMONE: CPT

## 2019-03-16 PROCEDURE — 83690 ASSAY OF LIPASE: CPT

## 2019-03-16 PROCEDURE — 82570 ASSAY OF URINE CREATININE: CPT

## 2019-03-16 PROCEDURE — 93010 EKG 12-LEAD: ICD-10-PCS | Mod: ,,, | Performed by: INTERNAL MEDICINE

## 2019-03-16 PROCEDURE — 63600175 PHARM REV CODE 636 W HCPCS: Performed by: EMERGENCY MEDICINE

## 2019-03-16 PROCEDURE — 87086 URINE CULTURE/COLONY COUNT: CPT

## 2019-03-16 PROCEDURE — 85025 COMPLETE CBC W/AUTO DIFF WBC: CPT

## 2019-03-16 PROCEDURE — 99285 EMERGENCY DEPT VISIT HI MDM: CPT | Mod: 25

## 2019-03-16 RX ORDER — SODIUM CHLORIDE, SODIUM LACTATE, POTASSIUM CHLORIDE, CALCIUM CHLORIDE 600; 310; 30; 20 MG/100ML; MG/100ML; MG/100ML; MG/100ML
INJECTION, SOLUTION INTRAVENOUS CONTINUOUS
Status: DISCONTINUED | OUTPATIENT
Start: 2019-03-16 | End: 2019-03-18 | Stop reason: HOSPADM

## 2019-03-16 RX ORDER — CEPHALEXIN 500 MG/1
500 CAPSULE ORAL 3 TIMES DAILY
Status: DISCONTINUED | OUTPATIENT
Start: 2019-03-16 | End: 2019-03-18 | Stop reason: HOSPADM

## 2019-03-16 RX ORDER — IBUPROFEN 200 MG
16 TABLET ORAL
Status: DISCONTINUED | OUTPATIENT
Start: 2019-03-16 | End: 2019-03-18 | Stop reason: HOSPADM

## 2019-03-16 RX ORDER — SELEGILINE HYDROCHLORIDE 5 MG/1
5 TABLET ORAL 2 TIMES DAILY WITH MEALS
Status: DISCONTINUED | OUTPATIENT
Start: 2019-03-16 | End: 2019-03-16

## 2019-03-16 RX ORDER — CARBIDOPA AND LEVODOPA 25; 100 MG/1; MG/1
1 TABLET ORAL 3 TIMES DAILY
Status: DISCONTINUED | OUTPATIENT
Start: 2019-03-16 | End: 2019-03-18 | Stop reason: HOSPADM

## 2019-03-16 RX ORDER — GLUCAGON 1 MG
1 KIT INJECTION
Status: DISCONTINUED | OUTPATIENT
Start: 2019-03-16 | End: 2019-03-18 | Stop reason: HOSPADM

## 2019-03-16 RX ORDER — DEXTROSE 50 % IN WATER (D50W) INTRAVENOUS SYRINGE
25
Status: DISCONTINUED | OUTPATIENT
Start: 2019-03-16 | End: 2019-03-18 | Stop reason: HOSPADM

## 2019-03-16 RX ORDER — OXYCODONE AND ACETAMINOPHEN 5; 325 MG/1; MG/1
1 TABLET ORAL EVERY 8 HOURS PRN
Status: DISCONTINUED | OUTPATIENT
Start: 2019-03-16 | End: 2019-03-18 | Stop reason: HOSPADM

## 2019-03-16 RX ORDER — SODIUM CHLORIDE 9 MG/ML
500 INJECTION, SOLUTION INTRAVENOUS
Status: COMPLETED | OUTPATIENT
Start: 2019-03-16 | End: 2019-03-16

## 2019-03-16 RX ORDER — DIPHENOXYLATE HYDROCHLORIDE AND ATROPINE SULFATE 2.5; .025 MG/1; MG/1
1 TABLET ORAL 2 TIMES DAILY PRN
Status: DISCONTINUED | OUTPATIENT
Start: 2019-03-16 | End: 2019-03-18 | Stop reason: HOSPADM

## 2019-03-16 RX ORDER — SODIUM CHLORIDE 0.9 % (FLUSH) 0.9 %
5 SYRINGE (ML) INJECTION
Status: DISCONTINUED | OUTPATIENT
Start: 2019-03-16 | End: 2019-03-18 | Stop reason: HOSPADM

## 2019-03-16 RX ORDER — HYDROCODONE BITARTRATE AND ACETAMINOPHEN 5; 325 MG/1; MG/1
1 TABLET ORAL EVERY 8 HOURS PRN
Status: DISCONTINUED | OUTPATIENT
Start: 2019-03-16 | End: 2019-03-18 | Stop reason: HOSPADM

## 2019-03-16 RX ORDER — CEPHALEXIN 500 MG/1
500 CAPSULE ORAL EVERY 8 HOURS
Status: DISCONTINUED | OUTPATIENT
Start: 2019-03-16 | End: 2019-03-16

## 2019-03-16 RX ORDER — IBUPROFEN 200 MG
24 TABLET ORAL
Status: DISCONTINUED | OUTPATIENT
Start: 2019-03-16 | End: 2019-03-18 | Stop reason: HOSPADM

## 2019-03-16 RX ADMIN — SODIUM CHLORIDE, SODIUM LACTATE, POTASSIUM CHLORIDE, AND CALCIUM CHLORIDE: .6; .31; .03; .02 INJECTION, SOLUTION INTRAVENOUS at 06:03

## 2019-03-16 RX ADMIN — SODIUM CHLORIDE 500 ML: 0.9 INJECTION, SOLUTION INTRAVENOUS at 12:03

## 2019-03-16 RX ADMIN — CARBIDOPA AND LEVODOPA 1 TABLET: 25; 100 TABLET ORAL at 09:03

## 2019-03-16 RX ADMIN — APIXABAN 2.5 MG: 2.5 TABLET, FILM COATED ORAL at 09:03

## 2019-03-16 RX ADMIN — CEFTRIAXONE 1 G: 1 INJECTION, SOLUTION INTRAVENOUS at 02:03

## 2019-03-16 RX ADMIN — OXYCODONE HYDROCHLORIDE AND ACETAMINOPHEN 1 TABLET: 5; 325 TABLET ORAL at 05:03

## 2019-03-16 RX ADMIN — CEPHALEXIN 500 MG: 500 CAPSULE ORAL at 09:03

## 2019-03-16 NOTE — SUBJECTIVE & OBJECTIVE
Past Medical History:   Diagnosis Date    Allergy     Anemia     Arthritis     Basal cell carcinoma     Depression     Herniation of intervertebral disc     Hyperlipidemia     Hypertension 3/4/2015    Macular degeneration     Parkinson disease     Spinal stenosis        Past Surgical History:   Procedure Laterality Date    Bilateral MBB  Bilateral 2/16/2016    Performed by [facility] at [facility]    BLOCK-NERVE-MEDIAL BRANCH-LUMBAR** Bilateral MBB ** Bilateral 12/22/2015    Performed by [facility] at [facility]    BLOCK-NERVE-MEDIAL BRANCH-LUMBAR** BILATERAL MBB ** Bilateral 12/1/2015    Performed by [facility] at [facility]    CATARACT EXTRACTION W/  INTRAOCULAR LENS IMPLANT Left 10/31/07    Dr. Nunez    CATARACT EXTRACTION W/  INTRAOCULAR LENS IMPLANT Right 12/16/15    Dr. Nunez    INJECTION-STEROID-EPIDURAL-CAUDAL- WITH CATH N/A 6/14/2016    Performed by [facility] at [facility]    INSERTION-INTRAOCULAR LENS (IOL) Right 12/16/2015    Performed [facility] at [facility]    PHACOEMULSIFICATION-ASPIRATION-CATARACT Right 12/16/2015    Per[facility] at [facility]    RADIOFREQUENCY THERMOCOAGULATION (RFTC)-NERVE-MEDIAN BRANCH-LUMBAR  Left L2, L3, L4, L5 Left 3/15/2016    Performed by [facility] at [facility]    REPAIR-RETINA (VITRECTOMY) Right 1/6/2016    Performed by JOAN Encinas MD at [facility]       Review of patient's allergies indicates:   Allergen Reactions    Combigan [brimonidine-timolol]      Made him dizziness    Contrast media Rash       No current facility-administered medications on file prior to encounter.      Current Outpatient Medications on File Prior to Encounter   Medication Sig    apixaban (ELIQUIS) 2.5 mg Tab Take 1 tablet (2.5 mg total) by mouth 2 (two) times daily.    carbidopa-levodopa  mg (SINEMET)  mg  per tablet Take 1 tablet three times daily.    cephALEXin (KEFLEX) 500 MG capsule Take 1 capsule (500 mg total) by mouth 3 (three) times daily. for 7 days    diphenoxylate-atropine 2.5-0.025 mg (LOMOTIL) 2.5-0.025 mg per tablet Take 1 tablet by mouth 2 (two) times daily. (Patient taking differently: Take 1 tablet by mouth 2 (two) times daily. Pt taking one pill twice a day.)    gabapentin (NEURONTIN) 100 MG capsule Take 300 mg by mouth 2 (two) times daily. Pt taking one pill twice a day.    hydrocodone-acetaminophen 5-325mg (NORCO) 5-325 mg per tablet Pt taking one pill once a day.    mesalamine (APRISO) 0.375 gram Cp24 Take 1.5 g by mouth once daily. Pt taking 4 pills in am.    oxyCODONE-acetaminophen (PERCOCET) 5-325 mg per tablet Take 1 tablet by mouth every 4 to 6 hours as needed for Pain.    rasagiline (AZILECT) 1 mg Tab Take 1 tablet (1 mg total) by mouth once daily.     Family History     Problem Relation (Age of Onset)    Cancer Father    Colon cancer Father    Hearing loss Paternal Aunt        Tobacco Use    Smoking status: Never Smoker    Smokeless tobacco: Never Used   Substance and Sexual Activity    Alcohol use: No     Alcohol/week: 0.0 oz    Drug use: No    Sexual activity: Yes     Partners: Female     Review of Systems   Unable to perform ROS: Mental status change     Objective:     Vital Signs (Most Recent):  Temp: 97.4 °F (36.3 °C) (03/16/19 1144)  Pulse: 63 (03/16/19 1144)  Resp: 12 (03/16/19 1144)  BP: 137/63 (03/16/19 1144)  SpO2: 99 % (03/16/19 1144) Vital Signs (24h Range):  Temp:  [97.4 °F (36.3 °C)] 97.4 °F (36.3 °C)  Pulse:  [63] 63  Resp:  [12] 12  SpO2:  [99 %] 99 %  BP: (137)/(63) 137/63     Weight: 72.6 kg (160 lb)  Body mass index is 22.32 kg/m².    Physical Exam   Constitutional: He appears well-developed and well-nourished.   HENT:   Head: Normocephalic and atraumatic.   Eyes: EOM are normal. Pupils are equal, round, and reactive to light.   Neck: Normal range of motion.  Neck supple.   Cardiovascular: Normal rate, regular rhythm and normal heart sounds. Exam reveals no gallop and no friction rub.   No murmur heard.  Pulmonary/Chest: Effort normal and breath sounds normal.   Abdominal: Soft. Bowel sounds are normal. There is no tenderness.   Musculoskeletal: Normal range of motion.   Neurological: He is alert.   Skin: Skin is dry. Capillary refill takes less than 2 seconds.   9 stitches in the R upper lips, with erythema and mild edema.    Psychiatric: His behavior is normal.         CRANIAL NERVES     CN III, IV, VI   Pupils are equal, round, and reactive to light.  Extraocular motions are normal.        Significant Labs:   Blood Culture: No results for input(s): LABBLOO in the last 48 hours.  CBC:   Recent Labs   Lab 03/16/19  1222   WBC 13.37*   HGB 12.6*   HCT 39.8*        CMP:   Recent Labs   Lab 03/16/19  1222      K 4.3      CO2 26   GLU 99   BUN 21   CREATININE 1.5*   CALCIUM 9.0   PROT 7.0   ALBUMIN 3.4*   BILITOT 1.3*   ALKPHOS 67   AST 22   ALT <5*   ANIONGAP 10   EGFRNONAA 41*     Coagulation: No results for input(s): PT, INR, APTT in the last 48 hours.  Lactic Acid:   Recent Labs   Lab 03/16/19  1222   LACTATE 1.1     Magnesium: No results for input(s): MG in the last 48 hours.  TSH:   Recent Labs   Lab 12/12/18  1038   TSH 2.901     Urine Culture: No results for input(s): LABURIN in the last 48 hours.  Urine Studies:   Recent Labs   Lab 03/16/19  1314   COLORU Yellow   APPEARANCEUA Clear   PHUR 6.0   SPECGRAV 1.015   PROTEINUA Trace*   GLUCUA Negative   KETONESU Negative   BILIRUBINUA Negative   OCCULTUA Negative   NITRITE Negative   UROBILINOGEN Negative   LEUKOCYTESUR 1+*   RBCUA 1   WBCUA 15*   BACTERIA None   SQUAMEPITHEL 3   HYALINECASTS 3*       Significant Imaging: none

## 2019-03-16 NOTE — TELEPHONE ENCOUNTER
Reason for Disposition   [1] Fall in systolic BP > 20 mm Hg from normal AND [2] dizzy, lightheaded, or weak    Protocols used: LOW BLOOD PRESSURE-A-    Advised to send pt to ER as his systolic dropped from 125 to 90 in a short period, pt also lethargic which is out of the ordinary per CG.

## 2019-03-16 NOTE — H&P
"[facility] Medicine  History & Physical    Patient Name: Phi Sainz  MRN: [id number]  Admission Date: 3/16/2019  Attending Physician: Mercedez Goetz MD  Primary Care Provider: Yanna Santana MD         Patient information was obtained from relative(s) and ER records.     Subjective:     Principal Problem:Acute cystitis without hematuria    Chief Complaint:   Chief Complaint   Patient presents with    Altered Mental Status     pt presents to ED today via EMS who reports per care giver pt c/o weakness and increased altered mental status onset last night reported BP was "low" pt disoriented to time only.         HPI: Phi Sainz is an 88 y/o M with PMH of Allergy, Anemia, Arthritis, Basal cell carcinoma, Depression, Herniation of intervertebral disc, Hyperlipidemia, Hypertension (3/4/2015), Macular degeneration, Parkinson disease, and Spinal stenosis present with altered mental status started yesterday after he took his medication per the sitter. There is associated weakness and patient stated something was wrong when asked yesterday. He denies fever, chills, chest pain, nausea, vomiting, or diarrhea. Per sitter patient was recently treated 2 weeks for UTI. Pt had recent surgery to his upper lip due to CA. UA positive and wbc elevated.   Patient lives with grand daughter, his two daughters visit everyday and all grand kids come in the evening daily. Patient also has two self pain nurses that see him daily. Daughter by nigel concerned he is a fall risk and on Eliquis, stated the cardiologist has been trying to wean him of, dose was  decreased to 2.5 mg 2 months ago.     Past Medical History:   Diagnosis Date    Allergy     Anemia     Arthritis     Basal cell carcinoma     Depression     Herniation of intervertebral disc     Hyperlipidemia     Hypertension 3/4/2015    Macular degeneration     Parkinson disease     Spinal stenosis        Past " Surgical History:   Procedure Laterality Date    Bilateral MBB  Bilateral 2/16/2016    Performed by [facility]nie [facility] at [facility]    BLOCK-NERVE-MEDIAL BRANCH-LUMBAR** Bilateral MBB ** Bilateral 12/22/2015    Performed by [facility] at [facility]    BLOCK-NERVE-MEDIAL BRANCH-LUMBAR** BILATERAL MBB ** Bilateral 12/1/2015    Performed by [facility] at [facility]    CATARACT EXTRACTION W/  INTRAOCULAR LENS IMPLANT Left 10/31/07    Dr. Nunez    CATARACT EXTRACTION W/  INTRAOCULAR LENS IMPLANT Right 12/16/15    Dr. Nunez    INJECTION-STEROID-EPIDURAL-CAUDAL- WITH CATH N/A 6/14/2016    Performed by [facility] at [facility]    INSERTION-INTRAOCULAR LENS (IOL) Right 12/16/2015    Performed by [facility] at [facility]    PHACOEMULSIFICATION-ASPIRATION-CATARACT Right 12/16/2015    Performed by [facility] at [facility]    RADIOFREQUENCY THERMOCOAGULATION (RFTC)-NERVE-MEDIAN BRANCH-LUMBAR  Left L2, L3, L4, L5 Left 3/15/2016    Performed by [facility]nie Jackson [facility] at [facility]    REPAIR-RETINA (VITRECTOMY) Right 1/6/2016    Performed by JOAN Encinas MD at [facility]       Review of patient's allergies indicates:   Allergen Reactions    Combigan [brimonidine-timolol]      Made him dizziness    Contrast media Rash       No current facility-administered medications on file prior to encounter.      Current Outpatient Medications on File Prior to Encounter   Medication Sig    apixaban (ELIQUIS) 2.5 mg Tab Take 1 tablet (2.5 mg total) by mouth 2 (two) times daily.    carbidopa-levodopa  mg (SINEMET)  mg per tablet Take 1 tablet three times daily.    cephALEXin (KEFLEX) 500 MG capsule Take 1 capsule (500 mg total) by mouth 3 (three) times daily. for 7 days    diphenoxylate-atropine 2.5-0.025 mg (LOMOTIL) 2.5-0.025 mg per tablet Take 1 tablet by mouth 2 (two) times daily. (Patient  taking differently: Take 1 tablet by mouth 2 (two) times daily. Pt taking one pill twice a day.)    gabapentin (NEURONTIN) 100 MG capsule Take 300 mg by mouth 2 (two) times daily. Pt taking one pill twice a day.    hydrocodone-acetaminophen 5-325mg (NORCO) 5-325 mg per tablet Pt taking one pill once a day.    mesalamine (APRISO) 0.375 gram Cp24 Take 1.5 g by mouth once daily. Pt taking 4 pills in am.    oxyCODONE-acetaminophen (PERCOCET) 5-325 mg per tablet Take 1 tablet by mouth every 4 to 6 hours as needed for Pain.    rasagiline (AZILECT) 1 mg Tab Take 1 tablet (1 mg total) by mouth once daily.     Family History     Problem Relation (Age of Onset)    Cancer Father    Colon cancer Father    Hearing loss Paternal Aunt        Tobacco Use    Smoking status: Never Smoker    Smokeless tobacco: Never Used   Substance and Sexual Activity    Alcohol use: No     Alcohol/week: 0.0 oz    Drug use: No    Sexual activity: Yes     Partners: Female     Review of Systems   Unable to perform ROS: Mental status change     Objective:     Vital Signs (Most Recent):  Temp: 97.4 °F (36.3 °C) (03/16/19 1144)  Pulse: 63 (03/16/19 1144)  Resp: 12 (03/16/19 1144)  BP: 137/63 (03/16/19 1144)  SpO2: 99 % (03/16/19 1144) Vital Signs (24h Range):  Temp:  [97.4 °F (36.3 °C)] 97.4 °F (36.3 °C)  Pulse:  [63] 63  Resp:  [12] 12  SpO2:  [99 %] 99 %  BP: (137)/(63) 137/63     Weight: 72.6 kg (160 lb)  Body mass index is 22.32 kg/m².    Physical Exam   Constitutional: He appears well-developed and well-nourished.   HENT:   Head: Normocephalic and atraumatic.   Eyes: EOM are normal. Pupils are equal, round, and reactive to light.   Neck: Normal range of motion. Neck supple.   Cardiovascular: Normal rate, regular rhythm and normal heart sounds. Exam reveals no gallop and no friction rub.   No murmur heard.  Pulmonary/Chest: Effort normal and breath sounds normal.   Abdominal: Soft. Bowel sounds are normal. There is no tenderness.    Musculoskeletal: Normal range of motion.   Neurological: He is alert.   Skin: Skin is dry. Capillary refill takes less than 2 seconds.   9 stitches in the R upper lips, with erythema and mild edema.    Psychiatric: His behavior is normal.         CRANIAL NERVES     CN III, IV, VI   Pupils are equal, round, and reactive to light.  Extraocular motions are normal.        Significant Labs:   Blood Culture: No results for input(s): LABBLOO in the last 48 hours.  CBC:   Recent Labs   Lab 03/16/19  1222   WBC 13.37*   HGB 12.6*   HCT 39.8*        CMP:   Recent Labs   Lab 03/16/19  1222      K 4.3      CO2 26   GLU 99   BUN 21   CREATININE 1.5*   CALCIUM 9.0   PROT 7.0   ALBUMIN 3.4*   BILITOT 1.3*   ALKPHOS 67   AST 22   ALT <5*   ANIONGAP 10   EGFRNONAA 41*     Coagulation: No results for input(s): PT, INR, APTT in the last 48 hours.  Lactic Acid:   Recent Labs   Lab 03/16/19  1222   LACTATE 1.1     Magnesium: No results for input(s): MG in the last 48 hours.  TSH:   Recent Labs   Lab 12/12/18  1038   TSH 2.901     Urine Culture: No results for input(s): LABURIN in the last 48 hours.  Urine Studies:   Recent Labs   Lab 03/16/19  1314   COLORU Yellow   APPEARANCEUA Clear   PHUR 6.0   SPECGRAV 1.015   PROTEINUA Trace*   GLUCUA Negative   KETONESU Negative   BILIRUBINUA Negative   OCCULTUA Negative   NITRITE Negative   UROBILINOGEN Negative   LEUKOCYTESUR 1+*   RBCUA 1   WBCUA 15*   BACTERIA None   SQUAMEPITHEL 3   HYALINECASTS 3*       Significant Imaging: none    Assessment/Plan:     * Acute cystitis without hematuria    UA positive  Urine and blood cx pending  Rocephin       Lip cancer    S/p lip surgery for cancer 3/13   Keflex         Dehydration    Rehydrate       Parkinson disease    Sinemet and Rasagiline       Chronic deep vein thrombosis (DVT)    Hold Eliquis       ILD (interstitial lung disease)    stable       Hypertension    Not on home meds       Essential tremor    Rasagiline         VTE  Risk Mitigation (From admission, onward)    None             Mercedez N Innocent-MD Joceline  Department of Hospital Medicine   [facility]

## 2019-03-16 NOTE — ED PROVIDER NOTES
"Encounter Date: 3/16/2019    SCRIBE #1 NOTE: I, Shaina Us, am scribing for, and in the presence of,  Dr. Denny. I have scribed the entire note.       History     Chief Complaint   Patient presents with    Altered Mental Status     pt presents to ED today via EMS who reports per care giver pt c/o weakness and increased altered mental status onset last night reported BP was "low" pt disoriented to time only.      Phi Sainz is a 89 y.o. male who  has a past medical history of Allergy, Anemia, Arthritis, Basal cell carcinoma, Depression, Herniation of intervertebral disc, Hyperlipidemia, Hypertension (3/4/2015), Macular degeneration, Parkinson disease, and Spinal stenosis.    The patient presents to the ED due to weakness and AMS.   Family reports symptoms started last night, described as confusion and appearing more tired.   Caregiver states that the patient does not usually complain of much, however, today he stated he feels fatigued and was having trouble walking. He is usually able to ambulate with a walker, but was unable to stand without assistance today. Caretaker states he is usually oriented x3, but today he seemed confused about where he was and what day/year it is. He denies fever, chest pain, SOB, abdominal pain, headache, nausea, vomiting, or diarrhea. Caregiver reports that the patient was diagnosed with a UTI approximately 1 month ago, and reports at that time he had similar symptoms including confusion. Pt had recent surgery to his upper lip due to basal cell carcinoma. He has been taking Keflex since the procedure, started 2 days ago. He has been taking pain medication as needed.      The history is provided by the patient and a caregiver.     Review of patient's allergies indicates:   Allergen Reactions    Combigan [brimonidine-timolol]      Made him dizziness    Contrast media Rash     Past Medical History:   Diagnosis Date    Allergy     Anemia     Arthritis     Basal cell " carcinoma     Depression     Herniation of intervertebral disc     Hyperlipidemia     Hypertension 3/4/2015    Macular degeneration     Parkinson disease     Spinal stenosis      Past Surgical History:   Procedure Laterality Date    Bilateral MBB  Bilateral 2/16/2016    Performed by [facility] at [facility]    BLOCK-NERVE-MEDIAL BRANCH-LUMBAR** Bilateral MBB ** Bilateral 12/22/2015    Performed by [facility] at [facility]    BLOCK-NERVE-MEDIAL BRANCH-LUMBAR** BILATERAL MBB ** Bilateral 12/1/2015    Performed by [facility] at [facility]    CATARACT EXTRACTION W/  INTRAOCULAR LENS IMPLANT Left 10/31/07    Dr. Nunez    CATARACT EXTRACTION W/  INTRAOCULAR LENS IMPLANT Right 12/16/15    Dr. Nunez    INJECTION-STEROID-EPIDURAL-CAUDAL- WITH CATH N/A 6/14/2016    Performed by [facility] at [facility]    INSERTION-INTRAOCULAR LENS (IOL) Right 12/16/2015    Performed by [facility]    PHACOEMULSIFICATION-ASPIRATION-CATARACT Right 12/16/2015    Performed by [facility]    RADIOFREQUENCY THERMOCOAGULATION (RFTC)-NERVE-MEDIAN BRANCH-LUMBAR  Left L2, L3, L4, L5 Left 3/15/2016    Performed by [facility] at [facility]    REPAIR-RETINA (VITRECTOMY) Right 1/6/2016    Performed by JOAN [facility]cinas [facility] at [facility]     Family History   Problem Relation Age of Onset    Cancer Father         prostate    Colon cancer Father     Hearing loss Paternal Aunt     Amblyopia Neg Hx     Blindness Neg Hx     Cataracts Neg Hx     Diabetes Neg Hx     Glaucoma Neg Hx     Hypertension Neg Hx     Macular degeneration Neg Hx     Retinal detachment Neg Hx     Strabismus Neg Hx     Stroke Neg Hx     Thyroid disease Neg Hx     Heart attack Neg Hx     Heart disease Neg Hx     Heart failure Neg Hx     Hyperlipidemia Neg Hx     Melanoma Neg Hx      Social History      Tobacco Use    Smoking status: Never Smoker    Smokeless tobacco: Never Used   Substance Use Topics    Alcohol use: No     Alcohol/week: 0.0 oz    Drug use: No     Review of Systems   Constitutional: Positive for fatigue. Negative for chills and fever.   HENT: Negative for facial swelling and trouble swallowing.    Eyes: Negative for redness.   Respiratory: Negative for shortness of breath.    Cardiovascular: Negative for chest pain.   Gastrointestinal: Negative for abdominal pain, diarrhea, nausea and vomiting.   Genitourinary: Negative for dysuria and hematuria.   Musculoskeletal: Negative for gait problem.   Skin: Negative for rash.   Neurological: Positive for weakness. Negative for facial asymmetry and speech difficulty.   Psychiatric/Behavioral: Positive for confusion. Negative for agitation and behavioral problems. The patient is not nervous/anxious and is not hyperactive.        Physical Exam     Initial Vitals [03/16/19 1144]   BP Pulse Resp Temp SpO2   137/63 63 12 97.4 °F (36.3 °C) 99 %      MAP       --         Physical Exam    Nursing note and vitals reviewed.  Constitutional: He appears well-developed and well-nourished. He is not diaphoretic. No distress.   HENT:   Head: Normocephalic. Head is with laceration. Head is without contusion.   Mouth/Throat: Oropharynx is clear and moist.   Post-op incision to right upper lip with moderate underlying swelling, sutures intact.  No bleeding, drainage, or fluctuance.   Eyes: Conjunctivae and EOM are normal.   Neck: Normal range of motion. Neck supple.   Cardiovascular: Normal rate, regular rhythm and normal heart sounds.   Pulmonary/Chest: Breath sounds normal. No respiratory distress.   Abdominal: Soft. There is no tenderness.   Musculoskeletal: Normal range of motion. He exhibits no edema or tenderness.   Neurological: He is alert. He has normal strength. He is disoriented. No cranial nerve deficit or sensory deficit. GCS eye subscore is 4. GCS  verbal subscore is 4. GCS motor subscore is 6.   Oriented to person and place only.  No focal weakness.   Skin: Skin is warm and dry.         ED Course   Procedures  Labs Reviewed   CBC WITHOUT DIFFERENTIAL - Abnormal; Notable for the following components:       Result Value    WBC 13.37 (*)     RBC 4.22 (*)     Hemoglobin 12.6 (*)     Hematocrit 39.8 (*)     MCHC 31.7 (*)     All other components within normal limits   COMPREHENSIVE METABOLIC PANEL - Abnormal; Notable for the following components:    Creatinine 1.5 (*)     Albumin 3.4 (*)     Total Bilirubin 1.3 (*)     ALT <5 (*)     eGFR if  47 (*)     eGFR if non  41 (*)     All other components within normal limits   LIPASE - Abnormal; Notable for the following components:    Lipase <3 (*)     All other components within normal limits   URINALYSIS, REFLEX TO URINE CULTURE - Abnormal; Notable for the following components:    Protein, UA Trace (*)     Leukocytes, UA 1+ (*)     All other components within normal limits    Narrative:     Preferred Collection Type->Urine, Clean Catch   URINALYSIS MICROSCOPIC - Abnormal; Notable for the following components:    WBC, UA 15 (*)     WBC Clumps, UA Occasional (*)     Hyaline Casts, UA 3 (*)     All other components within normal limits    Narrative:     Preferred Collection Type->Urine, Clean Catch   CBC W/ AUTO DIFFERENTIAL - Abnormal; Notable for the following components:    WBC 13.37 (*)     RBC 4.22 (*)     Hemoglobin 12.6 (*)     Hematocrit 39.8 (*)     MCHC 31.7 (*)     Gran% 82.0 (*)     Lymph% 9.0 (*)     All other components within normal limits   CULTURE, URINE   TROPONIN I   LACTIC ACID, PLASMA   CBC W/ AUTO DIFFERENTIAL   CREATININE, URINE, RANDOM   SODIUM, URINE, RANDOM   CREATININE, URINE, RANDOM    Narrative:     Preferred Collection Type->Urine, Clean Catch   SODIUM, URINE, RANDOM    Narrative:     Preferred Collection Type->Urine, Clean Catch     EKG Readings:  (Independently Interpreted)   Rhythm: Normal Sinus Rhythm. Heart Rate: 60.   No ST changes, no ischemia  Normal intervals  Stable from prior EKG on 10/2017         X-Rays:   Independently Interpreted Readings:   Other Readings:  Reviewed by myself, read by radiology.       Imaging Results    None         Medical Decision Making:   Initial Assessment:   89 y.o. Male presents to ED with weakness and AMS/confusion. Pt is oriented to person and place only, at baseline is oriented x3 and able to ambulate with walker. Will obtain labs, UA, EKG, and reassess.  Differential Diagnosis:   Differential Diagnosis includes, but is not limited to:  CVA/TIA, vertigo, anemia/blood loss, cardiogenic shock, arrhythmia, orthostatic hypotension, dehydration, medication side effect, vitamin deficiency, liver disease, hypothyroidism, drug intoxication/withdrawal, metabolic derangement.\    Clinical Tests:   Lab Tests: Ordered and Reviewed  Radiological Study: Ordered and Reviewed  ED Management:  1:40 PM  Labs unremarkable.   UA with mild UTI. Will give IV Rocephin and request admission for further management.    3:55 PM  Discussed with [facility] for admission.  Upon re-evaluation, the patient's status has remained stable.    At this time, I believe the patient should be admitted to the hospital for further evaluation and management of UTI.  [facility] HM service was contacted and the case was discussed. Additional recommendations at this time: none    The consulting physician/team agrees with plan and will admit under their service.   The patient and family were updated with test results, overall impression, and further plan of care. All questions were answered. The patient expressed understanding and agrees with the current plan.                        Clinical Impression:     1. Acute cystitis without hematuria    2. Altered mental state    3. NICOLLE (acute kidney injury)    4. Generalized weakness    5. Fatigue, unspecified type     6. Dehydration                        I, Dr. Heath Denny, personally performed the services described in this documentation. All medical record entries made by the scribe were at my direction and in my presence.  I have reviewed the chart and agree that the record reflects my personal performance and is accurate and complete.     Heath Denny MD.  7:21 PM 03/16/2019                 Heath Denny MD  03/16/19 1921

## 2019-03-16 NOTE — H&P
"[facility] Medicine H&P Note     Admitting Team: [facility] Hospitalist Team B  Attending Physician: Nany  Resident: Merry  Intern: Pillo     Date of Admit: 3/16/2019    Chief Complaint     Altered Mental Status (pt presents to ED today via EMS who reports per care giver pt c/o weakness and increased altered mental status onset last night reported BP was "low" pt disoriented to time only. )   for 1 day    Subjective:      History of Present Illness:  Patient is a 89 year old male with Parkinson's disease, hypertension, Crohns disease, and chronic DVT who present to [facility] by way of EMS with complaint of altered mental status X1 day.    The patient was in their usual state of health (needs some assistance with ADLs, ambulates with walker, lives at home with granddaugher) until the night prior to admission. Last night patient describe by granddaughter as being more confused and tired, having troubles walking and getting out of bed. His caretaker this morning also noticed some change in his mentals status. Family has noticed increased urinary frequency, but patient denies any burning on urination. Patient denied any fevers, chills, abdominal pain, chest pain, shortness of breath.    Of note, patient was treated for UTI about 1 month ago, family unaware of antibiotic he was taking. He also had a right upper lip surgery for suspected basal cell carcinoma during last week. His dermatologist prescribed Keflex which he has been taking, however granddaughter has noticed expanding erythema of site, but decreased swelling. Because of the surgery patient has had troubles with eating and swallowing fluids, leading to poor po intake over past couple days.    In ED patient given dose of ceftriaxone and 250 ml of NS. Admitted to observation by [facility] medicine.    Past Medical History:  Past Medical History:   Diagnosis Date    Allergy     Anemia     Arthritis     Basal cell carcinoma     Depression     " Herniation of intervertebral disc     Hyperlipidemia     Hypertension 3/4/2015    Macular degeneration     Parkinson disease     Spinal stenosis        Past Surgical History:  Past Surgical History:   Procedure Laterality Date    Bilateral MBB  Bilateral 2/16/2016    Performed by [facility] at [facility]    BLOCK-NERVE-MEDIAL BRANCH-LUMBAR** Bilateral MBB ** Bilateral 12/22/2015    Performed by [facility] at [facility]    BLOCK-NERVE-MEDIAL BRANCH-LUMBAR** BILATERAL MBB ** Bilateral 12/1/2015    Performed by [facility] at [facility]    CATARACT EXTRACTION W/  INTRAOCULAR LENS IMPLANT Left 10/31/07    Dr. Nunez    CATARACT EXTRACTION W/  INTRAOCULAR LENS IMPLANT Right 12/16/15    Dr. Nunez    INJECTION-STEROID-EPIDURAL-CAUDAL- WITH CATH N/A 6/14/2016    Performed by [facility] at [facility]    INSERTION-INTRAOCULAR LENS (IOL) Right 12/16/2015    Performed by [facility] at [facility]    PHACOEMULSIFICATION-ASPIRATION-CATARACT Right 12/16/2015    Performed by [facility]ie [facility] at [facility] OR [facility]    RADIOFREQUENCY THERMOCOAGULATION (RFTC)-NERVE-MEDIAN BRANCH-LUMBAR  Left L2, L3, L4, L5 Left 3/15/2016    Performed by [facility] at [facility]    REPAIR-RETINA (VITRECTOMY) Right 1/6/2016    Performed by JOAN Encinas MD at [facility] OR [facility]       Allergies:  Review of patient's allergies indicates:   Allergen Reactions    Combigan [brimonidine-timolol]      Made him dizziness    Contrast media Rash       Home Medications:  Prior to Admission medications    Medication Sig Start Date End Date Taking? Authorizing Provider   apixaban (ELIQUIS) 2.5 mg Tab Take 1 tablet (2.5 mg total) by mouth 2 (two) times daily. 12/19/18  Yes Rody Morrison MD   carbidopa-levodopa  mg (SINEMET)  mg per tablet Take 1 tablet three times daily. 2/18/19  Yes Adriana Carcamo NP   cephALEXin  (KEFLEX) 500 MG capsule Take 1 capsule (500 mg total) by mouth 3 (three) times daily. for 7 days 3/13/19 3/20/19 Yes Bobbi Black PA-C   diphenoxylate-atropine 2.5-0.025 mg (LOMOTIL) 2.5-0.025 mg per tablet Take 1 tablet by mouth 2 (two) times daily.  Patient taking differently: Take 1 tablet by mouth 2 (two) times daily. Pt taking one pill twice a day. 10/6/15  Yes Yanna Santana MD   gabapentin (NEURONTIN) 100 MG capsule Take 300 mg by mouth 2 (two) times daily. Pt taking one pill twice a day.   Yes Historical Provider, MD   hydrocodone-acetaminophen 5-325mg (NORCO) 5-325 mg per tablet Pt taking one pill once a day. 9/7/17  Yes Historical Provider, MD   mesalamine (APRISO) 0.375 gram Cp24 Take 1.5 g by mouth once daily. Pt taking 4 pills in am.   Yes Historical Provider, MD   oxyCODONE-acetaminophen (PERCOCET) 5-325 mg per tablet Take 1 tablet by mouth every 4 to 6 hours as needed for Pain. 3/13/19  Yes Bobbi Black PA-C   rasagiline (AZILECT) 1 mg Tab Take 1 tablet (1 mg total) by mouth once daily. 2/18/19 2/18/20 Yes Adriana Carcamo NP       Family History:  Family History   Problem Relation Age of Onset    Cancer Father         prostate    Colon cancer Father     Hearing loss Paternal Aunt     Amblyopia Neg Hx     Blindness Neg Hx     Cataracts Neg Hx     Diabetes Neg Hx     Glaucoma Neg Hx     Hypertension Neg Hx     Macular degeneration Neg Hx     Retinal detachment Neg Hx     Strabismus Neg Hx     Stroke Neg Hx     Thyroid disease Neg Hx     Heart attack Neg Hx     Heart disease Neg Hx     Heart failure Neg Hx     Hyperlipidemia Neg Hx     Melanoma Neg Hx        Social History:  Social History     Tobacco Use    Smoking status: Never Smoker    Smokeless tobacco: Never Used   Substance Use Topics    Alcohol use: No     Alcohol/week: 0.0 oz    Drug use: No       Review of Systems:  Unable to perform given patient mental status    Health Maintaince :   Primary Care Physician:   "Max    Immunizations:   TDap: UTD    Flu: UTD  Pna: UTD    Cancer Screening:  Not indicated     Objective:   Last 24 Hour Vital Signs:  BP  Min: 126/62  Max: 153/84  Temp  Av.4 °F (36.3 °C)  Min: 97.4 °F (36.3 °C)  Max: 97.4 °F (36.3 °C)  Pulse  Av  Min: 48  Max: 63  Resp  Avg: 15  Min: 12  Max: 16  SpO2  Av.5 %  Min: 96 %  Max: 99 %  Height  Av' 11" (180.3 cm)  Min: 5' 11" (180.3 cm)  Max: 5' 11" (180.3 cm)  Weight  Av.6 kg (160 lb)  Min: 72.6 kg (160 lb)  Max: 72.6 kg (160 lb)  Body mass index is 22.32 kg/m².  No intake/output data recorded.    Physical Examination:  General: NAD  Head: NC/AT  Eyes: PERRL  Mouth: Tachy MM  Neck: Supple. No cervical LAD  Heart: RRR no MRG  Lungs: CTAB  Abd: Soft, ND, NT. No RRG  Extremities: Senile purpura present diffusely  Neuro: AAOX2. Not to time. UE and LE sensation intact bilaterally  Skin: 9 stiches in right upper lip. Edema localized to one by one cm of mid right upper lip, nonfluctuant, no purulent discharge. Erythema across top of lip R.>L . Poor skin turgor    Laboratory:  Most Recent Data:  CBC:   Lab Results   Component Value Date    WBC 13.37 (H) 2019    HGB 12.6 (L) 2019    HCT 39.8 (L) 2019     2019    MCV 94 2019    RDW 13.0 2019     BMP:   Lab Results   Component Value Date     2019    K 4.3 2019     2019    CO2 26 2019    BUN 21 2019    CREATININE 1.5 (H) 2019    GLU 99 2019    CALCIUM 9.0 2019     LFTs:   Lab Results   Component Value Date    PROT 7.0 2019    ALBUMIN 3.4 (L) 2019    BILITOT 1.3 (H) 2019    AST 22 2019    ALKPHOS 67 2019    ALT <5 (L) 2019     Coags: No results found for: INR, PROTIME, PTT  FLP:   Lab Results   Component Value Date    CHOL 209 (H) 2018    HDL 61 2018    LDLCALC 131.0 2018    TRIG 85 2018    CHOLHDL 29.2 2018     DM:   Lab Results "   Component Value Date    LDLCALC 131.0 12/12/2018    CREATININE 1.5 (H) 03/16/2019     Thyroid:   Lab Results   Component Value Date    TSH 2.901 12/12/2018     Anemia:   Lab Results   Component Value Date    IRON 90 04/06/2011    TIBC 420 04/06/2011    FERRITIN 12 (L) 04/29/2011    NJWSGGLH42 380 04/06/2011    FOLATE 16.8 04/06/2011     Cardiac:   Lab Results   Component Value Date    TROPONINI 0.011 03/16/2019     (H) 10/09/2017     Urinalysis:   Lab Results   Component Value Date    LABURIN No significant growth 03/13/2017    COLORU Yellow 03/16/2019    SPECGRAV 1.015 03/16/2019    NITRITE Negative 03/16/2019    PROTEINUR neg 09/28/2018    KETONESU Negative 03/16/2019    UROBILINOGEN Negative 03/16/2019    BILIRUBINUR neg 09/28/2018    WBCUA 15 (H) 03/16/2019       Trended Lab Data:  Recent Labs   Lab 03/16/19  1222   WBC 13.37*   HGB 12.6*   HCT 39.8*      MCV 94   RDW 13.0      K 4.3      CO2 26   BUN 21   CREATININE 1.5*   GLU 99   PROT 7.0   ALBUMIN 3.4*   BILITOT 1.3*   AST 22   ALKPHOS 67   ALT <5*       Trended Cardiac Data:  Recent Labs   Lab 03/16/19  1222   TROPONINI 0.011       Microbiology Data:  Urine Cx    Other Results:  EKG (my interpretation):  NSR. No ST changes    Radiology:  Imaging Results    None            Assessment:     Phi Sainz is a 89 y.o. 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    Altered mental state 03/16/2019    NICOLLE (acute kidney injury) 03/16/2019    Parkinson disease     Parkinson's disease 10/11/2018    Physical debility 09/20/2018    DVT (deep vein thrombosis) in pregnancy 09/20/2018    Chronic deep vein thrombosis (DVT) 09/20/2018    Other dysphagia 07/03/2018    ILD (interstitial lung disease)     Abnormal chest CT     Bradycardia 07/12/2016    Leg weakness, bilateral 06/30/2016    Osteoarthritis of  spine 01/21/2016    Posterior vitreous detachment, both eyes 12/29/2015    Spinal stenosis, lumbar region, 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/01/2015    DDD (degenerative disc disease), lumbar 07/01/2015    Lumbar facet arthropathy 07/01/2015    Lumbago 04/23/2015    Thoracic or lumbosacral neuritis or radiculitis, unspecified 04/23/2015    Acquired spondylolisthesis 04/23/2015    Degeneration of lumbar or lumbosacral intervertebral disc 04/23/2015    Spondylosis without myelopathy 04/23/2015    Hypertension 03/04/2015    Dyslipidemia 03/04/2015    Essential tremor 03/05/2013    Gait disorder 03/05/2013    Pseudophakia - Left Eye 08/14/2012    ARMD (age related macular degeneration) - Both Eyes 08/14/2012        Plan:     NICOLLE on CKD 3  -Admission Cr of 1.5 & GFR 41  Baseline Cr 1.2  -Likely secondary to poor po intake in past few days  -FeNa 0.3 indicating pre-renal NICOLLE  Renal US pending  -Provide with IVF  -Hold home gabapentin    Waxing and W[place] 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  -Patient with Parkinson dementia and found to have worsening confusion X1 day  -Believe this to be combination of Parkinson disease, dehydration in setting of poor PO intake 2/2    Urinary Frequency  -UA with no bacteria/1+ leukocytes, 15 WBC, nitrite negative  -Given dose of ceftriaxone in ED  -Will dose of ceftriaxone and watch WBC in am  -Renal US pending    Normocytic Anemia  -3/16: Hb 12.6/Hct 39.8/MCV 94  -Monitor daily, if low in am will order workup    Hyperbilirubinemia  -Total bili of 1.3 on admission  -Would expect to improve with fluids  -Watch daily    Parkinson Disease  -Continue home Rasagiline and Cinemet    Dermatologic Lip Surgery for suspect Basal Cell Carcinoma  -Patient s/p lip surgery on 3/13 with dermatology  -Discharge on home prophalactic Keflex, continue at this time  -Percocet prn    Crohn's Disease  -Continue home mesalamine    Chronic DVT  -Continue home Eliquis    Hypertension  -Not on  any home meds  -Watch with routine vitals    Fluids: LR at 125 ml/hr  Electrolytes: Shift and replace prn  Nutrition: Full liquid  DVT PPx: Eliquis  Code: Full    Dispo: Place in observation      Daniel Paris, DO  [facility] HO-1    [facility] Medicine Hospitalist Pager numbers:   [facility] Hospitalist Medicine Team A (Marysol/Rober): [phone]  [facility] Hospitalist Medicine Team B (Nany/Shabbir):  [phone]

## 2019-03-16 NOTE — HPI
Phi Sainz is an 88 y/o M with PMH of Allergy, Anemia, Arthritis, Basal cell carcinoma, Depression, Herniation of intervertebral disc, Hyperlipidemia, Hypertension (3/4/2015), Macular degeneration, Parkinson disease, and Spinal stenosis present with altered mental status started yesterday after he took his medication per the sitter. There is associated weakness and patient stated something was wrong when asked yesterday. He denies fever, chills, chest pain, nausea, vomiting, or diarrhea. Per sitter patient was recently treated 2 weeks for UTI. Pt had recent surgery to his upper lip due to CA. UA positive and wbc elevated.   Patient lives with grand daughter, his two daughters visit everyday and all grand kids come in the evening daily. Patient also has two self pain nurses that see him daily. Daughter by nigel concerned he is a fall risk and on Eliquis, stated the cardiologist has been trying to wean him of, dose was  decreased to 2.5 mg 2 months ago.

## 2019-03-16 NOTE — ED TRIAGE NOTES
Pt seen in the ED after he was brought via EMS with complaints of weakness and confusion. During time of assessment the pt is disoriented to time, NIHSS 0, pt follows commands and denies any pain except for his R lip where he had surgery the previous week. Pt stable, will continue to monitor.

## 2019-03-17 LAB
ALBUMIN SERPL BCP-MCNC: 3.4 G/DL
ALP SERPL-CCNC: 63 U/L
ALT SERPL W/O P-5'-P-CCNC: <5 U/L
ANION GAP SERPL CALC-SCNC: 9 MMOL/L
AST SERPL-CCNC: 28 U/L
BASOPHILS # BLD AUTO: 0.01 K/UL
BASOPHILS NFR BLD: 0.1 %
BILIRUB SERPL-MCNC: 1 MG/DL
BUN SERPL-MCNC: 17 MG/DL
CALCIUM SERPL-MCNC: 9.6 MG/DL
CHLORIDE SERPL-SCNC: 104 MMOL/L
CO2 SERPL-SCNC: 28 MMOL/L
CREAT SERPL-MCNC: 1.1 MG/DL
DIFFERENTIAL METHOD: ABNORMAL
EOSINOPHIL # BLD AUTO: 0 K/UL
EOSINOPHIL NFR BLD: 0.3 %
ERYTHROCYTE [DISTWIDTH] IN BLOOD BY AUTOMATED COUNT: 12.8 %
EST. GFR  (AFRICAN AMERICAN): >60 ML/MIN/1.73 M^2
EST. GFR  (NON AFRICAN AMERICAN): 59 ML/MIN/1.73 M^2
GLUCOSE SERPL-MCNC: 90 MG/DL
HCT VFR BLD AUTO: 41.9 %
HGB BLD-MCNC: 13.5 G/DL
LYMPHOCYTES # BLD AUTO: 0.9 K/UL
LYMPHOCYTES NFR BLD: 12.9 %
MCH RBC QN AUTO: 29.9 PG
MCHC RBC AUTO-ENTMCNC: 32.2 G/DL
MCV RBC AUTO: 93 FL
MONOCYTES # BLD AUTO: 0.7 K/UL
MONOCYTES NFR BLD: 10.1 %
NEUTROPHILS # BLD AUTO: 5.2 K/UL
NEUTROPHILS NFR BLD: 76.3 %
PLATELET # BLD AUTO: 188 K/UL
PMV BLD AUTO: 11.2 FL
POTASSIUM SERPL-SCNC: 3.8 MMOL/L
PROT SERPL-MCNC: 7.1 G/DL
RBC # BLD AUTO: 4.52 M/UL
SODIUM SERPL-SCNC: 141 MMOL/L
WBC # BLD AUTO: 6.84 K/UL

## 2019-03-17 PROCEDURE — 97161 PT EVAL LOW COMPLEX 20 MIN: CPT

## 2019-03-17 PROCEDURE — 94761 N-INVAS EAR/PLS OXIMETRY MLT: CPT

## 2019-03-17 PROCEDURE — 36415 COLL VENOUS BLD VENIPUNCTURE: CPT

## 2019-03-17 PROCEDURE — 97166 OT EVAL MOD COMPLEX 45 MIN: CPT

## 2019-03-17 PROCEDURE — 97116 GAIT TRAINING THERAPY: CPT

## 2019-03-17 PROCEDURE — 63600175 PHARM REV CODE 636 W HCPCS: Performed by: STUDENT IN AN ORGANIZED HEALTH CARE EDUCATION/TRAINING PROGRAM

## 2019-03-17 PROCEDURE — 96376 TX/PRO/DX INJ SAME DRUG ADON: CPT

## 2019-03-17 PROCEDURE — 80053 COMPREHEN METABOLIC PANEL: CPT

## 2019-03-17 PROCEDURE — 96361 HYDRATE IV INFUSION ADD-ON: CPT

## 2019-03-17 PROCEDURE — 25000003 PHARM REV CODE 250: Performed by: STUDENT IN AN ORGANIZED HEALTH CARE EDUCATION/TRAINING PROGRAM

## 2019-03-17 PROCEDURE — G0378 HOSPITAL OBSERVATION PER HR: HCPCS

## 2019-03-17 PROCEDURE — 85025 COMPLETE CBC W/AUTO DIFF WBC: CPT

## 2019-03-17 PROCEDURE — 97530 THERAPEUTIC ACTIVITIES: CPT

## 2019-03-17 RX ORDER — RASAGILINE 0.5 MG/1
1 TABLET ORAL DAILY
Status: DISCONTINUED | OUTPATIENT
Start: 2019-03-18 | End: 2019-03-18 | Stop reason: HOSPADM

## 2019-03-17 RX ORDER — QUETIAPINE FUMARATE 25 MG/1
25 TABLET, FILM COATED ORAL ONCE
Status: COMPLETED | OUTPATIENT
Start: 2019-03-17 | End: 2019-03-17

## 2019-03-17 RX ADMIN — HYPROMELLOSE 2910 1 DROP: 5 SOLUTION OPHTHALMIC at 09:03

## 2019-03-17 RX ADMIN — CARBIDOPA AND LEVODOPA 1 TABLET: 25; 100 TABLET ORAL at 09:03

## 2019-03-17 RX ADMIN — HYDROCODONE BITARTRATE AND ACETAMINOPHEN 1 TABLET: 5; 325 TABLET ORAL at 08:03

## 2019-03-17 RX ADMIN — APIXABAN 2.5 MG: 2.5 TABLET, FILM COATED ORAL at 08:03

## 2019-03-17 RX ADMIN — CEPHALEXIN 500 MG: 500 CAPSULE ORAL at 09:03

## 2019-03-17 RX ADMIN — QUETIAPINE 25 MG: 25 TABLET ORAL at 09:03

## 2019-03-17 RX ADMIN — CARBIDOPA AND LEVODOPA 1 TABLET: 25; 100 TABLET ORAL at 02:03

## 2019-03-17 RX ADMIN — APIXABAN 2.5 MG: 2.5 TABLET, FILM COATED ORAL at 09:03

## 2019-03-17 RX ADMIN — MESALAMINE 1500 MG: 250 CAPSULE ORAL at 09:03

## 2019-03-17 RX ADMIN — CEFTRIAXONE 1 G: 1 INJECTION, SOLUTION INTRAVENOUS at 09:03

## 2019-03-17 RX ADMIN — CEPHALEXIN 500 MG: 500 CAPSULE ORAL at 08:03

## 2019-03-17 RX ADMIN — OXYCODONE HYDROCHLORIDE AND ACETAMINOPHEN 1 TABLET: 5; 325 TABLET ORAL at 04:03

## 2019-03-17 RX ADMIN — SODIUM CHLORIDE, SODIUM LACTATE, POTASSIUM CHLORIDE, AND CALCIUM CHLORIDE: .6; .31; .03; .02 INJECTION, SOLUTION INTRAVENOUS at 02:03

## 2019-03-17 RX ADMIN — CEPHALEXIN 500 MG: 500 CAPSULE ORAL at 02:03

## 2019-03-17 RX ADMIN — CARBIDOPA AND LEVODOPA 1 TABLET: 25; 100 TABLET ORAL at 08:03

## 2019-03-17 NOTE — NURSING
Received pt. From ER via stretcher.Vital signs stable.Admit assessment completed.Pt. Awake ,alert and oriented to self only.Iv fluids maintained as ordered.Telemetry placed.Pt. Placed on fall precautions.Bed alarm set ,call light and personal items placed within pt.s reach.Non-slide socks placed at this time.No complaints noted at this time.Pt. Denies any pain or discomfort at this time.Will continue to monitor.

## 2019-03-17 NOTE — PLAN OF CARE
Had discussion with patient granddaughter today about current medical workup. Informed patient still be treated with antibiotics, and being given IVF for NICOLLE.    Discussed patient's poor functional status with PT session this morning. Granddaughter states that patient has had home PT/OT for the past couple weeks. PT have come over past 3 weeks about twice per week, and OT have come about twice per week as well but stopped coming last week. Patient also has home health nurses who have come to help patient, and stay about 30 minutes to an hour for a couple times per week.  Waiting for final PT/OT recommendations, and asked if granddaughter and family would be comfortable with SNF. Patient granddaughter stated she would have to talk about it with her family.    Will keep in hospital for another of fluids and abx.    Daniel Paris,   [facility] Internal Medicine HO-1  03/17/2019

## 2019-03-17 NOTE — PT/OT/SLP EVAL
Occupational Therapy   Evaluation    Name: Phi Sainz  MRN: [id number]  Admitting Diagnosis:  NICOLLE (acute kidney injury)      Recommendations:     Discharge Recommendations: nursing facility, skilled  Discharge Equipment Recommendations:     Barriers to discharge:  None    Assessment:     Phi Sainz is a 89 y.o. male with a medical diagnosis of NICOLLE (acute kidney injury).  He presents with deconditioning, limited activity tolerance and lethargy. Performance deficits affecting function: weakness, gait instability, decreased upper extremity function, decreased lower extremity function, impaired endurance, decreased safety awareness, impaired cognition, impaired self care skills, impaired functional mobilty, decreased coordination, impaired skin, impaired coordination, decreased ROM.      Rehab Prognosis: Fair; patient would benefit from acute skilled OT services to address these deficits and reach maximum level of function.       Plan:     Patient to be seen 5 x/week to address the above listed problems via self-care/home management, therapeutic activities, therapeutic exercises  · Plan of Care Expires: 04/17/19  · Plan of Care Reviewed with: patient, family    Subjective     Chief Complaint: Pt lethargic through session and c/o lip pain to R upper lip at surgical site; family c/o spotted rash to back and neck as well as L eye redness and dryness  Patient/Family Comments/goals: To return home    Occupational Profile:  Living Environment: Pt lives with granddaughter in [address] that is handicap accessible.   Previous level of function: PTA pt required assist with ADLs; mod I for ambulation short distances at home with rollator for toilet t/f  Equipment Used at Home:  wheelchair, rollator, shower chair, bedside commode, grab bar, raised toilet(reclining chair with lift)  Assistance upon Discharge: Family and aides every morning 7:30-9:30 AM daily for morning routine and  meds    Pain/Comfort:  · Pain Rating 1: 5/10  · Location - Side 1: Right  · Location - Orientation 1: upper  · Location 1: lip  · Pain Addressed 1: Reposition, Distraction, Cessation of Activity, Nurse notified  · Pain Rating Post-Intervention 1: 4/10  · Pain Rating 2: 4/10  · Location - Side 2: Bilateral  · Location - Orientation 2: lower  · Location 2: back  · Pain Addressed 2: Reposition, Distraction, Cessation of Activity, Nurse notified  · Pain Rating Post-Intervention 2: (did not rate)    Patients cultural, spiritual, Hindu conflicts given the current situation:      Objective:     Communicated with: nsfredrick prior to session.  Patient found HOB elevated with peripheral IV, telemetry, bed alarm upon OT entry to room.    General Precautions: Standard, fall   Orthopedic Precautions:N/A   Braces: N/A     Occupational Performance:    Bed Mobility:    · Patient completed Rolling/Turning to Right with minimum assistance  · Patient completed Scooting/Bridging with stand by assistance  · Patient completed Supine to Sit with minimum assistance  · Patient completed Sit to Supine with minimum assistance    Functional Mobility/Transfers:  · Patient completed Sit <> Stand Transfer with contact guard assistance  with  rolling walker   Functional Mobility: Pt with fair to fair- dynamic seated and standing balance. Pt ambulated 3-4 to HOB with RW but unable to follow 1 step commands to take 2 step back towards bed, requiring min A to do so. Pt CGA to min A to sit EOB 2/2 increased lethargy and decreased balance in sitting; pt attempting to grasp OT and required increased v/c to use BUE to balance on bed.    Activities of Daily Living:  · Upper Body Dressing: moderate assistance to don gown as robe as pt with continued attempts to thread arm but unable to alighn sleeve with 3x v/c and 2x physical cues to place LUE in sleeve.   · Lower Body Dressing: minimum assistance to palce B socks on toes but able to don rest and to  doff    Cognitive/Visual Perceptual:  Cognitive/Psychosocial Skills:     -       Oriented to: Person and Place   -       Follows Commands/attention:Inattentive and Easily distracted  -       Communication: decreased intelligibility; family reports this to be 2/2 lip surgery 3/14 (past Thursday)  -       Memory: Impaired STM  -       Safety awareness/insight to disability: impaired   -       Mood/Affect/Coping skills/emotional control: Appropriate to situation    Physical Exam:  Postural examination/scapula alignment:    -       Rounded shoulders  -       Forward head  Motor Planning:    -       WFL this date but family reports essential tremors  Dominant hand:    -       R handed  Upper Extremity Range of Motion:  BUE WFL; minimally limited L shoulder flex ~0-150*  Upper Extremity Strength: BUE grossly 3+/5    Strength:  B hands grossly 4/5  Fine Motor Coordination:    -       Intact in functional task practice  Gross motor coordination:   WFL during session but pt with essential tremors per chart review    [facility] 6 Click ADL:  AMPAC Total Score: 17    Treatment & Education:  Pt educated on role of OT and POC.   Pt performing skills as listed above.  Pt and family well acquainted to OT; family educated on SNF recommendation and states that they will need to discuss with family if they would like to pursue SNF placement. Family concerned for rash on back and redness/dryness in L eye.  Education:    Patient left HOB elevated with all lines intact, call button in reach, bed alarm on, nsg notified and family present    GOALS:   Multidisciplinary Problems     Occupational Therapy Goals        Problem: Occupational Therapy Goal    Goal Priority Disciplines Outcome Interventions   Occupational Therapy Goal     OT, PT/OT     Description:  Goals to be met by: 04/17/2019     Patient will increase functional independence with ADLs by performing:    UE Dressing with Set-up Assistance.  LE Dressing with Contact Guard  Assistance.  Grooming while standing with Supervision.  Sitting at edge of bed x15 minutes with Supervision.  Step transfer with Contact Guard Assistance  Toilet transfer to toilet with Contact Guard Assistance.  Increased functional strength to WFL for self care.  Upper extremity exercise program x8 reps per handout, with assistance as needed.                      History:     Past Medical History:   Diagnosis Date    Allergy     Anemia     Arthritis     Basal cell carcinoma     Depression     Herniation of intervertebral disc     Hyperlipidemia     Hypertension 3/4/2015    Macular degeneration     Parkinson disease     Spinal stenosis        Past Surgical History:   Procedure Laterality Date    Bilateral MBB  Bilateral 2/16/2016    Performed by [facility]nie Jackson [facility] at [facility]    BLOCK-NERVE-MEDIAL BRANCH-LUMBAR** Bilateral MBB ** Bilateral 12/22/2015    Performed by [facility] at [facility]    BLOCK-NERVE-MEDIAL BRANCH-LUMBAR** BILATERAL MBB ** Bilateral 12/1/2015    Performed by [facility]nie [facility] MD at [facility]    CATARACT EXTRACTION W/  INTRAOCULAR LENS IMPLANT Left 10/31/07    Dr. Nunez    CATARACT EXTRACTION W/  INTRAOCULAR LENS IMPLANT Right 12/16/15    Dr. Nunez    INJECTION-STEROID-EPIDURAL-CAUDAL- WITH CATH N/A 6/14/2016    Performed by [facility]nie [facility] at [facility]    INSERTION-INTRAOCULAR LENS (IOL) Right 12/16/2015    Performed by [facility]ie [facility] at [facility]    PHACOEMULSIFICATION-ASPIRATION-CATARACT Right 12/16/2015    Performed by Bettie Nunez MD at [facility]    RADIOFREQUENCY THERMOCOAGULATION (RFTC)-NERVE-MEDIAN BRANCH-LUMBAR  Left L2, L3, L4, L5 Left 3/15/2016    Performed by Lonnie Jackson MD at [facility]    REPAIR-RETINA (VITRECTOMY) Right 1/6/2016    Performed by JOAN Encinas MD at [facility] OR [facility]       Time Tracking:     OT Date of Treatment: 03/17/19  OT Start Time:  1545  OT Stop Time: 1608  OT Total Time (min): 23 min    Billable Minutes:Evaluation 10  Therapeutic Activity 13    Uzma Sow OT  3/17/2019

## 2019-03-17 NOTE — PROGRESS NOTES
"[facility] Medicine Progress Note    Primary Team: hospitals Hospitalist Team B  Attending Physician: Chuck Ayon MD  Resident: Dagmar  Intern: Heri/Raina    Subjective:      Patient states he is feeling better this morning. No complaints. Denies chest pain, shortness of breath, abdominal pain.     Objective:     Last 24 Hour Vital Signs:  BP  Min: 126/62  Max: 153/84  Temp  Av.8 °F (36.6 °C)  Min: 96.8 °F (36 °C)  Max: 98.5 °F (36.9 °C)  Pulse  Av.4  Min: 48  Max: 80  Resp  Av.6  Min: 12  Max: 18  SpO2  Av.4 %  Min: 94 %  Max: 99 %  Height  Av' 11" (180.3 cm)  Min: 5' 11" (180.3 cm)  Max: 5' 11" (180.3 cm)  Weight  Av.1 kg (156 lb 11.5 oz)  Min: 69.6 kg (153 lb 7 oz)  Max: 72.6 kg (160 lb)  I/O last 3 completed shifts:  In: 550 [I.V.:500; IV Piggyback:50]  Out: -     Physical Examination:  General: NAD  Head: NC/AT  Eyes: PERRL  Mouth: MMM  Neck: Supple. No cervical LAD  Heart: RRR no MRG  Lungs: CTAB. No wheezes or crackles  Abd: Soft, ND, NT. No RRG  Extremities: Senile purpura present diffusely  Neuro: AAOX4. UE and LE sensation intact bilaterally  Skin: 9 stiches in right upper lip. Edema localized to one by one cm of mid right upper lip, nonfluctuant, no purulent discharge. Erythema across top of lip R.>L . Improved skin turgor      Laboratory:  Laboratory Data Reviewed: yes  Pertinent Findings:  Lab Results   Component Value Date    WBC 13.37 (H) 2019    WBC 13.37 (H) 2019    HGB 12.6 (L) 2019    HGB 12.6 (L) 2019    HCT 39.8 (L) 2019    HCT 39.8 (L) 2019    MCV 94 2019    MCV 94 2019     2019     2019     Lab Results   Component Value Date    ALT <5 (L) 2019    AST 22 2019    ALKPHOS 67 2019    BILITOT 1.3 (H) 2019     Lab Results   Component Value Date    CREATININE 1.5 (H) 2019    BUN 21 2019     2019    K 4.3 2019     2019    CO2 26 " 03/16/2019       Microbiology Data Reviewed: yes  Pertinent Findings:  Urine Cx: pending    Other Results:  EKG (my interpretation):  No new    Radiology Data Reviewed: yes  Pertinent Findings:  Imaging Results    None           Current Medications:     Infusions:   lactated ringers 125 mL/hr at 03/16/19 1854        Scheduled:   apixaban  2.5 mg Oral BID    carbidopa-levodopa  mg  1 tablet Oral TID    cefTRIAXone (ROCEPHIN) IVPB  1 g Intravenous Q24H    cephALEXin  500 mg Oral TID    mesalamine  1,500 mg Oral QAM        PRN:  dextrose 50 % in water (D50W), dextrose 50%, diphenoxylate-atropine 2.5-0.025 mg, glucagon (human recombinant), glucose, glucose, HYDROcodone-acetaminophen, oxyCODONE-acetaminophen, sodium chloride 0.9%    Antibiotics and Day Number of Therapy:  Ceftriaxone X2    Lines and Day Number of Therapy:  PIV    Assessment:     Phi Sainz is a 89 y.o.male with  Patient Active Problem List    Diagnosis Date Noted    Acute cystitis without hematuria 03/16/2019    Dehydration 03/16/2019    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    Altered mental state 03/16/2019    NICOLLE (acute kidney injury) 03/16/2019    Parkinson disease     Parkinson's disease 10/11/2018    Physical debility 09/20/2018    DVT (deep vein thrombosis) in pregnancy 09/20/2018    Chronic deep vein thrombosis (DVT) 09/20/2018    Other dysphagia 07/03/2018    ILD (interstitial lung disease)     Abnormal chest CT     Bradycardia 07/12/2016    Leg weakness, bilateral 06/30/2016    Osteoarthritis of spine 01/21/2016    Posterior vitreous detachment, both eyes 12/29/2015    Spinal stenosis, lumbar region, 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/01/2015    DDD (degenerative disc disease), lumbar 07/01/2015    Lumbar facet arthropathy 07/01/2015    Lumbago 04/23/2015    Thoracic or lumbosacral neuritis or radiculitis, unspecified 04/23/2015    Acquired spondylolisthesis 04/23/2015    Degeneration of lumbar or lumbosacral  intervertebral disc 04/23/2015    Spondylosis without myelopathy 04/23/2015    Hypertension 03/04/2015    Dyslipidemia 03/04/2015    Essential tremor 03/05/2013    Gait disorder 03/05/2013    Pseudophakia - Left Eye 08/14/2012    ARMD (age related macular degeneration) - Both Eyes 08/14/2012        Plan:     NICOLLE on CKD 3  -Admission Cr of 1.5 & GFR 41  Baseline Cr 1.2  -Likely secondary to poor po intake in past few days  -FeNa 0.3 indicating pre-renal NICOLLE  Renal US pending  -Provide with IVF  -Hold home gabapentin     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  -Patient with Parkinson dementia and found to have worsening confusion X1 day  -Believe this to be combination of Parkinson disease, dehydration in setting of poor PO intake 2/2     Urinary Frequency  -UA with no bacteria/1+ leukocytes, 15 WBC, nitrite negative  -Given dose of ceftriaxone in ED  -Will dose of ceftriaxone and watch WBC in am  -Renal US pending     Normocytic Anemia  -3/16: Hb 12.6/Hct 39.8/MCV 94  -Monitor daily     Hyperbilirubinemia  -Total bili of 1.3 on admission  -Would expect to improve with fluids  -Watch daily     Parkinson Disease  -Continue home Rasagiline and Cinemet     Dermatologic Lip Surgery for suspect Basal Cell Carcinoma  -Patient s/p lip surgery on 3/13 with dermatology  -Discharge on home prophalactic Keflex, continue at this time  -Percocet prn     Crohn's Disease  -Continue home mesalamine     Chronic DVT  -Continue home Eliquis     Hypertension  -Not on any home meds  -Watch with routine vitals     Fluids: LR at 125 ml/hr  Electrolytes: Shift and replace prn  Nutrition: Full liquid  DVT PPx: Eliquis  Code: Full     Dispo: Discharge home today pending AMY Paris DO  [facility] HO-1    [facility] Hospitalist Pager numbers:   [facility] Hospitalist Medicine Team A (Marysol/Rober): [phone]  [facility] Hospitalist Medicine Team B (Nany/Shabbir):  [phone]

## 2019-03-17 NOTE — PLAN OF CARE
Problem: Occupational Therapy Goal  Goal: Occupational Therapy Goal  Goals to be met by: 04/17/2019     Patient will increase functional independence with ADLs by performing:    UE Dressing with Set-up Assistance.  LE Dressing with Contact Guard Assistance.  Grooming while standing with Supervision.  Sitting at edge of bed x15 minutes with Supervision.  Step transfer with Contact Guard Assistance  Toilet transfer to toilet with Contact Guard Assistance.  Increased functional strength to WFL for self care.  Upper extremity exercise program x8 reps per handout, with assistance as needed.    Outcome: Ongoing (interventions implemented as appropriate)  Pt would benefit from continued OT to address deficits in self care and functional mobility. Recommending SNF; DME needs likely none

## 2019-03-17 NOTE — PT/OT/SLP EVAL
"Physical Therapy Evaluation and Treatment    Patient Name:  Phi Sainz   MRN:  [id number]    Recommendations:     Discharge Recommendations:  nursing facility, skilled   Discharge Equipment Recommendations: (TBD)   Barriers to discharge: Decreased caregiver support, high falls risk    Assessment:     Phi Sainz is a 89 y.o. male admitted with a medical diagnosis of NICOLLE (acute kidney injury).  He presents with the following impairments/functional limitations:  weakness, impaired endurance, impaired self care skills, impaired functional mobilty, gait instability, impaired balance, impaired cognition, decreased coordination, decreased safety awareness, impaired coordination, decreased ROM. Pt with impaired balance and stability with standing activities, presenting with posterior leaning and shuffled steps, requiring up to mod A during gait to prevent LOB. Recommending SNF placement upon d/c.    Rehab Prognosis: Good; patient would benefit from acute skilled PT services to address these deficits and reach maximum level of function.    Recent Surgery: * No surgery found *      Plan:     During this hospitalization, patient to be seen 6 x/week to address the identified rehab impairments via gait training, therapeutic activities, therapeutic exercises, neuromuscular re-education and progress toward the following goals:    · Plan of Care Expires:  04/17/19    Subjective     Chief Complaint: having to use the bathroom  Patient/Family Comments/goals: pt quietly saying "help" several minutes after PT left room- returned to find pt with his sheets off of him and when asked what he needs help with, pt stating, "I gotta get up and go home." Explained to pt he is not being d/c at this time- RN notified.  Pain/Comfort:  · Pain Rating 1: 4/10  · Location - Orientation 1: medial  · Location 1: back(mid to upper back)  · Pain Addressed 1: Reposition, Distraction  · Pain Rating Post-Intervention 1: 4/10    Patients " cultural, spiritual, Nondenominational conflicts given the current situation: no    Living Environment:  Information gathered from pt, no family present to determine if information is accurate.  Pt lives with his granddaughter in a H with 2 FILOMENA with R railing and WIS with shower chair.  Prior to admission, patients level of function was mod I ambulating with rollator, requires assist with dressing (buttons), assist to get in/out of shower, supervision during showers, and reported he was able to ambulate self to restroom. Reporting there is an aide that come to his house for several hours/day when his granddaughter is at work to assist him with ADLs. Equipment used at home: rollator, shower chair, bedside commode.  DME owned (not currently used): none.  Upon discharge, patient will have assistance from his granddaughter when she is home and reporting an aide comes usually 5 days/week for ~4 hours a day but there is still time during the days (up to 6 hours) when he is home alone. Recommending pt have 24/7 supervision/assist for safety at this time.    Objective:     Communicated with ROMMEL Jacinto prior to session.  Patient found all lines intact bed alarm, telemetry, peripheral IV([employer])  upon PT entry to room.    General Precautions: Standard, fall   Orthopedic Precautions:N/A   Braces: N/A     Exams:  · Cognitive Exam:  Patient is oriented to Person and city only, states another hospital and is not oriented to time  · Gross Motor Coordination:  impaired - pt with shuffled gait  · Postural Exam:  Patient presented with the following abnormalities:    · -       Rounded shoulders  · -       Forward head  · -       Posterior pelvic tilt  · Sensation:    · -       Intact  · Skin Integrity/Edema:      · -       Skin integrity: Visible skin intact  · RLE ROM: WFL except lacking 10-20 deg from terminal knee ext  · RLE Strength: grossly 4/5 to 4+/5  · LLE ROM: WFL except lacking 10-20 deg from terminal knee ext  · LLE Strength:  grossly 4/5 to 4+/5    Functional Mobility:  · Bed Mobility:     · Scooting: stand by assistance  · Supine to Sit: stand by assistance  · Sit to Supine: stand by assistance  · Transfers:     · Sit to Stand:  minimum assistance with rolling walker  · Toilet Transfer: minimum assistance and moderate assistance with  rolling walker  using  Step Transfer  · Gait: 10 ft x 2 and 45 ft with RW and min A for majority with 1 bout of mod A to prevent posterior LOB when ambulating back from restroom. Pt ambulated with increased proximity to RW, flexed posture over RW, and shuffled steps. Pt with intermittent posterior leaning requiring assist to prevent LOB.      Therapeutic Activities and Exercises:  Pt attempted using urinal in supine but unable to urinate and reported having to have a bowel movement.  Ambulated to restroom and had a large bowel movement. Stood at RW to complete hygiene with CGA.  Ambulated back to bed to rest.  Max cues required to maintain hands on RW and maintain RW in front of him when turning to reach seated surface instead of reaching for surface quickly to sit.  Ambulated 45 ft bout in pearson then returned to supine.  Educated in supine LE exercises.  Spoke with MD team regarding recommendation for SNF.    AM-PAC 6 CLICK MOBILITY  Total Score:15     Patient left HOB elevated with all lines intact, call button in reach, bed alarm on and RN present.    GOALS:   Multidisciplinary Problems     Physical Therapy Goals        Problem: Physical Therapy Goal    Goal Priority Disciplines Outcome Goal Variances Interventions   Physical Therapy Goal     PT, PT/OT Ongoing (interventions implemented as appropriate)     Description:  Goals to be met by: 19     Patient will increase functional independence with mobility by performin. Sit to stand transfer with Stand-by Assistance  2. Bed to chair transfer with Stand-by Assistance using Rolling Walker  3. Gait  x 100 feet with Stand-by Assistance using  Rolling Walker.   4. Stand for 5 minutes with Stand-by Assistance using Rolling Walker                      History:     Past Medical History:   Diagnosis Date    Allergy     Anemia     Arthritis     Basal cell carcinoma     Depression     Herniation of intervertebral disc     Hyperlipidemia     Hypertension 3/4/2015    Macular degeneration     Parkinson disease     Spinal stenosis        Past Surgical History:   Procedure Laterality Date    Bilateral MBB  Bilateral 2/16/2016    Performed by [facility] at [facility]    BLOCK-NERVE-MEDIAL BRANCH-LUMBAR** Bilateral MBB ** Bilateral 12/22/2015    Performed by [facility] at [facility]    BLOCK-NERVE-MEDIAL BRANCH-LUMBAR** BILATERAL MBB ** Bilateral 12/1/2015    Performed by [facility] at [facility]    CATARACT EXTRACTION W/  INTRAOCULAR LENS IMPLANT Left 10/31/07    Dr. Nunez    CATARACT EXTRACTION W/  INTRAOCULAR LENS IMPLANT Right 12/16/15    Dr. Nunez    INJECTION-STEROID-EPIDURAL-CAUDAL- WITH CATH N/A 6/14/2016    Performed by [facility] at [facility]    INSERTION-INTRAOCULAR LENS (IOL) Right 12/16/2015    [facility]    PHACOEMULSIFICATION-ASPIRATION-CATARACT Right 12/16/2015    Per[facility] at [facility]    RADIOFREQUENCY THERMOCOAGULATION (RFTC)-NERVE-MEDIAN BRANCH-LUMBAR  Left L2, L3, L4, L5 Left 3/15/2016    Performed by [facility] Jackson [facility] at [facility]    REPAIR-RETINA (VITRECTOMY) Right 1/6/2016    Performed by JOAN Encinas MD at [facility] OR [facility]       Time Tracking:     PT Received On: 03/17/19  PT Start Time: 0902     PT Stop Time: 0930  PT Total Time (min): 28 min     Billable Minutes: Evaluation 15 and Gait Training 13      Yesenia Bran, PT  03/17/2019

## 2019-03-17 NOTE — PLAN OF CARE
Problem: Fall Injury Risk  Goal: Absence of Fall and Fall-Related Injury  Outcome: Ongoing (interventions implemented as appropriate)  Discussed with pt. Fall precautions.Explained that the bed alarm is set and we would be rounding every hour.Call light and personal items placed within pt.s reach.

## 2019-03-17 NOTE — PLAN OF CARE
Problem: Physical Therapy Goal  Goal: Physical Therapy Goal  Goals to be met by: 19     Patient will increase functional independence with mobility by performin. Sit to stand transfer with Stand-by Assistance  2. Bed to chair transfer with Stand-by Assistance using Rolling Walker  3. Gait  x 100 feet with Stand-by Assistance using Rolling Walker.   4. Stand for 5 minutes with Stand-by Assistance using Rolling Walker    Outcome: Ongoing (interventions implemented as appropriate)  PT evaluation completed, note to follow. Pt with impaired balance and stability with standing activities, presenting with posterior leaning and shuffled steps, requiring up to mod A during gait to prevent LOB. Recommending SNF placement upon d/c.

## 2019-03-18 VITALS
DIASTOLIC BLOOD PRESSURE: 60 MMHG | RESPIRATION RATE: 20 BRPM | SYSTOLIC BLOOD PRESSURE: 124 MMHG | WEIGHT: 153.44 LBS | OXYGEN SATURATION: 99 % | HEART RATE: 74 BPM | BODY MASS INDEX: 21.48 KG/M2 | TEMPERATURE: 97 F | HEIGHT: 71 IN

## 2019-03-18 DIAGNOSIS — R00.1 BRADYCARDIA: Primary | ICD-10-CM

## 2019-03-18 LAB
ALBUMIN SERPL BCP-MCNC: 3.5 G/DL
ALP SERPL-CCNC: 70 U/L
ALT SERPL W/O P-5'-P-CCNC: <5 U/L
ANION GAP SERPL CALC-SCNC: 9 MMOL/L
AST SERPL-CCNC: 44 U/L
BASOPHILS # BLD AUTO: 0.02 K/UL
BASOPHILS NFR BLD: 0.2 %
BILIRUB SERPL-MCNC: 1 MG/DL
BUN SERPL-MCNC: 11 MG/DL
CALCIUM SERPL-MCNC: 9.5 MG/DL
CHLORIDE SERPL-SCNC: 103 MMOL/L
CO2 SERPL-SCNC: 30 MMOL/L
CREAT SERPL-MCNC: 1.1 MG/DL
DIFFERENTIAL METHOD: ABNORMAL
EOSINOPHIL # BLD AUTO: 0 K/UL
EOSINOPHIL NFR BLD: 0.2 %
ERYTHROCYTE [DISTWIDTH] IN BLOOD BY AUTOMATED COUNT: 12.8 %
EST. GFR  (AFRICAN AMERICAN): >60 ML/MIN/1.73 M^2
EST. GFR  (NON AFRICAN AMERICAN): 59 ML/MIN/1.73 M^2
GLUCOSE SERPL-MCNC: 90 MG/DL
HCT VFR BLD AUTO: 43.6 %
HGB BLD-MCNC: 14.2 G/DL
LYMPHOCYTES # BLD AUTO: 1.2 K/UL
LYMPHOCYTES NFR BLD: 13.1 %
MCH RBC QN AUTO: 29.6 PG
MCHC RBC AUTO-ENTMCNC: 32.6 G/DL
MCV RBC AUTO: 91 FL
MONOCYTES # BLD AUTO: 1 K/UL
MONOCYTES NFR BLD: 11.8 %
NEUTROPHILS # BLD AUTO: 6.5 K/UL
NEUTROPHILS NFR BLD: 74.5 %
PLATELET # BLD AUTO: 192 K/UL
PMV BLD AUTO: 11 FL
POCT GLUCOSE: 132 MG/DL (ref 70–110)
POTASSIUM SERPL-SCNC: 3.4 MMOL/L
PROT SERPL-MCNC: 7.4 G/DL
RBC # BLD AUTO: 4.79 M/UL
SODIUM SERPL-SCNC: 142 MMOL/L
WBC # BLD AUTO: 8.79 K/UL

## 2019-03-18 PROCEDURE — 85025 COMPLETE CBC W/AUTO DIFF WBC: CPT

## 2019-03-18 PROCEDURE — 25000003 PHARM REV CODE 250: Performed by: STUDENT IN AN ORGANIZED HEALTH CARE EDUCATION/TRAINING PROGRAM

## 2019-03-18 PROCEDURE — 97535 SELF CARE MNGMENT TRAINING: CPT | Mod: 59

## 2019-03-18 PROCEDURE — G0378 HOSPITAL OBSERVATION PER HR: HCPCS

## 2019-03-18 PROCEDURE — 93010 ELECTROCARDIOGRAM REPORT: CPT | Mod: ,,, | Performed by: INTERNAL MEDICINE

## 2019-03-18 PROCEDURE — 94761 N-INVAS EAR/PLS OXIMETRY MLT: CPT

## 2019-03-18 PROCEDURE — 93010 EKG 12-LEAD: ICD-10-PCS | Mod: ,,, | Performed by: INTERNAL MEDICINE

## 2019-03-18 PROCEDURE — 93005 ELECTROCARDIOGRAM TRACING: CPT

## 2019-03-18 PROCEDURE — 36415 COLL VENOUS BLD VENIPUNCTURE: CPT

## 2019-03-18 PROCEDURE — 80053 COMPREHEN METABOLIC PANEL: CPT

## 2019-03-18 PROCEDURE — 97530 THERAPEUTIC ACTIVITIES: CPT

## 2019-03-18 PROCEDURE — 96361 HYDRATE IV INFUSION ADD-ON: CPT

## 2019-03-18 RX ORDER — SODIUM CHLORIDE 9 MG/ML
INJECTION, SOLUTION INTRAVENOUS CONTINUOUS
Status: DISCONTINUED | OUTPATIENT
Start: 2019-03-18 | End: 2019-03-18 | Stop reason: HOSPADM

## 2019-03-18 RX ORDER — POTASSIUM CHLORIDE 20 MEQ/15ML
25 SOLUTION ORAL
Status: COMPLETED | OUTPATIENT
Start: 2019-03-18 | End: 2019-03-18

## 2019-03-18 RX ORDER — ONDANSETRON 8 MG/1
8 TABLET, ORALLY DISINTEGRATING ORAL EVERY 8 HOURS PRN
Status: DISCONTINUED | OUTPATIENT
Start: 2019-03-18 | End: 2019-03-18 | Stop reason: HOSPADM

## 2019-03-18 RX ORDER — ACETAMINOPHEN 325 MG/1
650 TABLET ORAL EVERY 4 HOURS PRN
Status: DISCONTINUED | OUTPATIENT
Start: 2019-03-18 | End: 2019-03-18 | Stop reason: HOSPADM

## 2019-03-18 RX ORDER — SODIUM CHLORIDE 0.9 % (FLUSH) 0.9 %
3 SYRINGE (ML) INJECTION
Status: DISCONTINUED | OUTPATIENT
Start: 2019-03-18 | End: 2019-03-18 | Stop reason: HOSPADM

## 2019-03-18 RX ADMIN — CEPHALEXIN 500 MG: 500 CAPSULE ORAL at 08:03

## 2019-03-18 RX ADMIN — CEPHALEXIN 500 MG: 500 CAPSULE ORAL at 03:03

## 2019-03-18 RX ADMIN — RASAGILINE 1 MG: 0.5 TABLET ORAL at 08:03

## 2019-03-18 RX ADMIN — MESALAMINE 1500 MG: 250 CAPSULE ORAL at 06:03

## 2019-03-18 RX ADMIN — APIXABAN 2.5 MG: 2.5 TABLET, FILM COATED ORAL at 08:03

## 2019-03-18 RX ADMIN — POTASSIUM CHLORIDE 25.07 MEQ: 20 SOLUTION ORAL at 11:03

## 2019-03-18 RX ADMIN — CARBIDOPA AND LEVODOPA 1 TABLET: 25; 100 TABLET ORAL at 08:03

## 2019-03-18 RX ADMIN — HYPROMELLOSE 2910 1 DROP: 5 SOLUTION OPHTHALMIC at 08:03

## 2019-03-18 RX ADMIN — POTASSIUM CHLORIDE 25.07 MEQ: 20 SOLUTION ORAL at 03:03

## 2019-03-18 RX ADMIN — SODIUM CHLORIDE, SODIUM LACTATE, POTASSIUM CHLORIDE, AND CALCIUM CHLORIDE: .6; .31; .03; .02 INJECTION, SOLUTION INTRAVENOUS at 01:03

## 2019-03-18 RX ADMIN — HYPROMELLOSE 2910 1 DROP: 5 SOLUTION OPHTHALMIC at 03:03

## 2019-03-18 RX ADMIN — HYDROCODONE BITARTRATE AND ACETAMINOPHEN 1 TABLET: 5; 325 TABLET ORAL at 11:03

## 2019-03-18 RX ADMIN — CARBIDOPA AND LEVODOPA 1 TABLET: 25; 100 TABLET ORAL at 03:03

## 2019-03-18 NOTE — NURSING
Pt. Continues to attempt to kick and pinch staff while being cleaned and redressed.Large soft/liquid bowel movement noted at this time.Wrist restraints taken off ,both arms reassessed and restraints reapplied.Will continue to monitor.

## 2019-03-18 NOTE — CONSULTS
U Cardiology Consult - Resident Note     Primary Attending Physician: Nany  Primary Team: [facility] Hospitalist Team B  Consultant Attending: Ariel Sepulveda  Consultant Resident: Ramo Gomez     Reason for Consult:     Bradycardia, altered mental status     Subjective:     History of Present Illness:  Mr Sainz is a 89 y/o with Parkinson's disease who [place] [facility] [place]. Patient was noted to freeze in position and start shaking while transferring from chair to bed. VS were taken, with HR of 46 and BP of 134/92. Cardiology was subsequently consulted. EKG revealed sinus bradycardia w/o ischemic changes.     Of note, patient has history of 24 hour Holter monitor placement 10/31/18 for bradycardia. There was no evidence of AV or SA node block, with HR ranging from 46-92 (avg 61) bpm.     Past Medical History:  Past Medical History:   Diagnosis Date    Allergy     Anemia     Arthritis     Basal cell carcinoma     Depression     Herniation of intervertebral disc     Hyperlipidemia     Hypertension 3/4/2015    Macular degeneration     Parkinson disease     Spinal stenosis      Allergies:  Review of patient's allergies indicates:   Allergen Reactions    Combigan [brimonidine-timolol]      Made him dizziness    Contrast media Rash     Medications:  In-Hospital Scheduled Medications:   apixaban  2.5 mg Oral BID    artificial tears  1 drop Both Eyes TID    carbidopa-levodopa  mg  1 tablet Oral TID    cephALEXin  500 mg Oral TID    mesalamine  1,500 mg Oral QAM    potassium chloride  25 mEq Oral Q2H    rasagiline  1 mg Oral Daily     In-Hospital PRN Medications:  dextrose 50 % in water (D50W), dextrose 50%, diphenoxylate-atropine 2.5-0.025 mg, glucagon (human recombinant), glucose, glucose, HYDROcodone-acetaminophen, oxyCODONE-acetaminophen, sodium chloride 0.9%  In-Hospital IV Infusion Medications:   lactated ringers 125 mL/hr at 03/17/19 1448     Home Medications:  Prior  to Admission medications    Medication Sig Start Date End Date Taking? Authorizing Provider   apixaban (ELIQUIS) 2.5 mg Tab Take 1 tablet (2.5 mg total) by mouth 2 (two) times daily. 12/19/18  Yes Rody Morrison MD   carbidopa-levodopa  mg (SINEMET)  mg per tablet Take 1 tablet three times daily. 2/18/19  Yes Adriana Carcamo NP   cephALEXin (KEFLEX) 500 MG capsule Take 1 capsule (500 mg total) by mouth 3 (three) times daily. for 7 days 3/13/19 3/20/19 Yes Bobbi Black PA-C   diphenoxylate-atropine 2.5-0.025 mg (LOMOTIL) 2.5-0.025 mg per tablet Take 1 tablet by mouth 2 (two) times daily.  Patient taking differently: Take 1 tablet by mouth 2 (two) times daily. Pt taking one pill twice a day. 10/6/15  Yes Yanna Santana MD   gabapentin (NEURONTIN) 100 MG capsule Take 300 mg by mouth 2 (two) times daily. Pt taking one pill twice a day.   Yes Historical Provider, MD   hydrocodone-acetaminophen 5-325mg (NORCO) 5-325 mg per tablet Pt taking one pill once a day. 9/7/17  Yes Historical Provider, MD   mesalamine (APRISO) 0.375 gram Cp24 Take 1.5 g by mouth once daily. Pt taking 4 pills in am.   Yes Historical Provider, MD   oxyCODONE-acetaminophen (PERCOCET) 5-325 mg per tablet Take 1 tablet by mouth every 4 to 6 hours as needed for Pain. 3/13/19  Yes Bobbi Black PA-C   rasagiline (AZILECT) 1 mg Tab Take 1 tablet (1 mg total) by mouth once daily. 2/18/19 2/18/20 Yes Adriana Carcamo NP        Family History:  Family History   Problem Relation Age of Onset    Cancer Father         prostate    Colon cancer Father     Hearing loss Paternal Aunt     Amblyopia Neg Hx     Blindness Neg Hx     Cataracts Neg Hx     Diabetes Neg Hx     Glaucoma Neg Hx     Hypertension Neg Hx     Macular degeneration Neg Hx     Retinal detachment Neg Hx     Strabismus Neg Hx     Stroke Neg Hx     Thyroid disease Neg Hx     Heart attack Neg Hx     Heart disease Neg Hx     Heart failure Neg Hx     Hyperlipidemia  Neg Hx     Melanoma Neg Hx         Social History:  Social History     Tobacco Use    Smoking status: Never Smoker    Smokeless tobacco: Never Used   Substance Use Topics    Alcohol use: No     Alcohol/week: 0.0 oz    Drug use: No        Review of Systems:  Unable to obtain ROS due to patient's mental status     Objective:  Last 24 Hour Vital Signs:  BP  Min: 121/66  Max: 205/105  Temp  Av.4 °F (36.3 °C)  Min: 96.2 °F (35.7 °C)  Max: 98.6 °F (37 °C)  Pulse  Av.7  Min: 46  Max: 89  Resp  Av.9  Min: 12  Max: 20  SpO2  Av.4 %  Min: 94 %  Max: 98 %  I/O last 3 completed shifts:  In: 2877 [P.O.:300; I.V.:2577]  Out: 1840 [Urine:1840]  Body mass index is 21.4 kg/m².     Physical Examination:  Gen: Alert in NAD  HEENT: NCAT. Sclera anicteric. No JVD or carotid bruits  Pulm: CTAB. Non-labored breathing  CV: RRR, no m/r/g  Abd: Soft, NTND  Ext: Palpable and symmetric pulses distal extremities bilat. No edema  Neuro: Patient alerts to name, but otherwise not answering questions.     Laboratory Results:  Most Recent Data:  DM:   Lab Results   Component Value Date    LDLCALC 131.0 2018    CREATININE 1.1 2019     Thyroid:   Lab Results   Component Value Date    TSH 1.829 2019     Anemia:   Lab Results   Component Value Date    IRON 90 2011    TIBC 420 2011    FERRITIN 12 (L) 2011    IPLSSIDN31 380 2011    FOLATE 16.8 2011     Cardiac:   Lab Results   Component Value Date    TROPONINI <0.006 2019     (H) 10/09/2017     Trended Lab Data:  Recent Labs   Lab 19  1222 19  0812 19  0520   WBC 13.37*  13.37* 6.84 8.79   HGB 12.6*  12.6* 13.5* 14.2   HCT 39.8*  39.8* 41.9 43.6     174 188 192   MCV 94  94 93 91   RDW 13.0  13.0 12.8 12.8    141 142   K 4.3 3.8 3.4*    104 103   CO2 26 28 30*   BUN 21 17 11   GLU 99 90 90   CALCIUM 9.0 9.6 9.5   PROT 7.0 7.1 7.4   ALBUMIN 3.4* 3.4* 3.5   BILITOT 1.3* 1.0 1.0    AST 22 28 44*   ALKPHOS 67 63 70   ALT <5* <5* <5*        Trended Cardiac Data:  Recent Labs   Lab 03/16/19  1222 03/16/19 2007   TROPONINI 0.011 <0.006     Other Results:  EKG (my interpretation): sinus derick      Assessment:     88 y/o male with transient bradycardia. HR 46, /92 at time of rapid. EKG revealed sinus bradycardia w/o evidence of AV block. Patient now with sustained HR 52-54 on tele. Patient has history of bradycardia, with Holter monitor placement 10/31/18. No evidence of SA or AV node block with HR ranging from 46-92 (avg 61) bpm at time of monitor placement. Change in behavior when rapid was called (freezing in place) and worsening dysnomia/aphasia likely related to PD or side-effect of PD medication, not cerebral malperfusion from low-output state.     Plan:  # Transient bradycardia  - No further workup or intervention warranted at this time.     Thank you for allowing us to participate in the care of this patient. Please contact me at my cell phone if you have any questions regarding this consult.     Ashu Arreola  [facility] HO-I  Cell phone: [phone]

## 2019-03-18 NOTE — PLAN OF CARE
"Problem: Occupational Therapy Goal  Goal: Occupational Therapy Goal  Goals to be met by: 04/17/2019     Patient will increase functional independence with ADLs by performing:    UE Dressing with Set-up Assistance.  LE Dressing with Contact Guard Assistance.  Grooming while standing with Supervision.  Sitting at edge of bed x15 minutes with Supervision.  Step transfer with Contact Guard Assistance  Toilet transfer to toilet with Contact Guard Assistance.  Increased functional strength to WFL for self care.  Upper extremity exercise program x8 reps per handout, with assistance as needed.     Outcome: Ongoing (interventions implemented as appropriate)  Patient more fatigued this date 2* earlier "seizure episode". Patient will benefit from continued skilled OT to address deficits and improve performance in functional ADL tasks. Cont OT per POC.      "

## 2019-03-18 NOTE — PT/OT/SLP PROGRESS
Physical Therapy      Patient Name:  Phi Sainz   MRN:  [id number]    Attempted to see pt at 1542, family stated that he was trying to use the urinal.  PTA agreed to return a little later.  Attempted to see pt at 1630, but still with urinal and had urinated, but pt was now having a BM.  Nsg notified that pt needed assist. Will follow-up tomorrow 3/19/19.    Ngoc Boo, PTA

## 2019-03-18 NOTE — PLAN OF CARE
"[facility] Plan of Care     Called by nurse regarding patient AMS. Patient trying to get out of bed, pulling lines, very agitated. Prescribed 25 mg seroquel in attempt to maintain patient safety and improve agitation. About an hour later, called again regarding patient status. Came to see patient. Patient extremely agitated, screaming "lacy" (michael's name) over and over loudly. Combatively trying to crawl out of bed, kicking at me and nurses. Pulling at lines.  Requiring constant supervision by me and 2 other providers.     Ordered 4 point restraints as patient is not redirectable, has not responded to seroquel, and is a danger to himself. No bedside sitters available.     Raquel Hernandez MD HO-I   "

## 2019-03-18 NOTE — PLAN OF CARE
Problem: Fall Injury Risk  Goal: Absence of Fall and Fall-Related Injury  Outcome: Ongoing (interventions implemented as appropriate)  Discussed fall precautions with pt. And family members.Call light and personal items are within pt.s reach.Bed alarm set and HÉCTOR in use.Pt. Instructed to call for asisstance before attempting any movement out of bed.

## 2019-03-18 NOTE — NURSING
Pt. Confused ,combative, pulling out iv (tearing skin to right wrist).Area cleaned and dresses.IV to left arm still in place infusing LR at 125cc/hr as ordered.Dr. Bliss notified of pt.s mental changes.Order for Seroquel 25mg PO to be administered .Will medicate and continue to monitor.

## 2019-03-18 NOTE — PLAN OF CARE
03/18/19 1155   BAEZA Message   Medicare Outpatient and Observation Notification regarding financial responsibility Given to patient/caregiver;Explained to patient/caregiver;Signed/date by patient/caregiver   Date BAEZA was signed 03/18/19   Time BAEZA was signed 1153

## 2019-03-18 NOTE — PLAN OF CARE
"MET called at 950. Pt was getting up from chair to bed. Grand daughter reported that pt has a history of orthostatic hypotension and that he froze up "and started to to shake. It was different from his usual parkinson shake." VS taken. It was noted that his Pulse was 46. Pt has had no previous episodes of bradycardia while on floor. Pt granddaughter denies bradycardia as part of pt history. Team at bedside. EKG was done showing sinus derick with PAC. Team consulted cardiology.   "

## 2019-03-18 NOTE — NURSING
Pt. Continuing to lash out and kick at staff Dr. Bliss in room to assess pt. Orders for soft restraints given at this time.Vital signs stable.Bed alarm set and HÉCTOR in use.No distress noted at this time.Will continue to monitor.

## 2019-03-18 NOTE — NURSING
Received report on pt. Pt. Awake, alert and oriented to self and time only.Grandson at the bedside.Pt.s bed alarm set.Call light and personal items are within pt.s reach.HÉCTOR in use.Pt. And family instructed to call for asisstance before attempting to get pt. Out of the bed.Verbalizes understanding.Telemetry monitor in use and IV fluids maintained as ordered.No distress is noted at this time.Will continue to monitor.

## 2019-03-18 NOTE — PROGRESS NOTES
[facility] Medicine Progress Note    Primary Team: [facility] Hospitalist Team B  Attending Physician: Chuck Ayon MD  Resident: Dagmar  Intern: Heri/Raina    Subjective:      Patient became agitated overnight, requiring Seroquel and soft restraints. He has calmed down this morning and is more oriented. He has no complaints at this time. Per granddaughter, family does not want placement and would rather continued home health.     Objective:     Last 24 Hour Vital Signs:  BP  Min: 121/66  Max: 205/105  Temp  Av.4 °F (36.3 °C)  Min: 96.2 °F (35.7 °C)  Max: 98.6 °F (37 °C)  Pulse  Av.7  Min: 61  Max: 89  Resp  Av.8  Min: 18  Max: 20  SpO2  Av %  Min: 95 %  Max: 97 %  I/O last 3 completed shifts:  In: 2877 [P.O.:300; I.V.:2577]  Out: 1840 [Urine:1840]    Physical Examination:  General: NAD  Head: NC/AT  Eyes: PERRL  Mouth: MMM  Heart: RRR no MRG  Lungs: CTAB. No wheezes or crackles  Abd: Soft, ND, NT. No RRG  Extremities: Senile purpura present diffusely  Neuro: AAOX4. UE and LE sensation intact bilaterally  Skin: 9 stiches in right upper lip. Edema localized to one by one cm of mid right upper lip, nonfluctuant, no purulent discharge. Erythema across top of lip R.>L . Improved skin turgor      Laboratory:  Laboratory Data Reviewed: yes  Pertinent Findings:  Lab Results   Component Value Date    WBC 8.79 2019    HGB 14.2 2019    HCT 43.6 2019    MCV 91 2019     2019     Lab Results   Component Value Date    ALT <5 (L) 2019    AST 44 (H) 2019    ALKPHOS 70 2019    BILITOT 1.0 2019     Lab Results   Component Value Date    CREATININE 1.1 2019    BUN 11 2019     2019    K 3.4 (L) 2019     2019    CO2 30 (H) 2019       Microbiology Data Reviewed: yes  Pertinent Findings:  Urine Cx: GNR, speciation pending    Other Results:  EKG (my interpretation):  No new    Radiology Data Reviewed:  yes  Pertinent Findings:  Imaging Results          US Retroperitoneal Complete (Final result)  Result time 03/17/19 14:30:57    Final result by Clover Alvarez MD (03/17/19 14:30:57)                 Impression:      This exam is within normal limits.      Electronically signed by: Clover Alvarez MD  Date:    03/17/2019  Time:    14:30             Narrative:    EXAMINATION:  US RETROPERITONEAL COMPLETE    CLINICAL HISTORY:  gregor;    TECHNIQUE:  Ultrasound of the kidneys and urinary bladder was performed including color flow and Doppler evaluation of the kidneys.    COMPARISON:  None.    FINDINGS:  Right kidney: The right kidney measures 10.4 cm. No cortical thinning. No loss of corticomedullary distinction. Resistive index measures 0.7.  No mass. No renal stone. No hydronephrosis.    Left kidney: The left kidney measures 9.8 cm. No cortical thinning. No loss of corticomedullary distinction. Resistive index measures 0.66.  There is a simple cyst measuring 1.7 cm in the lower pole of the left kidney.  No mass. No renal stone. No hydronephrosis.    The bladder is well distended at the time of scanning and has an unremarkable appearance.                                  Current Medications:     Infusions:   lactated ringers 125 mL/hr at 03/17/19 1448        Scheduled:   apixaban  2.5 mg Oral BID    artificial tears  1 drop Both Eyes TID    carbidopa-levodopa  mg  1 tablet Oral TID    cephALEXin  500 mg Oral TID    mesalamine  1,500 mg Oral QAM    rasagiline  1 mg Oral Daily        PRN:  dextrose 50 % in water (D50W), dextrose 50%, diphenoxylate-atropine 2.5-0.025 mg, glucagon (human recombinant), glucose, glucose, HYDROcodone-acetaminophen, oxyCODONE-acetaminophen, sodium chloride 0.9%    Antibiotics and Day Number of Therapy:  Ceftriaxone X2  PO Keflex day 3    Lines and Day Number of Therapy:  PIV    Assessment:     Phi Sainz is a 89 y.o.male with  Patient Active Problem List    Diagnosis  Date Noted    Acute cystitis without hematuria 03/16/2019    Dehydration 03/16/2019    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    Altered mental state 03/16/2019    NICOLLE (acute kidney injury) 03/16/2019    Parkinson disease     Parkinson's disease 10/11/2018    Physical debility 09/20/2018    DVT (deep vein thrombosis) in pregnancy 09/20/2018    Chronic deep vein thrombosis (DVT) 09/20/2018    Other dysphagia 07/03/2018    ILD (interstitial lung disease)     Abnormal chest CT     Bradycardia 07/12/2016    Leg weakness, bilateral 06/30/2016    Osteoarthritis of spine 01/21/2016    Posterior vitreous detachment, both eyes 12/29/2015    Spinal stenosis, lumbar region, 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/01/2015    DDD (degenerative disc disease), lumbar 07/01/2015    Lumbar facet arthropathy 07/01/2015    Lumbago 04/23/2015    Thoracic or lumbosacral neuritis or radiculitis, unspecified 04/23/2015    Acquired spondylolisthesis 04/23/2015    Degeneration of lumbar or lumbosacral intervertebral disc 04/23/2015    Spondylosis without myelopathy 04/23/2015    Hypertension 03/04/2015    Dyslipidemia 03/04/2015    Essential tremor 03/05/2013    Gait disorder 03/05/2013    Pseudophakia - Left Eye 08/14/2012    ARMD (age related macular degeneration) - Both Eyes 08/14/2012        Plan:     NICOLLE on CKD 3, resolved  -Admission Cr of 1.5 & GFR 41  Baseline Cr 1.2  -Likely secondary to poor po intake in past few days  -FeNa 0.3 indicating pre-renal NICOLLE  Renal US WNL  -Provide with IVF  -Hold home gabapentin  -Cr back at baseline     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  -Patient with Parkinson dementia and found to have worsening confusion X1 day  -Believe this to be combination of Parkinson disease, dehydration in setting of poor PO intake 2/2     UTI  -UA with no bacteria/1+ leukocytes, 15 WBC, nitrite negative  -Given dose of ceftriaxone in ED  -treating with PO Keflex  -Renal US WNL     Normocytic  Anemia  -3/16: Hb 12.6/Hct 39.8/MCV 94  -Monitor daily     Hyperbilirubinemia, resolved  -Total bili of 1.3 on admission  -improved to 1 with IVFs     Parkinson Disease  -Continue home Rasagiline and Cinemet     Dermatologic Lip Surgery for suspect Basal Cell Carcinoma  -Patient s/p lip surgery on 3/13 with dermatology  -Discharge on home prophalactic Keflex, continue at this time  -Percocet prn     Crohn's Disease  -Continue home mesalamine     Chronic DVT  -Continue home Eliquis     Hypertension  -Not on any home meds  -Watch with routine vitals     Fluids: LR at 125 ml/hr  Electrolytes: Shift and replace prn  Nutrition: Full liquid  DVT PPx: Eliquis  Code: Full     Dispo: plan to discharge today with home health        Daniel Liriano MD  [facility] Medicine HO-1    \[facility]\"" Medicine Hospitalist Pager numbers:   [facility] Hospitalist Medicine Team A (Marysol/Rober): [phone]  [facility] Hospitalist Medicine Team B (Nany/Shabbir):  [phone]

## 2019-03-18 NOTE — NURSING
Pt. Cleaned and repositioned.Mouth care performed at this time.Restraints untied and arms assessed.Pt. Continues to pull at iv tubing and line.Restraints reapplied .will continue to re-evaluate need for restraints.

## 2019-03-18 NOTE — PT/OT/SLP PROGRESS
"Occupational Therapy   Treatment    Name: Phi Sainz  MRN: [id number]  Admitting Diagnosis:  NICOLLE (acute kidney injury)       Recommendations:     Discharge Recommendations: nursing facility, skilled(Patient set to D/C home with HH OT/PT and has 24hr A/Sup)  Discharge Equipment Recommendations:  (TBD)  Barriers to discharge:  None    Assessment:   Patient more fatigued this date 2* earlier "seizure episode". Patient will benefit from continued skilled OT to address deficits and improve performance in functional ADL tasks. Cont OT per POC.    Phi Sainz is a 89 y.o. male with a medical diagnosis of NICOLLE (acute kidney injury). Performance deficits affecting function are weakness, impaired endurance, impaired self care skills, impaired functional mobilty, gait instability, impaired balance, decreased coordination, decreased upper extremity function, decreased lower extremity function, impaired fine motor, decreased safety awareness, impaired skin.     Rehab Prognosis:  Good and Fair; patient would benefit from acute skilled OT services to address these deficits and reach maximum level of function.       Plan:     Patient to be seen 5 x/week to address the above listed problems via self-care/home management, therapeutic activities, therapeutic exercises  · Plan of Care Expires: 04/17/19  · Plan of Care Reviewed with: patient, family    Subjective     Pain/Comfort:  · Pain Rating 1: 0/10  · Pain Rating Post-Intervention 1: 0/10    Objective:     Communicated with: nurseAmber prior to session.  Patient found HOB elevated with peripheral IV, telemetry, bed alarm upon OT entry to room.    General Precautions: Standard, fall   Orthopedic Precautions:N/A   Braces: N/A     Bed Mobility:    · Patient completed Scooting/Bridging with contact guard assistance  · Patient completed Supine to Sit with contact guard assistance, minimum assistance and with side rail  · Patient completed Sit to Supine with minimum " "assistance     Functional Mobility/Transfers:  · Patient completed Sit <> Stand Transfer with contact guard assistance and minimum assistance  with  hand-held assist and rolling walker     Activities of Daily Living:  · Grooming: stand by assistance      [facility] 6 Click ADL: 15    Treatment & Education:  Patient with bed mob as noted above. Increased time required for all movements/transitions. Completed G/H tasks EOB with SBA. Patient stood with HHA briefly before stating he felt dizzy. Returned to EOB seating - family member inspecting "rash" like spots on patient's back. Patient denies pain or itching. Patient stood a second time with RW to allow change of pads and was able to SS to HOB with CGA-Min (A). Patient returned to supine. Questions answered from family.     Patient left HOB elevated with all lines intact, call button in reach, bed alarm on, nurse notified and family members presentEducation:      GOALS:   Multidisciplinary Problems     Occupational Therapy Goals        Problem: Occupational Therapy Goal    Goal Priority Disciplines Outcome Interventions   Occupational Therapy Goal     OT, PT/OT Ongoing (interventions implemented as appropriate)    Description:  Goals to be met by: 04/17/2019     Patient will increase functional independence with ADLs by performing:    UE Dressing with Set-up Assistance.  LE Dressing with Contact Guard Assistance.  Grooming while standing with Supervision.  Sitting at edge of bed x15 minutes with Supervision.  Step transfer with Contact Guard Assistance  Toilet transfer to toilet with Contact Guard Assistance.  Increased functional strength to WFL for self care.  Upper extremity exercise program x8 reps per handout, with assistance as needed.                      Time Tracking:     OT Date of Treatment: 03/18/19  OT Start Time: 1428  OT Stop Time: 1500  OT Total Time (min): 32 min    Billable Minutes:Self Care/Home Management 12  Therapeutic Activity 20    Leigh Ann POWELL" SAGE Romero  3/18/2019

## 2019-03-18 NOTE — PLAN OF CARE
TN met with pt and pt's daughters in room including Yani (POLLO), pt lives at home alone but he has 24/7 sitters in place, pt has multiple DME at home, no HH but has had [facility] in the past, pt's dtr to provide transportation on discharge. TN discussed SNF placement with pt's daughter since PT/OT are recommending on discharge, pt's daughters are refusing SNF placement at this time, they prefer pt to be discharged with home health, HH preference is [facility] again. TN sent message to Dr. Santana's office for hospital follow up appointment.    Discharge brochure and blue discharge folder given to pt. TN updated contact information on [email or web address].       03/18/19 7741   Discharge Assessment   Assessment Type Discharge Planning Assessment   Confirmed/corrected address and phone number on facesheet? Yes   Assessment information obtained from? Caregiver;Medical Record   Communicated expected length of stay with patient/caregiver yes   Prior to hospitilization cognitive status: Unable to Assess   Prior to hospitalization functional status: Assistive Equipment   Current cognitive status: Unable to Assess   Current Functional Status: Assistive Equipment   Lives With alone  (pt has 24/7 sitters.)   Able to Return to Prior Arrangements yes   Is patient able to care for self after discharge? No   Who are your caregiver(s) and their phone number(s)? Yani Ellis Daughter   [phone]    Patient's perception of discharge disposition home health   Readmission Within the Last 30 Days no previous admission in last 30 days   Patient currently being followed by outpatient case management? No   Patient currently receives any other outside agency services? No   Equipment Currently Used at Home wheelchair;rollator;shower chair;bedside commode;grab bar;raised toilet   Do you have any problems affording any of your prescribed medications? No   Is the patient taking medications as prescribed? yes   Does the patient have  transportation home? Yes   Transportation Anticipated family or friend will provide   Does the patient receive services at the [facility]? No   Discharge Plan A Home;Home Health   Patient/Family in Agreement with Plan yes   Maritza Godinez RN-BC  Transitional Navigator  [phone]

## 2019-03-18 NOTE — PLAN OF CARE
Problem: Adult Inpatient Plan of Care  Goal: Plan of Care Review  Plan of care reviewed with patient. Voices understanding.HÉCTOR in place. NSR on monitor with no red alarms noted. Side rails up x3, bed low, bed alarm on, call bell within reach. Will continue to monitor.

## 2019-03-18 NOTE — CONSULTS
[facility] Neurology Consult Note    Reason for Consult -   Parkinson's Disease    History of Present Illness -   Mr. Sainz is an 89 y/o M w/ a history of Parkinson's diagnosed by movement disorder specialist, Dr. Forrester, in November 2018, Crohn's disease, and chronic pain. Mr. Sainz's daughter and son were at bedside to provide additional history when appropriate. Mr. Sainz was admitted to [facility] on 3/16/18 due to altered mental status. His daughter states that Mr. Sainz had recently had a basal cell carcinoma excision of the upper lip last week. She also reports that post-operatively he was not tolerating PO and became dehydrated. Additionally, Mr. Sainz missed approx 2 days of his Parkinson's medications. As he was not tolerating food, hydration or medications by mouth.    Mr. Sainz was diagnosed with Parkinson's by [facility] in Nov 2018 and is seen by Dr. Forrester as an outpatient. His outpatient PD medication therapy includes carbidopa/levodopa 25/100 1 tablet PO TID and rasagiline 1mg PO daily. Mr. Sainz's family reports that he is much improved since admission and is much more himself today. Mr. Sainz states that he is feeling much better as well. Of note, Mr. Sainz also sees cardiology as an outpatient as he has low blood pressure for years and is prescribed midodrine PRN. He has also had a Holter monitor in the past.     Cardiology has evaluated Mr. Sainz as an inpatient for symptomatic bradycardia. Per their consult note, Mr. Sainz experienced a freezing event while transferring from a chair to the bed. At that time he had a HR of 46 and a BP of 134/92. An EKG was not concerning for ischemic changes. There is no evidence of SA or AV node block.     Review of Systems -  GENERAL: No fever. No chills. No fatigue. Denies weight loss. Denies weight gain.  HEENT: Denies headaches. Denies vision change. Denies eye pain. Denies double vision. Denies ear pain.   CV: No  chest pain.  CHEST: Denies of shortness of breath.  GI: Denies constipation. No diarrhea. No abdominal pain. Denies nausea. Denies vomiting. No blood in stool.  HEME: Denies bleeding problems.  : Denies urgency. No painful urination. No blood in urine.  MS: Denies joint stiffness. Denies joint swelling.  Denies back pain.  SKIN: Denies rash.   NEURO: Denies seizures. No weakness.  PSYCH:  Denies difficulty sleeping. No anxiety. Denies depression. No suicidal thoughts.       Past Medical History - as above  Social History - denies tobacco use  Family History - non-contributory  Allergies - brimonidine-timolol and contrast media    Vitals -     Vitals:    03/18/19 1010 03/18/19 1013 03/18/19 1123 03/18/19 1155   BP: (!) 134/92 (!) 134/92 124/60    BP Location:   Right leg    Patient Position:   Lying    Pulse: (!) 49 (!) 46 62 60   Resp: 12 12     Temp:   97.6 °F (36.4 °C)    TempSrc:   Oral    SpO2: (!) 94% (!) 94% 99%    Weight:       Height:           Neurological Exam -     Orientation to time & place:  Oriented to time, place, person and situation. Mr. Sainz is appropriately conversant and was able to express his concerns regarding the his diagnosis of Parkinson's.     Language: No aphasia or dysarthria    Cranial nerves: VF intact to confrontation, PERRL, EOMI, decreased blink reflex, masked facies, hypophonia, facial sensation and expression symmetric, hearing grossly intact, palate raises midline, shoulder shrug strength normal, tongue protrudes midline.    Musculoskeletal:  Muscle tone: increased tone with rigidity more prominent in the bilateral UE's.    Muscle strength:  5/5 in bilateral upper and lower extremities in proximal and distal flexion and extension. No pronator drift    Sensation: Intact to light touch throughout    Deep tendon Reflexes: 2+ bilateral triceps, biceps, and brachioradialis; 2+ in bilateral patellae. Plantar flexor responses bilaterally    Cerebellar and Coordination:  Gait:   deferred        Finger-nose: no dysmetria       MOVEMENT DISORDERS FOCUSED EXAM  Pt has Parkinsonian features:  -Pt has masked facies, decreased blink reflex, and hypophonia  -Cogwheel rigidity of the bilateral UE's is more prominent than rigidity of the bilateral LE's. He also has hypokinesia  -Gait not assessed due to fall risk.    Labs -    Recent Labs   Lab 03/16/19  1222 03/16/19  1226 03/16/19 2007 03/17/19  0812 03/18/19  0520   WBC 13.37*  13.37*  --   --  6.84 8.79   HGB 12.6*  12.6*  --   --  13.5* 14.2   HCT 39.8*  39.8*  --   --  41.9 43.6     174  --   --  188 192     --   --  141 142   K 4.3  --   --  3.8 3.4*     --   --  104 103   CO2 26  --   --  28 30*   BUN 21  --   --  17 11   GLU 99  --   --  90 90   PROT 7.0  --   --  7.1 7.4   ALBUMIN 3.4*  --   --  3.4* 3.5   BILITOT 1.3*  --   --  1.0 1.0   AST 22  --   --  28 44*   ALKPHOS 67  --   --  63 70   ALT <5*  --   --  <5* <5*   TROPONINI 0.011  --  <0.006  --   --    TSH  --  1.829  --   --   --        Imaging -   No neurological imaging available for review    Assessment and Plan -     Parkinson's Disease:  -Mr. Sainz's cardiac history with bradycardia an hypotensive events is likely related to the progression of Parkinson's disease. He has been evaluated by cardiology as an outpatient for with a Holter monitor in the past (Oct 2018), and his family reports that he is also prescribed midodrine PRN for the hypotensive episodes that occur when changing positions such as standing from a seated position. Dysautonomia is a feature of the progression of Parkinson's Disease.  -Mr. Sainz is currently receiving his outpatient PD regimen of carbidopa/levodopa 25/100 1 tablet PO TID and rasagiline 1mg PO daily. He is not exhibiting features of excess dopamine and has clinically improved since admission.   Recommendations:  -Continue current regimen of CD/LD and rasagiline  -Do not administer anti-dopaminergic agents to treat  acute agitation due to the risk of Mr. Sainz developing neuroleptic malignant syndrome.  -May consider midodrine, fludrocortisone, or droxidopa for treatment of symptomatic orthostatic hypotension and monitor for supine hypertension. Per Dr. Forrester's clinic note (Jan 2019), he was previously on midodrine but this was discontinued due to hypertension.   -Will defer to cardiology for management of symptomatic bradycardia  -Pt continue following Dr. Forrester as an outpatient.     Will sign off at this time. If there are any further questions or concerns regarding Mr. Sainz's care, please contact the on-call [facility] Neu[facility] resident.    Ananth Langston MD  [facility] Neurology PGY 4

## 2019-03-19 LAB — BACTERIA UR CULT: NORMAL

## 2019-03-19 NOTE — PLAN OF CARE
3/19-TN spoke with Whitney at [facility], [facility], pt scheduled for Home visit tomorrow, TN called patient's dtr Yani regarding pt set up wit [facility] per [facility],  Per pt's dtr, she already spoke with [facility] and arranged home visit for patient.       03/19/19 4705   Post-Acute Status   Post-Acute Authorization Home Health/Hospice   Home Health/Hospice Status Authorization Obtained

## 2019-03-19 NOTE — PLAN OF CARE
Problem: Adult Inpatient Plan of Care  Goal: Plan of Care Review  Outcome: Outcome(s) achieved Date Met: 03/18/19  Discharge instruction and education provided to both family member and pt. Patient and family member voices understanding. IV Site removed, cath tip intact. Telemetry discontinued without adverse reaction. Patient shows no acute distress. Family at bedside.

## 2019-03-19 NOTE — PLAN OF CARE
3/19-9:39am- HH orders sent to [facility], pt prefer is [facility], Heath/Ngoc requested as preferred therapist.       03/19/19 0937   Post-Acute Status   Post-Acute Authorization Home Health/Hospice   Home Health/Hospice Status Referrals Sent

## 2019-03-19 NOTE — PLAN OF CARE
03/19/19 0940   Final Note   Assessment Type Final Discharge Note   Anticipated Discharge Disposition Home-Health   What phone number can be called within the next 1-3 days to see how you are doing after discharge? [phone]   Hospital Follow Up  Appt(s) scheduled? No   Right Care Referral Info   Post Acute Recommendation Home-care     Future Appointments   Date Time Provider Department Center   3/20/2019  1:30 PM Rody Morrison MD [facility] CARDIO [place]   3/21/2019 11:00 AM Nirmal Hazel MD [facility] DERMSUR [facility]   3/29/2019  9:45 AM Malachi Anton MD [facility] HNSO [facility]   4/3/2019 11:40 AM Harshad Forrester MD [facility] NEURO [facility]   4/17/2019 10:30 AM Alissa Black DPM [facility] PODIATR [place]     Message sent to Dr. Santana's office for hospital follow up appointment.

## 2019-03-19 NOTE — PT/OT/SLP DISCHARGE
Physical Therapy Discharge Summary    Name: Phi Sainz  MRN: [id number]   Principal Problem: NICOLLE (acute kidney injury)     Patient Discharged from acute Physical Therapy on 3/18/19.  Please refer to prior PT noted date on 3/18/19 for functional status.     Assessment:     Patient appropriate for care in another setting.    Objective:     GOALS:   Multidisciplinary Problems     Physical Therapy Goals        Problem: Physical Therapy Goal    Goal Priority Disciplines Outcome Goal Variances Interventions   Physical Therapy Goal     PT, PT/OT Ongoing (interventions implemented as appropriate)     Description:  Goals to be met by: 19     Patient will increase functional independence with mobility by performin. Sit to stand transfer with Stand-by Assistance  2. Bed to chair transfer with Stand-by Assistance using Rolling Walker  3. Gait  x 100 feet with Stand-by Assistance using Rolling Walker.   4. Stand for 5 minutes with Stand-by Assistance using Rolling Walker                      Reasons for Discontinuation of Therapy Services  Transfer to alternate level of care.      Plan:     Patient Discharged to: Home with Home Health Service-- recommended SNF but pt's family deferred.    Yesenia Bran, PT  3/19/2019

## 2019-03-20 ENCOUNTER — PATIENT MESSAGE (OUTPATIENT)
Dept: NEUROLOGY | Facility: CLINIC | Age: 84
End: 2019-03-20

## 2019-03-20 ENCOUNTER — OFFICE VISIT (OUTPATIENT)
Dept: CARDIOLOGY | Facility: CLINIC | Age: 84
End: 2019-03-20
Payer: MEDICARE

## 2019-03-20 VITALS
OXYGEN SATURATION: 98 % | HEIGHT: 71 IN | DIASTOLIC BLOOD PRESSURE: 67 MMHG | SYSTOLIC BLOOD PRESSURE: 108 MMHG | HEART RATE: 65 BPM | BODY MASS INDEX: 23.02 KG/M2 | WEIGHT: 164.44 LBS

## 2019-03-20 DIAGNOSIS — I82.509 CHRONIC DEEP VEIN THROMBOSIS (DVT) OF LOWER EXTREMITY, UNSPECIFIED LATERALITY, UNSPECIFIED VEIN: ICD-10-CM

## 2019-03-20 DIAGNOSIS — N17.9 AKI (ACUTE KIDNEY INJURY): ICD-10-CM

## 2019-03-20 DIAGNOSIS — E78.5 DYSLIPIDEMIA: ICD-10-CM

## 2019-03-20 DIAGNOSIS — I10 ESSENTIAL HYPERTENSION: Primary | ICD-10-CM

## 2019-03-20 DIAGNOSIS — R53.81 PHYSICAL DEBILITY: ICD-10-CM

## 2019-03-20 DIAGNOSIS — R00.1 BRADYCARDIA: ICD-10-CM

## 2019-03-20 DIAGNOSIS — G20.A1 PARKINSON'S DISEASE: ICD-10-CM

## 2019-03-20 DIAGNOSIS — R26.9 GAIT DISORDER: ICD-10-CM

## 2019-03-20 DIAGNOSIS — E86.0 DEHYDRATION: ICD-10-CM

## 2019-03-20 DIAGNOSIS — J84.9 ILD (INTERSTITIAL LUNG DISEASE): ICD-10-CM

## 2019-03-20 PROBLEM — R53.1 GENERALIZED WEAKNESS: Status: ACTIVE | Noted: 2018-09-20

## 2019-03-20 PROCEDURE — 99214 OFFICE O/P EST MOD 30 MIN: CPT | Mod: S$GLB,,, | Performed by: INTERNAL MEDICINE

## 2019-03-20 PROCEDURE — 3288F FALL RISK ASSESSMENT DOCD: CPT | Mod: CPTII,S$GLB,, | Performed by: INTERNAL MEDICINE

## 2019-03-20 PROCEDURE — 99214 PR OFFICE/OUTPT VISIT, EST, LEVL IV, 30-39 MIN: ICD-10-PCS | Mod: S$GLB,,, | Performed by: INTERNAL MEDICINE

## 2019-03-20 PROCEDURE — 99499 UNLISTED E&M SERVICE: CPT | Mod: S$GLB,,, | Performed by: INTERNAL MEDICINE

## 2019-03-20 PROCEDURE — 99499 RISK ADDL DX/OHS AUDIT: ICD-10-PCS | Mod: S$GLB,,, | Performed by: INTERNAL MEDICINE

## 2019-03-20 PROCEDURE — 99999 PR PBB SHADOW E&M-EST. PATIENT-LVL III: CPT | Mod: PBBFAC,,, | Performed by: INTERNAL MEDICINE

## 2019-03-20 PROCEDURE — 99999 PR PBB SHADOW E&M-EST. PATIENT-LVL III: ICD-10-PCS | Mod: PBBFAC,,, | Performed by: INTERNAL MEDICINE

## 2019-03-20 PROCEDURE — 3288F PR FALLS RISK ASSESSMENT DOCUMENTED: ICD-10-PCS | Mod: CPTII,S$GLB,, | Performed by: INTERNAL MEDICINE

## 2019-03-20 PROCEDURE — 1100F PTFALLS ASSESS-DOCD GE2>/YR: CPT | Mod: CPTII,S$GLB,, | Performed by: INTERNAL MEDICINE

## 2019-03-20 PROCEDURE — 1100F PR PT FALLS ASSESS DOC 2+ FALLS/FALL W/INJURY/YR: ICD-10-PCS | Mod: CPTII,S$GLB,, | Performed by: INTERNAL MEDICINE

## 2019-03-20 RX ORDER — ACETAMINOPHEN 500 MG
500 TABLET ORAL EVERY 6 HOURS PRN
COMMUNITY

## 2019-03-20 NOTE — DISCHARGE SUMMARY
[facility] Hospital Medicine Discharge Summary    Primary Team: [facility] Hospitalist Team B  Attending Physician: MD Nany  Resident: Dani  Intern: Raina    Date of Admit: 3/16/2019  Date of Discharge: 3/18/2019    Discharge to: Home with home health  Condition: Stable    Discharge Diagnoses     Patient Active Problem List   Diagnosis    Pseudophakia - Left Eye    ARMD (age related macular degeneration) - Both Eyes    Essential tremor    Gait disorder    Hypertension    Dyslipidemia    Lumbago    Thoracic or lumbosacral neuritis or radiculitis, unspecified    Acquired spondylolisthesis    Degeneration of lumbar or lumbosacral intervertebral disc    Spondylosis without myelopathy    Spinal stenosis, lumbar region, with neurogenic claudication    DDD (degenerative disc disease), lumbar    Lumbar facet arthropathy    Posterior vitreous detachment, both eyes    Osteoarthritis of spine    Leg weakness, bilateral    Bradycardia    Abnormal chest CT    ILD (interstitial lung disease)    Other dysphagia    Physical debility    DVT (deep vein thrombosis) in pregnancy    Chronic deep vein thrombosis (DVT)    Parkinson's disease    Parkinson disease    Acute cystitis without hematuria    Dehydration    Lip cancer    Altered mental state    NICOLLE (acute kidney injury)       Consultants and Procedures     Consultants:  Neurology  Cardiology    Procedures:   None    Imaging:  Imaging Results          US Retroperitoneal Complete (Final result)  Result time 03/17/19 14:30:57    Final result by Clover Alvarez MD (03/17/19 14:30:57)                 Impression:      This exam is within normal limits.      Electronically signed by: Clover Alvarez MD  Date:    03/17/2019  Time:    14:30             Narrative:    EXAMINATION:  US RETROPERITONEAL COMPLETE    CLINICAL HISTORY:  nicolle;    TECHNIQUE:  Ultrasound of the kidneys and urinary bladder was performed including color flow and Doppler evaluation of the  kidneys.    COMPARISON:  None.    FINDINGS:  Right kidney: The right kidney measures 10.4 cm. No cortical thinning. No loss of corticomedullary distinction. Resistive index measures 0.7.  No mass. No renal stone. No hydronephrosis.    Left kidney: The left kidney measures 9.8 cm. No cortical thinning. No loss of corticomedullary distinction. Resistive index measures 0.66.  There is a simple cyst measuring 1.7 cm in the lower pole of the left kidney.  No mass. No renal stone. No hydronephrosis.    The bladder is well distended at the time of scanning and has an unremarkable appearance.                                Brief History of Present Illness      Patient is a 89 year old male with Parkinson's disease, hypertension, Crohns disease, and chronic DVT who presented to [facility] by way of EMS with complaint of altered mental status X1 day.     The patient was in their usual state of health (needs some assistance with ADLs, ambulates with walker, lives at home with granddaugher) until the night prior to admission. That night patient described by granddaughter as being more confused and tired, having troubles walking and getting out of bed. His caretaker in morning also noticed some change in his mentals status. Family has noticed increased urinary frequency, but patient denies any burning on urination. Patient denied any fevers, chills, abdominal pain, chest pain, shortness of breath.     Of note, patient was treated for UTI about 1 month ago, family unaware of antibiotic he was taking. He also had a right upper lip surgery for suspected basal cell carcinoma during last week. His dermatologist prescribed Keflex which he has been taking, however granddaughter has noticed expanding erythema of site, but decreased swelling. Because of the surgery patient has had troubles with eating and swallowing fluids, leading to poor po intake over past couple days.    For the full HPI please refer to the History & Physical  from this admission.    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 Forrester outpatient    Transient Bradycardia  -Patient with episode (MET called) of bradycardia when getting up from bed which required no acute interventions, known history of prior bradycardia episode on Holter monitor  -Suspect may be combination of parkinson disease and parkinson medications  -No current interventions needed, follow up with Dr. Morrison outpatient     Dermatologic Lip Surgery for suspect Basal Cell Carcinoma  -Patient s/p lip surgery on 3/13 with dermatology  -Discharge on home prophalactic Keflex, continue at this time  -Percocet prn     Crohn's Disease  -Continue home mesalamine     Chronic DVT  -Continue home Eliquis     Hypertension  -Not on any home meds  -Watch with routine vitals       Discharge Medications        Medication List      START taking these medications    rotigotine 2 mg/24 hour  Commonly known as:  NEUPRO  Place 1 patch onto the skin prn once daily if patient unable to tolerate oral carbidopa-levidopa         CHANGE how you take these medications    diphenoxylate-atropine 2.5-0.025 mg 2.5-0.025 mg per tablet  Commonly known as:  LOMOTIL  Take 1 tablet by mouth 2 (two) times  daily.  What changed:  additional instructions        CONTINUE taking these medications    apixaban 2.5 mg Tab  Commonly known as:  ELIQUIS  Take 1 tablet (2.5 mg total) by mouth 2 (two) times daily.     APRISO 0.375 gram Cp24  Generic drug:  mesalamine     carbidopa-levodopa  mg  mg per tablet  Commonly known as:  SINEMET  Take 1 tablet three times daily.     cephALEXin 500 MG capsule  Commonly known as:  KEFLEX  Take 1 capsule (500 mg total) by mouth 3 (three) times daily. for 7 days     gabapentin 100 MG capsule  Commonly known as:  NEURONTIN     oxyCODONE-acetaminophen 5-325 mg per tablet  Commonly known as:  PERCOCET  Take 1 tablet by mouth every 4 to 6 hours as needed for Pain.     rasagiline 1 mg Tab  Commonly known as:  AZILECT  Take 1 tablet (1 mg total) by mouth once daily.        STOP taking these medications    HYDROcodone-acetaminophen 5-325 mg per tablet  Commonly known as:  NORCO           Where to Get Your Medications      These medications were sent to [facility]/pharmacy #[id number] - [place] - [address]  [address] [facility] [address] [facility] [address] [place] [address]    Phone:  [phone]   · rotigotine 2 mg/24 hour           Discharge Information:   Diet:  Cardiac diet    Physical Activity:  Up as tolerated             Instructions:  1. Take all medications as prescribed  2. Keep all follow-up appointments  3. Return to the hospital or call your primary care physicians if any worsening symptoms such as fever, chest pain, shortness of breath, return of symptoms, or any other concerns.    Follow-Up Appointments:  Dr. Santana, PCP  Dr. Forrester, Neurology  Dr. Morrison, Cardiology    Daniel Paris, DO  hospitals Internal Medicine, HO-1

## 2019-03-20 NOTE — PROGRESS NOTES
Subjective:   Patient ID:  Phi Sainz is a 89 y.o. male who presents for follow-up of Hypertension and Hospital Follow Up      HPI: Patient discharged yesterday after being hospitalized with confusion, dehydration and NICOLLE.  He was rehydrated and now is feeling better, but is still weak.  Sitter states that the reason for dehydration was recent lip surgery for skin cancer and difficulty in swallowing water.    He stopped Midodrine due to increased blood pressure.  Today BP is low normal, pulse is over 60    Lab Results   Component Value Date     03/18/2019    K 3.4 (L) 03/18/2019     03/18/2019    CO2 30 (H) 03/18/2019    BUN 11 03/18/2019    CREATININE 1.1 03/18/2019    GLU 90 03/18/2019    AST 44 (H) 03/18/2019    ALT <5 (L) 03/18/2019    ALBUMIN 3.5 03/18/2019    PROT 7.4 03/18/2019    BILITOT 1.0 03/18/2019    WBC 8.79 03/18/2019    HGB 14.2 03/18/2019    HCT 43.6 03/18/2019    MCV 91 03/18/2019     03/18/2019    PSA 0.82 05/03/2016    TSH 1.829 03/16/2019    CHOL 209 (H) 12/12/2018    HDL 61 12/12/2018    LDLCALC 131.0 12/12/2018    TRIG 85 12/12/2018       Review of Systems   Constitution: Positive for weakness, malaise/fatigue and weight loss.   HENT: Negative.    Eyes: Negative.    Cardiovascular: Negative.  Negative for chest pain, claudication, dyspnea on exertion, irregular heartbeat, leg swelling, near-syncope, palpitations and syncope.   Respiratory: Negative.  Negative for cough, shortness of breath, snoring and wheezing.    Endocrine: Negative.  Negative for cold intolerance, heat intolerance, polydipsia, polyphagia and polyuria.   Skin: Negative.    Musculoskeletal: Positive for back pain and muscle weakness.   Gastrointestinal: Negative.    Genitourinary: Negative.    Neurological: Positive for dizziness, light-headedness and loss of balance.   Psychiatric/Behavioral: The patient is nervous/anxious.        Objective:   Physical Exam   Constitutional: He is oriented to  "person, place, and time. He appears well-developed and well-nourished.   /67   Pulse 65   Ht 5' 11" (1.803 m)   Wt 74.6 kg (164 lb 7.4 oz)   SpO2 98%   BMI 22.94 kg/m²     frail   HENT:   Head: Normocephalic.   Eyes: Pupils are equal, round, and reactive to light.   Neck: Normal range of motion. Neck supple. Carotid bruit is not present. No thyromegaly present.   Cardiovascular: Normal rate, regular rhythm, normal heart sounds and intact distal pulses. Exam reveals no gallop and no friction rub.   No murmur heard.  Pulses:       Carotid pulses are 2+ on the right side, and 2+ on the left side.       Radial pulses are 2+ on the right side, and 2+ on the left side.        Femoral pulses are 2+ on the right side, and 2+ on the left side.       Popliteal pulses are 2+ on the right side, and 2+ on the left side.        Dorsalis pedis pulses are 2+ on the right side, and 2+ on the left side.        Posterior tibial pulses are 2+ on the right side, and 2+ on the left side.   Pulmonary/Chest: Effort normal and breath sounds normal. No respiratory distress. He has no wheezes. He has no rales. He exhibits no tenderness.   Abdominal: Soft. Bowel sounds are normal.   Musculoskeletal: Normal range of motion. He exhibits no edema.   Neurological: He is alert and oriented to person, place, and time.   Skin: Skin is warm and dry.   Psychiatric: He has a normal mood and affect.   Nursing note and vitals reviewed.        Assessment and Plan:     Problem List Items Addressed This Visit        Cardiology Problems    Hypertension - Primary    Chronic deep vein thrombosis (DVT)       Other    Gait disorder    Dyslipidemia    Bradycardia    ILD (interstitial lung disease)    Parkinson's disease    Dehydration    NICOLLE (acute kidney injury)    Physical debility          Patient's Medications   New Prescriptions    No medications on file   Previous Medications    ACETAMINOPHEN (TYLENOL EXTRA STRENGTH) 500 MG TABLET    Take 500 mg " by mouth every 6 (six) hours as needed for Pain.    APIXABAN (ELIQUIS) 2.5 MG TAB    Take 1 tablet (2.5 mg total) by mouth 2 (two) times daily.    CARBIDOPA-LEVODOPA  MG (SINEMET)  MG PER TABLET    Take 1 tablet three times daily.    CEPHALEXIN (KEFLEX) 500 MG CAPSULE    Take 1 capsule (500 mg total) by mouth 3 (three) times daily. for 7 days    DIPHENOXYLATE-ATROPINE 2.5-0.025 MG (LOMOTIL) 2.5-0.025 MG PER TABLET    Take 1 tablet by mouth 2 (two) times daily.    GABAPENTIN (NEURONTIN) 100 MG CAPSULE    Take 300 mg by mouth 2 (two) times daily. Pt taking one pill twice a day.    MESALAMINE (APRISO) 0.375 GRAM CP24    Take 1.5 g by mouth once daily. Pt taking 4 pills in am.    OXYCODONE-ACETAMINOPHEN (PERCOCET) 5-325 MG PER TABLET    Take 1 tablet by mouth every 4 to 6 hours as needed for Pain.    RASAGILINE (AZILECT) 1 MG TAB    Take 1 tablet (1 mg total) by mouth once daily.    ROTIGOTINE (NEUPRO) 2 MG/24 HOUR    Place 1 patch onto the skin once daily.   Modified Medications    No medications on file   Discontinued Medications    No medications on file     No change in the present regimen.  Balanced diet and proper hydration encouraged.  Follow-up in about 6 months (around 9/20/2019).

## 2019-03-21 ENCOUNTER — OFFICE VISIT (OUTPATIENT)
Dept: DERMATOLOGY | Facility: CLINIC | Age: 84
End: 2019-03-21
Payer: MEDICARE

## 2019-03-21 ENCOUNTER — PATIENT MESSAGE (OUTPATIENT)
Dept: NEUROLOGY | Facility: CLINIC | Age: 84
End: 2019-03-21

## 2019-03-21 DIAGNOSIS — Z09 POSTOP CHECK: Primary | ICD-10-CM

## 2019-03-21 PROCEDURE — 99999 PR PBB SHADOW E&M-EST. PATIENT-LVL III: CPT | Mod: PBBFAC,,, | Performed by: DERMATOLOGY

## 2019-03-21 PROCEDURE — 99024 PR POST-OP FOLLOW-UP VISIT: ICD-10-PCS | Mod: S$GLB,,, | Performed by: DERMATOLOGY

## 2019-03-21 PROCEDURE — 99999 PR PBB SHADOW E&M-EST. PATIENT-LVL III: ICD-10-PCS | Mod: PBBFAC,,, | Performed by: DERMATOLOGY

## 2019-03-21 PROCEDURE — 99024 POSTOP FOLLOW-UP VISIT: CPT | Mod: S$GLB,,, | Performed by: DERMATOLOGY

## 2019-03-21 NOTE — PROGRESS NOTES
89 y.o. male patient is here for suture removal following Mohs' surgery.    Patient reports no problems with site.  Family and caretaker state he was in the hospital for 4 days this past week for dehydration and bp issues. Caretaker said he forgot to give him some of his meds and that he wasn't eating or drinking much. However, now better.     WOUND PE:  The R upper lip sutures intact. Wound healing well. Good skin edges. No signs or symptoms of infection. Good vermilion border symmetry.    IMPRESSION:  Healing operative site from Mohs' surgery , BCC R upper lip s/p Mohs with Lip Wedge Repair, postop day #7.    PLAN:  Sutures removed today. Steri-strips applied.  Continue wound care.  Keep moist with Aquaphor.  To f/u with Dr Anton next week.    RTC:  In 1 month.

## 2019-03-26 ENCOUNTER — PATIENT MESSAGE (OUTPATIENT)
Dept: INTERNAL MEDICINE | Facility: CLINIC | Age: 84
End: 2019-03-26

## 2019-03-26 ENCOUNTER — TELEPHONE (OUTPATIENT)
Dept: OTOLARYNGOLOGY | Facility: CLINIC | Age: 84
End: 2019-03-26

## 2019-03-26 ENCOUNTER — TELEPHONE (OUTPATIENT)
Dept: INTERNAL MEDICINE | Facility: CLINIC | Age: 84
End: 2019-03-26

## 2019-03-26 NOTE — TELEPHONE ENCOUNTER
----- Message from Regla Da Silva sent at 3/26/2019 12:52 PM CDT -----  Contact: Yani pt daughter  Needs Advice    Reason for call: Yani is calling to speak to nurse regarding patients appt on Fri 03/29. Would like to know if appt can be pushed to a later time or a later date. Wants to know if pt has to be seen exactly at 3 wks.         Communication Preference: [phone]

## 2019-03-26 NOTE — TELEPHONE ENCOUNTER
----- Message from Immanuel Kong sent at 3/26/2019 12:34 PM CDT -----  Contact: Yani Daughter [phone]  Pt daughter will like to speak to Carrie about appointment.

## 2019-03-27 ENCOUNTER — OFFICE VISIT (OUTPATIENT)
Dept: INTERNAL MEDICINE | Facility: CLINIC | Age: 84
End: 2019-03-27
Payer: MEDICARE

## 2019-03-27 VITALS
DIASTOLIC BLOOD PRESSURE: 62 MMHG | TEMPERATURE: 98 F | HEART RATE: 61 BPM | SYSTOLIC BLOOD PRESSURE: 110 MMHG | OXYGEN SATURATION: 99 % | RESPIRATION RATE: 18 BRPM | WEIGHT: 166.88 LBS | HEIGHT: 71 IN | BODY MASS INDEX: 23.36 KG/M2

## 2019-03-27 DIAGNOSIS — R31.9 HEMATURIA, UNSPECIFIED TYPE: Primary | ICD-10-CM

## 2019-03-27 DIAGNOSIS — N30.00 ACUTE CYSTITIS WITHOUT HEMATURIA: ICD-10-CM

## 2019-03-27 DIAGNOSIS — N18.30 CKD (CHRONIC KIDNEY DISEASE), STAGE III: Chronic | ICD-10-CM

## 2019-03-27 DIAGNOSIS — N17.9 AKI (ACUTE KIDNEY INJURY): ICD-10-CM

## 2019-03-27 PROCEDURE — 1101F PR PT FALLS ASSESS DOC 0-1 FALLS W/OUT INJ PAST YR: ICD-10-PCS | Mod: CPTII,S$GLB,, | Performed by: INTERNAL MEDICINE

## 2019-03-27 PROCEDURE — 99213 PR OFFICE/OUTPT VISIT, EST, LEVL III, 20-29 MIN: ICD-10-PCS | Mod: S$GLB,,, | Performed by: INTERNAL MEDICINE

## 2019-03-27 PROCEDURE — 99499 RISK ADDL DX/OHS AUDIT: ICD-10-PCS | Mod: S$GLB,,, | Performed by: INTERNAL MEDICINE

## 2019-03-27 PROCEDURE — 99499 UNLISTED E&M SERVICE: CPT | Mod: S$GLB,,, | Performed by: INTERNAL MEDICINE

## 2019-03-27 PROCEDURE — 99999 PR PBB SHADOW E&M-EST. PATIENT-LVL IV: CPT | Mod: PBBFAC,,, | Performed by: INTERNAL MEDICINE

## 2019-03-27 PROCEDURE — 99999 PR PBB SHADOW E&M-EST. PATIENT-LVL IV: ICD-10-PCS | Mod: PBBFAC,,, | Performed by: INTERNAL MEDICINE

## 2019-03-27 PROCEDURE — 1101F PT FALLS ASSESS-DOCD LE1/YR: CPT | Mod: CPTII,S$GLB,, | Performed by: INTERNAL MEDICINE

## 2019-03-27 PROCEDURE — 99213 OFFICE O/P EST LOW 20 MIN: CPT | Mod: S$GLB,,, | Performed by: INTERNAL MEDICINE

## 2019-03-27 NOTE — PROGRESS NOTES
Subjective:       Patient ID: Phi Sainz is a 89 y.o. male.    Chief Complaint: Hematuria    HPI     89-year-old male here for evaluation of UTI, enlarged prostate, acute kidney injury.  Patient's last BMP shows resolution of the kidney injury from 9 days ago.  Patient had a urine culture that shows Pseudomonas aeruginosa pansensitive.  He was treated for the UTI in the hospital.  His urine is currently a dark, rust color.  His urination is not painful.  His urine changed color about 5 days after discharge.  His nurse noticed this yesterday and was told that it had been this way 3-4 days prior.  His cognition is fine.  His granddaughter is at home and says this has improved over the last 5 days.  He completed the course of antibiotics Sunday.  He was on proscar and flomax, but had this discontinued due to low BPs.  His nurse has noted some urinary retention.  He has urinary hesitancy and weak stream.    Discharge summary:  Patient is a 89 year old male with Parkinson's disease, hypertension, Crohns disease, and chronic DVT who presented to [facility] by way of EMS with complaint of altered mental status X1 day.     The patient was in their usual state of health (needs some assistance with ADLs, ambulates with walker, lives at home with granddaugher) until the night prior to admission. That night patient described by granddaughter as being more confused and tired, having troubles walking and getting out of bed. His caretaker in morning also noticed some change in his mentals status. Family has noticed increased urinary frequency, but patient denies any burning on urination. Patient denied any fevers, chills, abdominal pain, chest pain, shortness of breath.     Of note, patient was treated for UTI about 1 month ago, family unaware of antibiotic he was taking. He also had a right upper lip surgery for suspected basal cell carcinoma during last week. His dermatologist prescribed Keflex which he has been  taking, however granddaughter has noticed expanding erythema of site, but decreased swelling. Because of the surgery patient has had troubles with eating and swallowing fluids, leading to poor po intake over past couple days.     For the full HPI please refer to the History & Physical from this admission.     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 Forrester outpatient     Transient Bradycardia  -Patient with episode (MET called) of bradycardia when getting up from bed which required no acute interventions, known history of prior bradycardia episode on Holter monitor  -Suspect may be combination of parkinson disease and parkinson medications  -No current interventions needed, follow up with Dr. Morrison outpatient     Dermatologic Lip Surgery for suspect Basal Cell Carcinoma  -Patient s/p lip surgery on 3/13 with dermatology  -Discharge on home prophalactic Keflex, continue at this time  -Percocet prn     Crohn's Disease  -Continue home mesalamine     Chronic DVT  -Continue home Eliquis     Hypertension  -Not on any home meds  -Watch with routine vitals    Review of Systems   Constitutional: Negative for activity change and unexpected weight change.   HENT: Positive for  hearing loss and rhinorrhea. Negative for trouble swallowing.    Eyes: Negative for discharge and visual disturbance.   Respiratory: Positive for chest tightness. Negative for wheezing.    Cardiovascular: Negative for chest pain and palpitations.   Gastrointestinal: Negative for blood in stool, constipation, diarrhea and vomiting.   Endocrine: Positive for polydipsia and polyuria.   Genitourinary: Positive for difficulty urinating and hematuria. Negative for urgency.   Musculoskeletal: Negative for arthralgias, joint swelling and neck pain.   Neurological: Positive for weakness and headaches.   Psychiatric/Behavioral: Positive for confusion and dysphoric mood.       Objective:      Physical Exam   Constitutional: He is oriented to person, place, and time. He appears well-developed and well-nourished.   HENT:   Head: Normocephalic and atraumatic.   Mouth/Throat: No oropharyngeal exudate.   Eyes: Pupils are equal, round, and reactive to light. EOM are normal. Right eye exhibits no discharge. Left eye exhibits no discharge. No scleral icterus.   Neck: Normal range of motion. Neck supple. No tracheal deviation present. No thyromegaly present.   Cardiovascular: Normal rate, regular rhythm and normal heart sounds. Exam reveals no gallop and no friction rub.   No murmur heard.  Pulmonary/Chest: Effort normal and breath sounds normal. No respiratory distress. He has no wheezes. He has no rales. He exhibits no tenderness.   Abdominal: Soft. Bowel sounds are normal. He exhibits no distension and no mass. There is no tenderness. There is no rebound and no guarding.   Musculoskeletal: Normal range of motion. He exhibits no edema or tenderness.   Neurological: He is alert and oriented to person, place, and time.   Skin: Skin is warm and dry. No rash noted. No erythema. No pallor.   Psychiatric: He has a normal mood and affect. His behavior is normal.   Vitals reviewed.      Assessment:       1. Hematuria, unspecified type    2.  Acute cystitis without hematuria    3. NICOLLE (acute kidney injury)    4. CKD (chronic kidney disease), stage III        Plan:       1/2/3/4.  Check BMP today.  Check urinalysis and urine culture.  Awaiting results before deciding on whether to treat.

## 2019-03-28 ENCOUNTER — PATIENT MESSAGE (OUTPATIENT)
Dept: CARDIOLOGY | Facility: CLINIC | Age: 84
End: 2019-03-28

## 2019-03-28 ENCOUNTER — TELEPHONE (OUTPATIENT)
Dept: INTERNAL MEDICINE | Facility: CLINIC | Age: 84
End: 2019-03-28

## 2019-03-28 RX ORDER — CEPHALEXIN 500 MG/1
500 CAPSULE ORAL 4 TIMES DAILY
Qty: 28 CAPSULE | Refills: 0 | Status: SHIPPED | OUTPATIENT
Start: 2019-03-28 | End: 2019-04-04

## 2019-03-31 ENCOUNTER — PATIENT MESSAGE (OUTPATIENT)
Dept: INTERNAL MEDICINE | Facility: CLINIC | Age: 84
End: 2019-03-31

## 2019-03-31 DIAGNOSIS — R31.9 HEMATURIA, UNSPECIFIED TYPE: Primary | ICD-10-CM

## 2019-04-02 ENCOUNTER — OFFICE VISIT (OUTPATIENT)
Dept: UROLOGY | Facility: CLINIC | Age: 84
End: 2019-04-02
Payer: MEDICARE

## 2019-04-02 ENCOUNTER — DOCUMENTATION ONLY (OUTPATIENT)
Dept: CARDIOLOGY | Facility: CLINIC | Age: 84
End: 2019-04-02

## 2019-04-02 ENCOUNTER — PATIENT MESSAGE (OUTPATIENT)
Dept: CARDIOLOGY | Facility: CLINIC | Age: 84
End: 2019-04-02

## 2019-04-02 VITALS
HEIGHT: 71 IN | BODY MASS INDEX: 23.24 KG/M2 | HEART RATE: 58 BPM | WEIGHT: 166 LBS | DIASTOLIC BLOOD PRESSURE: 57 MMHG | SYSTOLIC BLOOD PRESSURE: 139 MMHG | TEMPERATURE: 98 F

## 2019-04-02 DIAGNOSIS — Z79.01 CURRENT USE OF LONG TERM ANTICOAGULATION: ICD-10-CM

## 2019-04-02 DIAGNOSIS — R31.9 HEMATURIA, UNSPECIFIED TYPE: Primary | ICD-10-CM

## 2019-04-02 DIAGNOSIS — R31.0 GROSS HEMATURIA: ICD-10-CM

## 2019-04-02 PROCEDURE — 99214 PR OFFICE/OUTPT VISIT, EST, LEVL IV, 30-39 MIN: ICD-10-PCS | Mod: 25,S$GLB,, | Performed by: STUDENT IN AN ORGANIZED HEALTH CARE EDUCATION/TRAINING PROGRAM

## 2019-04-02 PROCEDURE — 99214 OFFICE O/P EST MOD 30 MIN: CPT | Mod: 25,S$GLB,, | Performed by: STUDENT IN AN ORGANIZED HEALTH CARE EDUCATION/TRAINING PROGRAM

## 2019-04-02 PROCEDURE — 1101F PT FALLS ASSESS-DOCD LE1/YR: CPT | Mod: CPTII,S$GLB,, | Performed by: STUDENT IN AN ORGANIZED HEALTH CARE EDUCATION/TRAINING PROGRAM

## 2019-04-02 PROCEDURE — 99999 PR PBB SHADOW E&M-EST. PATIENT-LVL IV: ICD-10-PCS | Mod: PBBFAC,,, | Performed by: STUDENT IN AN ORGANIZED HEALTH CARE EDUCATION/TRAINING PROGRAM

## 2019-04-02 PROCEDURE — 51700 IRRIGATION OF BLADDER: CPT | Mod: S$GLB,,, | Performed by: STUDENT IN AN ORGANIZED HEALTH CARE EDUCATION/TRAINING PROGRAM

## 2019-04-02 PROCEDURE — 99999 PR PBB SHADOW E&M-EST. PATIENT-LVL IV: CPT | Mod: PBBFAC,,, | Performed by: STUDENT IN AN ORGANIZED HEALTH CARE EDUCATION/TRAINING PROGRAM

## 2019-04-02 PROCEDURE — 1101F PR PT FALLS ASSESS DOC 0-1 FALLS W/OUT INJ PAST YR: ICD-10-PCS | Mod: CPTII,S$GLB,, | Performed by: STUDENT IN AN ORGANIZED HEALTH CARE EDUCATION/TRAINING PROGRAM

## 2019-04-02 PROCEDURE — 51700 PR IRRIGATION, BLADDER: ICD-10-PCS | Mod: S$GLB,,, | Performed by: STUDENT IN AN ORGANIZED HEALTH CARE EDUCATION/TRAINING PROGRAM

## 2019-04-02 RX ORDER — PREDNISONE 50 MG/1
TABLET ORAL
Qty: 3 TABLET | Refills: 0 | Status: SHIPPED | OUTPATIENT
Start: 2019-04-02 | End: 2019-04-26

## 2019-04-02 NOTE — PROGRESS NOTES
Subjective:       Patient ID: Phi Sainz is a 89 y.o. male.    Chief Complaint: Hematuria  This is a 89 y.o.  male patient that is new to me but not new to the system.  The patient is referred to me by Dr. Malachi Bran an internist for gross hematuria. He has seen Dr. Morales in the past in 2018 for an enlarged prostate. He was started on flomax and finasteride for BPH and LUTS however they were later discontinued due to low BP.   He observed gross hematuria last Monday. He notes this is the first time this has ever happened. He denies dysuria, flank pain, suprapubic pain, fevers, chills. He has been urinating frequently. He has been hydrating with hydration.   He has never smoked.     History of rash with contrast media.   He takes Eliquis for DVT prevention. His daughter Yani and his nurse Phi are here with him today. Yani has messaged his cardiologist who has said it is ok for him to hold the Eliquis for a few days if needed to help hematuria resolve.     LAST PSA  Lab Results   Component Value Date    PSA 0.82 05/03/2016    PSA 0.93 03/17/2015    PSA 0.86 03/13/2014    PSA 0.96 02/29/2012    PSA 1.1 02/24/2011    PSA 0.75 02/24/2010    PSA 0.91 02/20/2009    PSA 0.5 02/18/2008    PSA 0.5 02/14/2007    PSA 0.4 01/25/2006    PSA 0.5 01/13/2005    PSA 0.4 01/29/2004    PSATOTAL 0.93 03/05/2013    PSAFREE 0.46 03/05/2013       Lab Results   Component Value Date    CREATININE 1.2 03/27/2019        ---  Past Medical History:   Diagnosis Date    Allergy     Anemia     Arthritis     Basal cell carcinoma     Depression     Herniation of intervertebral disc     Hyperlipidemia     Hypertension 3/4/2015    Macular degeneration     Parkinson disease     Spinal stenosis          Family History   Problem Relation Age of Onset    Cancer Father         prostate    Colon cancer Father     Hearing loss Paternal Aunt     Amblyopia Neg Hx     Blindness Neg Hx     Cataracts Neg Hx     Diabetes Neg Hx      Glaucoma Neg Hx     Hypertension Neg Hx     Macular degeneration Neg Hx     Retinal detachment Neg Hx     Strabismus Neg Hx     Stroke Neg Hx     Thyroid disease Neg Hx     Heart attack Neg Hx     Heart disease Neg Hx     Heart failure Neg Hx     Hyperlipidemia Neg Hx     Melanoma Neg Hx        Social History     Tobacco Use    Smoking status: Never Smoker    Smokeless tobacco: Never Used   Substance Use Topics    Alcohol use: No     Alcohol/week: 0.0 oz     Frequency: Never     Binge frequency: Never    Drug use: No       Current Outpatient Medications on File Prior to Visit   Medication Sig Dispense Refill    acetaminophen (TYLENOL EXTRA STRENGTH) 500 MG tablet Take 500 mg by mouth every 6 (six) hours as needed for Pain.      apixaban (ELIQUIS) 2.5 mg Tab Take 1 tablet (2.5 mg total) by mouth 2 (two) times daily. 60 tablet 6    carbidopa-levodopa  mg (SINEMET)  mg per tablet Take 1 tablet three times daily. 90 tablet 11    cephALEXin (KEFLEX) 500 MG capsule Take 1 capsule (500 mg total) by mouth 4 (four) times daily. for 7 days 28 capsule 0    diphenoxylate-atropine 2.5-0.025 mg (LOMOTIL) 2.5-0.025 mg per tablet Take 1 tablet by mouth 2 (two) times daily. (Patient taking differently: Take 1 tablet by mouth 2 (two) times daily. Pt taking one pill twice a day.) 60 tablet 0    gabapentin (NEURONTIN) 100 MG capsule Take 300 mg by mouth 2 (two) times daily. Pt taking one pill twice a day.      mesalamine (APRISO) 0.375 gram Cp24 Take 1.5 g by mouth once daily. Pt taking 4 pills in am.      rasagiline (AZILECT) 1 mg Tab Take 1 tablet (1 mg total) by mouth once daily. 30 tablet 5    rotigotine (NEUPRO) 2 mg/24 hour Place 1 patch onto the skin once daily. 30 patch 11     No current facility-administered medications on file prior to visit.        Review of patient's allergies indicates:   Allergen Reactions    Combigan [brimonidine-timolol]      Made him dizziness    Contrast media  Rash       Review of Systems   Constitutional: Negative for chills.   HENT: Negative for congestion.    Eyes: Negative for visual disturbance.   Respiratory: Negative for shortness of breath.    Cardiovascular: Negative for chest pain.   Gastrointestinal: Negative for abdominal distention.   Musculoskeletal: Negative for gait problem.   Skin: Negative for color change.   Neurological: Negative for dizziness.   Psychiatric/Behavioral: Negative for agitation.       Objective:      Physical Exam   Constitutional: He appears well-developed and well-nourished.   HENT:   Head: Normocephalic.   Eyes: Pupils are equal, round, and reactive to light.   Neck: Normal range of motion.   Cardiovascular: Intact distal pulses.   Pulmonary/Chest: Effort normal.   Abdominal: Soft. He exhibits no distension. There is no tenderness.   Non-palpable bladder   Genitourinary:   Genitourinary Comments: Uncircumcised phallus, able to retract foreskin back without difficulty to reveal normal glans and urethral meatus.  20 [place] urethral boyer inserted, inflated with 10cc of sterile saline. The boyer drained about 40-60cc of grossly bloody urine. The bladder was irrigated with sterile saline and a 60cc catheter tip syringe. I was able to remove 1 larger clot and 1 smaller clot with the irrigation. At the end of the irrigation his urine was a very light pink. The boyer balloon was deflated and the boyer catheter was removed   Musculoskeletal: Normal range of motion.   Neurological: He is alert.   Skin: Skin is warm and dry.   Psychiatric: He has a normal mood and affect.       Assessment:       1. Hematuria, unspecified type    2. Gross hematuria    3. Current use of long term anticoagulation        Plan:       The patient has gross hematuria.  counseled the patient on the American Urology Guidelines for a hematuria workup.  The criteria for microscopic hematuria is 3 red blood cells on microscopic urinalysis and the workup is recommended for  any patient experiencing gross hematuria. The workup includes a CT urogram and a flexible cystoscopy performed here in the clinic. After answering all of the questions about the workup the patient was amenable in proceeding.    1. CT urogram with prednisone and benadryl premedication. Prednisone called in, instructions in AVS summary printed.   2. RTC for cystoscopy.   3. Placed 20 Occitan boyer to irrigate patient in clinic. Urine significantly clearer at the end of irrigation, 2 clots removed (see above). Boyer removed at the end of irrigation as he was not in urinary retention.   4. Patient will hold Eliquis for a few days as his cardiologist has already cleared him to do so. He will continue to hydrate with water in the interim.         Hematuria, unspecified type  -     Cancel: POCT URINE DIPSTICK WITHOUT MICROSCOPE  -     Cystoscopy; Future; Expected date: 04/02/2019    Gross hematuria  -     CT Urogram Abd Pelvis W WO; Future; Expected date: 04/02/2019  -     Cystoscopy; Future; Expected date: 04/02/2019    Current use of long term anticoagulation  -     Cystoscopy; Future; Expected date: 04/02/2019    Other orders  -     predniSONE (DELTASONE) 50 MG Tab; 50 mg by mouth at 13 hours, 7 hours, and 1 hour before contrast media injection for CT scan  Dispense: 3 tablet; Refill: 0

## 2019-04-02 NOTE — LETTER
April 2, 2019      Malachi Bran MD  [address] [facility] [address] [facility] [place] [address]           [place] [facility]rology  [address]  [place] [address]  Phone: [phone]          Patient: Phi Sainz   MR Number: [id number]   YOB: 1929   Date of Visit: 4/2/2019       Dear Dr. Malachi Bran:    Thank you for referring Phi Sainz to me for evaluation. Attached you will find relevant portions of my assessment and plan of care.    If you have questions, please do not hesitate to call me. I look forward to following Phi Sainz along with you.    Sincerely,    Sunita Fernández MD    Enclosure  CC:  No Recipients    If you would like to receive this communication electronically, please contact [email or web address] or [phone] to request more information on [employer] access.    For providers and/or their staff who would like to refer a patient to [facility], please contact us through our one-stop-shop provider referral line, Marianna Ceja, at [phone].    If you feel you have received this communication in error or would no longer like to receive these types of communications, please e-mail [email or web address]

## 2019-04-03 NOTE — TELEPHONE ENCOUNTER
Yani notified per Dr. Morrison that pt should wear support hose on both legs and should not take Eliquis after his procedure until his appointment w/her on 4/29/19, and that she will discuss his options w/him at that appointment. I also offered Yani an appointment for pt to see another cardiologist before 4/29/19 if she was concerned about pt being off Eliquis until his appointment on 4/29/19 and she declined.

## 2019-04-09 ENCOUNTER — OFFICE VISIT (OUTPATIENT)
Dept: OTOLARYNGOLOGY | Facility: CLINIC | Age: 84
End: 2019-04-09
Payer: MEDICARE

## 2019-04-09 VITALS — SYSTOLIC BLOOD PRESSURE: 113 MMHG | DIASTOLIC BLOOD PRESSURE: 72 MMHG | HEART RATE: 61 BPM

## 2019-04-09 DIAGNOSIS — C00.9 LIP CANCER: Primary | ICD-10-CM

## 2019-04-09 PROCEDURE — 1101F PT FALLS ASSESS-DOCD LE1/YR: CPT | Mod: CPTII,S$GLB,, | Performed by: OTOLARYNGOLOGY

## 2019-04-09 PROCEDURE — 99024 PR POST-OP FOLLOW-UP VISIT: ICD-10-PCS | Mod: S$GLB,,, | Performed by: OTOLARYNGOLOGY

## 2019-04-09 PROCEDURE — 99999 PR PBB SHADOW E&M-EST. PATIENT-LVL III: ICD-10-PCS | Mod: PBBFAC,,, | Performed by: OTOLARYNGOLOGY

## 2019-04-09 PROCEDURE — 1101F PR PT FALLS ASSESS DOC 0-1 FALLS W/OUT INJ PAST YR: ICD-10-PCS | Mod: CPTII,S$GLB,, | Performed by: OTOLARYNGOLOGY

## 2019-04-09 PROCEDURE — 99024 POSTOP FOLLOW-UP VISIT: CPT | Mod: S$GLB,,, | Performed by: OTOLARYNGOLOGY

## 2019-04-09 PROCEDURE — 99999 PR PBB SHADOW E&M-EST. PATIENT-LVL III: CPT | Mod: PBBFAC,,, | Performed by: OTOLARYNGOLOGY

## 2019-04-09 NOTE — PROGRESS NOTES
[facility] CLINIC NOTE    CC: F/U wedge excision    TREATMENT HISTORY:  1. Wedge excision 3/13/2019    INTERVAL HISTORY:Phi Sainz returns to the [facility] for follow-up of lip. No complaints today.Denies dysphagia, odynophagia, throat pain and otalgia. Denies fevers, chills, and nightsweats. There has not been any redness or drainage from the wound. He will occasionally bite his lip but in general has no trouble.    Exam:  Incision C/D/I  No whistle deformity  Good sulcus restoration    A/P: Doing very well. Emphasized sunscreen whenever outdoors for next year. Lip will continue to loosen and soften over the next 2-3 months.

## 2019-04-11 ENCOUNTER — HOSPITAL ENCOUNTER (OUTPATIENT)
Dept: RADIOLOGY | Facility: HOSPITAL | Age: 84
Discharge: HOME | End: 2019-04-11
Attending: STUDENT IN AN ORGANIZED HEALTH CARE EDUCATION/TRAINING PROGRAM
Payer: MEDICARE

## 2019-04-11 ENCOUNTER — PROCEDURE VISIT (OUTPATIENT)
Dept: UROLOGY | Facility: CLINIC | Age: 84
End: 2019-04-11
Payer: MEDICARE

## 2019-04-11 VITALS
HEART RATE: 62 BPM | TEMPERATURE: 98 F | SYSTOLIC BLOOD PRESSURE: 167 MMHG | WEIGHT: 166 LBS | DIASTOLIC BLOOD PRESSURE: 106 MMHG | BODY MASS INDEX: 23.24 KG/M2 | HEIGHT: 71 IN

## 2019-04-11 DIAGNOSIS — R31.9 HEMATURIA, UNSPECIFIED TYPE: ICD-10-CM

## 2019-04-11 DIAGNOSIS — Z79.01 CURRENT USE OF LONG TERM ANTICOAGULATION: ICD-10-CM

## 2019-04-11 DIAGNOSIS — R31.0 GROSS HEMATURIA: ICD-10-CM

## 2019-04-11 PROCEDURE — 52000 CYSTOURETHROSCOPY: CPT | Mod: S$GLB,,, | Performed by: STUDENT IN AN ORGANIZED HEALTH CARE EDUCATION/TRAINING PROGRAM

## 2019-04-11 PROCEDURE — 74178 CT ABD&PLV WO CNTR FLWD CNTR: CPT | Mod: 26,,, | Performed by: RADIOLOGY

## 2019-04-11 PROCEDURE — 52000 PR CYSTOURETHROSCOPY: ICD-10-PCS | Mod: S$GLB,,, | Performed by: STUDENT IN AN ORGANIZED HEALTH CARE EDUCATION/TRAINING PROGRAM

## 2019-04-11 PROCEDURE — 74178 CT UROGRAM ABD PELVIS W WO: ICD-10-PCS | Mod: 26,,, | Performed by: RADIOLOGY

## 2019-04-11 PROCEDURE — 25500020 PHARM REV CODE 255: Performed by: STUDENT IN AN ORGANIZED HEALTH CARE EDUCATION/TRAINING PROGRAM

## 2019-04-11 PROCEDURE — 74178 CT ABD&PLV WO CNTR FLWD CNTR: CPT | Mod: TC

## 2019-04-11 RX ORDER — HYDROCODONE BITARTRATE AND ACETAMINOPHEN 5; 325 MG/1; MG/1
1 TABLET ORAL EVERY 8 HOURS PRN
Refills: 0 | COMMUNITY
Start: 2019-04-05 | End: 2019-05-02

## 2019-04-11 RX ADMIN — IOHEXOL 125 ML: 350 INJECTION, SOLUTION INTRAVENOUS at 10:04

## 2019-04-11 NOTE — PROCEDURES
Procedures     Procedure:   1. Flexible cysto-uretheroscopy    Pre Procedure Diagnosis:  1. Gross hematuria    Post Procedure Diagnosis:  1. Same    Surgeon: Sunita Fernández MD     Anesthesia: 2% uro-jet lidocaine jelly for local analgesia    Procedure note:  A flexible cysto-urethroscopy was performed after consent was obtained.  The risks and benefits were explained. 2% lidocaine urojet was used for local analgesia. The genitalia was prepped and draped in the sterile fashion.     The flexible scope was advanced into the urethra and into the bladder. A urethral stricture was not seen during scope passage.     Once the cystoscope was in the bladder, we systematically surveyed the entire bladder. The bilateral ureteral orifices were identified in their normal orthotopic locations. clear bilateral ureteral efflux was identified.     The bladder was completely surveyed in a systematic fashion. No bladder tumors or lesions were seen. One area where a vessel in the left lateral bladder wall appeared prominent.  Upon retroflexion the visualization was poor.     As the flexible cystoscope was being withdrawn, the prostate was evaluated carefully. The lateral lobes of the prostate were noted to be significant enlarged and in contacting with each other, known as kissing lobes.     The patient tolerated the procedure well without complication.     Findings in summary:  Trilobar hypertrophy of the prostate.  Clear efflux of urine from bilateral ureteral orifices.  No bladder tumors or stones visualized.  Large capacity bladder.     CT urogram 4/11/19-  Kidneys are normal in size and location.  Multiple left renal cortical cysts are identified.  There is a 2.4 cm slightly hyperattenuating filling defect within the left renal pelvis with thin strand extending into the proximal ureter.  No hydronephrosis or hydroureter.  Bladder demonstrates mild circumferential wall thickening, possibly related to chronic outlet obstruction  noting a slightly prominent prostate gland.  1. Large filling defect within the left renal pelvis extending into the proximal ureter which demonstrates subtle hyperattenuation suggesting associated blood products.  Recommend further evaluation with direct visualization to rule out an underlying neoplasm. No hydronephrosis or hydroureter.  2. Mild circumferential bladder wall thickening, presumably related to chronic outlet obstruction noting a slightly prominent prostate gland. No intravesical filling defects.  3. Left renal cysts.  No cortical enhancing lesions.  4. Ill-defined peripheral reticulation and ground-glass attenuation with some degree of subpleural spurring within the visualized lung bases, findings are consistent with chronic interstitial lung disease favoring NSIP over UIP.  Recommend consultation with pulmonary medicine and further evaluation with CT thorax.  5. Prominent aortic atherosclerotic calcification.    Assessment: 89 y.o. male with gross hematuria undergoing a CT urogram and cystoscopy as part of the workup according to the AUA guidelines.     Plan:  1. CT findings - left ureteral filling defect on CT urogram without associated hydronephrosis, stable serum Creatinine. Etiology may be possible clots (given his hematuria) versus urothelial carcinoma versus other etiology - sloughed papilla, less likely fungal ball, etc. Patient does not have a smoking history, he is on long term anticoagulants which may place him at higher risk for spontaneous bleeding events.   His options include observation with a followup CT urogram in 2 months and expectant management of possible malignancy in the left ureter versus diagnostic ureteroscopy .     2. Discussed ureteroscopy, biopsy, stent placement - risks include pain, infection, bleeding, damage to kidney, ureters, bladder, persistent hematuria, development of clots, flank pain, need for additional procedures, inconclusive biopsy, no malignancy detected,  development of hydronephrosis, kidney disease, etc.   With his age, comorbidities, history of DVT, interstitial lung disease he would be at higher risk for anesthesia complications, pulmonary complications, and bleeding complications from a diagnostic ureteroscopy and biopsy.  He would need medical, cardiology, and pulmonology clearance. He would need to hold Eliquis before and after the procedure.     3. Monitoring with a repeat CT urogram in 2 months to followup if there is a filling defect still present is also another option given that the patient may be high risk for an intervention which would be under general anesthesia.     4. Continue hydration.    5. Patient is following up with cardiologist later this month.     6. The patient's daughter Yani will contact us with their decision, given his age, comorbidities, I do not feel comfortable taking care of the patient in my practice setting, would likely refer to [facility]. Discussion today involved the patient's daughter Yani and nurse caretaker Daryl.

## 2019-04-15 ENCOUNTER — PATIENT MESSAGE (OUTPATIENT)
Dept: UROLOGY | Facility: CLINIC | Age: 84
End: 2019-04-15

## 2019-04-15 DIAGNOSIS — R31.9 HEMATURIA, UNSPECIFIED TYPE: ICD-10-CM

## 2019-04-16 RX ORDER — PREDNISONE 50 MG/1
TABLET ORAL
Qty: 3 TABLET | Refills: 0 | Status: SHIPPED | OUTPATIENT
Start: 2019-04-16 | End: 2019-04-26

## 2019-04-16 NOTE — TELEPHONE ENCOUNTER
CT urogram and BMP in 8 weeks and followup in clinic.   Prednisone prep for 8 weeks from now will be called in now.    CT preparation  Prednisone - 50 mg by mouth at 13 hours, 7 hours, and 1 hour before contrast media injection,   plus   Diphenhydramine (Benadryl®) - 50 mg by mouth 1 hour before contrast medium. Benadryl is over the counter- no prescription is needed.

## 2019-04-17 ENCOUNTER — OFFICE VISIT (OUTPATIENT)
Dept: PODIATRY | Facility: CLINIC | Age: 84
End: 2019-04-17
Payer: MEDICARE

## 2019-04-17 VITALS — WEIGHT: 166 LBS | HEIGHT: 71 IN | BODY MASS INDEX: 23.24 KG/M2

## 2019-04-17 DIAGNOSIS — R29.898 LEG WEAKNESS, BILATERAL: ICD-10-CM

## 2019-04-17 DIAGNOSIS — N18.30 CKD (CHRONIC KIDNEY DISEASE), STAGE III: Chronic | ICD-10-CM

## 2019-04-17 DIAGNOSIS — B35.1 DERMATOPHYTOSIS OF NAIL: ICD-10-CM

## 2019-04-17 DIAGNOSIS — G60.9 IDIOPATHIC PERIPHERAL NEUROPATHY: Primary | ICD-10-CM

## 2019-04-17 PROCEDURE — 99499 RISK ADDL DX/OHS AUDIT: ICD-10-PCS | Mod: S$GLB,,, | Performed by: PODIATRIST

## 2019-04-17 PROCEDURE — 11721 ROUTINE FOOT CARE: ICD-10-PCS | Mod: Q9,S$GLB,, | Performed by: PODIATRIST

## 2019-04-17 PROCEDURE — 11721 DEBRIDE NAIL 6 OR MORE: CPT | Mod: Q9,S$GLB,, | Performed by: PODIATRIST

## 2019-04-17 PROCEDURE — 99499 UNLISTED E&M SERVICE: CPT | Mod: S$GLB,,, | Performed by: PODIATRIST

## 2019-04-17 PROCEDURE — 99999 PR PBB SHADOW E&M-EST. PATIENT-LVL III: CPT | Mod: PBBFAC,,, | Performed by: PODIATRIST

## 2019-04-17 PROCEDURE — 99999 PR PBB SHADOW E&M-EST. PATIENT-LVL III: ICD-10-PCS | Mod: PBBFAC,,, | Performed by: PODIATRIST

## 2019-04-17 NOTE — PROGRESS NOTES
Subjective:      Patient ID: Phi Sainz is a 89 y.o. male.    Chief Complaint: Nail Care (Bilateral PCP last seen on 3/27/19) and Follow-up (nail trimming)    Phi Sainz is a 89 y.o. male who presents to the clinic for follow up toenails, history of painful ingrown hallux toenail on both feet.  Worse in tight shoes.  No drainage noted from the area.  No recent history of trauma to the feet.  He has history of neurogenic claudication and chronic DVT.  He is on Eliquis.    He is using kerasal on his toenails every other day.        PCP:  Yanna Santana MD  Last PCP visit:    Chief Complaint   Patient presents with    Nail Care     Bilateral PCP last seen on 3/27/19    Follow-up     nail trimming           Past Medical History:   Diagnosis Date    Allergy     Anemia     Arthritis     Basal cell carcinoma     Depression     Herniation of intervertebral disc     Hyperlipidemia     Hypertension 3/4/2015    Macular degeneration     Parkinson disease     Spinal stenosis          Current Outpatient Medications on File Prior to Visit   Medication Sig Dispense Refill    acetaminophen (TYLENOL EXTRA STRENGTH) 500 MG tablet Take 500 mg by mouth every 6 (six) hours as needed for Pain.      carbidopa-levodopa  mg (SINEMET)  mg per tablet Take 1 tablet three times daily. 90 tablet 11    diphenoxylate-atropine 2.5-0.025 mg (LOMOTIL) 2.5-0.025 mg per tablet Take 1 tablet by mouth 2 (two) times daily. (Patient taking differently: Take 1 tablet by mouth 2 (two) times daily. Pt taking one pill twice a day.) 60 tablet 0    gabapentin (NEURONTIN) 100 MG capsule Take 300 mg by mouth 2 (two) times daily. Pt taking one pill twice a day.      HYDROcodone-acetaminophen (NORCO) 5-325 mg per tablet Take 1 tablet by mouth every 8 (eight) hours as needed.  0    mesalamine (APRISO) 0.375 gram Cp24 Take 1.5 g by mouth once daily. Pt taking 4 pills in am.      predniSONE (DELTASONE) 50 MG Tab  50 mg by mouth at 13 hours, 7 hours, and 1 hour before contrast media injection for CT scan 3 tablet 0    predniSONE (DELTASONE) 50 MG Tab Prednisone - 50 mg by mouth at 13 hours, 7 hours, and 1 hour before contrast media injection 3 tablet 0    rasagiline (AZILECT) 1 mg Tab Take 1 tablet (1 mg total) by mouth once daily. 30 tablet 5    rotigotine (NEUPRO) 2 mg/24 hour Place 1 patch onto the skin once daily. 30 patch 11     No current facility-administered medications on file prior to visit.          Review of patient's allergies indicates:   Allergen Reactions    Combigan [brimonidine-timolol]      Made him dizziness    Contrast media Rash         Social History     Socioeconomic History    Marital status:      Spouse name: Not on file    Number of children: Not on file    Years of education: Not on file    Highest education level: Not on file   Occupational History    Occupation: retired   Social Needs    Financial resource strain: Not hard at all    Food insecurity:     Worry: Never true     Inability: Never true    Transportation needs:     Medical: No     Non-medical: No   Tobacco Use    Smoking status: Never Smoker    Smokeless tobacco: Never Used   Substance and Sexual Activity    Alcohol use: No     Alcohol/week: 0.0 oz     Frequency: Never     Binge frequency: Never    Drug use: No    Sexual activity: Yes     Partners: Female   Lifestyle    Physical activity:     Days per week: 0 days     Minutes per session: 0 min    Stress: To some extent   Relationships    Social connections:     Talks on phone: More than three times a week     Gets together: Three times a week     Attends Mandaen service: Not on file     Active member of club or organization: No     Attends meetings of clubs or organizations: Never     Relationship status:    Other Topics Concern    Not on file   Social History Narrative    Not on file           Review of Systems   Constitution: Negative for  "chills, fever and malaise/fatigue.   HENT: Negative for hearing loss.    Cardiovascular: Positive for leg swelling. Negative for claudication.   Respiratory: Negative for shortness of breath.    Skin: Positive for color change, dry skin, nail changes and unusual hair distribution. Negative for flushing and rash.   Musculoskeletal: Negative for joint pain and myalgias.   Neurological: Negative for loss of balance, numbness, paresthesias and sensory change.   Psychiatric/Behavioral: Negative for altered mental status.           Objective:        Vitals:    04/17/19 1041   Weight: 75.3 kg (166 lb)   Height: 5' 11" (1.803 m)           Physical Exam   Constitutional: He is oriented to person, place, and time. He appears well-developed and well-nourished. He is cooperative.   Cardiovascular:   Pulses:       Dorsalis pedis pulses are 0 on the right side, and 0 on the left side.        Posterior tibial pulses are 0 on the right side, and 0 on the left side.   +2 PE left  +1 right   Musculoskeletal: He exhibits edema and tenderness.        Right knee: He exhibits no swelling and no ecchymosis.        Left knee: He exhibits no swelling and no ecchymosis.        Right ankle: He exhibits decreased range of motion and abnormal pulse. He exhibits no swelling and no ecchymosis. No lateral malleolus, no medial malleolus and no head of 5th metatarsal tenderness found. Achilles tendon exhibits no pain, no defect and normal Wilkinson's test results.        Left ankle: He exhibits decreased range of motion and abnormal pulse. He exhibits no swelling and no ecchymosis. No lateral malleolus, no medial malleolus and no head of 5th metatarsal tenderness found. Achilles tendon exhibits no pain and normal Wilkinson's test results.        Right lower leg: He exhibits no tenderness, no bony tenderness, no swelling, no edema and no deformity.        Left lower leg: He exhibits no tenderness, no swelling and no edema.        Right foot: There is " decreased range of motion. There is no deformity.        Left foot: There is decreased range of motion. There is no deformity.   Decreased ROM with out joint pain nor crepitation to bilateral feet and ankle joints.  Muscle strength 4/5 in all groups bilaterally  Decreased height of medial arches noted with loading of the foot b/L     Neurological: He is alert and oriented to person, place, and time.   Diminished protective sensation noted b/L       Skin: Skin is warm and dry. Capillary refill takes more than 3 seconds. No abrasion, no bruising, no burn and no ecchymosis noted.   B/L hallux medial borders mildly ingrowing. No erythema or paronychia noted. No drainage.  Nails x10 are elongated by  3-4mm's, thickened by 2-4 mm's, dystrophic, and are yellowish in  coloration . Xerosis Bilaterally. No open lesions noted.     Webspaces 1-4 b/L clean, dry, and intact.    Superficial skin sore, medial right 5th toe due to abutting adjacent toe.  No open wound.  No drainage.  No redness. Minimally macerated.      Psychiatric: He has a normal mood and affect. His speech is normal and behavior is normal. He is attentive.   Vitals reviewed.              Assessment:       Problem List Items Addressed This Visit     CKD (chronic kidney disease), stage III (Chronic)    Leg weakness, bilateral      Other Visit Diagnoses     Idiopathic peripheral neuropathy    -  Primary    Dermatophytosis of nail                  Plan:         - I counseled the patient on his conditions, their implications and medical management.    - Toe spacer in between the right 4th and 5th toes as demonstrated.  Continue Wide toe box shoes.    - Shoe inspection. Patient instructed on proper foot hygeine. We discussed wearing proper shoe gear, daily foot inspections, never walking without protective shoe gear, never putting sharp instruments to feet, routine podiatric nail visits every 2-3 months.      Routine Foot Care  Date/Time: 4/17/2019 11:38 AM  Performed  by: Alissa Black DPM  Authorized by: Alissa Black DPM     Consent Done?:  Yes (Verbal)    Nail Care Type:  Debride  Location(s): All  (Left 1st Toe, Left 3rd Toe, Left 2nd Toe, Left 4th Toe, Left 5th Toe, Right 1st Toe, Right 2nd Toe, Right 3rd Toe, Right 4th Toe and Right 5th Toe)  Patient tolerance:  Patient tolerated the procedure well with no immediate complications     With patient's permission, the toenails mentioned above were aggressively reduced and debrided using a nail nipper, removing all offending nail and debris. The patient will continue to monitor the areas daily, inspect the feet, wear protective shoe gear when ambulatory, and moisturizer to maintain skin integrity.                 Alissa Black DPM  NPI: [id number]         [facility] - PODIATRY  [address] [place] [id number]  Dept: [phone]  Dept Fax: [phone]

## 2019-04-24 ENCOUNTER — OFFICE VISIT (OUTPATIENT)
Dept: DERMATOLOGY | Facility: CLINIC | Age: 84
End: 2019-04-24
Payer: MEDICARE

## 2019-04-24 ENCOUNTER — PATIENT MESSAGE (OUTPATIENT)
Dept: UROLOGY | Facility: CLINIC | Age: 84
End: 2019-04-24

## 2019-04-24 DIAGNOSIS — C44.01 BASAL CELL CARCINOMA OF SKIN OF RIGHT UPPER LIP: Primary | ICD-10-CM

## 2019-04-24 PROCEDURE — 99212 PR OFFICE/OUTPT VISIT, EST, LEVL II, 10-19 MIN: ICD-10-PCS | Mod: S$GLB,,, | Performed by: DERMATOLOGY

## 2019-04-24 PROCEDURE — 99212 OFFICE O/P EST SF 10 MIN: CPT | Mod: S$GLB,,, | Performed by: DERMATOLOGY

## 2019-04-24 PROCEDURE — 1101F PT FALLS ASSESS-DOCD LE1/YR: CPT | Mod: CPTII,S$GLB,, | Performed by: DERMATOLOGY

## 2019-04-24 PROCEDURE — 99999 PR PBB SHADOW E&M-EST. PATIENT-LVL I: ICD-10-PCS | Mod: PBBFAC,,, | Performed by: DERMATOLOGY

## 2019-04-24 PROCEDURE — 1101F PR PT FALLS ASSESS DOC 0-1 FALLS W/OUT INJ PAST YR: ICD-10-PCS | Mod: CPTII,S$GLB,, | Performed by: DERMATOLOGY

## 2019-04-24 PROCEDURE — 99999 PR PBB SHADOW E&M-EST. PATIENT-LVL I: CPT | Mod: PBBFAC,,, | Performed by: DERMATOLOGY

## 2019-04-24 NOTE — PROGRESS NOTES
89 y.o. male patient is here for wound check after surgery.    Patient reports no problems with right upper lip. Good function.    WOUND PE:  The right upper lip incision is well healed. Mild firmness and erythema of scar. No nodularity. Good vermilion symmetry.       IMPRESSION:  Healing operative site from Mohs' surgery BCC right upper lip s/p Mohs with lip wedge repair, postop week #10, well healed and no evidence of recurrence.    PLAN:  Small residual prolene removed today  Discontinue wound care. Recommended massage tid.  Reassured patient that redness and firmness will fade with time.  Daily SPF.  Regular skin checks.    RTC:  In 3-6 months with Lindsey Martinez M.D. for skin check or sooner if new concern arises.

## 2019-04-26 ENCOUNTER — OFFICE VISIT (OUTPATIENT)
Dept: INTERNAL MEDICINE | Facility: CLINIC | Age: 84
End: 2019-04-26
Payer: MEDICARE

## 2019-04-26 VITALS
WEIGHT: 162.94 LBS | DIASTOLIC BLOOD PRESSURE: 66 MMHG | HEIGHT: 71 IN | HEART RATE: 63 BPM | BODY MASS INDEX: 22.81 KG/M2 | SYSTOLIC BLOOD PRESSURE: 102 MMHG | TEMPERATURE: 98 F

## 2019-04-26 DIAGNOSIS — Z23 NEED FOR 23-POLYVALENT PNEUMOCOCCAL POLYSACCHARIDE VACCINE: ICD-10-CM

## 2019-04-26 DIAGNOSIS — N18.30 CKD (CHRONIC KIDNEY DISEASE), STAGE III: ICD-10-CM

## 2019-04-26 DIAGNOSIS — N17.9 AKI (ACUTE KIDNEY INJURY): ICD-10-CM

## 2019-04-26 DIAGNOSIS — I82.592 CHRONIC EMBOLISM AND THROMBOSIS OF OTHER SPECIFIED DEEP VEIN OF LEFT LOWER EXTREMITY: ICD-10-CM

## 2019-04-26 DIAGNOSIS — R06.01 ORTHOPNEA: ICD-10-CM

## 2019-04-26 DIAGNOSIS — I82.4Y2 DEEP VEIN THROMBOSIS (DVT) OF PROXIMAL VEIN OF LEFT LOWER EXTREMITY, UNSPECIFIED CHRONICITY: Primary | ICD-10-CM

## 2019-04-26 DIAGNOSIS — G20.A1 PARKINSON DISEASE: ICD-10-CM

## 2019-04-26 PROCEDURE — G0009 ADMIN PNEUMOCOCCAL VACCINE: HCPCS | Mod: S$GLB,,, | Performed by: INTERNAL MEDICINE

## 2019-04-26 PROCEDURE — 90732 PNEUMOCOCCAL POLYSACCHARIDE VACCINE 23-VALENT =>2YO SQ IM: ICD-10-PCS | Mod: S$GLB,,, | Performed by: INTERNAL MEDICINE

## 2019-04-26 PROCEDURE — 99214 PR OFFICE/OUTPT VISIT, EST, LEVL IV, 30-39 MIN: ICD-10-PCS | Mod: 25,S$GLB,, | Performed by: INTERNAL MEDICINE

## 2019-04-26 PROCEDURE — 99999 PR PBB SHADOW E&M-EST. PATIENT-LVL III: ICD-10-PCS | Mod: PBBFAC,,, | Performed by: INTERNAL MEDICINE

## 2019-04-26 PROCEDURE — 1100F PR PT FALLS ASSESS DOC 2+ FALLS/FALL W/INJURY/YR: ICD-10-PCS | Mod: CPTII,S$GLB,, | Performed by: INTERNAL MEDICINE

## 2019-04-26 PROCEDURE — 99499 RISK ADDL DX/OHS AUDIT: ICD-10-PCS | Mod: S$GLB,,, | Performed by: INTERNAL MEDICINE

## 2019-04-26 PROCEDURE — 99499 UNLISTED E&M SERVICE: CPT | Mod: S$GLB,,, | Performed by: INTERNAL MEDICINE

## 2019-04-26 PROCEDURE — G0009 PNEUMOCOCCAL POLYSACCHARIDE VACCINE 23-VALENT =>2YO SQ IM: ICD-10-PCS | Mod: S$GLB,,, | Performed by: INTERNAL MEDICINE

## 2019-04-26 PROCEDURE — 3288F FALL RISK ASSESSMENT DOCD: CPT | Mod: CPTII,S$GLB,, | Performed by: INTERNAL MEDICINE

## 2019-04-26 PROCEDURE — 99214 OFFICE O/P EST MOD 30 MIN: CPT | Mod: 25,S$GLB,, | Performed by: INTERNAL MEDICINE

## 2019-04-26 PROCEDURE — 90732 PPSV23 VACC 2 YRS+ SUBQ/IM: CPT | Mod: S$GLB,,, | Performed by: INTERNAL MEDICINE

## 2019-04-26 PROCEDURE — 99999 PR PBB SHADOW E&M-EST. PATIENT-LVL III: CPT | Mod: PBBFAC,,, | Performed by: INTERNAL MEDICINE

## 2019-04-26 PROCEDURE — 1100F PTFALLS ASSESS-DOCD GE2>/YR: CPT | Mod: CPTII,S$GLB,, | Performed by: INTERNAL MEDICINE

## 2019-04-26 PROCEDURE — 3288F PR FALLS RISK ASSESSMENT DOCUMENTED: ICD-10-PCS | Mod: CPTII,S$GLB,, | Performed by: INTERNAL MEDICINE

## 2019-04-26 NOTE — PROGRESS NOTES
Transitional Care Note  Subjective:       Patient ID: Phi Sainz is a 89 y.o. male.  Chief Complaint: Hospital Follow Up    Family and/or Caretaker present at visit?  Yes.  Diagnostic tests reviewed/disposition: No diagnosic tests pending after this hospitalization.  Disease/illness education: NICOLLE  Home health/community services discussion/referrals: Patient does not have home health established from hospital visit.  They do not need home health.  If needed, we will set up home health for the patient.   Establishment or re-establishment of referral orders for community resources: No other necessary community resources.   Discussion with other health care providers: No discussion with other health care providers necessary.   CC: followup of hospitalization for acute kidney injury and intermittent encephalopathy  HPI:  The patient is a 89 y.o. year old male who presents to the office for followup of hospitalization for acute kidney injury and intermittent encephalopathy.  He presented to the hospital via EMS with complaint of altered mental status x1 day.  He was in his usual state of health until the night prior to admission.  That night, he was found to be more confused and tired, having difficulty walking and getting out of bed.  His caretaker also noticed some change in his mental status.  Family notice increased urinary frequency, but patient denied any burning with urination.  He denied any fever, chills, abdominal pain, chest pain or shortness of breath.  He was also found to have expanding erythema around the site where a suspected basal cell carcinoma was removed from his lip.  He had been taking Keflex.  Upon admission, his creatinine was 1.5.  This was thought to be due to poor oral intake.  He was given IV fluids and home gabapentin was held.  Renal ultrasound showed no hydronephrosis, stones or masses.  He does have a simple cyst in the left kidney.  Intermittent encephalopathy was believed to  be secondary to Parkinson's disease and dehydration in the setting of poor oral intake.  Neurology was consulted and was started on p.r.n. Neupro patch to be used when mental status declined and patient unable to tolerate oral medications.    PAST MEDICAL HISTORY:  Past Medical History:  No date: Allergy  No date: Anemia  No date: Arthritis  No date: Basal cell carcinoma  No date: Depression  No date: Herniation of intervertebral disc  No date: Hyperlipidemia  3/4/2015: Hypertension  No date: Macular degeneration  No date: Parkinson disease  No date: Spinal stenosis    SURGICAL HISTORY:  Past Surgical History:  2/16/2016: Bilateral MBB ; Bilateral      Comment:  Performed by Lonnie Jackson MD at [facility]  12/22/2015: BLOCK-NERVE-MEDIAL BRANCH-LUMBAR** Bilateral MBB **;   Bilateral      Comment:  Performed by Lonnie Jackson MD at [facility]  12/1/2015: BLOCK-NERVE-MEDIAL BRANCH-LUMBAR** BILATERAL MBB **;   Bilateral      Comment:  Performed by Lonnie Jackson MD at [facility]  10/31/07: CATARACT EXTRACTION W/  INTRAOCULAR LENS IMPLANT; Left      Comment:  Dr. Nunez  12/16/15: CATARACT EXTRACTION W/  INTRAOCULAR LENS IMPLANT; Right      Comment:  Dr. Nunez  6/14/2016: INJECTION-STEROID-EPIDURAL-CAUDAL- WITH CATH; N/A      Comment:  Performed by Lonnie Jackson MD at [facility]  12/16/2015: INSERTION-INTRAOCULAR LENS (IOL); Right      Comment:  Performed by Bettie Nunez MD at [facility] OR [address] [place] [address]  12/16/2015: PHACOEMULSIFICATION-ASPIRATION-CATARACT; Right      Comment:  Performed by Bettie Nunez MD at [facility] OR [address] [place] [address]  3/15/2016: RADIOFREQUENCY THERMOCOAGULATION (RFTC)-NERVE-MEDIAN   BRANCH-LUMBAR Left L2, L3, L4, L5; Left      Comment:  Performed by Lonnie Jackson MD at [facility]  1/6/2016: REPAIR-RETINA (VITRECTOMY); Right      Comment:  Performed by JOAN Encinas MD at [facility] OR [address] [place] [address]    MEDS:  [employer] reviewed and updated    ALLERGIES:  Allergy Card reviewed and updated    SOCIAL HISTORY:   The patient is a nonsmoker.        Review of Systems   Constitutional: Negative for activity change, appetite change and unexpected weight change.   Respiratory: Positive for chest tightness and shortness of breath.    Cardiovascular: Positive for chest pain. Negative for palpitations.        Chest discomfort, when recumbent   Gastrointestinal: Positive for abdominal pain and constipation. Negative for blood in stool, nausea and vomiting.   Genitourinary: Negative for difficulty urinating, dysuria and frequency.   Musculoskeletal: Positive for back pain and gait problem.   Neurological: Positive for tremors and light-headedness. Negative for dizziness and headaches.        Tremors are stable from Parkinson's   Psychiatric/Behavioral: Positive for sleep disturbance.       Objective:      Physical Exam   Constitutional: He is oriented to person, place, and time. He appears well-developed and well-nourished.   Cardiovascular: Normal rate, regular rhythm and normal heart sounds.   Pulmonary/Chest: Effort normal and breath sounds normal. No respiratory distress.   Abdominal: Soft. Bowel sounds are normal. There is no tenderness.   Neurological: He is alert and oriented to person, place, and time.   Skin: Skin is warm and dry.   Psychiatric: He has a normal mood and affect. His behavior is normal. Judgment and thought content normal.       Assessment:       1. Deep vein thrombosis (DVT) of proximal vein of left lower extremity, unspecified chronicity    2. CKD (chronic kidney disease), stage III    3. NICOLLE (acute kidney injury)    4. Parkinson disease    5. 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 Need for 23-polyvalent pneumococcal polysaccharide vaccine    7. Orthopnea        Plan:     Phi was seen today for hospital follow up.    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

## 2019-04-29 ENCOUNTER — OFFICE VISIT (OUTPATIENT)
Dept: CARDIOLOGY | Facility: CLINIC | Age: 84
End: 2019-04-29
Payer: MEDICARE

## 2019-04-29 ENCOUNTER — PATIENT MESSAGE (OUTPATIENT)
Dept: UROLOGY | Facility: CLINIC | Age: 84
End: 2019-04-29

## 2019-04-29 ENCOUNTER — CLINICAL SUPPORT (OUTPATIENT)
Dept: CARDIOLOGY | Facility: CLINIC | Age: 84
End: 2019-04-29
Attending: INTERNAL MEDICINE
Payer: MEDICARE

## 2019-04-29 VITALS
WEIGHT: 160.38 LBS | DIASTOLIC BLOOD PRESSURE: 80 MMHG | SYSTOLIC BLOOD PRESSURE: 147 MMHG | HEIGHT: 71 IN | BODY MASS INDEX: 22.45 KG/M2 | HEART RATE: 55 BPM

## 2019-04-29 DIAGNOSIS — N18.30 CKD (CHRONIC KIDNEY DISEASE), STAGE III: Chronic | ICD-10-CM

## 2019-04-29 DIAGNOSIS — J84.9 ILD (INTERSTITIAL LUNG DISEASE): ICD-10-CM

## 2019-04-29 DIAGNOSIS — I82.4Y2 DEEP VEIN THROMBOSIS (DVT) OF PROXIMAL VEIN OF LEFT LOWER EXTREMITY, UNSPECIFIED CHRONICITY: ICD-10-CM

## 2019-04-29 DIAGNOSIS — R00.1 BRADYCARDIA: ICD-10-CM

## 2019-04-29 DIAGNOSIS — E86.0 DEHYDRATION: ICD-10-CM

## 2019-04-29 DIAGNOSIS — G20.A1 PARKINSON DISEASE: ICD-10-CM

## 2019-04-29 DIAGNOSIS — I10 ESSENTIAL HYPERTENSION: Primary | ICD-10-CM

## 2019-04-29 DIAGNOSIS — I82.592 CHRONIC EMBOLISM AND THROMBOSIS OF OTHER SPECIFIED DEEP VEIN OF LEFT LOWER EXTREMITY: ICD-10-CM

## 2019-04-29 DIAGNOSIS — E78.5 DYSLIPIDEMIA: ICD-10-CM

## 2019-04-29 PROCEDURE — 99999 PR PBB SHADOW E&M-EST. PATIENT-LVL I: ICD-10-PCS | Mod: PBBFAC,,,

## 2019-04-29 PROCEDURE — 99999 PR PBB SHADOW E&M-EST. PATIENT-LVL III: CPT | Mod: PBBFAC,,, | Performed by: INTERNAL MEDICINE

## 2019-04-29 PROCEDURE — 99999 PR PBB SHADOW E&M-EST. PATIENT-LVL I: CPT | Mod: PBBFAC,,,

## 2019-04-29 PROCEDURE — 93971 EXTREMITY STUDY: CPT | Mod: LT,S$GLB,, | Performed by: INTERNAL MEDICINE

## 2019-04-29 PROCEDURE — 1101F PR PT FALLS ASSESS DOC 0-1 FALLS W/OUT INJ PAST YR: ICD-10-PCS | Mod: CPTII,S$GLB,, | Performed by: INTERNAL MEDICINE

## 2019-04-29 PROCEDURE — 99214 PR OFFICE/OUTPT VISIT, EST, LEVL IV, 30-39 MIN: ICD-10-PCS | Mod: S$GLB,,, | Performed by: INTERNAL MEDICINE

## 2019-04-29 PROCEDURE — 99214 OFFICE O/P EST MOD 30 MIN: CPT | Mod: S$GLB,,, | Performed by: INTERNAL MEDICINE

## 2019-04-29 PROCEDURE — 1101F PT FALLS ASSESS-DOCD LE1/YR: CPT | Mod: CPTII,S$GLB,, | Performed by: INTERNAL MEDICINE

## 2019-04-29 PROCEDURE — 99999 PR PBB SHADOW E&M-EST. PATIENT-LVL III: ICD-10-PCS | Mod: PBBFAC,,, | Performed by: INTERNAL MEDICINE

## 2019-04-29 PROCEDURE — 93971 CV US DOPPLER VENOUS LEG LEFT (CUPID ONLY): ICD-10-PCS | Mod: LT,S$GLB,, | Performed by: INTERNAL MEDICINE

## 2019-04-29 RX ORDER — MIDODRINE HYDROCHLORIDE 2.5 MG/1
2.5 TABLET ORAL 3 TIMES DAILY
COMMUNITY
End: 2019-05-02

## 2019-04-29 NOTE — PROGRESS NOTES
"Subjective:   Patient ID:  Phi Sainz is a 89 y.o. male who presents for follow-up of Hypertension      HPI: Doing well. Eliquis was stopped due to hematuria. Urology work up negative. Hematuria resolved.  Eliquis was not resumed. Patient is more ambulatory and undergoing physical therapy.  HIs BP remains stable off Midodrine and the episodes of hypotension and presyncope have resolved.    Lab Results   Component Value Date     03/27/2019    K 4.0 03/27/2019     03/27/2019    CO2 28 03/27/2019    BUN 14 03/27/2019    CREATININE 1.2 03/27/2019    GLU 91 03/27/2019    AST 44 (H) 03/18/2019    ALT <5 (L) 03/18/2019    ALBUMIN 3.5 03/18/2019    PROT 7.4 03/18/2019    BILITOT 1.0 03/18/2019    WBC 8.79 03/18/2019    HGB 14.2 03/18/2019    HCT 43.6 03/18/2019    MCV 91 03/18/2019     03/18/2019    PSA 0.82 05/03/2016    TSH 1.829 03/16/2019    CHOL 209 (H) 12/12/2018    HDL 61 12/12/2018    LDLCALC 131.0 12/12/2018    TRIG 85 12/12/2018       Review of Systems   Constitution: Positive for malaise/fatigue.   HENT: Negative.    Eyes: Negative.    Cardiovascular: Positive for dyspnea on exertion. Negative for chest pain, claudication, irregular heartbeat, leg swelling, near-syncope, palpitations and syncope.   Respiratory: Negative.  Negative for cough, shortness of breath, snoring and wheezing.    Endocrine: Negative.  Negative for cold intolerance, heat intolerance, polydipsia, polyphagia and polyuria.   Skin: Negative.    Musculoskeletal: Positive for back pain and muscle weakness.   Gastrointestinal: Positive for abdominal pain, change in bowel habit and constipation.   Genitourinary: Negative.    Neurological: Positive for loss of balance.   Psychiatric/Behavioral: Negative.        Objective:   Physical Exam   Constitutional: He is oriented to person, place, and time. He appears well-developed and well-nourished.   BP (!) 147/80   Pulse (!) 55   Ht 5' 11" (1.803 m)   Wt 72.7 kg (160 lb " 6.2 oz)   BMI 22.37 kg/m²   Frail, on a  wheelchair    HENT:   Head: Normocephalic.   Eyes: Pupils are equal, round, and reactive to light.   Neck: Normal range of motion. Neck supple. Carotid bruit is not present. No thyromegaly present.   Cardiovascular: Normal rate, regular rhythm, normal heart sounds and intact distal pulses. Exam reveals no gallop and no friction rub.   No murmur heard.  Pulses:       Carotid pulses are 2+ on the right side, and 2+ on the left side.       Radial pulses are 2+ on the right side, and 2+ on the left side.        Femoral pulses are 2+ on the right side, and 2+ on the left side.       Popliteal pulses are 2+ on the right side, and 2+ on the left side.        Dorsalis pedis pulses are 2+ on the right side, and 2+ on the left side.        Posterior tibial pulses are 2+ on the right side, and 2+ on the left side.   Pulmonary/Chest: Effort normal. No respiratory distress. He has no wheezes. He has rales. He exhibits no tenderness.   Diffuse fine crackles   Abdominal: Soft. Bowel sounds are normal.   Musculoskeletal: Normal range of motion. He exhibits no edema.   Neurological: He is alert and oriented to person, place, and time.   Skin: Skin is warm and dry.   Psychiatric: He has a normal mood and affect.   Nursing note and vitals reviewed.        Assessment and Plan:     Problem List Items Addressed This Visit     None          Patient's Medications   New Prescriptions    No medications on file   Previous Medications    ACETAMINOPHEN (TYLENOL EXTRA STRENGTH) 500 MG TABLET    Take 500 mg by mouth every 6 (six) hours as needed for Pain.    CARBIDOPA-LEVODOPA  MG (SINEMET)  MG PER TABLET    Take 1 tablet three times daily.    DIPHENOXYLATE-ATROPINE 2.5-0.025 MG (LOMOTIL) 2.5-0.025 MG PER TABLET    Take 1 tablet by mouth 2 (two) times daily.    GABAPENTIN (NEURONTIN) 100 MG CAPSULE    Take 300 mg by mouth 2 (two) times daily. Pt taking one pill twice a day.     HYDROCODONE-ACETAMINOPHEN (NORCO) 5-325 MG PER TABLET    Take 1 tablet by mouth every 8 (eight) hours as needed.    MESALAMINE (APRISO) 0.375 GRAM CP24    Take 1.5 g by mouth once daily. Pt taking 4 pills in am.    MIDODRINE (PROAMATINE) 2.5 MG TAB    Take 2.5 mg by mouth 3 (three) times daily. Pt taking prn. Has not needed per Care Taker on 4/29/19.    RASAGILINE (AZILECT) 1 MG TAB    Take 1 tablet (1 mg total) by mouth once daily.   Modified Medications    No medications on file   Discontinued Medications    ROTIGOTINE (NEUPRO) 2 MG/24 HOUR    Place 1 patch onto the skin once daily.   If patient remains ambulatory and immobility is minimized I would recommend only compression stockings.  Awaits venous Doppler results and advise.  Continue on the present regimen.  Proper hydration and balanced diet recommended.  Follow up in about 6 months (around 10/29/2019).

## 2019-04-29 NOTE — PROGRESS NOTES
Routine left lower extremity venous U/S complete. Paperwork being faxed, cupid done. Notified reading physician at 1421. Patient informed he would receive results per ordering physician. Patient released with caretaker.

## 2019-04-30 ENCOUNTER — TELEPHONE (OUTPATIENT)
Dept: INTERNAL MEDICINE | Facility: CLINIC | Age: 84
End: 2019-04-30

## 2019-04-30 ENCOUNTER — TELEPHONE (OUTPATIENT)
Dept: GASTROENTEROLOGY | Facility: CLINIC | Age: 84
End: 2019-04-30

## 2019-04-30 ENCOUNTER — PATIENT MESSAGE (OUTPATIENT)
Dept: INTERNAL MEDICINE | Facility: CLINIC | Age: 84
End: 2019-04-30

## 2019-04-30 NOTE — TELEPHONE ENCOUNTER
Spoke with patients daughter today, Ms. Ellis.   She reports that her father is impacted and needs to be disimpacted.   I explained to her that the GI clinic is not set up for accommodate a manual disimpaction and If that was her main priority then this could be accommodated in [facility]S or the ER. She also had questions concerning Crohn;s disease possibility vs the microscopic colitis. I Clarified the difference with her and let her know previous results that were found in our  showed lymphocytic colitis found in 2015 by [facility] Dr. Ray. I did explain if she would like to get the the impaction handled first since that is her main concern and then f/u with myself or someone else in GI then we would be happy to do so. She agreed. All questions and concerns addressed. MA to call her and get appt made with [facility] for manual disimpaction.

## 2019-05-01 ENCOUNTER — HOSPITAL ENCOUNTER (OUTPATIENT)
Dept: RADIOLOGY | Facility: HOSPITAL | Age: 84
Discharge: HOME | End: 2019-05-01
Attending: NURSE PRACTITIONER
Payer: MEDICARE

## 2019-05-01 ENCOUNTER — OFFICE VISIT (OUTPATIENT)
Dept: SURGERY | Facility: CLINIC | Age: 84
End: 2019-05-01
Payer: MEDICARE

## 2019-05-01 VITALS
WEIGHT: 165.56 LBS | HEART RATE: 70 BPM | HEIGHT: 71 IN | BODY MASS INDEX: 23.18 KG/M2 | SYSTOLIC BLOOD PRESSURE: 104 MMHG | DIASTOLIC BLOOD PRESSURE: 65 MMHG

## 2019-05-01 DIAGNOSIS — K59.00 CONSTIPATION, UNSPECIFIED CONSTIPATION TYPE: ICD-10-CM

## 2019-05-01 DIAGNOSIS — K59.00 CONSTIPATION, UNSPECIFIED CONSTIPATION TYPE: Primary | ICD-10-CM

## 2019-05-01 PROCEDURE — 99204 PR OFFICE/OUTPT VISIT, NEW, LEVL IV, 45-59 MIN: ICD-10-PCS | Mod: 25,S$GLB,, | Performed by: NURSE PRACTITIONER

## 2019-05-01 PROCEDURE — 99999 PR PBB SHADOW E&M-EST. PATIENT-LVL III: CPT | Mod: PBBFAC,,, | Performed by: NURSE PRACTITIONER

## 2019-05-01 PROCEDURE — 99999 PR PBB SHADOW E&M-EST. PATIENT-LVL III: ICD-10-PCS | Mod: PBBFAC,,, | Performed by: NURSE PRACTITIONER

## 2019-05-01 PROCEDURE — 99204 OFFICE O/P NEW MOD 45 MIN: CPT | Mod: 25,S$GLB,, | Performed by: NURSE PRACTITIONER

## 2019-05-01 PROCEDURE — 1101F PT FALLS ASSESS-DOCD LE1/YR: CPT | Mod: CPTII,S$GLB,, | Performed by: NURSE PRACTITIONER

## 2019-05-01 PROCEDURE — 46600 DIAGNOSTIC ANOSCOPY SPX: CPT | Mod: S$GLB,,, | Performed by: NURSE PRACTITIONER

## 2019-05-01 PROCEDURE — 74019 RADEX ABDOMEN 2 VIEWS: CPT | Mod: TC

## 2019-05-01 PROCEDURE — 74019 RADEX ABDOMEN 2 VIEWS: CPT | Mod: 26,,, | Performed by: RADIOLOGY

## 2019-05-01 PROCEDURE — 46600 PR DIAG2STIC A2SCOPY: ICD-10-PCS | Mod: S$GLB,,, | Performed by: NURSE PRACTITIONER

## 2019-05-01 PROCEDURE — 1101F PR PT FALLS ASSESS DOC 0-1 FALLS W/OUT INJ PAST YR: ICD-10-PCS | Mod: CPTII,S$GLB,, | Performed by: NURSE PRACTITIONER

## 2019-05-01 PROCEDURE — 74019 XR ABDOMEN FLAT AND ERECT: ICD-10-PCS | Mod: 26,,, | Performed by: RADIOLOGY

## 2019-05-01 NOTE — PROGRESS NOTES
"Subjective:       Patient ID: Phi Sainz is a 89 y.o. male.    Chief Complaint: No chief complaint on file.    HPI   89 M who presents to clinic for abdominal pain for the past month. Mostly at night, keeps him up and unable to sleep. Better in the day time. Tolerating diet. Long history of constipation - had prior fecal impaction. Hadn't had a BM for "a while". Finally had large BM yesterday. Non bloody. Denies n/v, fevers or weight loss or rectal pain.     BMs have varied between diarrhea and constipation. Family says he has colitis? He will have flair up with diarrhea and urgency. On lomotil and mesalamine - this "combo" controls his symptoms for the most part.       Colonoscopy 2008 - normal   No prior abdominal surgery  No family history of colon or rectal CA    Review of Systems   Constitutional: Negative for appetite change, chills, fatigue, fever and unexpected weight change.   Respiratory: Negative for shortness of breath.    Cardiovascular: Negative for chest pain.   Gastrointestinal: Positive for abdominal pain and constipation. Negative for abdominal distention, anal bleeding, blood in stool, diarrhea, nausea, rectal pain and vomiting.       Objective:      Physical Exam   Constitutional: He is oriented to person, place, and time. He appears well-developed and well-nourished.   Eyes: Conjunctivae and EOM are normal.   Pulmonary/Chest: Effort normal. No respiratory distress.   Abdominal: Soft. He exhibits no distension. There is no tenderness.   Genitourinary: Rectal exam shows no mass and no tenderness.   Genitourinary Comments: Normal perianal skin. eversion of anus revealed no abnormality or fissure, SEMAJ revealed no masses, blood or stool in vault, normal sphincter tone, anoscopy revealed grade I internal hemorrhoids with no bleeding or stigmata of same.     Musculoskeletal: Normal range of motion.   Neurological: He is alert and oriented to person, place, and time.   Skin: Skin is warm and " dry.   Psychiatric: He has a normal mood and affect. His behavior is normal.       Assessment:       1. Constipation, unspecified constipation type        Plan:       Abdomoninal Xray  Hold lomotil  Continue fiber and stool softeners   Needs to follow up with GI

## 2019-05-01 NOTE — LETTER
May 1, 2019      Bisi Degroot, YAQUELIN  [address]  [facility] [address]           [facility]  [address]  [facility] [address]  Phone: [phone]          Patient: Phi Sainz   MR Number: [id number]   YOB: 1929   Date of Visit: 5/1/2019       Dear Bisi Degroot:    Thank you for referring Phi Sainz to me for evaluation. Attached you will find relevant portions of my assessment and plan of care.    If you have questions, please do not hesitate to call me. I look forward to following Phi Sainz along with you.    Sincerely,    Yissel Tejeda NP    Enclosure  CC:  No Recipients    If you would like to receive this communication electronically, please contact [email or web address] or [phone] to request more information on [employer] access.    For providers and/or their staff who would like to refer a patient to [facility], please contact us through our one-stop-shop provider referral line, [facility], at [phone].    If you feel you have received this communication in error or would no longer like to receive these types of communications, please e-mail [email or web address]

## 2019-05-02 ENCOUNTER — OFFICE VISIT (OUTPATIENT)
Dept: NEUROLOGY | Facility: CLINIC | Age: 84
End: 2019-05-02
Payer: MEDICARE

## 2019-05-02 VITALS
SYSTOLIC BLOOD PRESSURE: 157 MMHG | WEIGHT: 161.19 LBS | DIASTOLIC BLOOD PRESSURE: 82 MMHG | BODY MASS INDEX: 22.56 KG/M2 | HEART RATE: 61 BPM | HEIGHT: 71 IN

## 2019-05-02 DIAGNOSIS — G20.A1 PARKINSON'S DISEASE: ICD-10-CM

## 2019-05-02 DIAGNOSIS — G20.A1 PARKINSON DISEASE: Primary | ICD-10-CM

## 2019-05-02 PROCEDURE — 99215 OFFICE O/P EST HI 40 MIN: CPT | Mod: S$GLB,,, | Performed by: PSYCHIATRY & NEUROLOGY

## 2019-05-02 PROCEDURE — 99999 PR PBB SHADOW E&M-EST. PATIENT-LVL III: ICD-10-PCS | Mod: PBBFAC,,, | Performed by: PSYCHIATRY & NEUROLOGY

## 2019-05-02 PROCEDURE — 1101F PT FALLS ASSESS-DOCD LE1/YR: CPT | Mod: CPTII,S$GLB,, | Performed by: PSYCHIATRY & NEUROLOGY

## 2019-05-02 PROCEDURE — 1101F PR PT FALLS ASSESS DOC 0-1 FALLS W/OUT INJ PAST YR: ICD-10-PCS | Mod: CPTII,S$GLB,, | Performed by: PSYCHIATRY & NEUROLOGY

## 2019-05-02 PROCEDURE — 99999 PR PBB SHADOW E&M-EST. PATIENT-LVL III: CPT | Mod: PBBFAC,,, | Performed by: PSYCHIATRY & NEUROLOGY

## 2019-05-02 PROCEDURE — 99215 PR OFFICE/OUTPT VISIT, EST, LEVL V, 40-54 MIN: ICD-10-PCS | Mod: S$GLB,,, | Performed by: PSYCHIATRY & NEUROLOGY

## 2019-05-02 NOTE — PROGRESS NOTES
"Name: Phi Sainz  MRN: [id number]   CSN: [id number]      Date: 05/07/2019    Referring physician:  No referring provider defined for this encounter.    Chief Complaint / Interval History:   - generally more weak and slow over 3 months  - had ggod results with threeapy lasts time, then stopped it, ready again  - more aqbd pain at night, up from midnight onward usually  - rwecent tsh is good, though colder at night  - more sundowing at night  - saw shay 2 months ago  - using melatonin at midnight    From Jan 2019:  - generally more slow in the last three months  - still on current ldopa dosing  - walking has slowed down some  - still lower BP in the past, watching it now on less Flomax  - on Midodrine now until a few days ago, was running too high  - no recent staring spells  - ready for more meds if able  - no falls  - some speeding bowels, does have colitis      History of Present Illness (HPI):  Patient is 87 yo M presents with 4 years duration of tremors, bradykinesia, and gait abnormality. Patient was seen in neurologist 4 years ago with resting tremor, managed conservatively. His tremor had progressed, and progressively developed shuffling gait. Daughter and granddaughter also reports that patient had been having "staring spells" where patient would appear to be locked in with blank expression that self-resolves. Recently patient developed orthostatic hypotension, and was assessed by Cardiologist who then referred patient to Neurologist Dr. Lal. Patient was seen in Neurology clinic last week, and his symptoms were suspicious for Parkinson's Disease. Patient was prescribed Sinemet 25/100 TID. Today in clinic, patient reports significant improvement of his symptoms. He has not had another staring spell since initiating medication. Patient continues to have dysphagia, and chokes on thin liquids. He is scheduled for Speech therapy and ENT assessment.     Nonmotor/Premotor ROS:  Hyposmia " (HENT)?N/A  RBD/sleep issues (Constitutional)?Yes  Depression/anxiety (Psychiatric)?Yes  Fatigue (Constitutional)?No  Constipation (GI)?No  Urinary issues ()?No  Sexual dysfunction ()?N/A  Orthostasis (Cardiovascular)?Yes  Leg swelling (Cardiovascular)? No  Falls (Musculoskeletal)?Yes  Cognitive impairment (Neurologic)?No  Psychoses (Psychiatric)?No  Pain/Paresthesia (Neurologic)?No  Visual changes (Eyes)?No  Moles / skin changes (Skin)?N/A  Stridor / SOB (Pulm)?No  Bruising (Heme)?No    Past Medical History: The patient  has a past medical history of Allergy, Anemia, Arthritis, Basal cell carcinoma, Depression, Herniation of intervertebral disc, Hyperlipidemia, Hypertension (3/4/2015), Macular degeneration, Parkinson disease, and Spinal stenosis.    Social History: The patient  reports that he has never smoked. He has never used smokeless tobacco. He reports that he does not drink alcohol or use drugs.    Family History: Their family history includes Cancer in his father; Colon cancer in his father; Hearing loss in his paternal aunt.    Allergies: Combigan [brimonidine-timolol] and Contrast media     Meds:   Current Outpatient Medications on File Prior to Visit   Medication Sig Dispense Refill    acetaminophen (TYLENOL EXTRA STRENGTH) 500 MG tablet Take 500 mg by mouth every 6 (six) hours as needed for Pain.      carbidopa-levodopa  mg (SINEMET)  mg per tablet Take 1 tablet three times daily. 90 tablet 11    gabapentin (NEURONTIN) 100 MG capsule Take 300 mg by mouth 2 (two) times daily. Pt taking one pill twice a day.      mesalamine (APRISO) 0.375 gram Cp24 Take 1.5 g by mouth once daily. Pt taking 4 pills in am.      rasagiline (AZILECT) 1 mg Tab Take 1 tablet (1 mg total) by mouth once daily. 30 tablet 5    diphenoxylate-atropine 2.5-0.025 mg (LOMOTIL) 2.5-0.025 mg per tablet Take 1 tablet by mouth 2 (two) times daily. (Patient taking differently: Take 1 tablet by mouth 2 (two) times daily.  "Pt taking one pill twice a day.) 60 tablet 0     No current facility-administered medications on file prior to visit.        Exam:  BP (!) 157/82   Pulse 61   Ht 5' 11" (1.803 m)   Wt 73.1 kg (161 lb 2.5 oz)   BMI 22.48 kg/m²     Constitutional  Well-developed, well-nourished, appears stated age   Ophthalmoscopic  No papilledema with no hemorrhages or exudates bilaterally   Cardiovascular  Radial pulses 2+ and symmetric, no LE edema bilaterally   Neurological    * Mental status  MOCA = N/A     - Orientation  Oriented to person, place, time, and situation     - Memory   Intact recent and remote     - Attention/concentration  Attentive, vigilant during exam     - Language  Naming & repetition intact, +2-step commands     - Fund of knowledge  Aware of current events     - Executive  Well-organized thoughts     - Other     * Cranial nerves       - CN II  PERRL, visual fields full to confrontation     - CN III, IV, VI  Extraocular movements full, normal pursuits and saccades     - CN V  Sensation V1 - V3 intact     - CN VII  Face strong and symmetric bilaterally     - CN VIII  Hearing intact bilaterally     - CN IX, X  Palate raises midline and symmetric     - CN XI  SCM and trapezius 5/5 bilaterally     - CN XII  Tongue midline   * Motor  Muscle bulk normal, strength 5/5 throughout   * Sensory   Intact to temperature and vibration throughout   * Coordination  No dysmetria with finger-to-nose or heel-to-shin   * Gait  See below.   * Deep tendon reflexes  2+ and symmetric throughout   Babinski downgoing bilaterally   * Specialized movement exam  ++ hypophonic speech.    + facial masking.   No cogwheel rigidity.     No bradykinesia.   Mild tremor on R with rest    No other dystonia, chorea, athetosis, myoclonus, or tics.   No motor impersistence.   Shuffled gait.   ++ shortened stride length.   ++ abnormal arm swing.     + postural instability, falls with narrow gait          Medical Record Review:  - Lab " Results:      Problem List Items Addressed This Visit     Parkinson's disease    Overview     Diagnosed 2018, tremor dominant with prominent gait dysfunction, good early ldopa response.  Some non-motor complication of hypotension.         Current Assessment & Plan       - will retry going higher on cd/ld again to 1.5 tabs with each dose  - adding LSVT BIG equivalent with Home Health  - add kenon earlier           Parkinson disease - Primary    Relevant Orders    Ambulatory Referral to Home Health                Harshad Forrester MD, MPH  Division of Movement and Memory Disorders  [facility]

## 2019-05-03 ENCOUNTER — PATIENT MESSAGE (OUTPATIENT)
Dept: SURGERY | Facility: CLINIC | Age: 84
End: 2019-05-03

## 2019-05-08 NOTE — ASSESSMENT & PLAN NOTE
- will retry going higher on cd/ld again to 1.5 tabs with each dose  - adding LSVT BIG equivalent with Home Health  - add tez earlier

## 2019-05-09 ENCOUNTER — TELEPHONE (OUTPATIENT)
Dept: NEUROLOGY | Facility: CLINIC | Age: 84
End: 2019-05-09

## 2019-05-09 NOTE — TELEPHONE ENCOUNTER
Message below received the staff box and information and last clinic notes has been faxed to [employer].       Message from Home Health:   Phi Sainz mrn [phone]     Thank you for the referral for HH services.     However, this referral is denied, due to:     We do not accept [employer] insurance ([employer]).      If you need anything further, please contact our office.       Stephanie Yanez    [facility]   [phone]

## 2019-05-14 ENCOUNTER — CLINICAL SUPPORT (OUTPATIENT)
Dept: CARDIOLOGY | Facility: CLINIC | Age: 84
End: 2019-05-14
Attending: INTERNAL MEDICINE
Payer: MEDICARE

## 2019-05-14 ENCOUNTER — TELEPHONE (OUTPATIENT)
Dept: NEUROLOGY | Facility: CLINIC | Age: 84
End: 2019-05-14

## 2019-05-14 VITALS
HEIGHT: 71 IN | HEART RATE: 58 BPM | WEIGHT: 162 LBS | DIASTOLIC BLOOD PRESSURE: 90 MMHG | BODY MASS INDEX: 22.68 KG/M2 | SYSTOLIC BLOOD PRESSURE: 154 MMHG

## 2019-05-14 DIAGNOSIS — R06.01 ORTHOPNEA: ICD-10-CM

## 2019-05-14 LAB
ASCENDING AORTA: 3.81 CM
AV INDEX (PROSTH): 0.62
AV MEAN GRADIENT: 4.21 MMHG
AV PEAK GRADIENT: 6.55 MMHG
AV VALVE AREA: 2.22 CM2
AV VELOCITY RATIO: 0.63
BSA FOR ECHO PROCEDURE: 1.92 M2
CV ECHO LV RWT: 0.37 CM
DOP CALC AO PEAK VEL: 1.28 M/S
DOP CALC AO VTI: 30.78 CM
DOP CALC LVOT AREA: 3.56 CM2
DOP CALC LVOT DIAMETER: 2.13 CM
DOP CALC LVOT PEAK VEL: 0.8 M/S
DOP CALC LVOT STROKE VOLUME: 68.34 CM3
DOP CALCLVOT PEAK VEL VTI: 19.19 CM
E WAVE DECELERATION TIME: 283.08 MSEC
E/A RATIO: 0.6
E/E' RATIO: 11.4
ECHO LV POSTERIOR WALL: 0.89 CM (ref 0.6–1.1)
FRACTIONAL SHORTENING: 29 % (ref 28–44)
INTERVENTRICULAR SEPTUM: 0.92 CM (ref 0.6–1.1)
IVRT: 0.14 MSEC
LA MAJOR: 5.17 CM
LA MINOR: 5.1 CM
LA WIDTH: 3.5 CM
LEFT ATRIUM SIZE: 3.7 CM
LEFT ATRIUM VOLUME INDEX: 29.3 ML/M2
LEFT ATRIUM VOLUME: 56.52 CM3
LEFT INTERNAL DIMENSION IN SYSTOLE: 3.38 CM (ref 2.1–4)
LEFT VENTRICLE DIASTOLIC VOLUME INDEX: 55.5 ML/M2
LEFT VENTRICLE DIASTOLIC VOLUME: 107.02 ML
LEFT VENTRICLE MASS INDEX: 76.9 G/M2
LEFT VENTRICLE SYSTOLIC VOLUME INDEX: 24.2 ML/M2
LEFT VENTRICLE SYSTOLIC VOLUME: 46.69 ML
LEFT VENTRICULAR INTERNAL DIMENSION IN DIASTOLE: 4.79 CM (ref 3.5–6)
LEFT VENTRICULAR MASS: 148.36 G
LV LATERAL E/E' RATIO: 11.4
LV SEPTAL E/E' RATIO: 11.4
MV PEAK A VEL: 0.95 M/S
MV PEAK E VEL: 0.57 M/S
PISA TR MAX VEL: 3.52 M/S
PULM VEIN S/D RATIO: 2.06
PV PEAK D VEL: 0.31 M/S
PV PEAK S VEL: 0.64 M/S
RA MAJOR: 5.1 CM
RA PRESSURE: 3 MMHG
RA WIDTH: 3.05 CM
RIGHT VENTRICULAR END-DIASTOLIC DIMENSION: 3.48 CM
SINUS: 3.62 CM
STJ: 3.34 CM
TDI LATERAL: 0.05
TDI SEPTAL: 0.05
TDI: 0.05
TR MAX PG: 49.56 MMHG
TRICUSPID ANNULAR PLANE SYSTOLIC EXCURSION: 2 CM
TV REST PULMONARY ARTERY PRESSURE: 53 MMHG

## 2019-05-14 PROCEDURE — 93306 TTE W/DOPPLER COMPLETE: CPT | Mod: S$GLB,,, | Performed by: INTERNAL MEDICINE

## 2019-05-14 PROCEDURE — 99999 PR PBB SHADOW E&M-EST. PATIENT-LVL II: ICD-10-PCS | Mod: PBBFAC,,,

## 2019-05-14 PROCEDURE — 93306 TRANSTHORACIC ECHO (TTE) COMPLETE (CUPID ONLY): ICD-10-PCS | Mod: S$GLB,,, | Performed by: INTERNAL MEDICINE

## 2019-05-14 PROCEDURE — 99999 PR PBB SHADOW E&M-EST. PATIENT-LVL II: CPT | Mod: PBBFAC,,,

## 2019-05-14 NOTE — TELEPHONE ENCOUNTER
----- Message from Reyes Cabrera sent at 5/14/2019  3:53 PM CDT -----  Needs Advice    Reason for call: Marta is asking to speak w/ Xochitl regarding home health order        Communication Preference: Marta bernstein/ [facility] @ [phone]    Additional Information:

## 2019-05-15 ENCOUNTER — TELEPHONE (OUTPATIENT)
Dept: GASTROENTEROLOGY | Facility: CLINIC | Age: 84
End: 2019-05-15

## 2019-05-15 NOTE — TELEPHONE ENCOUNTER
----- Message from Dylan Barboza sent at 5/14/2019  1:09 PM CDT -----  Contact: Pt daughter Yani:[phone]  .Patient Requesting Sooner Appointment.     Reason for sooner appt.:Pt is having gastric pain  When is the first available appointPt daughter  Communication Preference:Pt daughter Yani:[phone]  Additional Information:

## 2019-05-15 NOTE — TELEPHONE ENCOUNTER
----- Message from Reyes Cabrera sent at 5/15/2019  8:35 AM CDT -----  Needs Advice    Reason for call: Marta is asking to speak w/ Xochitl about home health request they received        Communication Preference: Marta bernstein/ [facility] @ [phone]    Additional Information:

## 2019-05-15 NOTE — TELEPHONE ENCOUNTER
Returned patient's call.     Patient daughter is requesting a sooner appointment with a provider at [facility].     Her father is experiencing abdominal pain and she doesn't want to go back to our [place] location.     GI appointment scheduled and mailed to address on file.

## 2019-05-16 ENCOUNTER — PATIENT MESSAGE (OUTPATIENT)
Dept: CARDIOLOGY | Facility: CLINIC | Age: 84
End: 2019-05-16

## 2019-05-16 ENCOUNTER — TELEPHONE (OUTPATIENT)
Dept: NEUROLOGY | Facility: CLINIC | Age: 84
End: 2019-05-16

## 2019-05-16 NOTE — TELEPHONE ENCOUNTER
----- Message from Reyes Cabrera sent at 5/16/2019  8:28 AM CDT -----  Patient Returning Call from [facility]    Who Left Message for Patient: Xochitl  Communication Preference: Marta bernstein/ [facility] @ [phone] (Marta is asking for a call back before 4 PM)  Additional Information:

## 2019-05-17 NOTE — TELEPHONE ENCOUNTER
Returned call to Marta and explained orders is for PT, with a therapist who specialize in [facility].     She had no further questions

## 2019-05-17 NOTE — TELEPHONE ENCOUNTER
----- Message from Ángela Green sent at 5/17/2019  8:01 AM CDT -----  Contact: Marta ([employer]) @ [phone]  Calling to speak with someone regarding the patient's home health, says [facility] is not a part of the patient's plan, and [facility] needs to know by end of day (before 4pm), if the doctor wants the patient to see another location and what discipline is needed. Please call.

## 2019-05-29 ENCOUNTER — LAB VISIT (OUTPATIENT)
Dept: LAB | Facility: HOSPITAL | Age: 84
End: 2019-05-29
Payer: MEDICARE

## 2019-05-29 ENCOUNTER — TELEPHONE (OUTPATIENT)
Dept: NEUROLOGY | Facility: CLINIC | Age: 84
End: 2019-05-29

## 2019-05-29 ENCOUNTER — OFFICE VISIT (OUTPATIENT)
Dept: GASTROENTEROLOGY | Facility: CLINIC | Age: 84
End: 2019-05-29
Payer: MEDICARE

## 2019-05-29 VITALS
HEIGHT: 71 IN | SYSTOLIC BLOOD PRESSURE: 127 MMHG | HEART RATE: 62 BPM | BODY MASS INDEX: 23.46 KG/M2 | WEIGHT: 167.56 LBS | DIASTOLIC BLOOD PRESSURE: 75 MMHG

## 2019-05-29 DIAGNOSIS — K59.00 CONSTIPATION, UNSPECIFIED CONSTIPATION TYPE: ICD-10-CM

## 2019-05-29 DIAGNOSIS — R10.30 LOWER ABDOMINAL PAIN: ICD-10-CM

## 2019-05-29 DIAGNOSIS — R10.30 LOWER ABDOMINAL PAIN: Primary | ICD-10-CM

## 2019-05-29 DIAGNOSIS — R19.7 DIARRHEA, UNSPECIFIED TYPE: ICD-10-CM

## 2019-05-29 LAB
CREAT SERPL-MCNC: 2 MG/DL (ref 0.5–1.4)
EST. GFR  (AFRICAN AMERICAN): 33.2 ML/MIN/1.73 M^2
EST. GFR  (NON AFRICAN AMERICAN): 28.7 ML/MIN/1.73 M^2

## 2019-05-29 PROCEDURE — 99999 PR PBB SHADOW E&M-EST. PATIENT-LVL IV: CPT | Mod: PBBFAC,GC,, | Performed by: STUDENT IN AN ORGANIZED HEALTH CARE EDUCATION/TRAINING PROGRAM

## 2019-05-29 PROCEDURE — 99999 PR PBB SHADOW E&M-EST. PATIENT-LVL IV: ICD-10-PCS | Mod: PBBFAC,GC,, | Performed by: STUDENT IN AN ORGANIZED HEALTH CARE EDUCATION/TRAINING PROGRAM

## 2019-05-29 PROCEDURE — 36415 COLL VENOUS BLD VENIPUNCTURE: CPT

## 2019-05-29 PROCEDURE — 1101F PR PT FALLS ASSESS DOC 0-1 FALLS W/OUT INJ PAST YR: ICD-10-PCS | Mod: CPTII,GC,S$GLB, | Performed by: INTERNAL MEDICINE

## 2019-05-29 PROCEDURE — 82565 ASSAY OF CREATININE: CPT

## 2019-05-29 PROCEDURE — 99214 PR OFFICE/OUTPT VISIT, EST, LEVL IV, 30-39 MIN: ICD-10-PCS | Mod: GC,S$GLB,, | Performed by: INTERNAL MEDICINE

## 2019-05-29 PROCEDURE — 99214 OFFICE O/P EST MOD 30 MIN: CPT | Mod: GC,S$GLB,, | Performed by: INTERNAL MEDICINE

## 2019-05-29 PROCEDURE — 1101F PT FALLS ASSESS-DOCD LE1/YR: CPT | Mod: CPTII,GC,S$GLB, | Performed by: INTERNAL MEDICINE

## 2019-05-29 NOTE — TELEPHONE ENCOUNTER
Called octavia and informed her that its up to the patient if they would like to continue with the PT.

## 2019-05-29 NOTE — TELEPHONE ENCOUNTER
----- Message from Sheri Tonyhakeem sent at 5/29/2019 10:46 AM CDT -----  Contact: Jenn    [facility]    [phone]  Pt [facility] to [facility].  Pt would like to use [facility] instead but they do not have a [facility] certified therapist.  Would like to know if it is ok to use [facility] anyway.  Pls call asap.

## 2019-05-31 ENCOUNTER — HOSPITAL ENCOUNTER (OUTPATIENT)
Dept: RADIOLOGY | Facility: HOSPITAL | Age: 84
Discharge: HOME | End: 2019-05-31
Attending: STUDENT IN AN ORGANIZED HEALTH CARE EDUCATION/TRAINING PROGRAM
Payer: MEDICARE

## 2019-05-31 ENCOUNTER — TELEPHONE (OUTPATIENT)
Dept: UROLOGY | Facility: CLINIC | Age: 84
End: 2019-05-31

## 2019-05-31 DIAGNOSIS — R10.30 LOWER ABDOMINAL PAIN: ICD-10-CM

## 2019-05-31 PROCEDURE — 74176 CT ABD & PELVIS W/O CONTRAST: CPT | Mod: 26,,, | Performed by: RADIOLOGY

## 2019-05-31 PROCEDURE — 25500020 PHARM REV CODE 255: Performed by: STUDENT IN AN ORGANIZED HEALTH CARE EDUCATION/TRAINING PROGRAM

## 2019-05-31 PROCEDURE — 74176 CT ABDOMEN PELVIS WITHOUT CONTRAST: ICD-10-PCS | Mod: 26,,, | Performed by: RADIOLOGY

## 2019-05-31 PROCEDURE — 74176 CT ABD & PELVIS W/O CONTRAST: CPT | Mod: TC

## 2019-05-31 RX ADMIN — IOHEXOL 30 ML: 350 INJECTION, SOLUTION INTRAVENOUS at 01:05

## 2019-05-31 NOTE — TELEPHONE ENCOUNTER
Spoke with Ms Yani about her fathers procedure I did schedule his CT before his office visit with us on June 11th. Also mailed out a reminder letter

## 2019-06-03 ENCOUNTER — TELEPHONE (OUTPATIENT)
Dept: GASTROENTEROLOGY | Facility: CLINIC | Age: 84
End: 2019-06-03

## 2019-06-03 ENCOUNTER — TELEPHONE (OUTPATIENT)
Dept: UROLOGY | Facility: CLINIC | Age: 84
End: 2019-06-03

## 2019-06-03 ENCOUNTER — TELEPHONE (OUTPATIENT)
Dept: NEUROLOGY | Facility: HOSPITAL | Age: 84
End: 2019-06-03

## 2019-06-03 NOTE — TELEPHONE ENCOUNTER
----- Message from Kimberley Bran sent at 6/3/2019  2:18 PM CDT -----  Contact: daughter Yani [phone]  Patient's daughter is requesting to speak with you regarding CT scan results. Please advise.

## 2019-06-03 NOTE — TELEPHONE ENCOUNTER
Spoke with Yani/pt. Daughter.     Recommendations given to Yani and she verbalized understanding.     She wanted Dr. Pina to know her father have been complaining of abdominal pain and diarrhea. He had an episode of explosive diarrhea this morning and per Yani it was everywhere, on the walls, floor, and bed.     She gave him a Lomotil this morning to see if that will help with his diarrhea.     Yani will reach out to Dr. Fernández's office to see if her father can be seen before 6/11. She also wanted to know what can be done about her father's diarrhea?

## 2019-06-03 NOTE — TELEPHONE ENCOUNTER
Spoke with Ms. Lomeli about her concerns. Her father is having stomach issues and would like for Dr Vargas to address them per Dr Lara. I stated that we do not treat stomach issues but I would let Dr vargas know about their concerns as Dr Lara will be out of office for a few days.

## 2019-06-03 NOTE — TELEPHONE ENCOUNTER
Spoke with patient's daughter, appointment offered for tomorrow at 0800 hours, need help with sitters.  Appointment offered for 6/6/19 at 1000 hours, daughter accepted as this would provide more time to get patient ready.  Appointment confirmed, daughter will reach out via portal if need to be changed.

## 2019-06-03 NOTE — TELEPHONE ENCOUNTER
----- Message from Cathy Bowden sent at 6/3/2019  2:51 PM CDT -----  Contact: daughter/Yani  [phone]  Patient daughter is returning your call.  Please call back to assist at [phone]

## 2019-06-03 NOTE — TELEPHONE ENCOUNTER
----- Message from Joe Pina MD sent at 6/3/2019  9:36 AM CDT -----  Vicente Gamboa,    Can you please call the patient and let him know we got the results of his CT scan and we recommend he f/u with his Urologist ASAP. Can you please help him make an appt if he does not have one coming up soon?     Thanks

## 2019-06-04 ENCOUNTER — PATIENT MESSAGE (OUTPATIENT)
Dept: UROLOGY | Facility: CLINIC | Age: 84
End: 2019-06-04

## 2019-06-06 ENCOUNTER — OFFICE VISIT (OUTPATIENT)
Dept: UROLOGY | Facility: CLINIC | Age: 84
End: 2019-06-06
Payer: MEDICARE

## 2019-06-06 VITALS
TEMPERATURE: 98 F | SYSTOLIC BLOOD PRESSURE: 110 MMHG | DIASTOLIC BLOOD PRESSURE: 70 MMHG | HEIGHT: 71 IN | HEART RATE: 77 BPM | BODY MASS INDEX: 21.32 KG/M2 | WEIGHT: 152.31 LBS

## 2019-06-06 DIAGNOSIS — N13.30 HYDRONEPHROSIS, UNSPECIFIED HYDRONEPHROSIS TYPE: ICD-10-CM

## 2019-06-06 DIAGNOSIS — R93.41 URETER FILLING DEFECT: ICD-10-CM

## 2019-06-06 DIAGNOSIS — R31.0 GROSS HEMATURIA: Primary | ICD-10-CM

## 2019-06-06 DIAGNOSIS — N18.30 CKD (CHRONIC KIDNEY DISEASE), STAGE III: ICD-10-CM

## 2019-06-06 PROCEDURE — 99499 RISK ADDL DX/OHS AUDIT: ICD-10-PCS | Mod: S$GLB,,, | Performed by: STUDENT IN AN ORGANIZED HEALTH CARE EDUCATION/TRAINING PROGRAM

## 2019-06-06 PROCEDURE — 1101F PT FALLS ASSESS-DOCD LE1/YR: CPT | Mod: CPTII,S$GLB,, | Performed by: STUDENT IN AN ORGANIZED HEALTH CARE EDUCATION/TRAINING PROGRAM

## 2019-06-06 PROCEDURE — 99499 UNLISTED E&M SERVICE: CPT | Mod: S$GLB,,, | Performed by: STUDENT IN AN ORGANIZED HEALTH CARE EDUCATION/TRAINING PROGRAM

## 2019-06-06 PROCEDURE — 99215 PR OFFICE/OUTPT VISIT, EST, LEVL V, 40-54 MIN: ICD-10-PCS | Mod: S$GLB,,, | Performed by: STUDENT IN AN ORGANIZED HEALTH CARE EDUCATION/TRAINING PROGRAM

## 2019-06-06 PROCEDURE — 99999 PR PBB SHADOW E&M-EST. PATIENT-LVL IV: ICD-10-PCS | Mod: PBBFAC,,, | Performed by: STUDENT IN AN ORGANIZED HEALTH CARE EDUCATION/TRAINING PROGRAM

## 2019-06-06 PROCEDURE — 1101F PR PT FALLS ASSESS DOC 0-1 FALLS W/OUT INJ PAST YR: ICD-10-PCS | Mod: CPTII,S$GLB,, | Performed by: STUDENT IN AN ORGANIZED HEALTH CARE EDUCATION/TRAINING PROGRAM

## 2019-06-06 PROCEDURE — 99215 OFFICE O/P EST HI 40 MIN: CPT | Mod: S$GLB,,, | Performed by: STUDENT IN AN ORGANIZED HEALTH CARE EDUCATION/TRAINING PROGRAM

## 2019-06-06 PROCEDURE — 99999 PR PBB SHADOW E&M-EST. PATIENT-LVL IV: CPT | Mod: PBBFAC,,, | Performed by: STUDENT IN AN ORGANIZED HEALTH CARE EDUCATION/TRAINING PROGRAM

## 2019-06-06 RX ORDER — DIAZEPAM 2 MG/1
TABLET ORAL
Qty: 30 TABLET | Refills: 3 | Status: SHIPPED | OUTPATIENT
Start: 2019-06-06

## 2019-06-06 RX ORDER — GABAPENTIN 300 MG/1
300 CAPSULE ORAL 2 TIMES DAILY
Refills: 1 | COMMUNITY
Start: 2019-05-02 | End: 2019-06-06 | Stop reason: SDUPTHER

## 2019-06-06 RX ORDER — HYDROCODONE BITARTRATE AND ACETAMINOPHEN 5; 325 MG/1; MG/1
TABLET ORAL
Refills: 0 | COMMUNITY
Start: 2019-06-03

## 2019-06-06 NOTE — PROGRESS NOTES
Subjective:       Patient ID: Phi Sainz is a 89 y.o. male.    Chief Complaint: Follow-up (Discuss CT results)  This is a 89 y.o.  male patient that is an established patient of mine.   The patient is referred to me by Dr. Malachi Bran an internist for gross hematuria. He has seen Dr. Morales in the past in 2018 for an enlarged prostate. He was started on flomax and finasteride for BPH and LUTS however they were later discontinued due to low BP. He observed gross hematuria without dysuria, flank pain, suprapubic pain, fevers, chills. He has never smoked. History of rash with contrast media. He takes Eliquis for DVT prevention. His daughter Yani and his nurse Phi are here with him today. Yani has messaged his cardiologist who has said it is ok for him to hold the Eliquis for a few days if needed to help hematuria resolve.   He underwent a gross hematuria workup with me.  CT urogram 4/11/19-  Kidneys are normal in size and location.  Multiple left renal cortical cysts are identified.  There is a 2.4 cm slightly hyperattenuating filling defect within the left renal pelvis with thin strand extending into the proximal ureter.  No hydronephrosis or hydroureter.  Bladder demonstrates mild circumferential wall thickening, possibly related to chronic outlet obstruction noting a slightly prominent prostate gland.  1. Large filling defect within the left renal pelvis extending into the proximal ureter which demonstrates subtle hyperattenuation suggesting associated blood products.  Recommend further evaluation with direct visualization to rule out an underlying neoplasm. No hydronephrosis or hydroureter.  2. Mild circumferential bladder wall thickening, presumably related to chronic outlet obstruction noting a slightly prominent prostate gland. No intravesical filling defects.  3. Left renal cysts.  No cortical enhancing lesions.  4. Ill-defined peripheral reticulation and ground-glass attenuation with some degree of  subpleural spurring within the visualized lung bases, findings are consistent with chronic interstitial lung disease favoring NSIP over UIP.  Recommend consultation with pulmonary medicine and further evaluation with CT thorax.  5. Prominent aortic atherosclerotic calcification.    He also underwent a cystoscopy with me on 4/11/19 - Trilobar hypertrophy of the prostate. Clear efflux of urine from bilateral ureteral orifices. No bladder tumors or stones visualized. Large capacity bladder.     We engaged in a long discussion on 4/11/19 (see note for details) and his daughter Yani messaged me after discussing with the family at length and they did not want to proceed with ureteroscopy/RGP/biopsy.     6/6/19  In the interim the patient has been losing weight, he has been experiencing lower anterior abdominal pain. He has had worsening PO intake. He is still trying to hydrate generously. He has been alternating between constipation or experiencing loose stools. He was seeing a GI doctor for these symptoms and they ordered a CT abdomen/pelvis with contrast however his Cr returned as 2 and only a noncontrasted CT was performed on 5/31/19. He has seen gross hematuria once since we last saw each other in clinic.    I showed the patient, his daughter, and caretaker the CT images from 5/31/19 -   In the abdomen liver is similar in size.  No focal mass lesions on this noncontrast study.  Gallbladder and pancreas are unremarkable.  Spleen is normal in size.  No adrenal masses.  Right kidney is similar in size and contour.  The left kidney demonstrates significant dilatation of the collecting system.  There is high density material within the renal pelvis.  The ureter does not appear dilated.  No stone visualized.  1.8 cm hypodensity lateral aspect of the left kidney consistent with a cyst.  There is some perinephric stranding similar to slightly increased.    LAST PSA  Lab Results   Component Value Date    PSA 0.82 05/03/2016     PSA 0.93 03/17/2015    PSA 0.86 03/13/2014    PSA 0.96 02/29/2012    PSA 1.1 02/24/2011    PSA 0.75 02/24/2010    PSA 0.91 02/20/2009    PSA 0.5 02/18/2008    PSA 0.5 02/14/2007    PSA 0.4 01/25/2006    PSA 0.5 01/13/2005    PSA 0.4 01/29/2004    PSATOTAL 0.93 03/05/2013    PSAFREE 0.46 03/05/2013       Lab Results   Component Value Date    CREATININE 2.0 (H) 05/29/2019    ---  Past Medical History:   Diagnosis Date    Allergy     Anemia     Arthritis     Basal cell carcinoma     Depression     Herniation of intervertebral disc     Hyperlipidemia     Hypertension 3/4/2015    Macular degeneration     Parkinson disease     Spinal stenosis            Family History   Problem Relation Age of Onset    Cancer Father         prostate    Colon cancer Father     Hearing loss Paternal Aunt     Amblyopia Neg Hx     Blindness Neg Hx     Cataracts Neg Hx     Diabetes Neg Hx     Glaucoma Neg Hx     Hypertension Neg Hx     Macular degeneration Neg Hx     Retinal detachment Neg Hx     Strabismus Neg Hx     Stroke Neg Hx     Thyroid disease Neg Hx     Heart attack Neg Hx     Heart disease Neg Hx     Heart failure Neg Hx     Hyperlipidemia Neg Hx     Melanoma Neg Hx        Social History     Tobacco Use    Smoking status: Never Smoker    Smokeless tobacco: Never Used   Substance Use Topics    Alcohol use: No     Alcohol/week: 0.0 oz     Frequency: Never     Binge frequency: Never    Drug use: No       Current Outpatient Medications on File Prior to Visit   Medication Sig Dispense Refill    acetaminophen (TYLENOL EXTRA STRENGTH) 500 MG tablet Take 500 mg by mouth every 6 (six) hours as needed for Pain.      carbidopa-levodopa  mg (SINEMET)  mg per tablet Take 1 tablet three times daily. 90 tablet 11    diazePAM (VALIUM) 2 MG tablet TAKE 1 TABLET BY MOUTH EVERY DAY AS NEEDED 30 tablet 3    diphenoxylate-atropine 2.5-0.025 mg (LOMOTIL) 2.5-0.025 mg per tablet Take 1 tablet by mouth 2  (two) times daily. (Patient taking differently: Take 1 tablet by mouth 2 (two) times daily. Pt taking one pill twice a day.) 60 tablet 0    gabapentin (NEURONTIN) 100 MG capsule Take 300 mg by mouth 2 (two) times daily. Pt taking one pill twice a day.      HYDROcodone-acetaminophen (NORCO) 5-325 mg per tablet TK 1 T PO Q 8 H PRN  0    mesalamine (APRISO) 0.375 gram Cp24 Take 1.5 g by mouth once daily. Pt taking 4 pills in am.      psyllium (METAMUCIL) packet Take 1 packet by mouth once daily.      rasagiline (AZILECT) 1 mg Tab Take 1 tablet (1 mg total) by mouth once daily. 30 tablet 5    [DISCONTINUED] gabapentin (NEURONTIN) 300 MG capsule Take 300 mg by mouth 2 (two) times daily.  1     No current facility-administered medications on file prior to visit.        Review of patient's allergies indicates:   Allergen Reactions    Combigan [brimonidine-timolol]      Made him dizziness    Contrast media Rash       Review of Systems   Constitutional: Negative for chills.   HENT: Negative for congestion.    Eyes: Negative for visual disturbance.   Respiratory: Negative for shortness of breath.    Cardiovascular: Negative for chest pain.   Gastrointestinal: Negative for abdominal distention.   Musculoskeletal: Negative for gait problem.   Skin: Negative for color change.   Neurological: Negative for dizziness.   Psychiatric/Behavioral: Negative for agitation.       Objective:      Physical Exam   Constitutional: He appears well-developed and well-nourished.   In wheelchair   HENT:   Head: Normocephalic.   Eyes: Pupils are equal, round, and reactive to light.   Neck: Normal range of motion.   Cardiovascular: Intact distal pulses.   Pulmonary/Chest: Effort normal.   Abdominal: Soft. He exhibits no distension. There is no tenderness.   Musculoskeletal: Normal range of motion.   Neurological: He is alert.   Skin: Skin is warm and dry.   Psychiatric: He has a normal mood and affect.       Assessment:       1. Gross  hematuria    2. Ureter filling defect    3. Hydronephrosis, unspecified hydronephrosis type    4. CKD (chronic kidney disease), stage III        Plan:       1. I counseled the patient's daughter, his caretaker, and the patient today about the CT scan findings and serum Cr. Likely with the more pronounced distention present it is suggestive of a malignant process. Again my same recommendation stands in regards to a workup which would include a ureteroscopy, retrograde pyelogram, possible ureteral tumor biopsy if one is visualized during the ureteroscopy, and ureteral stent placement. They are aware of the risks of the procedure and also the risks of anesthesia are high given his age and comorbidities. We had this same discussion a few months ago and the family and the patient did not want to proceed.  They will again discuss and if they want to proceed, they desire a referral to be placed to Dr. Romario Howard.  2. I discussed with them that diarrhea, constipation, and lower anterior abdominal pain is likely not related to the right ureteral filling defect seen on the CT scan. They are undergoing a GI workup/evaluation for this.  3. The patient understands that should they elect to proceed with ureteroscopy/intervention, they would likely need medical clearance, cardiology clearance, and pulmonary clearance. He would likely be at high risk for complications and for general anesthesia given his comorbidities.     The discussion today encompassed 40 minutes.     Gross hematuria    Ureter filling defect    Hydronephrosis, unspecified hydronephrosis type    CKD (chronic kidney disease), stage III

## 2019-06-08 ENCOUNTER — HOSPITAL ENCOUNTER (EMERGENCY)
Facility: HOSPITAL | Age: 84
Discharge: HOME | End: 2019-06-08
Attending: EMERGENCY MEDICINE
Payer: MEDICARE

## 2019-06-08 VITALS
WEIGHT: 152 LBS | DIASTOLIC BLOOD PRESSURE: 62 MMHG | HEART RATE: 88 BPM | BODY MASS INDEX: 21.28 KG/M2 | OXYGEN SATURATION: 94 % | RESPIRATION RATE: 22 BRPM | SYSTOLIC BLOOD PRESSURE: 135 MMHG | TEMPERATURE: 98 F | HEIGHT: 71 IN

## 2019-06-08 DIAGNOSIS — S20.221A CONTUSION OF RIGHT SIDE OF BACK, INITIAL ENCOUNTER: ICD-10-CM

## 2019-06-08 DIAGNOSIS — N28.89 RENAL MASS, LEFT: ICD-10-CM

## 2019-06-08 DIAGNOSIS — R53.83 FATIGUE: ICD-10-CM

## 2019-06-08 DIAGNOSIS — N28.9 RENAL INSUFFICIENCY: ICD-10-CM
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
## 2019-06-08 DIAGNOSIS — R53.1 GENERALIZED WEAKNESS: Primary | ICD-10-CM

## 2019-06-08 LAB
ALBUMIN SERPL BCP-MCNC: 3 G/DL (ref 3.5–5.2)
ALP SERPL-CCNC: 68 U/L (ref 55–135)
ALT SERPL W/O P-5'-P-CCNC: <5 U/L (ref 10–44)
ANION GAP SERPL CALC-SCNC: 9 MMOL/L (ref 8–16)
AST SERPL-CCNC: 12 U/L (ref 10–40)
BACTERIA #/AREA URNS HPF: NORMAL /HPF
BASOPHILS # BLD AUTO: 0.01 K/UL (ref 0–0.2)
BASOPHILS NFR BLD: 0.1 % (ref 0–1.9)
BILIRUB SERPL-MCNC: 0.4 MG/DL (ref 0.1–1)
BILIRUB UR QL STRIP: NEGATIVE
BUN SERPL-MCNC: 22 MG/DL (ref 8–23)
CALCIUM SERPL-MCNC: 9.2 MG/DL (ref 8.7–10.5)
CHLORIDE SERPL-SCNC: 101 MMOL/L (ref 95–110)
CLARITY UR: CLEAR
CO2 SERPL-SCNC: 28 MMOL/L (ref 23–29)
COLOR UR: YELLOW
CREAT SERPL-MCNC: 2 MG/DL (ref 0.5–1.4)
DIFFERENTIAL METHOD: ABNORMAL
EOSINOPHIL # BLD AUTO: 0.1 K/UL (ref 0–0.5)
EOSINOPHIL NFR BLD: 0.6 % (ref 0–8)
ERYTHROCYTE [DISTWIDTH] IN BLOOD BY AUTOMATED COUNT: 13.2 % (ref 11.5–14.5)
EST. GFR  (AFRICAN AMERICAN): 33 ML/MIN/1.73 M^2
EST. GFR  (NON AFRICAN AMERICAN): 29 ML/MIN/1.73 M^2
GLUCOSE SERPL-MCNC: 99 MG/DL (ref 70–110)
GLUCOSE UR QL STRIP: NEGATIVE
HCT VFR BLD AUTO: 33.8 % (ref 40–54)
HGB BLD-MCNC: 10.7 G/DL (ref 14–18)
HGB UR QL STRIP: ABNORMAL
KETONES UR QL STRIP: NEGATIVE
LEUKOCYTE ESTERASE UR QL STRIP: NEGATIVE
LIPASE SERPL-CCNC: 8 U/L (ref 4–60)
LYMPHOCYTES # BLD AUTO: 1.2 K/UL (ref 1–4.8)
LYMPHOCYTES NFR BLD: 10.3 % (ref 18–48)
MCH RBC QN AUTO: 30.1 PG (ref 27–31)
MCHC RBC AUTO-ENTMCNC: 31.7 G/DL (ref 32–36)
MCV RBC AUTO: 95 FL (ref 82–98)
MICROSCOPIC COMMENT: NORMAL
MONOCYTES # BLD AUTO: 1.1 K/UL (ref 0.3–1)
MONOCYTES NFR BLD: 9.3 % (ref 4–15)
NEUTROPHILS # BLD AUTO: 9.3 K/UL (ref 1.8–7.7)
NEUTROPHILS NFR BLD: 79.7 % (ref 38–73)
NITRITE UR QL STRIP: NEGATIVE
PH UR STRIP: 6 [PH] (ref 5–8)
PLATELET # BLD AUTO: 243 K/UL (ref 150–350)
PMV BLD AUTO: 10.2 FL (ref 9.2–12.9)
POTASSIUM SERPL-SCNC: 4.1 MMOL/L (ref 3.5–5.1)
PROT SERPL-MCNC: 7 G/DL (ref 6–8.4)
PROT UR QL STRIP: ABNORMAL
RBC # BLD AUTO: 3.56 M/UL (ref 4.6–6.2)
RBC #/AREA URNS HPF: 3 /HPF (ref 0–4)
SODIUM SERPL-SCNC: 138 MMOL/L (ref 136–145)
SP GR UR STRIP: 1.01 (ref 1–1.03)
TROPONIN I SERPL DL<=0.01 NG/ML-MCNC: 0.02 NG/ML (ref 0–0.03)
URN SPEC COLLECT METH UR: ABNORMAL
UROBILINOGEN UR STRIP-ACNC: NEGATIVE EU/DL
WBC # BLD AUTO: 11.7 K/UL (ref 3.9–12.7)
WBC #/AREA URNS HPF: 1 /HPF (ref 0–5)

## 2019-06-08 PROCEDURE — 93010 ELECTROCARDIOGRAM REPORT: CPT | Mod: ,,, | Performed by: INTERNAL MEDICINE

## 2019-06-08 PROCEDURE — 96360 HYDRATION IV INFUSION INIT: CPT

## 2019-06-08 PROCEDURE — 93010 EKG 12-LEAD: ICD-10-PCS | Mod: ,,, | Performed by: INTERNAL MEDICINE

## 2019-06-08 PROCEDURE — 99285 EMERGENCY DEPT VISIT HI MDM: CPT | Mod: 25

## 2019-06-08 PROCEDURE — 85025 COMPLETE CBC W/AUTO DIFF WBC: CPT

## 2019-06-08 PROCEDURE — 25000003 PHARM REV CODE 250: Performed by: EMERGENCY MEDICINE

## 2019-06-08 PROCEDURE — 81000 URINALYSIS NONAUTO W/SCOPE: CPT

## 2019-06-08 PROCEDURE — 84484 ASSAY OF TROPONIN QUANT: CPT

## 2019-06-08 PROCEDURE — 99284 EMERGENCY DEPT VISIT MOD MDM: CPT | Mod: 27

## 2019-06-08 PROCEDURE — 96361 HYDRATE IV INFUSION ADD-ON: CPT

## 2019-06-08 PROCEDURE — 83690 ASSAY OF LIPASE: CPT

## 2019-06-08 PROCEDURE — 93005 ELECTROCARDIOGRAM TRACING: CPT

## 2019-06-08 PROCEDURE — 80053 COMPREHEN METABOLIC PANEL: CPT

## 2019-06-08 RX ORDER — HYDROCODONE BITARTRATE AND ACETAMINOPHEN 5; 325 MG/1; MG/1
1 TABLET ORAL
Status: COMPLETED | OUTPATIENT
Start: 2019-06-08 | End: 2019-06-08

## 2019-06-08 RX ADMIN — HYDROCODONE BITARTRATE AND ACETAMINOPHEN 1 TABLET: 5; 325 TABLET ORAL at 07:06

## 2019-06-08 RX ADMIN — SODIUM CHLORIDE 1000 ML: 0.9 INJECTION, SOLUTION INTRAVENOUS at 06:06

## 2019-06-08 NOTE — ED PROVIDER NOTES
Encounter Date: 6/8/2019    SCRIBE #1 NOTE: I, Lance Montgomery , am scribing for, and in the presence of,  Dr. Linares . I have scribed the entire note.       History     Chief Complaint   Patient presents with    Fatigue     Reports generalized weakness for past couple days and today when using restroom fell. Pain to R lumbar with contusion. Denies head trauma.      Time seen by provider: 5:31 PM    This is a 89 y.o. male who presents with complaint of Unwitnessed Fall. Pt reports that while urinating today , he lost his balance and fell backwards striking his back against the bed frame. There was no LOC or head injury upon impact. He also complains of increased generalized weakness recently as well.         Review of patient's allergies indicates:   Allergen Reactions    Combigan [brimonidine-timolol]      Made him dizziness    Contrast media Rash     Past Medical History:   Diagnosis Date    Allergy     Anemia     Arthritis     Basal cell carcinoma     Depression     Herniation of intervertebral disc     Hyperlipidemia     Hypertension 3/4/2015    Macular degeneration     Parkinson disease     Spinal stenosis        Family History   Problem Relation Age of Onset    Cancer Father         prostate    Colon cancer Father     Hearing loss Paternal Aunt     Amblyopia Neg Hx     Blindness Neg Hx     Cataracts Neg Hx     Diabetes Neg Hx     Glaucoma Neg Hx     Hypertension Neg Hx     Macular degeneration Neg Hx     Retinal detachment Neg Hx     Strabismus Neg Hx     Stroke Neg Hx     Thyroid disease Neg Hx     Heart attack Neg Hx     Heart disease Neg Hx     Heart failure Neg Hx     Hyperlipidemia Neg Hx     Melanoma Neg Hx      Social History     Tobacco Use    Smoking status: Never Smoker    Smokeless tobacco: Never Used   Substance Use Topics    Alcohol use: No     Alcohol/week: 0.0 oz     Frequency: Never     Binge frequency: Never    Drug use: No     Review of Systems    Constitutional: Negative for chills and fever.   HENT: Negative for congestion, ear pain, rhinorrhea and sore throat.    Respiratory: Negative for cough, shortness of breath and wheezing.    Cardiovascular: Negative for chest pain and palpitations.   Gastrointestinal: Negative for abdominal pain, constipation, diarrhea, nausea and vomiting.   Genitourinary: Negative for dysuria, flank pain, frequency, hematuria and urgency.   Musculoskeletal: Positive for back pain. Negative for myalgias and neck pain. Arthralgias: right sided flank pain    Skin: Negative for rash and wound.   Neurological: Negative for dizziness, weakness, light-headedness and headaches.   Hematological: Does not bruise/bleed easily.   Psychiatric/Behavioral: Negative for agitation, behavioral problems and confusion.   All other systems reviewed and are negative.      Physical Exam     Initial Vitals [06/08/19 1706]   BP Pulse Resp Temp SpO2   134/77 (!) 53 13 97.7 °F (36.5 °C) 99 %      MAP       --         Physical Exam    Nursing note and vitals reviewed.  Constitutional: He appears well-developed and well-nourished. He is not diaphoretic. No distress.   Alert and oriented x 2     HENT:   Head: Normocephalic and atraumatic.   Mouth/Throat: Oropharynx is clear and moist.   Eyes: Conjunctivae and EOM are normal.   Neck: Normal range of motion. Neck supple.   Cardiovascular: Normal rate, regular rhythm and normal heart sounds. Exam reveals no gallop and no friction rub.    No murmur heard.  Pulmonary/Chest: He has no wheezes. He has no rhonchi. He has rales (slight bibasilar).   Chest wall non TTP.   Bibasilar rales    Abdominal: Soft. There is no tenderness. There is no rebound and no guarding.   Musculoskeletal: Normal range of motion. He exhibits tenderness. He exhibits no edema.        Arms:  Slight ecchymosis and TTP to the right posterior flank      Lymphadenopathy:     He has no cervical adenopathy.   Neurological: He is alert and oriented  to person, place, and time. He has normal strength.   Skin: Skin is warm and dry. Ecchymosis noted. No rash noted.              ED Course   Procedures  Labs Reviewed   CBC W/ AUTO DIFFERENTIAL - Abnormal; Notable for the following components:       Result Value    RBC 3.56 (*)     Hemoglobin 10.7 (*)     Hematocrit 33.8 (*)     Mean Corpuscular Hemoglobin Conc 31.7 (*)     Gran # (ANC) 9.3 (*)     Mono # 1.1 (*)     Gran% 79.7 (*)     Lymph% 10.3 (*)     All other components within normal limits   COMPREHENSIVE METABOLIC PANEL - Abnormal; Notable for the following components:    Creatinine 2.0 (*)     Albumin 3.0 (*)     ALT <5 (*)     eGFR if  33 (*)     eGFR if non  29 (*)     All other components within normal limits   URINALYSIS, REFLEX TO URINE CULTURE - Abnormal; Notable for the following components:    Protein, UA Trace (*)     Occult Blood UA 1+ (*)     All other components within normal limits    Narrative:     Preferred Collection Type->Urine, Clean Catch   URINALYSIS MICROSCOPIC    Narrative:     Preferred Collection Type->Urine, Clean Catch   TROPONIN I   LIPASE     EKG Readings: (Independently Interpreted)   (17:23)    Sinus bradycardia, positive artifact, normal intervals       Imaging Results          CT Abdomen Pelvis  Without Contrast (Final result)  Result time 06/08/19 20:38:13   Procedure changed from CT Abdomen Pelvis With Contrast     Final result by Whitney Self MD (06/08/19 20:38:13)                 Impression:      Three-vessel coronary disease.  Mild cardiomegaly unchanged.    Subpleural reticulation worrisome for interstitial fibrotic lung disease.  There are also punctate pulmonary calcifications suggesting dendritic pulmonary ossification as well, overall appearance unchanged from 10/09/2017.    Left severe hydronephrosis with heterogeneous intermediate density material filling the left renal collecting system similar with 05/31/2019 likely from  hematoma.  Underlying lesion is difficult to exclude.  No urolithiasis found.      Electronically signed by: Whitney Self  Date:    06/08/2019  Time:    20:38             Narrative:    EXAMINATION:  CT ABDOMEN PELVIS WITHOUT CONTRAST    CLINICAL HISTORY:  Abd trauma, blunt, hematuria microscopic;    TECHNIQUE:  Low dose axial images, sagittal and coronal reformations were obtained from the lung bases to the pubic symphysis.  Without IV or oral contrast.    COMPARISON:  05/31/2019 and 04/11/2019 CT and 09/13/2016 chest CT.    FINDINGS:  Chest: The heart is globally enlarged probably unchanged.  No pericardial effusion.  There is atherosclerotic calcification of the LAD and circumflex and RCA.  The visualized soft is appears normal.    There coarse subpleural reticular opacities not well demonstrated from gross breathing motion.  Findings are worrisome for interstitial fibrotic lung disease similar prior.  No pneumothorax or pleural effusion.    Abdomen: There is breathing motion degradation and scatter artifact from adjacent arms.  The liver, spleen, adrenal glands and pancreas appear normal.  Gallbladder appears normal.  Common bile duct measures about 7 mm probably within normal limits.  The abdominal aorta and IVC appear normal.  No adenopathy seen.  The right kidney shows grossly normal morphology.  The right ureter appears normal.    Left kidney shows perinephric stranding and marked hydronephrosis with heterogeneous material distending the left renal pelvis.  This is similar with 05/31/2019 but increased from 04/11/2019.  This probably represents complex hematoma.  Underlying neoplastic process is difficult to evaluate or exclude.  No left urolithiasis found.  There is a small exophytic 14 mm cyst of the left kidney unchanged from 04/11/2019.    Pelvis: Urinary bladder appears normal.  No inguinal hernia or pelvic adenopathy found.    Bowel and mesentery the appendix appears normal coronal image 38.  Terminal  ileum appears normal coronal image 36.  No bowel dilation or wall thickening or pneumatosis.  No pattern of ileus or obstruction.  No peritoneal abscess or free air.    Skeleton: There are old healed right posterior rib fractures.  No acute rib fracture found.  Visualized femurs appear intact.  No osteonecrosis.  Sacrum and SI joints are grossly normal.                               CT CERVICAL SPINE WITHOUT CONTRAST (Final result)  Result time 06/08/19 20:32:37    Final result by Abiodun Rosario MD (06/08/19 20:32:37)                 Impression:      No CT evidence of cervical spine acute osseous traumatic injury.    Cervical spondylosis most prominent at C3-4 through C6-7 levels, as above.    Few additional findings as above.      Electronically signed by: Abiodun Rosario MD  Date:    06/08/2019  Time:    20:32             Narrative:    EXAMINATION:  CT CERVICAL SPINE WITHOUT CONTRAST    CLINICAL HISTORY:  C-spine trauma, NEXUS/CCR positive, low risk;    TECHNIQUE:  Low dose axial images, sagittal and coronal reformations were performed though the cervical spine.  Contrast was not administered.    COMPARISON:  CT thorax 10/09/2017    FINDINGS:  Generalized osteopenia.  Mild dextrocurvature and straightening of the cervical lordosis, likely related to positioning or muscle strain.  Vertebral body heights are relatively maintained.  2 mm degenerative related grade 1 anterolisthesis of C2 on 3 and C7 on T1.  No displaced fracture, dislocation or destructive osseous process.  No prevertebral soft tissue swelling.  No paraspinal mass or fluid collection.  No subcutaneous emphysema or radiodense retained foreign body.    Mild to moderate degenerative change at the atlantodental interval.  Multilevel mild to moderate degenerative disc disease with loss of disc height, endplate changes, small marginal osteophytes and uncovertebral and facet arthrosis.    C2-3: Posterior disc osteophyte complex resulting in borderline  acquired canal stenosis.  Moderate right and mild left neural foraminal narrowing.    C3-4: Posterior disc osteophyte complex resulting in borderline acquired canal stenosis.  Moderate to severe right and moderate left neural foraminal narrowing.    C4-5: Posterior disc osteophyte complex resulting in mild acquired canal stenosis.  Moderate to severe right and severe left neural foraminal narrowing.    C5-6: Posterior disc osteophyte complex asymmetric to the right resulting in mild acquired canal stenosis.  Severe bilateral neural foraminal narrowing.    C6-7: Posterior disc osteophyte complex asymmetric to the left resulting in mild acquired canal stenosis.  Moderate to severe right and severe left neural foraminal narrowing.    C7-T1: No significant spinal canal stenosis or neural foraminal narrowing.    Included airway is midline and patent.  Calcific atherosclerosis of the bilateral carotid bifurcations and imaged aortic arch.  Imaged lung apices show peripheral reticular opacities with interlobular septal thickening progressed as well as interval patchy nonspecific pulmonary mosaic attenuation which can be seen with small vessels or small airways disease.  No apical pneumothorax.                               CT Head Without Contrast (Final result)  Result time 06/08/19 20:23:23    Final result by Abiodun Rosario MD (06/08/19 20:23:23)                 Impression:      No acute large vascular territory infarct or intracranial hemorrhage identified.    Age-related generalized cerebral volume loss and supratentorial white matter chronic small vessel ischemic change.    Right thalamic remote lacunar type infarct.    Right maxillary chronic sinusitis.  Correlate clinically.      Electronically signed by: Abiodun Rosario MD  Date:    06/08/2019  Time:    20:23             Narrative:    EXAMINATION:  CT HEAD WITHOUT CONTRAST    CLINICAL HISTORY:  Head trauma, mental status change;    TECHNIQUE:  Low dose axial CT images  obtained throughout the head without intravenous contrast. Sagittal and coronal reconstructions were performed.    COMPARISON:  MRI brain 03/30/2015    FINDINGS:  Intracranial compartment:    Age-appropriate mild generalized cerebral volume loss.  Ventricles are midline without distortion by mass effect or acute hydrocephalus.  No extra-axial blood or fluid collections.    Mild degree of patchy hypoattenuation of the bilateral subcortical and periventricular white matter, likely sequela of chronic small vessel ischemic change in this age group.  Right thalamic remote lacunar type infarct.  No parenchymal mass, hemorrhage, edema or major vascular distribution infarct.  Skull base atherosclerotic and right basal ganglia calcifications noted.    Skull/extracranial contents (limited evaluation): No fracture. Complete opacification with hyperostosis of the right maxillary sinus consistent with chronic sinusitis.  Remaining imaged paranasal sinuses and mastoid air cells are clear.  Prior surgery to the bilateral ocular lenses.                               X-Ray Chest AP Portable (Final result)  Result time 06/08/19 17:52:20    Final result by Bernice Cervantes MD (06/08/19 17:52:20)                 Impression:      Chronic interstitial lung disease changes with no acute cardiopulmonary process identified.      Electronically signed by: Bernice Cervantes MD  Date:    06/08/2019  Time:    17:52             Narrative:    EXAMINATION:  XR CHEST AP PORTABLE    CLINICAL HISTORY:  fall;    TECHNIQUE:  Single frontal view of the chest was performed.    COMPARISON:  None    FINDINGS:  Cardiac silhouette is enlarged but stable in size.  Lungs are symmetrically expanded.  Findings of chronic interstitial lung disease/pulmonary fibrosis are again seen.  No evidence of new focal consolidation, pneumothorax, or significant pleural effusion.  No acute osseous abnormality identified.                                 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 Granddaughters in attendance today report positive gradual weight loss and corresponding increase in generalized weakness. Low back pain and lower abdominal pain likely stem from this malignancy. Creatinine is stable from recent determination and VSS. Patient has received IV fluids and is agreeable to being discharged to home as is his family. Family have confirmed his desired DNR status. All questions answered and family voice understanding of discharge instructions. No other issues at this juncture. Patient remains at his baseline mentation while in ED.                       Clinical Impression:     1. Generalized weakness    2. Fatigue    3. Renal mass, left    4. Renal insufficiency    5. Fall, initial encounter    6. Contusion of right side of back, initial encounter               I, Dr. Pacheco Linares, personally performed the services described in this documentation. All medical record entries made by the scribe were at my direction and in my presence.  I have reviewed the chart and agree that the record reflects my personal performance and is accurate and complete.                  Pacheco Linares MD  06/08/19 [id number]

## 2019-06-08 NOTE — ED NOTES
Pt arrived per EMS s/p fall from approx 3 pm today. Pt c/o weakness for a few days. Pt states he went to use urinal, lost balance, and fell, hitting L lower back on bed frame. Pt denies hitting head or LOC. Pt currently a/a/o. NAD noted. Respirations even, unlabored. Will continue to monitor.

## 2019-06-09 NOTE — ED NOTES
Dr. Linares notified of patient O2 sats consistently in the 90s and that O2 was placed by RN. MD arrived to bedside to eval patient. MD discussing patient current VS, labs, radiology results, and plan of care with daughter at bedside.

## 2019-06-10 ENCOUNTER — TELEPHONE (OUTPATIENT)
Dept: GASTROENTEROLOGY | Facility: CLINIC | Age: 84
End: 2019-06-10

## 2019-06-10 ENCOUNTER — PATIENT MESSAGE (OUTPATIENT)
Dept: INTERNAL MEDICINE | Facility: CLINIC | Age: 84
End: 2019-06-10

## 2019-06-10 ENCOUNTER — PATIENT MESSAGE (OUTPATIENT)
Dept: CARDIOLOGY | Facility: CLINIC | Age: 84
End: 2019-06-10

## 2019-06-10 NOTE — PROGRESS NOTES
"     GASTROENTEROLOGY CLINIC NOTE    Reason for visit: The encounter diagnosis was Lower abdominal pain.  Referring provider/PCP: Yanna Santana MD    HPI:  Phi Sainz is a 89 y.o. male with medical history of Parkinson's disease who is here for his symptoms of constipation, diarrhea, and lower abdominal pain. Patient is accompanied by his wife and caregiver. It is difficult to get an accurate sense of his symptoms, but it sounds like patient has been having lower abdominal pain for the past few months. The pain is severe at times and wakes him up from sleep. The pain is usually less in the morning and gets worse at night. It is unclear if bowel movements help his pain, but he says he does feel better after he has a bowel movement. He reports constipation but does have 1-2 soft bowel movements daily. He also has episodes of looser stools. His home health aide has been managing his alternating symptoms with miralax and lomotil. He has a history of lymphocytic colitis many years ago. He is prescribed mesalamine by a gastroenterologist he saw at [facility], unclear why, possibly for lymphocytic colitis. Family reports weight loss and poor sleep. He does report recent hematuria and had CT urogram done in April which found "Large filling defect within the left renal pelvis extending into the proximal ureter which demonstrates subtle hyperattenuation suggesting associated blood products.  Recommend further evaluation with direct visualization to rule out an underlying neoplasm. No hydronephrosis or hydroureter. Mild circumferential bladder wall thickening, presumably related to chronic outlet obstruction noting a slightly prominent prostate gland."    GI ROS:  Reflux: No  Dysphagia: No   Constipation: Yes  Diarrhea: Yes  Rectal bleeding/Melena/hematemesis: No  NSAIDs: No  Anticoagulation: No  Anti-platelet therapy: No    Prior GI studies:  EGD 2011  - erythematous gastropathy  - path: chronic grastitis  Colon " 2008  - normal    Review of Systems   Constitutional: Positive for weight loss. Negative for chills and fever.   HENT: Negative for congestion and sore throat.    Respiratory: Negative for cough and shortness of breath.    Cardiovascular: Negative for chest pain and leg swelling.   Gastrointestinal: Positive for abdominal pain, constipation and diarrhea. Negative for blood in stool, heartburn, melena, nausea and vomiting.   Genitourinary: Positive for hematuria. Negative for dysuria.   Musculoskeletal: Negative for myalgias and neck pain.   Skin: Negative for itching and rash.   Neurological: Negative for dizziness and headaches.       Past Medical History: has a past medical history of Allergy, Anemia, Arthritis, Basal cell carcinoma, Depression, Herniation of intervertebral disc, Hyperlipidemia, Hypertension, Macular degeneration, Parkinson disease, and Spinal stenosis.    Past Surgical History: has a past surgical history that includes Cataract extraction w/  intraocular lens implant (Left, 10/31/07) and Cataract extraction w/  intraocular lens implant (Right, 12/16/15).    Family History:family history includes Cancer in his father; Colon cancer in his father; Hearing loss in his paternal aunt.    Allergies: Reviewed in EPIC.     Social History: reports that he has never smoked. He has never used smokeless tobacco. He reports that he does not drink alcohol or use drugs.    Home medications:   Current Outpatient Medications on File Prior to Visit   Medication Sig Dispense Refill    acetaminophen (TYLENOL EXTRA STRENGTH) 500 MG tablet Take 500 mg by mouth every 6 (six) hours as needed for Pain.      carbidopa-levodopa  mg (SINEMET)  mg per tablet Take 1 tablet three times daily. 90 tablet 11    diphenoxylate-atropine 2.5-0.025 mg (LOMOTIL) 2.5-0.025 mg per tablet Take 1 tablet by mouth 2 (two) times daily. (Patient taking differently: Take 1 tablet by mouth 2 (two) times daily. Pt taking one pill  "twice a day.) 60 tablet 0    gabapentin (NEURONTIN) 100 MG capsule Take 300 mg by mouth 2 (two) times daily. Pt taking one pill twice a day.      mesalamine (APRISO) 0.375 gram Cp24 Take 1.5 g by mouth once daily. Pt taking 4 pills in am.      psyllium (METAMUCIL) packet Take 1 packet by mouth once daily.      rasagiline (AZILECT) 1 mg Tab Take 1 tablet (1 mg total) by mouth once daily. 30 tablet 5     No current facility-administered medications on file prior to visit.        Vital signs:  /75   Pulse 62   Ht 5' 11" (1.803 m)   Wt 76 kg (167 lb 8.8 oz)   BMI 23.37 kg/m²     Physical Exam   Constitutional: He is oriented to person, place, and time.   Sitting in wheelchair     HENT:   Masked facies   Eyes: No scleral icterus.   Cardiovascular: Normal rate and regular rhythm.   Pulmonary/Chest: Effort normal and breath sounds normal.   Abdominal: Soft. Bowel sounds are normal. He exhibits no distension. There is no tenderness. There is no guarding.   Musculoskeletal: He exhibits no edema.   Neurological: He is alert and oriented to person, place, and time.   Skin: Skin is warm and dry.   Psychiatric: He has a normal mood and affect.   Vitals reviewed.      Routine labs:  Lab Results   Component Value Date    WBC 11.70 06/08/2019    HGB 10.7 (L) 06/08/2019    HCT 33.8 (L) 06/08/2019    MCV 95 06/08/2019     06/08/2019     No results found for: INR  Lab Results   Component Value Date    IRON 90 04/06/2011    FERRITIN 12 (L) 04/29/2011    TIBC 420 04/06/2011    FESATURATED 21 04/06/2011     Lab Results   Component Value Date     06/08/2019    K 4.1 06/08/2019     06/08/2019    CO2 28 06/08/2019    BUN 22 06/08/2019    CREATININE 2.0 (H) 06/08/2019     Lab Results   Component Value Date    ALBUMIN 3.0 (L) 06/08/2019    ALT <5 (L) 06/08/2019    AST 12 06/08/2019    ALKPHOS 68 06/08/2019    BILITOT 0.4 06/08/2019     No results found for: GLUCOSE      Assessment:  Phi Sainz is a " 89 y.o. male with Parkinson's who is here for evaluation of lower abdominal pain and alternating bowel movements. It is unclear from the history whether his pain is related to his bowel movements or not. CT urogram obtained did not find any abnormalities with his intestinal tract. We advised him to stop the mesalamine as that is not indicated for him. Encouraged him to take miralax daily to control his constipation. With regards to his lower abdominal pain, concerned this may be related to his urinary tract and bladder given the CT findings and hematuria.     Recommendations:  1. Obtain new CT AP to evaluate the intestinal tract  2. Continue miralax daily  3. Stop mesalamine  4. Refer to Urology given hematuria and CT findings from last scan    Joe Pina MD PGY-V  Gastroenterology Fellow  [facility]  [phone]      Orders Placed This Encounter   Procedures    Creatinine, serum     Standing Status:   Future     Number of Occurrences:   1     Standing Expiration Date:   7/27/2020     Orders Placed This Encounter   Procedures    Creatinine, serum     Orders Placed This Encounter    Creatinine, serum

## 2019-06-10 NOTE — TELEPHONE ENCOUNTER
----- Message from Cristela Balderrama sent at 6/10/2019 12:03 PM CDT -----  Contact: Daughter- Yani- [phone]  Marco- pts daughter called to speak with Bee regarding the pts plan of care- no further details relayed during call- please contact Yani at [phone]

## 2019-06-10 NOTE — TELEPHONE ENCOUNTER
Returned Ms. Lomeli's call.     Mrs. Lomeli is calling because her father is still experiencing explosive diarrhea, abdominal pain, and weight loss.     Mr. Sainz was recently seen in the ER due to generalized weakness and fell down.     Yani stopped giving her father Miralax becausae it did not help. She would like to know if her father can be prescribed a steroid to help with his diarrhea.     She is very upset because she feels like Dr. Pina and Dr. Lara does not want to help her father because Mr. Sainz doesn't want to have a colonoscopy.     Mr. Sainz followed up with his urology and was told his symptoms are related to GI.

## 2019-06-11 ENCOUNTER — TELEPHONE (OUTPATIENT)
Dept: HEPATOLOGY | Facility: HOSPITAL | Age: 84
End: 2019-06-11

## 2019-06-11 DIAGNOSIS — R19.7 DIARRHEA, UNSPECIFIED TYPE: Primary | ICD-10-CM

## 2019-06-11 RX ORDER — MESALAMINE 0.38 G/1
1.5 CAPSULE, EXTENDED RELEASE ORAL DAILY
Qty: 120 CAPSULE | Refills: 3 | Status: SHIPPED | OUTPATIENT
Start: 2019-06-11 | End: 2019-10-01 | Stop reason: SDUPTHER

## 2019-06-11 RX ORDER — DICYCLOMINE HYDROCHLORIDE 10 MG/1
10 CAPSULE ORAL
Qty: 120 CAPSULE | Refills: 0 | Status: SHIPPED | OUTPATIENT
Start: 2019-06-11 | End: 2019-06-11 | Stop reason: SDUPTHER

## 2019-06-11 RX ORDER — MESALAMINE 0.38 G/1
1.5 CAPSULE, EXTENDED RELEASE ORAL DAILY
Qty: 120 CAPSULE | Refills: 3 | Status: SHIPPED | OUTPATIENT
Start: 2019-06-11 | End: 2019-06-11

## 2019-06-11 RX ORDER — DICYCLOMINE HYDROCHLORIDE 10 MG/1
10 CAPSULE ORAL
Qty: 120 CAPSULE | Refills: 0 | Status: SHIPPED | OUTPATIENT
Start: 2019-06-11 | End: 2019-07-12

## 2019-06-11 NOTE — TELEPHONE ENCOUNTER
Spoke with daughter at length and spoke with Dr. Lara about patient's care. Patient has developed explosive diarrhea since we last saw him in clinic. They had stopped the apriso and lomotil and started the miralax as we recommended. He had a fall over the weekend due to generalized weakness. Urology is concerned about a possible malignancy but do not think his symptoms are related to urinary tract system. Patient is now doing better as the daughter has restaretd the apriso and lomotil and stopped the miralax.    We have outlined the following plan:  - check stool studies (WBC, c. Diff, giardia and crypto, o+p, stool culture, occult blood)  - refilled apriso  - will give script for lomotil tomorrow to patient's family when they come in to  stool study kits  - will trial bentyl for abdominal pain    All questions answered and patient appreciative and on board with the plan.    Joe Pina MD PGY-V  Gastroenterology Fellow  [facility]  P [phone]

## 2019-06-12 ENCOUNTER — PATIENT MESSAGE (OUTPATIENT)
Dept: UROLOGY | Facility: CLINIC | Age: 84
End: 2019-06-12

## 2019-06-12 ENCOUNTER — TELEPHONE (OUTPATIENT)
Dept: GASTROENTEROLOGY | Facility: CLINIC | Age: 84
End: 2019-06-12

## 2019-06-12 RX ORDER — DIPHENOXYLATE HYDROCHLORIDE AND ATROPINE SULFATE 2.5; .025 MG/1; MG/1
1 TABLET ORAL 2 TIMES DAILY
Qty: 100 TABLET | Refills: 2 | Status: SHIPPED | OUTPATIENT
Start: 2019-06-12 | End: 2019-08-15 | Stop reason: SDUPTHER

## 2019-06-12 NOTE — TELEPHONE ENCOUNTER
----- Message from Lisa Jarquin sent at 6/12/2019  3:14 PM CDT -----  Contact: pt daughter  Needs Advice    Reason for call: pt daughter Yani kolb stated she believe the dicyclomine (BENTYL) 10 MG capsule is causing pt to be confused. Can she cut the dosage down to 2 tabs.         Communication Preference: contact via portal or call her @ [phone]    Additional Information: She stated she will also  order tomorrow instead of today

## 2019-06-13 ENCOUNTER — TELEPHONE (OUTPATIENT)
Dept: GASTROENTEROLOGY | Facility: CLINIC | Age: 84
End: 2019-06-13

## 2019-06-13 ENCOUNTER — EXTERNAL HOME HEALTH (OUTPATIENT)
Dept: HOME HEALTH SERVICES | Facility: HOSPITAL | Age: 84
End: 2019-06-13

## 2019-06-13 ENCOUNTER — PATIENT MESSAGE (OUTPATIENT)
Dept: GASTROENTEROLOGY | Facility: CLINIC | Age: 84
End: 2019-06-13

## 2019-06-13 NOTE — TELEPHONE ENCOUNTER
Spoke with daughter. Instructed okay to cut back dose and to only use prn as recommended by Dr Pina. Yani verbalized understanding.

## 2019-06-13 NOTE — TELEPHONE ENCOUNTER
Patient's daughter Yani called. She is very upset and wants to know what to do. She reports patient is having explosive diarrhea, weakness, and weight loss. She reports he has been having bouts of explosive diarrhea and constipation for months now. She states she read online that Bentyl can cause confusion and aggravate diarrhea. She also reports that the Bentyl medication bottle did not say as needed, it stated take 1 pill before meals. She is very frustrated and worried about her dad. She would like to know what to do. She would like MD to call her or send a message via portal. Will forward to MD.

## 2019-06-14 ENCOUNTER — PATIENT MESSAGE (OUTPATIENT)
Dept: INTERNAL MEDICINE | Facility: CLINIC | Age: 84
End: 2019-06-14

## 2019-06-14 ENCOUNTER — TELEPHONE (OUTPATIENT)
Dept: INTERNAL MEDICINE | Facility: CLINIC | Age: 84
End: 2019-06-14

## 2019-06-14 ENCOUNTER — PATIENT MESSAGE (OUTPATIENT)
Dept: GASTROENTEROLOGY | Facility: CLINIC | Age: 84
End: 2019-06-14

## 2019-06-14 NOTE — TELEPHONE ENCOUNTER
Spoke with Yani by phone and had a productive conversation about the recent care and messages related to her father.  I apologize for any confusion and frustration.  We discussed ongoing symptoms and response to medications.  He was taking the Bentyl 4 times a day regularly which resulted in confusion.  The medication did help with cramping and pain. They have been using it as needed now and he feels that it is helping and his confusion has resolved.  I expressed to her that I think it is still important that she submit the stool tests for her father, and she will be picking up the materials to do that.  This could be related to prior microscopic colitis but that will be undetermined without sampling from the surface of the colon.  Within continue to treat empirically.  Once the stool samples are submitted I will follow up with them.  They would prefer to only follow-up with me at this time and not see Dr. Pina.    She was understanding and appreciative of my call.

## 2019-06-18 ENCOUNTER — TELEPHONE (OUTPATIENT)
Dept: GASTROENTEROLOGY | Facility: CLINIC | Age: 84
End: 2019-06-18

## 2019-06-18 NOTE — TELEPHONE ENCOUNTER
Returned Mrs. Lomeli's call.     Mrs. Lomeli is calling Dr. Lara with an update. Her father is still experiencing explosive diarrhea, abdominal pain, excessive thirst, weakness( unable to stand) and confusion/unusal thoughts.     Mrs. Lomeli believes his symptoms may be a side effect of dicyclomine. She would like to know if another medication can be prescribe.     Mr. Sainz did start taking Apriso and Lomotil as prescribed. There are no improvements with his symptoms.     Patient care giver will  stool kit tomorrow from our clinic.

## 2019-06-18 NOTE — TELEPHONE ENCOUNTER
----- Message from Cristela Balderrama sent at 6/18/2019 11:47 AM CDT -----  Contact: Yani- Daughter- [phone]  Marco- pts daughter called to speak with Bee- states the pt is still having abdominal pain- would like to schedule f/u tmrw if available- please contact aYni at [phone]

## 2019-06-19 ENCOUNTER — OFFICE VISIT (OUTPATIENT)
Dept: NEUROLOGY | Facility: CLINIC | Age: 84
End: 2019-06-19
Payer: MEDICARE

## 2019-06-19 VITALS
BODY MASS INDEX: 23.55 KG/M2 | DIASTOLIC BLOOD PRESSURE: 62 MMHG | SYSTOLIC BLOOD PRESSURE: 104 MMHG | HEIGHT: 71 IN | WEIGHT: 168.19 LBS | HEART RATE: 69 BPM

## 2019-06-19 DIAGNOSIS — F05 SUNDOWNING: ICD-10-CM

## 2019-06-19 DIAGNOSIS — R41.3 MEMORY LOSS: ICD-10-CM

## 2019-06-19 DIAGNOSIS — G20.C PARKINSONISM, UNSPECIFIED PARKINSONISM TYPE: Primary | ICD-10-CM

## 2019-06-19 DIAGNOSIS — R19.7 DIARRHEA, UNSPECIFIED TYPE: ICD-10-CM

## 2019-06-19 DIAGNOSIS — R10.9 ABDOMINAL CRAMPING: ICD-10-CM

## 2019-06-19 PROCEDURE — 99499 RISK ADDL DX/OHS AUDIT: ICD-10-PCS | Mod: S$GLB,,, | Performed by: NURSE PRACTITIONER

## 2019-06-19 PROCEDURE — 99999 PR PBB SHADOW E&M-EST. PATIENT-LVL III: CPT | Mod: PBBFAC,,, | Performed by: NURSE PRACTITIONER

## 2019-06-19 PROCEDURE — 99999 PR PBB SHADOW E&M-EST. PATIENT-LVL III: ICD-10-PCS | Mod: PBBFAC,,, | Performed by: NURSE PRACTITIONER

## 2019-06-19 PROCEDURE — 99214 OFFICE O/P EST MOD 30 MIN: CPT | Mod: S$GLB,,, | Performed by: NURSE PRACTITIONER

## 2019-06-19 PROCEDURE — 1101F PR PT FALLS ASSESS DOC 0-1 FALLS W/OUT INJ PAST YR: ICD-10-PCS | Mod: CPTII,S$GLB,, | Performed by: NURSE PRACTITIONER

## 2019-06-19 PROCEDURE — 99499 UNLISTED E&M SERVICE: CPT | Mod: S$GLB,,, | Performed by: NURSE PRACTITIONER

## 2019-06-19 PROCEDURE — 1101F PT FALLS ASSESS-DOCD LE1/YR: CPT | Mod: CPTII,S$GLB,, | Performed by: NURSE PRACTITIONER

## 2019-06-19 PROCEDURE — 99214 PR OFFICE/OUTPT VISIT, EST, LEVL IV, 30-39 MIN: ICD-10-PCS | Mod: S$GLB,,, | Performed by: NURSE PRACTITIONER

## 2019-06-19 RX ORDER — CARBIDOPA AND LEVODOPA 25; 100 MG/1; MG/1
TABLET, EXTENDED RELEASE ORAL
Qty: 90 TABLET | Refills: 5 | Status: SHIPPED | OUTPATIENT
Start: 2019-06-19 | End: 2019-07-12

## 2019-06-19 NOTE — PROGRESS NOTES
Name: Phi Sainz  MRN: [id number]   CSN: [id number]      Date: 6-19-19      Referring physician:  Aaareferral Self  No address on file    Subjective:      Chief Complaint: diarrhea,  memory, PD,     History of Present Illness (HPI):    Phi Sainz is a 89 y.o.  male who presents today for a follow-up evaluation of pdism and memory and is accompanied by family. Last seen by me 2-18-19. At that time, reduced cd/ld 25/100 from 1.5 tab TID to 1 tab to see if this helped memory and delusions and added rasagiline 1 mg daily. Last visit with Dr. Forrester was 5-2-19. At that time, re-tried increasing l-dopa to 1.5 tabs each dose. They never increased. He remains on 1 tab TID.     About 6 weeks ago, he started with abdominal pain and bowel urgency and diarrhea with 3-4 BM accidents daily. Lost 14 lbs in 2 weeks. Has gained 2 lbs back recently. (From our scale- he has actually gained 7 lbs since last visit with Dr. Forrester). Weak.    Is now being seen by GI (Dr. Lara). Colonoscopy recommended but they have refused due to his age. He is to see GI again in July.       He has had more confusion of everything since the abdominal issues started. Certainly slower to respond and some diff with word find. Some sundowning.        Added rasagiline- seemes to be working failry well. Does seem to be better with l-dopa also but at 1 tab TID.     Review of Systems   Constitutional: Positive for unexpected weight change.   Gastrointestinal: Positive for abdominal pain and diarrhea. Negative for nausea and vomiting.   Neurological: Negative for tremors.   Psychiatric/Behavioral:        Sundowning       Past Medical History: The patient  has a past medical history of Allergy, Anemia, Arthritis, Basal cell carcinoma, Depression, Herniation of intervertebral disc, Hyperlipidemia, Hypertension (3/4/2015), Macular degeneration, Parkinson disease, and Spinal stenosis.    Social History: The patient  reports that he has never  "smoked. He has never used smokeless tobacco. He reports that he does not drink alcohol or use drugs.    Family History: Their family history includes Cancer in his father; Colon cancer in his father; Hearing loss in his paternal aunt.    Allergies: Combigan [brimonidine-timolol] and Contrast media     Meds:   Current Outpatient Medications on File Prior to Visit   Medication Sig Dispense Refill    acetaminophen (TYLENOL EXTRA STRENGTH) 500 MG tablet Take 500 mg by mouth every 6 (six) hours as needed for Pain.      carbidopa-levodopa  mg (SINEMET)  mg per tablet Take 1 tablet three times daily. 90 tablet 11    diazePAM (VALIUM) 2 MG tablet TAKE 1 TABLET BY MOUTH EVERY DAY AS NEEDED 30 tablet 3    dicyclomine (BENTYL) 10 MG capsule Take 1 capsule (10 mg total) by mouth 4 (four) times daily before meals and nightly. 120 capsule 0    diphenoxylate-atropine 2.5-0.025 mg (LOMOTIL) 2.5-0.025 mg per tablet Take 1 tablet by mouth 2 (two) times daily. 100 tablet 2    gabapentin (NEURONTIN) 100 MG capsule Take 300 mg by mouth 2 (two) times daily. Pt taking one pill twice a day.      HYDROcodone-acetaminophen (NORCO) 5-325 mg per tablet TK 1 T PO Q 8 H PRN  0    mesalamine (APRISO) 0.375 gram Cp24 Take 4 capsules (1.5 g total) by mouth once daily. Pt taking 4 pills in am. 120 capsule 3    rasagiline (AZILECT) 1 mg Tab Take 1 tablet (1 mg total) by mouth once daily. 30 tablet 5    [DISCONTINUED] psyllium (METAMUCIL) packet Take 1 packet by mouth once daily.       No current facility-administered medications on file prior to visit.        Objective:     Physical Exam:    Vitals:    06/19/19 1418   BP: 104/62   Pulse: 69   Weight: 76.3 kg (168 lb 3.4 oz)   Height: 5' 11" (1.803 m)     Body mass index is 23.46 kg/m².    Constitutional  Well-developed, well-nourished, appears stated age   Cardiovascular  Radial pulses 2+ and symmetric, no LE edema bilaterally     ..III.  MOTOR EXAMINATION - last dose 1300- exam " time 1505       Speech  2 - Monotone, slurred but understandable; moderately impaired.   Facial Expression  2 - Slight but definitely abnormal diminution of facial expression.    Tremor at Rest:      Face, lips, chin 0 - Absent.    Hands:      right 0 - Absent.    left 0 - Absent.    Feet:      right 0 - Absent.    left 0 - Absent.    Action or Postural Tremor of Hands      right 0 - Absent.    left 0 - Absent.    Rigidity      Neck 2 - Mild or moderate.   Upper Extremity: Right 1 - Slight or detectable only when activated by mirror or other movements.   Upper Extremity: Left 2 - Mild or moderate.   Lower Extremity: Right 1 - Slight or detectable only when activated by mirror or other movements.   Lower Extremity: Left 2 - Mild or moderate.   Finger Taps      right 1 - Mild slowing and/or reduction in amplitude.   left 2 - Moderately impaired. Definite and early fatiguing. May have occasional arrests in movement.   Hand Movements      right 1 - Mild slowing and/or reduction in amplitude.   left 1 - Mild slowing and/or reduction in amplitude.   Rapid Alternating Movements of Hands      right 1 - Mild slowing and/or reduction in amplitude.   left 2 - Moderately impaired. Definite and early fatiguing. May have occasional arrests in movement.   Leg Agility      right 2 - Moderately impaired. Definite and early fatiguing. May have occasional arrests in movement.   left 2 - Moderately impaired. Definite and early fatiguing. May have occasional arrests in movement.   Arising from Chair  WC   Posture  WC   Gait  WC   Postural Stability (Response to sudden, strong posterior displacement produced by pull on shoulders while patient erect with eyes open and feet slightly apart. Patient is prepared, and can have had some practice runs.)  deferred   Body Bradykinesia and Hypokinesia (Combining slowness, hesitancy, decreased armswing, small amplitude, and poverty of movement in general)  3 - Moderate slowness, poverty or small  amplitude of movement.         Laboratory Results:  Admission on 06/08/2019, Discharged on 06/08/2019   Component Date Value Ref Range Status    WBC 06/08/2019 11.70  3.90 - 12.70 K/uL Final    RBC 06/08/2019 3.56* 4.60 - 6.20 M/uL Final    Hemoglobin 06/08/2019 10.7* 14.0 - 18.0 g/dL Final    Hematocrit 06/08/2019 33.8* 40.0 - 54.0 % Final    Mean Corpuscular Volume 06/08/2019 95  82 - 98 fL Final    Mean Corpuscular Hemoglobin 06/08/2019 30.1  27.0 - 31.0 pg Final    Mean Corpuscular Hemoglobin Conc 06/08/2019 31.7* 32.0 - 36.0 g/dL Final    RDW 06/08/2019 13.2  11.5 - 14.5 % Final    Platelets 06/08/2019 243  150 - 350 K/uL Final    MPV 06/08/2019 10.2  9.2 - 12.9 fL Final    Gran # (ANC) 06/08/2019 9.3* 1.8 - 7.7 K/uL Final    Lymph # 06/08/2019 1.2  1.0 - 4.8 K/uL Final    Mono # 06/08/2019 1.1* 0.3 - 1.0 K/uL Final    Eos # 06/08/2019 0.1  0.0 - 0.5 K/uL Final    Baso # 06/08/2019 0.01  0.00 - 0.20 K/uL Final    Gran% 06/08/2019 79.7* 38.0 - 73.0 % Final    Lymph% 06/08/2019 10.3* 18.0 - 48.0 % Final    Mono% 06/08/2019 9.3  4.0 - 15.0 % Final    Eosinophil% 06/08/2019 0.6  0.0 - 8.0 % Final    Basophil% 06/08/2019 0.1  0.0 - 1.9 % Final    Differential Method 06/08/2019 Automated   Final    Sodium 06/08/2019 138  136 - 145 mmol/L Final    Potassium 06/08/2019 4.1  3.5 - 5.1 mmol/L Final    Chloride 06/08/2019 101  95 - 110 mmol/L Final    CO2 06/08/2019 28  23 - 29 mmol/L Final    Glucose 06/08/2019 99  70 - 110 mg/dL Final    BUN, Bld 06/08/2019 22  8 - 23 mg/dL Final    Creatinine 06/08/2019 2.0* 0.5 - 1.4 mg/dL Final    Calcium 06/08/2019 9.2  8.7 - 10.5 mg/dL Final    Total Protein 06/08/2019 7.0  6.0 - 8.4 g/dL Final    Albumin 06/08/2019 3.0* 3.5 - 5.2 g/dL Final    Total Bilirubin 06/08/2019 0.4  0.1 - 1.0 mg/dL Final    Alkaline Phosphatase 06/08/2019 68  55 - 135 U/L Final    AST 06/08/2019 12  10 - 40 U/L Final    ALT 06/08/2019 <5* 10 - 44 U/L Final    Anion Gap  06/08/2019 9  8 - 16 mmol/L Final    eGFR if  06/08/2019 33* >60 mL/min/1.73 m^2 Final    eGFR if non African American 06/08/2019 29* >60 mL/min/1.73 m^2 Final    Specimen UA 06/08/2019 Urine, Clean Catch   Final    Color, UA 06/08/2019 Yellow  Yellow, Straw, Danna Final    Appearance, UA 06/08/2019 Clear  Clear Final    pH, UA 06/08/2019 6.0  5.0 - 8.0 Final    Specific Gravity, UA 06/08/2019 1.015  1.005 - 1.030 Final    Protein, UA 06/08/2019 Trace* Negative Final    Glucose, UA 06/08/2019 Negative  Negative Final    Ketones, UA 06/08/2019 Negative  Negative Final    Bilirubin (UA) 06/08/2019 Negative  Negative Final    Occult Blood UA 06/08/2019 1+* Negative Final    Nitrite, UA 06/08/2019 Negative  Negative Final    Urobilinogen, UA 06/08/2019 Negative  <2.0 EU/dL Final    Leukocytes, UA 06/08/2019 Negative  Negative Final    RBC, UA 06/08/2019 3  0 - 4 /hpf Final    WBC, UA 06/08/2019 1  0 - 5 /hpf Final    Bacteria 06/08/2019 Rare  None-Occ /hpf Final    Microscopic Comment 06/08/2019 SEE COMMENT   Final    Troponin I 06/08/2019 0.017  0.000 - 0.026 ng/mL Final    Lipase 06/08/2019 8  4 - 60 U/L Final   Lab Visit on 05/29/2019   Component Date Value Ref Range Status    Creatinine 05/29/2019 2.0* 0.5 - 1.4 mg/dL Final    eGFR if  05/29/2019 33.2* >60 mL/min/1.73 m^2 Final    eGFR if non African American 05/29/2019 28.7* >60 mL/min/1.73 m^2 Final   Clinical Support on 05/14/2019   Component Date Value Ref Range Status    Ascending aorta 05/14/2019 3.81  cm Final    STJ 05/14/2019 3.34  cm Final    AV mean gradient 05/14/2019 4.21  mmHg Final    Ao peak tanya 05/14/2019 1.28  m/s Final    Ao VTI 05/14/2019 30.78  cm Final    IVRT 05/14/2019 0.14  msec Final    IVS 05/14/2019 0.92  0.6 - 1.1 cm Final    LA size 05/14/2019 3.70  cm Final    Left Atrium Major Axis 05/14/2019 5.17  cm Final    Left Atrium Minor Axis 05/14/2019 5.10  cm Final    LVIDD  05/14/2019 4.79  3.5 - 6.0 cm Final    LVIDS 05/14/2019 3.38  2.1 - 4.0 cm Final    LVOT diameter 05/14/2019 2.13  cm Final    LVOT peak VTI 05/14/2019 19.19  cm Final    PW 05/14/2019 0.89  0.6 - 1.1 cm Final    MV Peak A Maxi 05/14/2019 0.95  m/s Final    E wave decelartion time 05/14/2019 283.08  msec Final    MV Peak E Maxi 05/14/2019 0.57  m/s Final    PV Peak D Maxi 05/14/2019 0.31  m/s Final    PV Peak S Maxi 05/14/2019 0.64  m/s Final    RA Major Axis 05/14/2019 5.10  cm Final    RA Width 05/14/2019 3.05  cm Final    RVDD 05/14/2019 3.48  cm Final    Sinus 05/14/2019 3.62  cm Final    TAPSE 05/14/2019 2.00  cm Final    TR Max Maxi 05/14/2019 3.52  m/s Final    TDI LATERAL 05/14/2019 0.05   Final    TDI SEPTAL 05/14/2019 0.05   Final    LA WIDTH 05/14/2019 3.50  cm Final    LV Diastolic Volume 05/14/2019 107.02  mL Final    LV Systolic Volume 05/14/2019 46.69  mL Final    LVOT peak maxi 05/14/2019 0.8  m/s Final    LV LATERAL E/E' RATIO 05/14/2019 11.40   Final    LV SEPTAL E/E' RATIO 05/14/2019 11.40   Final    FS 05/14/2019 29  % Final    LA volume 05/14/2019 56.52  cm3 Final    LV mass 05/14/2019 148.36  g Final    Left Ventricle Relative Wall Thick* 05/14/2019 0.37  cm Final    AV valve area 05/14/2019 2.22  cm2 Final    AV Velocity Ratio 05/14/2019 0.63   Final    AV index (prosthetic) 05/14/2019 0.62   Final    E/A ratio 05/14/2019 0.60   Final    Mean e' 05/14/2019 0.05   Final    Pulm vein S/D ratio 05/14/2019 2.06   Final    LVOT area 05/14/2019 3.56  cm2 Final    LVOT stroke volume 05/14/2019 68.34  cm3 Final    AV peak gradient 05/14/2019 6.55  mmHg Final    E/E' ratio 05/14/2019 11.40   Final    Triscuspid Valve Regurgitation Pea* 05/14/2019 49.56  mmHg Final    BSA 05/14/2019 1.92  m2 Final    LV Systolic Volume Index 05/14/2019 24.2  mL/m2 Final    LV Diastolic Volume Index 05/14/2019 55.50  mL/m2 Final    LA Volume Index 05/14/2019 29.3  mL/m2 Final    LV  Mass Index 05/14/2019 76.9  g/m2 Final    Right Atrial Pressure (from IVC) 05/14/2019 3  mmHg Final    TV rest pulmonary artery pressure 05/14/2019 53  mmHg Final   Lab Visit on 03/27/2019   Component Date Value Ref Range Status    Specimen UA 03/27/2019 Urine, Clean Catch   Final    Color, UA 03/27/2019 Red* Yellow, Straw, Danna Final    Appearance, UA 03/27/2019 Hazy* Clear Final    pH, UA 03/27/2019 6.0  5.0 - 8.0 Final    Specific Nitro, UA 03/27/2019 1.010  1.005 - 1.030 Final    Protein, UA 03/27/2019 2+* Negative Final    Glucose, UA 03/27/2019 Negative  Negative Final    Ketones, UA 03/27/2019 Negative  Negative Final    Bilirubin (UA) 03/27/2019 Negative  Negative Final    Occult Blood UA 03/27/2019 3+* Negative Final    Nitrite, UA 03/27/2019 Negative  Negative Final    Leukocytes, UA 03/27/2019 1+* Negative Final    Urine Culture, Routine 03/27/2019 No growth   Final    RBC, UA 03/27/2019 >100* 0 - 4 /hpf Final    WBC, UA 03/27/2019 26* 0 - 5 /hpf Final    WBC Clumps, UA 03/27/2019 Occasional* None-Rare Final    Bacteria 03/27/2019 Few* None-Occ /hpf Final    Hyaline Casts, UA 03/27/2019 0  0-1/lpf /lpf Final    Microscopic Comment 03/27/2019 SEE COMMENT   Final   Lab Visit on 03/27/2019   Component Date Value Ref Range Status    Sodium 03/27/2019 141  136 - 145 mmol/L Final    Potassium 03/27/2019 4.0  3.5 - 5.1 mmol/L Final    Chloride 03/27/2019 102  95 - 110 mmol/L Final    CO2 03/27/2019 28  23 - 29 mmol/L Final    Glucose 03/27/2019 91  70 - 110 mg/dL Final    BUN, Bld 03/27/2019 14  8 - 23 mg/dL Final    Creatinine 03/27/2019 1.2  0.5 - 1.4 mg/dL Final    Calcium 03/27/2019 9.4  8.7 - 10.5 mg/dL Final    Anion Gap 03/27/2019 11  8 - 16 mmol/L Final    eGFR if African American 03/27/2019 >60.0  >60 mL/min/1.73 m^2 Final    eGFR if non African American 03/27/2019 53.3* >60 mL/min/1.73 m^2 Final       Imaging:       Assessment and Plan     Parkinsonism, unspecified  Parkinsonism type  -     carbidopa-levodopa  mg (SINEMET CR)  mg TbSR; Take one tablet two to three times daily as directed.  Dispense: 90 tablet; Refill: 5    Memory loss    Sundowning    Abdominal cramping    Diarrhea, unspecified type        Medical Decision Making:    Try switching carbidopa / levodopa IR to continued release to see if this helps the abdominal pain, or at least takes the edge off the pain / cramping.     Begin with 1 tablet twice daily, keeping doses 12 hours apart.     If this is not enough, add a third dose in the middle of the day. If he does take three times daily, keep doses 4-6 hours apart.     It is fine to use a half tablet of carbidopa / levodopa 25/100 immediate release formulation (yellow pills) as a rescue if he has increased tremor, stiffness or slowness.    Follow up as scheduled with Dr. Forrester in August.     Call for problems or questions.       I spent 35 minutes face-to-face with the patient and family with >50% of the time spent with counseling and education regarding:  - results of data, diagnosis, and recommendations stated above  - the prognosis of pd  - risks and benefits of cd/ld IR and CR  - importance of diet and exercise    Adriana Carcamo, DNP, NP-C  Division of Movement and Memory Disorders  [facility]  [phone]

## 2019-06-19 NOTE — PATIENT INSTRUCTIONS
Let's try switching the carbidopa / levodopa to a continued release formulation to see if this helps the abdominal pain, or at least takes the edge off the pain / cramping.     Begin with 1 tablet twice daily, keeping doses 12 hours apart.     If this is not enough, add a third dose in the middle of the day. If he does take three times daily, keep doses 4-6 hours apart.     It is fine to use a half tablet of carbidopa / levodopa 25/100 immediate release formulation (yellow pills) as a rescue if he has increased tremor, stiffness or slowness.

## 2019-06-20 ENCOUNTER — PATIENT MESSAGE (OUTPATIENT)
Dept: NEUROLOGY | Facility: CLINIC | Age: 84
End: 2019-06-20

## 2019-06-21 ENCOUNTER — LAB VISIT (OUTPATIENT)
Dept: LAB | Facility: HOSPITAL | Age: 84
End: 2019-06-21
Attending: STUDENT IN AN ORGANIZED HEALTH CARE EDUCATION/TRAINING PROGRAM
Payer: MEDICARE

## 2019-06-21 DIAGNOSIS — R19.7 DIARRHEA, UNSPECIFIED TYPE: ICD-10-CM

## 2019-06-21 PROCEDURE — 87209 SMEAR COMPLEX STAIN: CPT

## 2019-06-21 PROCEDURE — 87045 FECES CULTURE AEROBIC BACT: CPT

## 2019-06-21 PROCEDURE — 87046 STOOL CULTR AEROBIC BACT EA: CPT | Mod: 59

## 2019-06-21 PROCEDURE — 82272 OCCULT BLD FECES 1-3 TESTS: CPT

## 2019-06-21 PROCEDURE — 89055 LEUKOCYTE ASSESSMENT FECAL: CPT

## 2019-06-21 PROCEDURE — 87449 NOS EACH ORGANISM AG IA: CPT

## 2019-06-21 PROCEDURE — 87329 GIARDIA AG IA: CPT

## 2019-06-21 PROCEDURE — 87427 SHIGA-LIKE TOXIN AG IA: CPT

## 2019-06-21 PROCEDURE — 83993 ASSAY FOR CALPROTECTIN FECAL: CPT

## 2019-06-22 LAB
C DIFF GDH STL QL: NEGATIVE
C DIFF TOX A+B STL QL IA: NEGATIVE
CRYPTOSP AG STL QL IA: NEGATIVE
G LAMBLIA AG STL QL IA: NEGATIVE
OB PNL STL: NEGATIVE
WBC #/AREA STL HPF: NORMAL /[HPF]

## 2019-06-23 LAB
E COLI SXT1 STL QL IA: NEGATIVE
E COLI SXT2 STL QL IA: NEGATIVE

## 2019-06-24 LAB — O+P STL TRI STN: NORMAL

## 2019-06-25 ENCOUNTER — PATIENT MESSAGE (OUTPATIENT)
Dept: CARDIOLOGY | Facility: CLINIC | Age: 84
End: 2019-06-25

## 2019-06-25 LAB — BACTERIA STL CULT: NORMAL

## 2019-06-26 ENCOUNTER — PATIENT MESSAGE (OUTPATIENT)
Dept: INTERNAL MEDICINE | Facility: CLINIC | Age: 84
End: 2019-06-26

## 2019-06-26 ENCOUNTER — PATIENT MESSAGE (OUTPATIENT)
Dept: NEUROLOGY | Facility: CLINIC | Age: 84
End: 2019-06-26

## 2019-06-26 ENCOUNTER — TELEPHONE (OUTPATIENT)
Dept: NEUROLOGY | Facility: CLINIC | Age: 84
End: 2019-06-26

## 2019-06-26 NOTE — TELEPHONE ENCOUNTER
"Pt portal message fwd to CK     ----- Message from Reyes Cabrera sent at 6/26/2019  2:20 PM CDT -----  Needs Advice    Reason for call: Yani wanted to let Adriana know that she left Adriana a message on the portal, regarding the pt's "strange, bizarre behavior"        Communication Preference: Yani (daughter) @ [facility] or     Additional Information:    "

## 2019-06-27 ENCOUNTER — PATIENT MESSAGE (OUTPATIENT)
Dept: NEUROLOGY | Facility: CLINIC | Age: 84
End: 2019-06-27

## 2019-06-27 DIAGNOSIS — G47.00 INSOMNIA, UNSPECIFIED TYPE: ICD-10-CM

## 2019-06-27 DIAGNOSIS — F05 SUNDOWNING: Primary | ICD-10-CM

## 2019-06-27 LAB — CALPROTECTIN STL-MCNT: 286.3 MCG/G

## 2019-06-27 RX ORDER — QUETIAPINE FUMARATE 25 MG/1
TABLET, FILM COATED ORAL
Qty: 30 TABLET | Refills: 5 | Status: SHIPPED | OUTPATIENT
Start: 2019-06-27 | End: 2019-07-29 | Stop reason: SDUPTHER

## 2019-06-27 NOTE — TELEPHONE ENCOUNTER
Pt daughter states he's out of character... He's sleeping a little bit at night... But he  sleeps all day.. Pt daughter also she change they he takes his meds.       * Pt daughter states she would like a sleep aid to help him at night*        ----- Message from Sheri Desouza sent at 6/27/2019 11:30 AM CDT -----  Contact: Yani (daughter) @ [phone]  Pts daughter says she left 2 messages on the portal yesterday but has not received a response yet.   She says it is very imperative she speak with Adriana today.  Pls call.

## 2019-06-27 NOTE — TELEPHONE ENCOUNTER
"Spoke to Yani.   Cd/ld CR caused too much drowsiness.   Went back to [facility].  Stomach issues have seemingly resolved- not sure why.  Sundowning like "crazy" usually starts around 4-5 PM.   Not sleeping thru night so they are not sleeping and they are at "wits end."   Sleeping during day.     Discussed Seroquel. Recent EKG reviewed.   Give 1/2 tab one-two hours before sundowning   In one week, may repeat 1/2 tab at bedtime if needed.     Do not let him sleep all day.   "

## 2019-07-04 ENCOUNTER — NURSE TRIAGE (OUTPATIENT)
Dept: ADMINISTRATIVE | Facility: CLINIC | Age: 84
End: 2019-07-04

## 2019-07-04 ENCOUNTER — HOSPITAL ENCOUNTER (EMERGENCY)
Facility: HOSPITAL | Age: 84
Discharge: HOME | End: 2019-07-04
Attending: EMERGENCY MEDICINE
Payer: MEDICARE

## 2019-07-04 VITALS
HEART RATE: 51 BPM | WEIGHT: 160 LBS | TEMPERATURE: 98 F | SYSTOLIC BLOOD PRESSURE: 172 MMHG | OXYGEN SATURATION: 96 % | DIASTOLIC BLOOD PRESSURE: 97 MMHG | RESPIRATION RATE: 21 BRPM | BODY MASS INDEX: 22.32 KG/M2

## 2019-07-04 DIAGNOSIS — N18.9 CHRONIC KIDNEY DISEASE, UNSPECIFIED CKD STAGE: ICD-10-CM

## 2019-07-04 DIAGNOSIS — R10.30 LOWER ABDOMINAL PAIN: Primary | ICD-10-CM

## 2019-07-04 LAB
ALBUMIN SERPL BCP-MCNC: 3 G/DL (ref 3.5–5.2)
ALP SERPL-CCNC: 77 U/L (ref 55–135)
ALT SERPL W/O P-5'-P-CCNC: <5 U/L (ref 10–44)
ANION GAP SERPL CALC-SCNC: 11 MMOL/L (ref 8–16)
AST SERPL-CCNC: 12 U/L (ref 10–40)
BASOPHILS # BLD AUTO: 0.03 K/UL (ref 0–0.2)
BASOPHILS NFR BLD: 0.4 % (ref 0–1.9)
BILIRUB SERPL-MCNC: 0.6 MG/DL (ref 0.1–1)
BILIRUB UR QL STRIP: NEGATIVE
BUN SERPL-MCNC: 19 MG/DL (ref 8–23)
CALCIUM SERPL-MCNC: 9.1 MG/DL (ref 8.7–10.5)
CHLORIDE SERPL-SCNC: 105 MMOL/L (ref 95–110)
CLARITY UR REFRACT.AUTO: CLEAR
CO2 SERPL-SCNC: 24 MMOL/L (ref 23–29)
COLOR UR AUTO: YELLOW
CREAT SERPL-MCNC: 1.8 MG/DL (ref 0.5–1.4)
DIFFERENTIAL METHOD: ABNORMAL
EOSINOPHIL # BLD AUTO: 0.1 K/UL (ref 0–0.5)
EOSINOPHIL NFR BLD: 1.7 % (ref 0–8)
ERYTHROCYTE [DISTWIDTH] IN BLOOD BY AUTOMATED COUNT: 13.5 % (ref 11.5–14.5)
EST. GFR  (AFRICAN AMERICAN): 37.7 ML/MIN/1.73 M^2
EST. GFR  (NON AFRICAN AMERICAN): 32.6 ML/MIN/1.73 M^2
GLUCOSE SERPL-MCNC: 102 MG/DL (ref 70–110)
GLUCOSE UR QL STRIP: NEGATIVE
HCT VFR BLD AUTO: 32.9 % (ref 40–54)
HGB BLD-MCNC: 10.3 G/DL (ref 14–18)
HGB UR QL STRIP: NEGATIVE
IMM GRANULOCYTES # BLD AUTO: 0.03 K/UL (ref 0–0.04)
IMM GRANULOCYTES NFR BLD AUTO: 0.4 % (ref 0–0.5)
KETONES UR QL STRIP: NEGATIVE
LACTATE SERPL-SCNC: 1 MMOL/L (ref 0.5–2.2)
LEUKOCYTE ESTERASE UR QL STRIP: NEGATIVE
LYMPHOCYTES # BLD AUTO: 1.1 K/UL (ref 1–4.8)
LYMPHOCYTES NFR BLD: 13.2 % (ref 18–48)
MCH RBC QN AUTO: 29 PG (ref 27–31)
MCHC RBC AUTO-ENTMCNC: 31.3 G/DL (ref 32–36)
MCV RBC AUTO: 93 FL (ref 82–98)
MONOCYTES # BLD AUTO: 0.7 K/UL (ref 0.3–1)
MONOCYTES NFR BLD: 8.2 % (ref 4–15)
NEUTROPHILS # BLD AUTO: 6.1 K/UL (ref 1.8–7.7)
NEUTROPHILS NFR BLD: 76.1 % (ref 38–73)
NITRITE UR QL STRIP: NEGATIVE
NRBC BLD-RTO: 0 /100 WBC
PH UR STRIP: 5 [PH] (ref 5–8)
PLATELET # BLD AUTO: 205 K/UL (ref 150–350)
PMV BLD AUTO: 11.1 FL (ref 9.2–12.9)
POTASSIUM SERPL-SCNC: 3.6 MMOL/L (ref 3.5–5.1)
PROT SERPL-MCNC: 6.5 G/DL (ref 6–8.4)
PROT UR QL STRIP: NEGATIVE
RBC # BLD AUTO: 3.55 M/UL (ref 4.6–6.2)
SODIUM SERPL-SCNC: 140 MMOL/L (ref 136–145)
SP GR UR STRIP: 1.02 (ref 1–1.03)
URN SPEC COLLECT METH UR: NORMAL
WBC # BLD AUTO: 8.01 K/UL (ref 3.9–12.7)

## 2019-07-04 PROCEDURE — 25000003 PHARM REV CODE 250: Performed by: STUDENT IN AN ORGANIZED HEALTH CARE EDUCATION/TRAINING PROGRAM

## 2019-07-04 PROCEDURE — P9612 CATHETERIZE FOR URINE SPEC: HCPCS

## 2019-07-04 PROCEDURE — 99285 EMERGENCY DEPT VISIT HI MDM: CPT | Mod: ,,, | Performed by: EMERGENCY MEDICINE

## 2019-07-04 PROCEDURE — 99285 EMERGENCY DEPT VISIT HI MDM: CPT | Mod: 25

## 2019-07-04 PROCEDURE — 81003 URINALYSIS AUTO W/O SCOPE: CPT

## 2019-07-04 PROCEDURE — 99285 PR EMERGENCY DEPT VISIT,LEVEL V: ICD-10-PCS | Mod: ,,, | Performed by: EMERGENCY MEDICINE

## 2019-07-04 PROCEDURE — 85025 COMPLETE CBC W/AUTO DIFF WBC: CPT

## 2019-07-04 PROCEDURE — 83605 ASSAY OF LACTIC ACID: CPT

## 2019-07-04 PROCEDURE — 80053 COMPREHEN METABOLIC PANEL: CPT

## 2019-07-04 RX ORDER — DIPHENOXYLATE HYDROCHLORIDE AND ATROPINE SULFATE 2.5; .025 MG/1; MG/1
1 TABLET ORAL
Status: DISCONTINUED | OUTPATIENT
Start: 2019-07-04 | End: 2019-07-04

## 2019-07-04 RX ORDER — DIAZEPAM 2 MG/1
2 TABLET ORAL
Status: COMPLETED | OUTPATIENT
Start: 2019-07-04 | End: 2019-07-04

## 2019-07-04 RX ORDER — GABAPENTIN 300 MG/1
300 CAPSULE ORAL
Status: COMPLETED | OUTPATIENT
Start: 2019-07-04 | End: 2019-07-04

## 2019-07-04 RX ORDER — CARBIDOPA AND LEVODOPA 25; 100 MG/1; MG/1
1 TABLET ORAL
Status: COMPLETED | OUTPATIENT
Start: 2019-07-04 | End: 2019-07-04

## 2019-07-04 RX ORDER — CARBIDOPA AND LEVODOPA 10; 100 MG/1; MG/1
1 TABLET ORAL
Status: DISCONTINUED | OUTPATIENT
Start: 2019-07-04 | End: 2019-07-04

## 2019-07-04 RX ORDER — POLYETHYLENE GLYCOL 3350 17 G/17G
17 POWDER, FOR SOLUTION ORAL DAILY
Qty: 5 EACH | Refills: 0 | Status: SHIPPED | OUTPATIENT
Start: 2019-07-04 | End: 2019-07-09

## 2019-07-04 RX ORDER — QUETIAPINE FUMARATE 25 MG/1
25 TABLET, FILM COATED ORAL
Status: COMPLETED | OUTPATIENT
Start: 2019-07-04 | End: 2019-07-04

## 2019-07-04 RX ADMIN — QUETIAPINE FUMARATE 25 MG: 25 TABLET ORAL at 07:07

## 2019-07-04 RX ADMIN — CARBIDOPA AND LEVODOPA 1 TABLET: 25; 100 TABLET ORAL at 07:07

## 2019-07-04 RX ADMIN — DIAZEPAM 2 MG: 2 TABLET ORAL at 07:07

## 2019-07-04 RX ADMIN — GABAPENTIN 300 MG: 300 CAPSULE ORAL at 07:07

## 2019-07-04 NOTE — ED NOTES
Patient given urinal for attempting to void, if patient unable to void in 15 mins patients son requesting a male nurse to straight cath patient

## 2019-07-04 NOTE — ED NOTES
LOC: The patient is awake, alert, the patient is oriented to self and speaking appropriately, he is also aware he is at some sort of medical facility.  APPEARANCE: Patient resting comfortably and in no acute distress, patient is clean and well groomed, patient's clothing is properly fastened.  SKIN: The skin is warm and dry, color consistent with ethnicity, patient has normal skin turgor and moist mucus membranes, skin intact, no breakdown or bruising noted.  MUSCULOSKELETAL: no obvious swelling or deformities noted.  RESPIRATORY: Airway is open and patent, respirations are spontaneous, patient has a normal effort and rate, no accessory muscle use noted      Patient sent from home after complaining of stomach pains for the last 3 hours, patient has dementia and does not talk very much, patient does answer questions when spoken to, patient states his abd is feeling uncomfortable, not sure if he has to vomit or not, patients son at bedside states he has been sitting up in his recliner the last few hours and was rocking back and forth and moaning in pain, patients son also states patient has bad sundowners so they give him medication to sleep at night, patient was hard to arouse this am and took longer than normal to wake up, no acute distress noted, cardiac monitoring in progress, vss, will continue to monitor

## 2019-07-04 NOTE — ED PROVIDER NOTES
Encounter Date: 7/4/2019       History     Chief Complaint   Patient presents with    Abdominal Pain     from home with family, hx of dementia, pointing to stomach x1 day. -n/v.      Pt is a 88yo M with h/o of Parkinson's disease and sundowning. Pt presents today with abdominal pain brought in by EMS. Pt is no oriented to place or time at this point and is minimally communicative. Hx obtained from his son. Son stated pt was groaning and holding his abdomen in pain. They were concerned and called EMS. Upon arrival, pt seemed to be back to baseline and no longer in pain. Son believes pt just had to pass flatus and may have passed it when EMS arrived. Upon arrival to the ED, pt was comfortable. He is unable to further communicate his pain or symptoms.    The history is provided by a relative. The history is limited by the condition of the patient.     Review of patient's allergies indicates:   Allergen Reactions    Combigan [brimonidine-timolol]      Made him dizziness    Contrast media Rash     Past Medical History:   Diagnosis Date    Allergy     Anemia     Arthritis     Basal cell carcinoma     Depression     Herniation of intervertebral disc     Hyperlipidemia     Hypertension 3/4/2015    Macular degeneration     Parkinson disease     Spinal stenosis        Family History   Problem Relation Age of Onset    Cancer Father         prostate    Colon cancer Father     Hearing loss Paternal Aunt     Amblyopia Neg Hx     Blindness Neg Hx     Cataracts Neg Hx     Diabetes Neg Hx     Glaucoma Neg Hx     Hypertension Neg Hx     Macular degeneration Neg Hx     Retinal detachment Neg Hx     Strabismus Neg Hx     Stroke Neg Hx     Thyroid disease Neg Hx     Heart attack Neg Hx     Heart disease Neg Hx     Heart failure Neg Hx     Hyperlipidemia Neg Hx     Melanoma Neg Hx      Social History     Tobacco Use    Smoking status: Never Smoker    Smokeless tobacco: Never Used   Substance Use Topics     Alcohol use: No     Alcohol/week: 0.0 oz     Frequency: Never     Binge frequency: Never    Drug use: No     Review of Systems   Unable to perform ROS: Dementia       Physical Exam     Initial Vitals [07/04/19 1724]   BP Pulse Resp Temp SpO2   121/71 (!) 55 15 98.4 °F (36.9 °C) 95 %      MAP       --         Physical Exam    Constitutional: Vital signs are normal. He appears well-developed and well-nourished. He appears lethargic.   HENT:   Head: Normocephalic and atraumatic.   Right Ear: Hearing normal.   Left Ear: Hearing normal.   Nose: Nose normal.   Mouth/Throat: Uvula is midline, oropharynx is clear and moist and mucous membranes are normal. Abnormal dentition (poor dentition).   Eyes: Conjunctivae, EOM and lids are normal. Pupils are equal, round, and reactive to light.   Neck: Trachea normal and normal range of motion. Neck supple. No thyromegaly present. No stridor present. No tracheal tenderness present. No tracheal deviation present. Carotid bruit is not present. No JVD present.   Cardiovascular: Normal rate, regular rhythm and normal heart sounds. Exam reveals no gallop and no friction rub.    No murmur heard.  Pulses:       Radial pulses are 2+ on the right side, and 2+ on the left side.   Pulmonary/Chest: Effort normal. He has rales in the right middle field and the right lower field. He exhibits no tenderness.   Abdominal: Soft. Normal appearance and bowel sounds are normal. He exhibits distension (mild). There is no tenderness. There is no rigidity, no rebound and no guarding.   Lymphadenopathy:     He has no cervical adenopathy.   Neurological: He appears lethargic. He is disoriented (Oriented only to person and only barely).   Skin: Skin is warm, dry and intact. No laceration and no rash noted. No pallor.   Psychiatric: His affect is blunt. His speech is delayed. He is withdrawn. Cognition and memory are impaired. He exhibits abnormal recent memory. He is inattentive.         ED Course    Procedures  Labs Reviewed   CBC W/ AUTO DIFFERENTIAL - Abnormal; Notable for the following components:       Result Value    RBC 3.55 (*)     Hemoglobin 10.3 (*)     Hematocrit 32.9 (*)     Mean Corpuscular Hemoglobin Conc 31.3 (*)     Gran% 76.1 (*)     Lymph% 13.2 (*)     All other components within normal limits   COMPREHENSIVE METABOLIC PANEL - Abnormal; Notable for the following components:    Creatinine 1.8 (*)     Albumin 3.0 (*)     ALT <5 (*)     eGFR if  37.7 (*)     eGFR if non  32.6 (*)     All other components within normal limits   URINALYSIS, REFLEX TO URINE CULTURE    Narrative:     Preferred Collection Type->Urine, Clean Catch   LACTIC ACID, PLASMA          Imaging Results          CT Abdomen Pelvis  Without Contrast (Final result)  Result time 07/04/19 20:38:56   Procedure changed from CT Abdomen Without Contrast     Final result by Whitney Self MD (07/04/19 20:38:56)                 Impression:      Enlarged left renal collecting system and proximal left ureter similar with 06/08/2019 with heterogeneous soft tissue density material.  This most likely represents clot and hematoma in the collecting system.  There is resulting moderate left hydronephrosis probably unchanged.  No urolithiasis found.  Follow-up to show complete resolution recommended as underlying neoplastic process is difficult to exclude.    No bowel dilation.  Normal appendix.  No acute inflammatory process found.    Findings of interstitial fibrotic lung disease unchanged.    Three-vessel coronary disease and mild cardiomegaly unchanged.      Electronically signed by: Whitney Self  Date:    07/04/2019  Time:    20:38             Narrative:    EXAMINATION:  CT ABDOMEN PELVIS WITHOUT CONTRAST    CLINICAL HISTORY:  Abd pain, gastroenteritis or colitis suspected;    TECHNIQUE:  Low dose axial images, sagittal and coronal reformations were obtained from the lung bases to the pubic symphysis.   Without IV contrast.  Without oral contrast.    COMPARISON:  06/08/2019 abdomen pelvic CT.  05/31/2019 CT.  04/11/2019 CT.    FINDINGS:  Chest: The heart is moderately enlarged diffusely unchanged.  No pericardial effusion.  There is atherosclerotic calcification of all 3 coronary arteries.  The visualized thoracic aorta and esophagus appear normal.  There is coarse reticular subpleural peripheral opacities symmetrically similar previous exam.  There is punctate associated calcification.  Findings likely represent interstitial fibrotic lung disease.  There appears to also be a background of centrilobular emphysema.  No definite superimposed interstitial edema.  No pneumothorax or pleural effusion.    Abdomen: The liver, spleen, adrenal glands appear normal.  Gallbladder appears normal.  Common bile duct measures about 8 mm probably within normal limits.  The abdominal aorta and IVC appear normal.  No periaortic adenopathy found.  Stomach shows overall normal morphology.    Right kidney is grossly normal without hydronephrosis or nephrolithiasis.  Right ureter appears normal.    There is heterogeneous soft tissue density masslike enlargement of the proximal ureter and the left renal collecting system measuring about 4 x 3.7 cm similar with 05/31/2019 and 06/08/2019.  This could represent hematoma with resulting least moderate left hydronephrosis which appears similar with 06/08/2019.  No urolithiasis found.  The remaining left ureter appears to be normal.    Pelvis: The urinary bladder appears normal.  The prostate is grossly normal.  No inguinal hernia or pelvic adenopathy found.    Bowel and mesentery: The appendix appears normal axial image 64.  The terminal ileum appears normal axial image 54.  No bowel dilation or wall thickening or pneumatosis.  There is mild formed stool scattered throughout the colon probably within normal limits.  No small bowel dilation.  No pattern of ileus or obstruction.  No abscess or  free air or focal mesenteric stranding.    Skeleton: No rib fracture.  Visualized femurs appear intact.  No osteonecrosis.  Sacrum and SI joints appear normal.  There is severe degenerative changes of the visualized spine.  No compression deformity or endplate erosion or subluxation.                               X-Ray Chest AP Portable (Final result)  Result time 07/04/19 19:02:28    Final result by Whitney Self MD (07/04/19 19:02:28)                 Impression:      Findings of interstitial fibrotic lung disease similar with prior.  No definite superimposed failure or pneumonia.      Electronically signed by: Whitney Self  Date:    07/04/2019  Time:    19:02             Narrative:    EXAMINATION:  XR CHEST AP PORTABLE    CLINICAL HISTORY:  aspiration PNA;    TECHNIQUE:  Single frontal view of the chest was performed.    COMPARISON:  06/08/2019 chest film.  CT 10/09/2017.    FINDINGS:  Single AP portable view at 18:28.    There is confluent hazy reticular opacities with peripheral preponderance worrisome for interstitial fibrotic lung disease similar with 06/08/2019.    No definite superimposed edema or pneumonia.  Trachea appears normal.  Heart is mildly enlarged unchanged.  No abdominal free air or fracture.  There are overlying leads.  No discrete nodule or cavitary lesion                                 Medical Decision Making:   History:   I obtained history from: someone other than patient.       <> Summary of History: From son  Old Medical Records: I decided to obtain old medical records.  Old Records Summarized: records from clinic visits and records from previous admission(s).       <> Summary of Records: Chart reviewed prior to encounter  Initial Assessment:   89 y.o. male  with h/o Parkinson dz and sundowning presents with abdominal pain. My DDx includes but is not limited to SBO, LBO, gastroenteritis, aspiration PNA, UTI, and infection. I have considered GERD, PUD, and mesenteric ischemia but am  not convinced of these as per HPI and PE. Will obtain labs, CXR, abdominal CT with contrast, ua, and lactate. Clinical presentation consistent with LBO.     Independently Interpreted Test(s):   I have ordered and independently interpreted X-rays - see prior notes.  Clinical Tests:   Lab Tests: Ordered and Reviewed  Radiological Study: Ordered and Reviewed  ED Management:  See APC/Resident       APC / Resident Notes:   7:22 PM  Pt's son is concerned about pt's sundowning. I placed orders for his nightly medications and his PRN valium to combat these concerns. I have reached out to CT ED about how long until pt can go to CT. They informed me approx 45 minutes.     7:51 PM  Ua came back (-ve) for UTI. CXR remarkable for known findings of interstitial fibrotic lung disease.  No definite superimposed failure or pneumonia. Pt's family is aware of these findings. SBP elevated to 188. Will continue to regularly monitor BP.    8:30 PM  Personally reviewed the CT Abd and Pelvis. Believe there was mild colonic enlargement.    9:11 PM  CT Abd Pelvis: Enlarged left renal collecting system and proximal left ureter similar with 06/08/2019 with heterogeneous soft tissue density material.  This most likely represents clot and hematoma in the collecting system.  There is resulting moderate left hydronephrosis probably unchanged.  No urolithiasis found.  Follow-up to show complete resolution recommended as underlying neoplastic process is difficult to exclude.  No bowel dilation.  Normal appendix.  No acute inflammatory process found.  Findings of interstitial fibrotic lung disease unchanged.  Three-vessel coronary disease and mild cardiomegaly unchanged.    Pt's grandson mentioned that these kidney findings have been going on for a little while now. We recommend he follow up with Nephrology. Grandson reports that pt already has upcoming appts with Gastroenterology and Nephrology. I feel comfortable to discharge home on polyethylene  glycol. I spoke with the pt and his family about what to expect with the medication. They are agreeable to this plan. Will discharge home with instruction on when to return to the ED if needed.           Attending Attestation:   Physician Attestation Statement for Resident:  As the supervising MD   Physician Attestation Statement: I have personally seen and examined this patient.   I agree with the above history. -: 89M with Parkinson's disease, dementia, hypertension, hyperlipidemia, presenting with 1 day of abdominal pain. Per family members pt grimaced this afternoon and grabbed abdomen, passed flatus, and then appeared more comfortable.  Last normal BM yesterday, family denies change in oral intake or vomiting. Patient is a poor historian but denies any pain or nausea at this time.    As the supervising MD I agree with the above PE.   -: NAD, NCAT, alert and oriented to baseline per family at bedside, PERRL and EOMI, neck supple/normal ROM, CTAB, RRR no mrg, normal extremity ROM/m/s, abd s/nt/nd, no LE edema, skin dry and warm   As the supervising MD I agree with the above treatment, course, plan, and disposition.  I have reviewed and agree with the residents interpretation of the following: lab data and CT scans.  I have reviewed the following: old records at this facility.                       Clinical Impression:       ICD-10-CM ICD-9-CM   1. Lower abdominal pain R10.30 789.09   2. Chronic kidney disease, unspecified CKD stage N18.9 585.9                                Ricki Scott MD  Resident  07/04/19 [id number]       Yina Ibrahim MD  07/15/19 6853

## 2019-07-12 ENCOUNTER — OFFICE VISIT (OUTPATIENT)
Dept: GASTROENTEROLOGY | Facility: CLINIC | Age: 84
End: 2019-07-12
Payer: MEDICARE

## 2019-07-12 VITALS
HEART RATE: 62 BPM | DIASTOLIC BLOOD PRESSURE: 71 MMHG | BODY MASS INDEX: 22.04 KG/M2 | WEIGHT: 157.44 LBS | SYSTOLIC BLOOD PRESSURE: 126 MMHG | HEIGHT: 71 IN

## 2019-07-12 DIAGNOSIS — R19.8 ALTERNATING CONSTIPATION AND DIARRHEA: ICD-10-CM

## 2019-07-12 DIAGNOSIS — R19.5 ABNORMAL FINDINGS IN STOOL: ICD-10-CM

## 2019-07-12 DIAGNOSIS — K52.832 LYMPHOCYTIC COLITIS: Primary | ICD-10-CM

## 2019-07-12 PROCEDURE — 99999 PR PBB SHADOW E&M-EST. PATIENT-LVL III: ICD-10-PCS | Mod: PBBFAC,,, | Performed by: INTERNAL MEDICINE

## 2019-07-12 PROCEDURE — 99214 OFFICE O/P EST MOD 30 MIN: CPT | Mod: S$GLB,,, | Performed by: INTERNAL MEDICINE

## 2019-07-12 PROCEDURE — 1101F PR PT FALLS ASSESS DOC 0-1 FALLS W/OUT INJ PAST YR: ICD-10-PCS | Mod: CPTII,S$GLB,, | Performed by: INTERNAL MEDICINE

## 2019-07-12 PROCEDURE — 99214 PR OFFICE/OUTPT VISIT, EST, LEVL IV, 30-39 MIN: ICD-10-PCS | Mod: S$GLB,,, | Performed by: INTERNAL MEDICINE

## 2019-07-12 PROCEDURE — 1101F PT FALLS ASSESS-DOCD LE1/YR: CPT | Mod: CPTII,S$GLB,, | Performed by: INTERNAL MEDICINE

## 2019-07-12 PROCEDURE — 99999 PR PBB SHADOW E&M-EST. PATIENT-LVL III: CPT | Mod: PBBFAC,,, | Performed by: INTERNAL MEDICINE

## 2019-07-12 RX ORDER — BUDESONIDE 3 MG/1
9 CAPSULE, COATED PELLETS ORAL DAILY
Qty: 30 CAPSULE | Refills: 0 | Status: SHIPPED | OUTPATIENT
Start: 2019-07-12 | End: 2019-07-22 | Stop reason: SDUPTHER

## 2019-07-12 NOTE — PROGRESS NOTES
[facility] Established Patient Visit    Reason for Visit:    Chief Complaint   Patient presents with    Follow-up     er f/u    Abdominal Pain    Diarrhea     some urgency, semi solid-liquid; 3-4 per day       PCP: Yanna Santana    HPI:  Phi Sainz is a 89 y.o. male here for follow-up of abdominal pain and irregular bowel movements.  He was last seen in our fellows clinic on 05/29/2019.  He is here with his caregiver as well as his daughter.  Evaluation following that indicated an elevated fecal calprotectin level of 286.3.  He also had a CT scan of the abdomen that did not indicate any new findings.  He continues to complain of symptoms.  He was seen in the emergency department on July 4th with constipation. He took MiraLax which resulted in bowel movement and improvement.  Other than that episode of constipation he continues to have diarrhea up to 4 times a day with watery stools.  He still has a lot of weakness. He has about 1 good day out of the week.  Bentyl is causing significant side effects of confusion; however, it does help his symptoms.  It also cause dry mouth.  There are no other new findings.  He has been taking Apriso daily.  They do not find that the Apriso is helping much.          PMHX:  has a past medical history of Allergy, Anemia, Arthritis, Basal cell carcinoma, Depression, Herniation of intervertebral disc, Hyperlipidemia, Hypertension (3/4/2015), Macular degeneration, Parkinson disease, and Spinal stenosis.    PSHX:  has a past surgical history that includes Cataract extraction w/  intraocular lens implant (Left, 10/31/07) and Cataract extraction w/  intraocular lens implant (Right, 12/16/15).    The patient's social and family histories were reviewed by me and updated in the appropriate section of the electronic medical record.    Review of patient's allergies indicates:   Allergen Reactions    Combigan [brimonidine-timolol]      Made him dizziness  "   Contrast media Rash       Prior to Admission medications    Medication Sig Start Date End Date Taking? Authorizing Provider   acetaminophen (TYLENOL EXTRA STRENGTH) 500 MG tablet Take 500 mg by mouth every 6 (six) hours as needed for Pain.   Yes Historical Provider, MD   carbidopa-levodopa  mg (SINEMET)  mg per tablet Take 1 tablet three times daily. 2/18/19  Yes Adriana Carcamo NP   diazePAM (VALIUM) 2 MG tablet TAKE 1 TABLET BY MOUTH EVERY DAY AS NEEDED 6/6/19  Yes Yanna Santana MD   dicyclomine (BENTYL) 10 MG capsule Take 1 capsule (10 mg total) by mouth 4 (four) times daily before meals and nightly. 6/11/19 7/12/19 Yes Joe Pina MD   diphenoxylate-atropine 2.5-0.025 mg (LOMOTIL) 2.5-0.025 mg per tablet Take 1 tablet by mouth 2 (two) times daily. 6/12/19  Yes Joe Pina MD   gabapentin (NEURONTIN) 100 MG capsule Take 300 mg by mouth 2 (two) times daily. Pt taking one pill twice a day.   Yes Historical Provider, MD   HYDROcodone-acetaminophen (NORCO) 5-325 mg per tablet TK 1 T PO Q 8 H PRN 6/3/19  Yes Historical Provider, MD   mesalamine (APRISO) 0.375 gram Cp24 Take 4 capsules (1.5 g total) by mouth once daily. Pt taking 4 pills in am. 6/11/19  Yes Joe Pina MD   QUEtiapine (SEROQUEL) 25 MG Tab Give 1/2 tablet one to two hours before sundowning. After one week, may give the other half tablet at bedtime. 6/27/19  Yes Adriana Carcamo NP   rasagiline (AZILECT) 1 mg Tab Take 1 tablet (1 mg total) by mouth once daily. 2/18/19 2/18/20 Yes Adriana Carcamo NP   carbidopa-levodopa  mg (SINEMET CR)  mg TbSR Take one tablet two to three times daily as directed. 6/19/19 7/12/19  Adriana Carcamo NP         Objective Findings:  Vital Signs:  /71   Pulse 62   Ht 5' 11" (1.803 m)   Wt 71.4 kg (157 lb 6.5 oz)   BMI 21.95 kg/m²  Body mass index is 21.95 kg/m².    Physical Exam:  General Appearance:  Well appearing in no acute distress, appears stated age      Labs:  Lab " Results   Component Value Date    WBC 8.01 07/04/2019    HGB 10.3 (L) 07/04/2019    HCT 32.9 (L) 07/04/2019    MCV 93 07/04/2019    RDW 13.5 07/04/2019     07/04/2019    GRAN 6.1 07/04/2019    GRAN 76.1 (H) 07/04/2019    LYMPH 1.1 07/04/2019    LYMPH 13.2 (L) 07/04/2019    MONO 0.7 07/04/2019    MONO 8.2 07/04/2019    EOS 0.1 07/04/2019    BASO 0.03 07/04/2019     Lab Results   Component Value Date     07/04/2019    K 3.6 07/04/2019     07/04/2019    CO2 24 07/04/2019     07/04/2019    BUN 19 07/04/2019    CREATININE 1.8 (H) 07/04/2019    CALCIUM 9.1 07/04/2019    PROT 6.5 07/04/2019    ALBUMIN 3.0 (L) 07/04/2019    BILITOT 0.6 07/04/2019    ALKPHOS 77 07/04/2019    AST 12 07/04/2019    ALT <5 (L) 07/04/2019                   Assessment:  Phi Sainz is a 89 y.o. male here with:  1. Lymphocytic colitis    2. Abnormal findings in stool    3. Alternating constipation and diarrhea      This is a complicated situation.  He has a history of lymphocytic colitis noted in 2015 by biopsies during flexible sigmoidoscopy.  There also is a questionable history of inflammatory bowel disease. His fecal calprotectin is elevated.  Initial workup for infectious etiology was unremarkable with negative ova and parasite, Giardia/Cryptosporidium, C diff, and stool culture.  It is difficult to say whether his symptoms are secondary to microscopic colitis or IBD.  He and his family do not want endoscopic evaluation.  Given that the Apriso is not working, I would suspect that this may be a manifestation of microscopic colitis rather than IBD.  There is a chance also that this may be a manifestation of irritable bowel syndrome since he is having alternating episodes of constipation and diarrhea.  However, the constipation appears more recent.      Recommendations:  1.  Continue Apriso as is for now  2.  I will start him on budesonide 9 mg p.o. once daily for which he will take for the next 8 weeks.  This  will have the benefit of not only treating microscopic colitis but also any undiagnosed inflammatory bowel disease.   3.  I will get a GI pathogens panel  4.  He may use a fiber supplement  5.  Stop Bentyl.  He can try IBgard in its place.    Follow-up in about 8 weeks.    Order summary:  Orders Placed This Encounter   Procedures    Gastrointestinal Pathogens Panel, PCR         Medical decision making:  Visit time 25 min with more than 50% of time counseling on treatment of microscopic colitis as well as inflammatory bowel disease.        Darshan Lara MD

## 2019-07-18 ENCOUNTER — LAB VISIT (OUTPATIENT)
Dept: LAB | Facility: HOSPITAL | Age: 84
End: 2019-07-18
Attending: INTERNAL MEDICINE
Payer: MEDICARE

## 2019-07-18 DIAGNOSIS — R19.8 ALTERNATING CONSTIPATION AND DIARRHEA: ICD-10-CM

## 2019-07-18 DIAGNOSIS — R19.5 ABNORMAL FINDINGS IN STOOL: ICD-10-CM

## 2019-07-18 PROCEDURE — 87507 IADNA-DNA/RNA PROBE TQ 12-25: CPT

## 2019-07-21 RX ORDER — DICYCLOMINE HYDROCHLORIDE 10 MG/1
10 CAPSULE ORAL
Qty: 120 CAPSULE | Refills: 2 | Status: SHIPPED | OUTPATIENT
Start: 2019-07-21 | End: 2019-10-18 | Stop reason: SDUPTHER

## 2019-07-22 ENCOUNTER — TELEPHONE (OUTPATIENT)
Dept: GASTROENTEROLOGY | Facility: CLINIC | Age: 84
End: 2019-07-22

## 2019-07-22 DIAGNOSIS — K52.832 LYMPHOCYTIC COLITIS: ICD-10-CM

## 2019-07-22 NOTE — TELEPHONE ENCOUNTER
----- Message from Darshan Lara MD sent at 7/21/2019  2:41 PM CDT -----  Contact: [facility]  [phone]  Prescription sent.      ----- Message -----  From: Silvia Slaughter MA  Sent: 7/17/2019   8:24 AM  To: Darshan Lara MD        ----- Message -----  From: Joe Pina MD  Sent: 7/17/2019   7:39 AM  To: KATJA Pratt,    I am no longer involved in this patient's care due to his daughter's request. Dr. Lara has taken over. Thank you!    ----- Message -----  From: Silvia Slaughter MA  Sent: 7/16/2019   1:51 PM  To: Joe Pina MD    Refill request for Dicyclomine Cap 10 mg, #120, take on capsule by mouth 4 times daily before meals and nightly.  LR 6/11/2019

## 2019-07-22 NOTE — TELEPHONE ENCOUNTER
----- Message from Cristela Balderrama sent at 7/22/2019  2:12 PM CDT -----  Contact: Daughter- Yani- [phone]  Marco- pts daughter called to speak with Bee regarding the budesonide (ENTOCORT EC) 3 mg capsule- states the rx is working and helping the pt- can a refill be called in- please contact Yani at [phone]

## 2019-07-23 LAB
GPP - ADENOVIRUS 40/41: NOT DETECTED
GPP - CAMPYLOBACTER: NOT DETECTED
GPP - CLOSTRIDIUM DIFFICILE TOXIN A/B: NOT DETECTED
GPP - CRYPTOSPORIDIUM: NOT DETECTED
GPP - E COLI O157: NOT DETECTED
GPP - ENTAMOEBA HISTOLYTICA: NOT DETECTED
GPP - ENTEROTOXIGENIC E COLI (ETEC): NOT DETECTED
GPP - GIARDIA LAMBLIA: NOT DETECTED
GPP - NOROVIRUS GI/GII: NOT DETECTED
GPP - ROTAVIRUS A: NOT DETECTED
GPP - SALMONELLA: NOT DETECTED
GPP - SHIGELLA: NOT DETECTED
GPP - VIBRIO CHOLERA: NOT DETECTED
GPP - YERSINIA ENTEROCOLITICA: NOT DETECTED
LACTATE PLASV-SCNC: NOT DETECTED MMOL/L

## 2019-07-23 RX ORDER — BUDESONIDE 3 MG/1
9 CAPSULE, COATED PELLETS ORAL DAILY
Qty: 90 CAPSULE | Refills: 1 | Status: SHIPPED | OUTPATIENT
Start: 2019-07-23 | End: 2019-08-17 | Stop reason: SDUPTHER

## 2019-07-23 NOTE — TELEPHONE ENCOUNTER
----- Message from Adrian Heart sent at 7/23/2019 12:36 PM CDT -----  Contact: Yani (daughter): [phone]  Rx Refill/Request     Is this a Refill or New Rx: Refill     Rx Name and Strength:  budesonide (ENTOCORT EC) 3 mg     Preferred Pharmacy with phone number:  [facility]/pharmacy #[id number] - [place] - [address] [phone] (Phone)  [phone] (Fax)    Communication Preference: Yani (daughter): [phone]    Additional Information: pt's daughter state the medication does help the pt

## 2019-07-25 ENCOUNTER — TELEPHONE (OUTPATIENT)
Dept: GASTROENTEROLOGY | Facility: CLINIC | Age: 84
End: 2019-07-25

## 2019-07-25 NOTE — TELEPHONE ENCOUNTER
----- Message from Lisa Jarquin sent at 7/25/2019 11:56 AM CDT -----  Contact: pt   Rx Refill/Request   Refill request for Dicyclomine Cap 10 mg, #120, take on capsule by mouth 4 times daily before meals and nightly was not rec'd by [facility]. Can you redo

## 2019-07-29 ENCOUNTER — OFFICE VISIT (OUTPATIENT)
Dept: INTERNAL MEDICINE | Facility: CLINIC | Age: 84
End: 2019-07-29
Payer: MEDICARE

## 2019-07-29 VITALS
SYSTOLIC BLOOD PRESSURE: 128 MMHG | HEART RATE: 61 BPM | DIASTOLIC BLOOD PRESSURE: 76 MMHG | BODY MASS INDEX: 21.76 KG/M2 | TEMPERATURE: 99 F | WEIGHT: 155.44 LBS | HEIGHT: 71 IN

## 2019-07-29 DIAGNOSIS — F05 SUNDOWNING: ICD-10-CM

## 2019-07-29 DIAGNOSIS — G20.A1 PARKINSON DISEASE: Primary | ICD-10-CM

## 2019-07-29 DIAGNOSIS — G47.00 INSOMNIA, UNSPECIFIED TYPE: ICD-10-CM

## 2019-07-29 DIAGNOSIS — K52.9 COLITIS: ICD-10-CM

## 2019-07-29 DIAGNOSIS — K52.832 LYMPHOCYTIC COLITIS: ICD-10-CM

## 2019-07-29 PROCEDURE — 1101F PR PT FALLS ASSESS DOC 0-1 FALLS W/OUT INJ PAST YR: ICD-10-PCS | Mod: CPTII,S$GLB,, | Performed by: INTERNAL MEDICINE

## 2019-07-29 PROCEDURE — 99999 PR PBB SHADOW E&M-EST. PATIENT-LVL IV: ICD-10-PCS | Mod: PBBFAC,,, | Performed by: INTERNAL MEDICINE

## 2019-07-29 PROCEDURE — 99213 OFFICE O/P EST LOW 20 MIN: CPT | Mod: S$GLB,,, | Performed by: INTERNAL MEDICINE

## 2019-07-29 PROCEDURE — 99213 PR OFFICE/OUTPT VISIT, EST, LEVL III, 20-29 MIN: ICD-10-PCS | Mod: S$GLB,,, | Performed by: INTERNAL MEDICINE

## 2019-07-29 PROCEDURE — 1101F PT FALLS ASSESS-DOCD LE1/YR: CPT | Mod: CPTII,S$GLB,, | Performed by: INTERNAL MEDICINE

## 2019-07-29 PROCEDURE — 99999 PR PBB SHADOW E&M-EST. PATIENT-LVL IV: CPT | Mod: PBBFAC,,, | Performed by: INTERNAL MEDICINE

## 2019-07-29 RX ORDER — QUETIAPINE FUMARATE 25 MG/1
TABLET, FILM COATED ORAL
Qty: 45 TABLET | Refills: 1 | Status: SHIPPED | OUTPATIENT
Start: 2019-07-29 | End: 2019-09-23 | Stop reason: SDUPTHER

## 2019-07-29 NOTE — PROGRESS NOTES
CC: followup of hypertension  HPI:  The patient is a 89 y.o. year old male who presents to the office for followup of hypertension.  The patient denies any chest pain, shortness of breath, headache, blurred vision, excessive fatigue, nausea or vomiting.  The patient's daughter reports that he wakes up around 2:00 a.m. and has vivid dreams.  He sometimes tries to get out of bed, and his mood has been labile.  He complains of continued leg weakness as well.    PAST MEDICAL HISTORY:  Past Medical History:   Diagnosis Date    Allergy     Anemia     Arthritis     Basal cell carcinoma     Depression     Herniation of intervertebral disc     Hyperlipidemia     Hypertension 3/4/2015    Macular degeneration     Parkinson disease     Spinal stenosis        SURGICAL HISTORY:  Past Surgical History:   Procedure Laterality Date    Bilateral MBB  Bilateral 2/16/2016    Performed by [facility]nie Jackson [facility] at [facility]    BLOCK-NERVE-MEDIAL BRANCH-LUMBAR** Bilateral MBB ** Bilateral 12/22/2015    Performed by [facility] MD at [facility]    BLOCK-NERVE-MEDIAL BRANCH-LUMBAR** BILATERAL MBB ** Bilateral 12/1/2015    Performed by [facility]nie Jackson MD at [facility]    CATARACT EXTRACTION W/  INTRAOCULAR LENS IMPLANT Left 10/31/07    Dr. Nunez    CATARACT EXTRACTION W/  INTRAOCULAR LENS IMPLANT Right 12/16/15    Dr. Nunez    INJECTION-STEROID-EPIDURAL-CAUDAL- WITH CATH N/A 6/14/2016    Performed by [facility] at [facility]    INSERTION-INTRAOCULAR LENS (IOL) Right 12/16/2015    Performed by Bettie [facility]unez [facility] at [facility] OR [facility]    PHACOEMULSIFICATION-ASPIRATION-CATARACT Right 12/16/2015    Performed by [facility]ie [facility] MD at [facility] OR [facility]    RADIOFREQUENCY THERMOCOAGULATION (RFTC)-NERVE-MEDIAN BRANCH-LUMBAR  Left L2, L3, L4, L5 Left 3/15/2016    Performed by [facility]nie Jackson MD at [facility]    REPAIR-RETINA (VITRECTOMY) Right 1/6/2016     Performed by JOAN Encinas MD at [facility] OR [address] [place] [address]       MEDS:  Medcard reviewed and updated    ALLERGIES: Allergy Card reviewed and updated    SOCIAL HISTORY:   The patient is a nonsmoker.    PE:   APPEARANCE: Well nourished, well developed, in no acute distress.    CHEST: Lungs clear to auscultation with unlabored respirations.  CARDIOVASCULAR: Normal S1, S2. No murmurs. No carotid bruits. No pedal edema.  ABDOMEN: Bowel sounds normal. Not distended. Soft. No tenderness or masses.  PSYCHIATRIC: The patient is oriented to person, place, and time and has a pleasant affect.        ASSESSMENT/PLAN:  Phi was seen today for follow-up.    Diagnoses and all orders for this visit:    Parkinson disease  -     HOSPITAL BED FOR HOME USE  -     Ambulatory referral to Home Health    Sundowning  -     QUEtiapine (SEROQUEL) 25 MG Tab; Give 1-1/2 tablet one to two hours before sundowning.    Insomnia, unspecified type  -     QUEtiapine (SEROQUEL) 25 MG Tab; Give 1-1/2 tablet one to two hours before sundowning.    Lymphocytic colitis  -     follow-up by GI    Colitis  -     Ambulatory Referral to Pharmacy Assistance        Answers for HPI/ROS submitted by the patient on 7/29/2019   activity change: Yes  unexpected weight change: Yes  neck pain: No  hearing loss: No  rhinorrhea: No  trouble swallowing: Yes  eye discharge: No  visual disturbance: No  chest tightness: No  wheezing: No  chest pain: No  palpitations: No  blood in stool: No  constipation: Yes  vomiting: No  diarrhea: No  polydipsia: No  polyuria: No  difficulty urinating: No  urgency: No  hematuria: No  joint swelling: No  arthralgias: No  headaches: No  weakness: Yes  confusion: Yes  dysphoric mood: Yes

## 2019-07-31 ENCOUNTER — TELEPHONE (OUTPATIENT)
Dept: PHARMACY | Facility: CLINIC | Age: 84
End: 2019-07-31

## 2019-07-31 NOTE — TELEPHONE ENCOUNTER
I spoke with Mr. Sainz's daughter and explained to her there's no drug programs for the  medication budesonide and the [facility] rolo program is closed. I asked her if the drug required a PA and she said yes, I explained to her that may bring her copay to a lower cost. I will mail my card for future assistance if needed.

## 2019-08-05 ENCOUNTER — TELEPHONE (OUTPATIENT)
Dept: INTERNAL MEDICINE | Facility: CLINIC | Age: 84
End: 2019-08-05

## 2019-08-05 NOTE — TELEPHONE ENCOUNTER
Lyric called from [facility] @ [phone] she advised The pt has exhausted all the prior visits as goals meet, She will send another Eval for pt and ot and also  confirmed that order for bed has been received.

## 2019-08-05 NOTE — TELEPHONE ENCOUNTER
----- Message from Immanuel Kong sent at 8/2/2019 10:17 AM CDT -----  Contact: [facility] [phone]  Lyric states that they  the Home Health Orders but they need the recent progress note and verbal order for what discipline plan that Dr. Santana is requesting from [facility], Fax: [phone], Please Advise.

## 2019-08-06 ENCOUNTER — TELEPHONE (OUTPATIENT)
Dept: INTERNAL MEDICINE | Facility: CLINIC | Age: 84
End: 2019-08-06

## 2019-08-06 DIAGNOSIS — R13.10 DYSPHAGIA, UNSPECIFIED TYPE: Primary | ICD-10-CM

## 2019-08-06 PROCEDURE — G0180 PR HOME HEALTH MD CERTIFICATION: ICD-10-PCS | Mod: ,,, | Performed by: INTERNAL MEDICINE

## 2019-08-06 PROCEDURE — G0180 MD CERTIFICATION HHA PATIENT: HCPCS | Mod: ,,, | Performed by: INTERNAL MEDICINE

## 2019-08-06 NOTE — TELEPHONE ENCOUNTER
----- Message from Salvatore Paula sent at 8/6/2019  2:30 PM CDT -----  Contact: Qing with [facility]  ext [phone]  Qing with [facility] called in to request an order for a Sleep Eval and treat the patient is having trouble swallowing fax # [phone] please advise.

## 2019-08-07 NOTE — TELEPHONE ENCOUNTER
Order for speech evaluation has been completed.  Please clarify that speech evaluation was requested, not sleep evaluation.

## 2019-08-08 ENCOUNTER — TELEPHONE (OUTPATIENT)
Dept: INTERNAL MEDICINE | Facility: CLINIC | Age: 84
End: 2019-08-08

## 2019-08-09 ENCOUNTER — TELEPHONE (OUTPATIENT)
Dept: INTERNAL MEDICINE | Facility: CLINIC | Age: 84
End: 2019-08-09

## 2019-08-13 ENCOUNTER — PATIENT OUTREACH (OUTPATIENT)
Dept: ADMINISTRATIVE | Facility: OTHER | Age: 84
End: 2019-08-13

## 2019-08-13 ENCOUNTER — TELEPHONE (OUTPATIENT)
Dept: INTERNAL MEDICINE | Facility: CLINIC | Age: 84
End: 2019-08-13

## 2019-08-13 ENCOUNTER — PATIENT MESSAGE (OUTPATIENT)
Dept: INTERNAL MEDICINE | Facility: CLINIC | Age: 84
End: 2019-08-13

## 2019-08-13 NOTE — TELEPHONE ENCOUNTER
----- Message from Bee Duque sent at 8/13/2019  9:31 AM CDT -----  Contact: Qing @ [facility] #[phone], ext# [phone], Fax# [phone]  Ms. Longo would like a call back in regards to her wanting to know what is status on the order that she put in on last week for speech therapy for the patient?

## 2019-08-13 NOTE — TELEPHONE ENCOUNTER
This fax was sent 8/7/2019,8/9/2019 and also again on 8/13/2019 I have also sent the order to [facility] agency

## 2019-08-15 ENCOUNTER — OFFICE VISIT (OUTPATIENT)
Dept: PODIATRY | Facility: CLINIC | Age: 84
End: 2019-08-15
Payer: MEDICARE

## 2019-08-15 VITALS — WEIGHT: 155 LBS | HEIGHT: 71 IN | BODY MASS INDEX: 21.7 KG/M2

## 2019-08-15 DIAGNOSIS — B35.1 DERMATOPHYTOSIS OF NAIL: ICD-10-CM

## 2019-08-15 DIAGNOSIS — G60.9 IDIOPATHIC PERIPHERAL NEUROPATHY: Primary | ICD-10-CM

## 2019-08-15 DIAGNOSIS — R29.898 LEG WEAKNESS, BILATERAL: ICD-10-CM

## 2019-08-15 PROCEDURE — 99499 RISK ADDL DX/OHS AUDIT: ICD-10-PCS | Mod: S$GLB,,, | Performed by: PODIATRIST

## 2019-08-15 PROCEDURE — 99213 PR OFFICE/OUTPT VISIT, EST, LEVL III, 20-29 MIN: ICD-10-PCS | Mod: S$GLB,,, | Performed by: PODIATRIST

## 2019-08-15 PROCEDURE — 99999 PR PBB SHADOW E&M-EST. PATIENT-LVL III: ICD-10-PCS | Mod: PBBFAC,,, | Performed by: PODIATRIST

## 2019-08-15 PROCEDURE — 99999 PR PBB SHADOW E&M-EST. PATIENT-LVL III: CPT | Mod: PBBFAC,,, | Performed by: PODIATRIST

## 2019-08-15 PROCEDURE — 99213 OFFICE O/P EST LOW 20 MIN: CPT | Mod: S$GLB,,, | Performed by: PODIATRIST

## 2019-08-15 PROCEDURE — 3288F FALL RISK ASSESSMENT DOCD: CPT | Mod: CPTII,S$GLB,, | Performed by: PODIATRIST

## 2019-08-15 PROCEDURE — 3288F PR FALLS RISK ASSESSMENT DOCUMENTED: ICD-10-PCS | Mod: CPTII,S$GLB,, | Performed by: PODIATRIST

## 2019-08-15 PROCEDURE — 1100F PR PT FALLS ASSESS DOC 2+ FALLS/FALL W/INJURY/YR: ICD-10-PCS | Mod: CPTII,S$GLB,, | Performed by: PODIATRIST

## 2019-08-15 PROCEDURE — 1100F PTFALLS ASSESS-DOCD GE2>/YR: CPT | Mod: CPTII,S$GLB,, | Performed by: PODIATRIST

## 2019-08-15 PROCEDURE — 99499 UNLISTED E&M SERVICE: CPT | Mod: S$GLB,,, | Performed by: PODIATRIST

## 2019-08-15 NOTE — PROGRESS NOTES
Subjective:      Patient ID: Phi Sainz is a 89 y.o. male.    Chief Complaint: Nail Care (Dr Santana 7-29-19) and Ingrown Toenail (Left Great Toenail)    Phi Sainz is a 89 y.o. male who presents to the clinic for follow up toenails, history of painful ingrown hallux toenail on both feet.  Worse in tight shoes.  No drainage noted from the area.  No recent history of trauma to the feet.  He has history of neurogenic claudication and chronic DVT.  He is on Eliquis.    He is using kerasal on his toenails occasionally.  He also complains of bilateral lower extremities weakness; he does have home PT.         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) - Both Eyes    Essential tremor    Gait disorder    Hypertension    Dyslipidemia    Lumbago    Thoracic or lumbosacral neuritis or radiculitis, unspecified    Acquired spondylolisthesis    Degeneration of lumbar or lumbosacral intervertebral disc    Spondylosis without myelopathy    Spinal stenosis, lumbar region, with neurogenic claudication    DDD (degenerative disc disease), lumbar    Lumbar facet arthropathy    Posterior vitreous detachment, both eyes    Osteoarthritis of spine    Leg weakness, bilateral    Bradycardia    Abnormal chest CT    ILD (interstitial lung disease)    Other dysphagia    Generalized weakness    Chronic deep vein thrombosis (DVT)    Parkinson's disease    Parkinson disease    Acute cystitis without hematuria    Dehydration    Lip cancer    Altered mental state    Physical debility    CKD (chronic kidney disease), stage III           Current Outpatient Medications on File Prior to Visit   Medication Sig Dispense Refill    acetaminophen (TYLENOL EXTRA  STRENGTH) 500 MG tablet Take 500 mg by mouth every 6 (six) hours as needed for Pain.      budesonide (ENTOCORT EC) 3 mg capsule Take 3 capsules (9 mg total) by mouth once daily. 90 capsule 1    carbidopa-levodopa  mg (SINEMET)  mg per tablet Take 1 tablet three times daily. 90 tablet 11    diazePAM (VALIUM) 2 MG tablet TAKE 1 TABLET BY MOUTH EVERY DAY AS NEEDED 30 tablet 3    dicyclomine (BENTYL) 10 MG capsule Take 1 capsule (10 mg total) by mouth 4 (four) times daily before meals and nightly. 120 capsule 2    diphenoxylate-atropine 2.5-0.025 mg (LOMOTIL) 2.5-0.025 mg per tablet Take 1 tablet by mouth 2 (two) times daily. 100 tablet 2    gabapentin (NEURONTIN) 100 MG capsule Take 300 mg by mouth 2 (two) times daily. Pt taking one pill twice a day.      HYDROcodone-acetaminophen (NORCO) 5-325 mg per tablet TK 1 T PO Q 8 H PRN  0    mesalamine (APRISO) 0.375 gram Cp24 Take 4 capsules (1.5 g total) by mouth once daily. Pt taking 4 pills in am. 120 capsule 3    QUEtiapine (SEROQUEL) 25 MG Tab Give 1-1/2 tablet one to two hours before sundowning. 45 tablet 1    rasagiline (AZILECT) 1 mg Tab Take 1 tablet (1 mg total) by mouth once daily. 30 tablet 5     No current facility-administered medications on file prior to visit.          Review of patient's allergies indicates:   Allergen Reactions    Combigan [brimonidine-timolol]      Made him dizziness    Contrast media Rash         Social History     Socioeconomic History    Marital status:      Spouse name: Not on file    Number of children: Not on file    Years of education: Not on file    Highest education level: Not on file   Occupational History    Occupation: retired   Social Needs    Financial resource strain: Not hard at all    Food insecurity:     Worry: Never true     Inability: Never true    Transportation needs:     Medical: No     Non-medical: No   Tobacco Use    Smoking status: Never Smoker    Smokeless tobacco: Never  "Used   Substance and Sexual Activity    Alcohol use: No     Alcohol/week: 0.0 oz     Frequency: Never     Binge frequency: Never    Drug use: No    Sexual activity: Yes     Partners: Female   Lifestyle    Physical activity:     Days per week: 0 days     Minutes per session: 0 min    Stress: To some extent   Relationships    Social connections:     Talks on phone: More than three times a week     Gets together: Three times a week     Attends Yazidism service: Not on file     Active member of club or organization: No     Attends meetings of clubs or organizations: Never     Relationship status:    Other Topics Concern    Not on file   Social History Narrative    Not on file           Review of Systems   Constitution: Negative for chills, fever and malaise/fatigue.   HENT: Negative for hearing loss.    Cardiovascular: Positive for leg swelling. Negative for claudication.   Respiratory: Negative for shortness of breath.    Skin: Positive for color change, dry skin, nail changes and unusual hair distribution. Negative for flushing and rash.   Musculoskeletal: Negative for joint pain and myalgias.   Neurological: Negative for loss of balance, numbness, paresthesias and sensory change.   Psychiatric/Behavioral: Negative for altered mental status.           Objective:        Vitals:    08/15/19 1218   Weight: 70.3 kg (155 lb)   Height: 5' 11" (1.803 m)           Physical Exam   Constitutional: He is oriented to person, place, and time. He appears well-developed and well-nourished. He is cooperative.   Cardiovascular:   Pulses:       Dorsalis pedis pulses are 0 on the right side, and 0 on the left side.        Posterior tibial pulses are 0 on the right side, and 0 on the left side.   +2 PE left  +1 right   Musculoskeletal: He exhibits edema and tenderness.        Right knee: He exhibits no swelling and no ecchymosis.        Left knee: He exhibits no swelling and no ecchymosis.        Right ankle: He exhibits " decreased range of motion and abnormal pulse. He exhibits no swelling and no ecchymosis. No lateral malleolus, no medial malleolus and no head of 5th metatarsal tenderness found. Achilles tendon exhibits no pain, no defect and normal Wilkinson's test results.        Left ankle: He exhibits decreased range of motion and abnormal pulse. He exhibits no swelling and no ecchymosis. No lateral malleolus, no medial malleolus and no head of 5th metatarsal tenderness found. Achilles tendon exhibits no pain and normal Wilkinson's test results.        Right lower leg: He exhibits no tenderness, no bony tenderness, no swelling, no edema and no deformity.        Left lower leg: He exhibits no tenderness, no swelling and no edema.        Right foot: There is decreased range of motion. There is no deformity.        Left foot: There is decreased range of motion. There is no deformity.   Decreased ROM with out joint pain nor crepitation to bilateral feet and ankle joints.  Muscle strength 4/5 in all groups bilaterally  Decreased height of medial arches noted with loading of the foot b/L     Neurological: He is alert and oriented to person, place, and time.   Diminished protective sensation noted b/L       Skin: Skin is warm and dry. Capillary refill takes more than 3 seconds. No abrasion, no bruising, no burn and no ecchymosis noted.   B/L hallux medial borders mildly ingrowing. No erythema or paronychia noted. No drainage.  Nails x10 are elongated by  3-4mm's, thickened by 2-4 mm's, dystrophic, and are yellowish in  coloration . Xerosis Bilaterally. No open lesions noted.     Webspaces 1-4 b/L clean, dry, and intact.   Psychiatric: He has a normal mood and affect. His speech is normal and behavior is normal. He is attentive.   Vitals reviewed.              Assessment:       Problem List Items Addressed This Visit     Leg weakness, bilateral      Other Visit Diagnoses     Idiopathic peripheral neuropathy    -  Primary     Dermatophytosis of nail                  Plan:         - I counseled the patient on his conditions, their implications and medical management.    - Continue Wide toe box shoes.    - Shoe inspection. Patient instructed on proper foot hygeine. We discussed wearing proper shoe gear, daily foot inspections, never walking without protective shoe gear, never putting sharp instruments to feet, routine podiatric nail visits every 2-3 months.      - Continue Kerasal for toenails.     - PT for bilateral weakness.                 Alissa Black DPM  NPI: [id number]         [facility] - P[facility]  [address] [address]  Dept: [phone]  Dept Fax: [phone]

## 2019-08-15 NOTE — TELEPHONE ENCOUNTER
----- Message from Ro Conte sent at 8/15/2019  2:39 PM CDT -----  Contact: self [phone]  ..Rx Refill/Request     Is this a Refill or New Rx: refill   Rx Name and Strength:  diphenoxylate-atropine 2.5-0.025 mg (LOMOTIL) 2.5-0.025 mg per tablet   Preferred Pharmacy with phone number: [facility][id number] - [place] - [address]  Communication Preference:phone  Additional Information:

## 2019-08-17 DIAGNOSIS — K52.832 LYMPHOCYTIC COLITIS: ICD-10-CM

## 2019-08-19 ENCOUNTER — TELEPHONE (OUTPATIENT)
Dept: HOME HEALTH SERVICES | Facility: HOSPITAL | Age: 84
End: 2019-08-19

## 2019-08-19 RX ORDER — DIPHENOXYLATE HYDROCHLORIDE AND ATROPINE SULFATE 2.5; .025 MG/1; MG/1
1 TABLET ORAL 2 TIMES DAILY
Qty: 100 TABLET | Refills: 2 | Status: SHIPPED | OUTPATIENT
Start: 2019-08-19 | End: 2019-09-11 | Stop reason: SDUPTHER

## 2019-08-19 RX ORDER — BUDESONIDE 3 MG/1
9 CAPSULE, COATED PELLETS ORAL DAILY
Qty: 90 CAPSULE | Refills: 1 | Status: SHIPPED | OUTPATIENT
Start: 2019-08-19 | End: 2019-11-25 | Stop reason: SDUPTHER

## 2019-08-23 ENCOUNTER — EXTERNAL HOME HEALTH (OUTPATIENT)
Dept: HOME HEALTH SERVICES | Facility: HOSPITAL | Age: 84
End: 2019-08-23
Payer: MEDICARE

## 2019-08-28 ENCOUNTER — OFFICE VISIT (OUTPATIENT)
Dept: NEUROLOGY | Facility: CLINIC | Age: 84
End: 2019-08-28
Payer: MEDICARE

## 2019-08-28 VITALS
DIASTOLIC BLOOD PRESSURE: 61 MMHG | SYSTOLIC BLOOD PRESSURE: 103 MMHG | HEART RATE: 66 BPM | WEIGHT: 151.69 LBS | HEIGHT: 71 IN | BODY MASS INDEX: 21.24 KG/M2

## 2019-08-28 DIAGNOSIS — G20.A1 PARKINSON'S DISEASE: ICD-10-CM

## 2019-08-28 DIAGNOSIS — G20.A1 PARKINSON DISEASE: Primary | ICD-10-CM

## 2019-08-28 PROCEDURE — 3288F PR FALLS RISK ASSESSMENT DOCUMENTED: ICD-10-PCS | Mod: CPTII,S$GLB,, | Performed by: PSYCHIATRY & NEUROLOGY

## 2019-08-28 PROCEDURE — 3288F FALL RISK ASSESSMENT DOCD: CPT | Mod: CPTII,S$GLB,, | Performed by: PSYCHIATRY & NEUROLOGY

## 2019-08-28 PROCEDURE — 99214 OFFICE O/P EST MOD 30 MIN: CPT | Mod: S$GLB,,, | Performed by: PSYCHIATRY & NEUROLOGY

## 2019-08-28 PROCEDURE — 99999 PR PBB SHADOW E&M-EST. PATIENT-LVL III: ICD-10-PCS | Mod: PBBFAC,,, | Performed by: PSYCHIATRY & NEUROLOGY

## 2019-08-28 PROCEDURE — 99214 PR OFFICE/OUTPT VISIT, EST, LEVL IV, 30-39 MIN: ICD-10-PCS | Mod: S$GLB,,, | Performed by: PSYCHIATRY & NEUROLOGY

## 2019-08-28 PROCEDURE — 1100F PTFALLS ASSESS-DOCD GE2>/YR: CPT | Mod: CPTII,S$GLB,, | Performed by: PSYCHIATRY & NEUROLOGY

## 2019-08-28 PROCEDURE — 1100F PR PT FALLS ASSESS DOC 2+ FALLS/FALL W/INJURY/YR: ICD-10-PCS | Mod: CPTII,S$GLB,, | Performed by: PSYCHIATRY & NEUROLOGY

## 2019-08-28 PROCEDURE — 99999 PR PBB SHADOW E&M-EST. PATIENT-LVL III: CPT | Mod: PBBFAC,,, | Performed by: PSYCHIATRY & NEUROLOGY

## 2019-08-28 RX ORDER — DONEPEZIL HYDROCHLORIDE 5 MG/1
5 TABLET, FILM COATED ORAL NIGHTLY
Qty: 30 TABLET | Refills: 11 | Status: SHIPPED | OUTPATIENT
Start: 2019-08-28 | End: 2020-08-27

## 2019-08-28 NOTE — PROGRESS NOTES
"Name: Phi Sainz  MRN: [id number]   CSN: [id number]      Date: 08/28/2019      Chief Complaint / Interval History:  - moving well, a little weight loss but ok  - not constipated, feels well  - "legs are more slow and weak below the knee"  - denies numbness or pain, but says gives out  -reviewed family email messages      From May 2019:   - generally more weak and slow over 3 months  - had ggod results with threeapy lasts time, then stopped it, ready again  - more aqbd pain at night, up from midnight onward usually  - rwecent tsh is good, though colder at night  - more sundowing at night  - saw shay 2 months ago  - using melatonin at midnight    From Jan 2019:  - generally more slow in the last three months  - still on current ldopa dosing  - walking has slowed down some  - still lower BP in the past, watching it now on less Flomax  - on Midodrine now until a few days ago, was running too high  - no recent staring spells  - ready for more meds if able  - no falls  - some speeding bowels, does have colitis      History of Present Illness (HPI):  Patient is 89 yo M presents with 4 years duration of tremors, bradykinesia, and gait abnormality. Patient was seen in neurologist 4 years ago with resting tremor, managed conservatively. His tremor had progressed, and progressively developed shuffling gait. Daughter and granddaughter also reports that patient had been having "staring spells" where patient would appear to be locked in with blank expression that self-resolves. Recently patient developed orthostatic hypotension, and was assessed by Cardiologist who then referred patient to Neurologist Dr. Lal. Patient was seen in Neurology clinic last week, and his symptoms were suspicious for Parkinson's Disease. Patient was prescribed Sinemet 25/100 TID. Today in clinic, patient reports significant improvement of his symptoms. He has not had another staring spell since initiating medication. Patient continues to " have dysphagia, and chokes on thin liquids. He is scheduled for Speech therapy and ENT assessment.     Nonmotor/Premotor ROS:  Hyposmia (HENT)?N/A  RBD/sleep issues (Constitutional)?Yes  Depression/anxiety (Psychiatric)?Yes  Fatigue (Constitutional)?No  Constipation (GI)?No  Urinary issues ()?No  Sexual dysfunction ()?N/A  Orthostasis (Cardiovascular)?Yes  Leg swelling (Cardiovascular)? No  Falls (Musculoskeletal)?Yes  Cognitive impairment (Neurologic)?No  Psychoses (Psychiatric)?No  Pain/Paresthesia (Neurologic)?No  Visual changes (Eyes)?No  Moles / skin changes (Skin)?N/A  Stridor / SOB (Pulm)?No  Bruising (Heme)?No    Past Medical History: The patient  has a past medical history of Allergy, Anemia, Arthritis, Basal cell carcinoma, Depression, Herniation of intervertebral disc, Hyperlipidemia, Hypertension (3/4/2015), Macular degeneration, Parkinson disease, and Spinal stenosis.    Social History: The patient  reports that he has never smoked. He has never used smokeless tobacco. He reports that he does not drink alcohol or use drugs.    Family History: Their family history includes Cancer in his father; Colon cancer in his father; Hearing loss in his paternal aunt.    Allergies: Combigan [brimonidine-timolol] and Contrast media     Meds:   Current Outpatient Medications on File Prior to Visit   Medication Sig Dispense Refill    acetaminophen (TYLENOL EXTRA STRENGTH) 500 MG tablet Take 500 mg by mouth every 6 (six) hours as needed for Pain.      budesonide (ENTOCORT EC) 3 mg capsule TAKE 3 CAPSULES (9 MG TOTAL) BY MOUTH ONCE DAILY. 90 capsule 1    carbidopa-levodopa  mg (SINEMET)  mg per tablet Take 1 tablet three times daily. 90 tablet 11    diazePAM (VALIUM) 2 MG tablet TAKE 1 TABLET BY MOUTH EVERY DAY AS NEEDED 30 tablet 3    dicyclomine (BENTYL) 10 MG capsule Take 1 capsule (10 mg total) by mouth 4 (four) times daily before meals and nightly. 120 capsule 2    diphenoxylate-atropine  "2.5-0.025 mg (LOMOTIL) 2.5-0.025 mg per tablet Take 1 tablet by mouth 2 (two) times daily. 100 tablet 2    gabapentin (NEURONTIN) 100 MG capsule Take 300 mg by mouth 2 (two) times daily. Pt taking one pill twice a day.      HYDROcodone-acetaminophen (NORCO) 5-325 mg per tablet TK 1 T PO Q 8 H PRN  0    mesalamine (APRISO) 0.375 gram Cp24 Take 4 capsules (1.5 g total) by mouth once daily. Pt taking 4 pills in am. 120 capsule 3    QUEtiapine (SEROQUEL) 25 MG Tab Give 1-1/2 tablet one to two hours before sundowning. 45 tablet 1    rasagiline (AZILECT) 1 mg Tab Take 1 tablet (1 mg total) by mouth once daily. 30 tablet 5     No current facility-administered medications on file prior to visit.      Exam:  /61   Pulse 66   Ht 5' 11" (1.803 m)   Wt 68.8 kg (151 lb 10.8 oz)   BMI 21.15 kg/m²     * Specialized movement exam  ++ hypophonic speech.    + facial masking.   No cogwheel rigidity.     No bradykinesia.   Mild tremor on R with rest    No other dystonia, chorea, athetosis, myoclonus, or tics.   No motor impersistence.   Shuffled gait.   ++ shortened stride length.   ++ abnormal arm swing.     + postural instability, falls with narrow gait          Medical Record Review:  - Lab Results:      Problem List Items Addressed This Visit     None        - adding donepezil  - might increase seroquel at night to sleep            Harshad Forrester MD, MPH  Division of Movement and Memory Disorders  [facility]  "

## 2019-08-29 ENCOUNTER — TELEPHONE (OUTPATIENT)
Dept: CARDIOLOGY | Facility: CLINIC | Age: 84
End: 2019-08-29

## 2019-08-29 DIAGNOSIS — I10 ESSENTIAL HYPERTENSION: Primary | ICD-10-CM

## 2019-08-29 DIAGNOSIS — E78.5 DYSLIPIDEMIA: ICD-10-CM

## 2019-08-30 ENCOUNTER — TELEPHONE (OUTPATIENT)
Dept: NEUROLOGY | Facility: CLINIC | Age: 84
End: 2019-08-30

## 2019-08-30 NOTE — TELEPHONE ENCOUNTER
Called and spoke to pharmacist and called in azilect. Medication has been open and expertise for 6 month fitness.

## 2019-08-30 NOTE — TELEPHONE ENCOUNTER
----- Message from Reyes Cabrera sent at 8/30/2019 11:42 AM CDT -----  Rx Refill/Request     Is this a Refill or New Rx: Refill   Rx Name and Strength: rasagiline (AZILECT) 1 mg Tab   Preferred Pharmacy with phone number: see below  Communication Preference: Yani (daughter) @ [phone]  Additional Information: Yani states pt is out of the medication    [id number] - [place] - [address]  [address] [place] [address]  Phone: [phone] Fax: [phone]

## 2019-09-04 ENCOUNTER — TELEPHONE (OUTPATIENT)
Dept: CARDIOLOGY | Facility: CLINIC | Age: 84
End: 2019-09-04

## 2019-09-04 DIAGNOSIS — R29.898 LEG WEAKNESS, BILATERAL: ICD-10-CM

## 2019-09-04 DIAGNOSIS — G20.A1 PARKINSON'S DISEASE: ICD-10-CM

## 2019-09-04 DIAGNOSIS — R53.81 PHYSICAL DEBILITY: ICD-10-CM

## 2019-09-04 DIAGNOSIS — M48.062 SPINAL STENOSIS, LUMBAR REGION, WITH NEUROGENIC CLAUDICATION: ICD-10-CM

## 2019-09-04 DIAGNOSIS — M51.37 DEGENERATION OF LUMBAR OR LUMBOSACRAL INTERVERTEBRAL DISC: Primary | ICD-10-CM

## 2019-09-04 NOTE — TELEPHONE ENCOUNTER
----- Message from Mireya Giordano sent at 9/4/2019  3:52 PM CDT -----  Contact: Yani pt daughter [phone]  Tammy pt daughter would like you call her in ref to high blood pressure readings. 9:30 a.m this morning it was 196/106 heart rate 59 after meds. She states it has been running high since yesterday.    Thanks

## 2019-09-05 ENCOUNTER — PATIENT MESSAGE (OUTPATIENT)
Dept: CARDIOLOGY | Facility: CLINIC | Age: 84
End: 2019-09-05

## 2019-09-05 DIAGNOSIS — I10 ESSENTIAL HYPERTENSION: Primary | ICD-10-CM

## 2019-09-05 RX ORDER — AMLODIPINE BESYLATE 5 MG/1
5 TABLET ORAL DAILY
COMMUNITY
End: 2019-09-05 | Stop reason: SDUPTHER

## 2019-09-05 RX ORDER — AMLODIPINE BESYLATE 5 MG/1
5 TABLET ORAL DAILY
Qty: 30 TABLET | Refills: 6 | Status: SHIPPED | OUTPATIENT
Start: 2019-09-05 | End: 2020-02-14

## 2019-09-05 NOTE — TELEPHONE ENCOUNTER
Yani notified, verbalized understanding. I also advised Yani of Dr. Morrison's recommendations from the [employer] message and Yani stated that she wants pt to start Amlodipine 5mg and will call on Monday to report pt's bp readings.

## 2019-09-05 NOTE — TELEPHONE ENCOUNTER
Yani called stating that pt's bp was 197/105 hr60's on Tuesday. Yani stated that pt stated that he was asymptomatic. Pt stated that yesterday pt's bp was 198/104 and the lowest reading was 120's/? Around 6pm. Yani stated that she is unsure what pt's diastolic number was. Yani stated that she will send a [employer] message w/pt's bp readings. Yani stated that pt has been drinking Gatorade to help w/low bp.Yani stated that she didn't give pt Gatorade for the past 2 days. Please advise.

## 2019-09-06 ENCOUNTER — TELEPHONE (OUTPATIENT)
Dept: PODIATRY | Facility: CLINIC | Age: 84
End: 2019-09-06

## 2019-09-06 NOTE — TELEPHONE ENCOUNTER
----- Message from Kendell Julian sent at 9/6/2019  2:19 PM CDT -----  Contact: [facility]  Name of Who is Calling:  Nette      What is the request in detail [facility]:  is requesting to speak with clinical staff  Order for patient     Please contact to further discuss and advise.       Can the clinic reply by [facility]:       What Number to Call Back if not in [facility]: [phone] opt 1

## 2019-09-06 NOTE — TELEPHONE ENCOUNTER
Attempted to contact [facility] however the phone rang several times and there is no voice mail box set up.

## 2019-09-09 ENCOUNTER — PATIENT MESSAGE (OUTPATIENT)
Dept: NEUROLOGY | Facility: CLINIC | Age: 84
End: 2019-09-09

## 2019-09-09 ENCOUNTER — PATIENT MESSAGE (OUTPATIENT)
Dept: CARDIOLOGY | Facility: CLINIC | Age: 84
End: 2019-09-09

## 2019-09-09 RX ORDER — MESALAMINE 0.38 G/1
1.5 CAPSULE, EXTENDED RELEASE ORAL DAILY
Qty: 120 CAPSULE | Refills: 3 | OUTPATIENT
Start: 2019-09-09

## 2019-09-10 ENCOUNTER — OFFICE VISIT (OUTPATIENT)
Dept: GASTROENTEROLOGY | Facility: CLINIC | Age: 84
End: 2019-09-10
Payer: MEDICARE

## 2019-09-10 VITALS
DIASTOLIC BLOOD PRESSURE: 70 MMHG | HEIGHT: 71 IN | SYSTOLIC BLOOD PRESSURE: 110 MMHG | WEIGHT: 149.69 LBS | HEART RATE: 62 BPM | BODY MASS INDEX: 20.96 KG/M2

## 2019-09-10 DIAGNOSIS — K59.09 INTERMITTENT CONSTIPATION: ICD-10-CM

## 2019-09-10 DIAGNOSIS — K52.832 LYMPHOCYTIC COLITIS: Primary | ICD-10-CM

## 2019-09-10 PROCEDURE — 99214 OFFICE O/P EST MOD 30 MIN: CPT | Mod: S$GLB,,, | Performed by: INTERNAL MEDICINE

## 2019-09-10 PROCEDURE — 99214 PR OFFICE/OUTPT VISIT, EST, LEVL IV, 30-39 MIN: ICD-10-PCS | Mod: S$GLB,,, | Performed by: INTERNAL MEDICINE

## 2019-09-10 PROCEDURE — 1101F PT FALLS ASSESS-DOCD LE1/YR: CPT | Mod: CPTII,S$GLB,, | Performed by: INTERNAL MEDICINE

## 2019-09-10 PROCEDURE — 1101F PR PT FALLS ASSESS DOC 0-1 FALLS W/OUT INJ PAST YR: ICD-10-PCS | Mod: CPTII,S$GLB,, | Performed by: INTERNAL MEDICINE

## 2019-09-10 PROCEDURE — 99999 PR PBB SHADOW E&M-EST. PATIENT-LVL III: CPT | Mod: PBBFAC,,, | Performed by: INTERNAL MEDICINE

## 2019-09-10 PROCEDURE — 99999 PR PBB SHADOW E&M-EST. PATIENT-LVL III: ICD-10-PCS | Mod: PBBFAC,,, | Performed by: INTERNAL MEDICINE

## 2019-09-10 NOTE — PROGRESS NOTES
[facility] Established Patient Visit    Reason for Visit:    Chief Complaint   Patient presents with    Constipation    Abdominal Pain       PCP: Yanna Santana    HPI:  Phi Sainz is a 89 y.o. male here for follow-up of abdominal pain and irregular bowel movements.  Last seen by me about a weeks ago for the same.  He again returns to clinic with his daughter and caregiver, who both provide most of the information.  I started him on budesonide 9 mg per day.  They report that his bowel movements improved almost immediately after starting the budesonide.  He continues to have intermittent constipation, but this was always a problem.  He is no longer having the significant diarrhea episodes that he was previously having.  He has only had 1 accident since our last visit.  He appears to have more bowel control.  He is using IBgard as well which also seems to help.  He continues to use Lomotil at times when bowel movements are loose.  They are using Bentyl sparingly due to causing a side effect of confusion; however, it does help symptom of abdominal pain. He did not start taking fiber.  He was started on amlodipine last week due to persistently elevated blood pressure. Since starting the amlodipine he has noticed some nausea as well as stomach pains.        PMHX:  has a past medical history of Allergy, Anemia, Arthritis, Basal cell carcinoma, Depression, Herniation of intervertebral disc, Hyperlipidemia, Hypertension (3/4/2015), Macular degeneration, Parkinson disease, and Spinal stenosis.    PSHX:  has a past surgical history that includes Cataract extraction w/  intraocular lens implant (Left, 10/31/07) and Cataract extraction w/  intraocular lens implant (Right, 12/16/15).    The patient's social and family histories were reviewed by me and updated in the appropriate section of the electronic medical record.    Review of patient's allergies indicates:   Allergen Reactions     Combigan [brimonidine-timolol]      Made him dizziness    Contrast media Rash       Prior to Admission medications    Medication Sig Start Date End Date Taking? Authorizing Provider   acetaminophen (TYLENOL EXTRA STRENGTH) 500 MG tablet Take 500 mg by mouth every 6 (six) hours as needed for Pain.   Yes Historical Provider, MD   amLODIPine (NORVASC) 5 MG tablet Take 1 tablet (5 mg total) by mouth once daily. 9/5/19  Yes Rody Morrison MD   budesonide (ENTOCORT EC) 3 mg capsule TAKE 3 CAPSULES (9 MG TOTAL) BY MOUTH ONCE DAILY. 8/19/19 10/18/19 Yes Darshan Lara MD   carbidopa-levodopa  mg (SINEMET)  mg per tablet Take 1 tablet three times daily. 2/18/19  Yes Adriana Carcamo NP   diazePAM (VALIUM) 2 MG tablet TAKE 1 TABLET BY MOUTH EVERY DAY AS NEEDED 6/6/19  Yes Yanna Santana MD   dicyclomine (BENTYL) 10 MG capsule Take 1 capsule (10 mg total) by mouth 4 (four) times daily before meals and nightly.  Patient taking differently: Take 10 mg by mouth as needed.  7/21/19 10/19/19 Yes Darshan Lara MD   diphenoxylate-atropine 2.5-0.025 mg (LOMOTIL) 2.5-0.025 mg per tablet Take 1 tablet by mouth 2 (two) times daily. 8/19/19  Yes Darshan Lara MD   donepezil (ARICEPT) 5 MG tablet Take 1 tablet (5 mg total) by mouth every evening. 8/28/19 8/27/20 Yes Harshad Forrester MD   gabapentin (NEURONTIN) 100 MG capsule Take 300 mg by mouth 2 (two) times daily. Pt taking one pill twice a day.   Yes Historical Provider, MD   HYDROcodone-acetaminophen (NORCO) 5-325 mg per tablet TK 1 T PO Q 8 H PRN 6/3/19  Yes Historical Provider, MD   mesalamine (APRISO) 0.375 gram Cp24 Take 4 capsules (1.5 g total) by mouth once daily. Pt taking 4 pills in am. 6/11/19  Yes Joe Pina MD   QUEtiapine (SEROQUEL) 25 MG Tab Give 1-1/2 tablet one to two hours before sundowning. 7/29/19  Yes Yanna Santana MD   rasagiline (AZILECT) 1 mg Tab Take 1 tablet (1 mg total) by mouth once daily. 2/18/19 2/18/20 Yes Adriana  "Gurdeepoop, NP         Objective Findings:  Vital Signs:  Ht 5' 11" (1.803 m)   Wt 67.9 kg (149 lb 11.1 oz)   BMI 20.88 kg/m²  Body mass index is 20.88 kg/m².        Labs:  Lab Results   Component Value Date    WBC 8.01 07/04/2019    HGB 10.3 (L) 07/04/2019    HCT 32.9 (L) 07/04/2019    MCV 93 07/04/2019    RDW 13.5 07/04/2019     07/04/2019    GRAN 6.1 07/04/2019    GRAN 76.1 (H) 07/04/2019    LYMPH 1.1 07/04/2019    LYMPH 13.2 (L) 07/04/2019    MONO 0.7 07/04/2019    MONO 8.2 07/04/2019    EOS 0.1 07/04/2019    BASO 0.03 07/04/2019     Lab Results   Component Value Date     07/04/2019    K 3.6 07/04/2019     07/04/2019    CO2 24 07/04/2019     07/04/2019    BUN 19 07/04/2019    CREATININE 1.8 (H) 07/04/2019    CALCIUM 9.1 07/04/2019    PROT 6.5 07/04/2019    ALBUMIN 3.0 (L) 07/04/2019    BILITOT 0.6 07/04/2019    ALKPHOS 77 07/04/2019    AST 12 07/04/2019    ALT <5 (L) 07/04/2019                 Assessment:  Phi Sainz is a 89 y.o. male here with:  1. Lymphocytic colitis    2. Intermittent constipation      This continues to be a complicated situation.  He has a history of lymphocytic colitis noted in 2015 by biopsies during flexible sigmoidoscopy.  There also is a questionable history of inflammatory bowel disease. His fecal calprotectin was elevated.  Initial workup for infectious etiology was unremarkable with negative ova and parasite, Giardia/Cryptosporidium, C diff, and stool culture.   A GI pathogens panel was also negative.  It is difficult to say whether his symptoms are secondary to microscopic colitis or IBD.  He and his family do not want endoscopic evaluation.   he is on Apriso, but had continued symptoms; therefore, I would suspect that this may be a manifestation of microscopic colitis rather than IBD.  Irritable bowel syndrome is also a consideration since he is having alternating episodes of constipation and diarrhea.   he has improved with budesonide.  No longer " having significant diarrhea and accidents.  He is currently on 9 mg per day.  Continues to have occasional constipation. IBgard is helping some.  He has not started fiber.      Recommendations:  1.  Complete the course of budesonide which I prescribed previously.  Once complete, we will see how his symptoms progress off of the medication.  If symptoms return, then I will start him back on 9 mg per day and then slowly reduce the dose to see how his symptoms progress.  They will contact me with any worsening of symptoms after stopping the medication.  2.  Continue Apriso for now.  I do not want to make too many changes at once.  3.  Can continue Bentyl as needed for severe symptoms.  4.  Start fiber supplement daily.    Follow-up in 8 weeks      Medical decision making:  Visit time 25 min with more than 50% of time spent counseling on treatment course and plan.        Darshan Lara MD

## 2019-09-11 RX ORDER — ONDANSETRON 4 MG/1
4 TABLET, FILM COATED ORAL EVERY 8 HOURS PRN
Qty: 28 TABLET | Refills: 0 | Status: SHIPPED | OUTPATIENT
Start: 2019-09-11 | End: 2019-10-30 | Stop reason: SDUPTHER

## 2019-09-11 RX ORDER — DIPHENOXYLATE HYDROCHLORIDE AND ATROPINE SULFATE 2.5; .025 MG/1; MG/1
1 TABLET ORAL 2 TIMES DAILY
Qty: 100 TABLET | Refills: 2 | Status: SHIPPED | OUTPATIENT
Start: 2019-09-11

## 2019-09-11 NOTE — TELEPHONE ENCOUNTER
----- Message from Susana Edwards sent at 9/11/2019 12:38 PM CDT -----  Contact: Yani, daughter, [phone]  Pt daughter called regarding a new prescription for lomotil. Pt is taking BP medicine; he is having nausea. Pt daughter would like to know if Dr. Lara can prescribe something mild that would help w/ the nausea since pt have High BP.     Contact: Yani, daughter, [phone]

## 2019-09-11 NOTE — TELEPHONE ENCOUNTER
Mrs. Lomeli is requesting a prescription to help her father with nausea.     Please send prescription to pharmacy on file.

## 2019-09-12 DIAGNOSIS — G20.A1 PARKINSON DISEASE: ICD-10-CM

## 2019-09-12 RX ORDER — RASAGILINE 1 MG/1
1 TABLET ORAL DAILY
Qty: 30 TABLET | Refills: 5 | Status: SHIPPED | OUTPATIENT
Start: 2019-09-12 | End: 2020-02-14

## 2019-09-13 ENCOUNTER — TELEPHONE (OUTPATIENT)
Dept: PODIATRY | Facility: CLINIC | Age: 84
End: 2019-09-13

## 2019-09-13 NOTE — TELEPHONE ENCOUNTER
Called and spoke with Kimberly at [facility] about PT orders. She state patient was discharge from PT on 9-8-2019 and Dr Black orders was written on 9-4-2019 new orders if patient still need PT.  I explained  To her I will talk with Dr Black and get back with her on Monday. She communicated understanding.               ----- Message from Dorina Cook sent at 9/12/2019  3:23 PM CDT -----  Contact: Kimberly with [facility]   Name of Who is Calling: Kimberly with [facility]       What is the request in detail:Kimberly would like to speak with staff regarding request for home health. Please call to discuss       Can the clinic reply by [facility]: no    What Number to Call Back if not in [facility]: [phone]

## 2019-09-13 NOTE — TELEPHONE ENCOUNTER
Matter being resloved.      ----- Message from Kendell Julian sent at 9/13/2019 10:51 AM CDT -----  Contact: Name of Who is Calling: Kimberly with [facility]   Name of Who is Calling: Kimberly with [facility]       What is the request in detail:Kimberly would like to speak with staff regarding request for home health. Please call to discuss       Can the clinic reply by [facility]: no     What Number to Call Back if not in [facility]: [phone]

## 2019-09-16 DIAGNOSIS — R29.898 LEG WEAKNESS, BILATERAL: ICD-10-CM

## 2019-09-16 DIAGNOSIS — M48.062 SPINAL STENOSIS, LUMBAR REGION, WITH NEUROGENIC CLAUDICATION: ICD-10-CM

## 2019-09-16 DIAGNOSIS — M51.37 DEGENERATION OF LUMBAR OR LUMBOSACRAL INTERVERTEBRAL DISC: Primary | ICD-10-CM

## 2019-09-23 DIAGNOSIS — F05 SUNDOWNING: ICD-10-CM

## 2019-09-23 DIAGNOSIS — G47.00 INSOMNIA, UNSPECIFIED TYPE: ICD-10-CM

## 2019-09-23 RX ORDER — QUETIAPINE FUMARATE 25 MG/1
TABLET, FILM COATED ORAL
Qty: 45 TABLET | Refills: 1 | Status: SHIPPED | OUTPATIENT
Start: 2019-09-23 | End: 2019-12-10 | Stop reason: SDUPTHER

## 2019-09-28 PROCEDURE — G0180 PR HOME HEALTH MD CERTIFICATION: ICD-10-PCS | Mod: ,,, | Performed by: PODIATRIST

## 2019-09-28 PROCEDURE — G0180 MD CERTIFICATION HHA PATIENT: HCPCS | Mod: ,,, | Performed by: PODIATRIST

## 2019-09-30 ENCOUNTER — TELEPHONE (OUTPATIENT)
Dept: GASTROENTEROLOGY | Facility: CLINIC | Age: 84
End: 2019-09-30

## 2019-09-30 NOTE — TELEPHONE ENCOUNTER
Mrs. Lomeli/pt.'s daughter is requesting an prescription for Proctosol to be sent to the pharmacy on file.

## 2019-10-01 RX ORDER — MESALAMINE 375 MG/1
CAPSULE, EXTENDED RELEASE ORAL
Qty: 360 CAPSULE | Refills: 1 | Status: SHIPPED | OUTPATIENT
Start: 2019-10-01 | End: 2020-01-02 | Stop reason: SDUPTHER

## 2019-10-01 RX ORDER — HYDROCORTISONE 25 MG/G
CREAM TOPICAL 2 TIMES DAILY
Qty: 30 G | Refills: 0 | Status: SHIPPED | OUTPATIENT
Start: 2019-10-01 | End: 2019-10-31

## 2019-10-15 ENCOUNTER — TELEPHONE (OUTPATIENT)
Dept: PULMONOLOGY | Facility: CLINIC | Age: 84
End: 2019-10-15

## 2019-10-15 NOTE — TELEPHONE ENCOUNTER
----- Message from Carolyn He sent at 10/15/2019  1:26 PM CDT -----  Contact: pt's daughter  Yani   Daughter ph [phone]     Pt needs a work in  Or urgent appt    Daughter said his  Condition is getting worse  He takes a few steps and can barely breath.    Please call with a work in   Thanks

## 2019-10-15 NOTE — TELEPHONE ENCOUNTER
Spoke with patient daughter, informed her that I have received her message. Patient daughter advise me that patient is having some serious breathing problems. Patient daughter states that patient only take about (4) states and he is going out of breath. Patient daughter is requesting patient appointment with Dr. George, I advised patient daughter that Dr. George does not have any available appointments at this time, however I can schedule patient with another physician. Patient daughter states that patient will f/u with his Primary Care Physician but she wants me to forward this message to Dr. George to review/advise. I verbalized to patient daughter that I understand and will forward her message to Dr. George to review/advise. Patient daughter verbalized that she understand.

## 2019-10-17 ENCOUNTER — EXTERNAL HOME HEALTH (OUTPATIENT)
Dept: HOME HEALTH SERVICES | Facility: HOSPITAL | Age: 84
End: 2019-10-17
Payer: MEDICARE

## 2019-10-18 RX ORDER — DICYCLOMINE HYDROCHLORIDE 10 MG/1
10 CAPSULE ORAL 3 TIMES DAILY PRN
Qty: 60 CAPSULE | Refills: 2 | Status: SHIPPED | OUTPATIENT
Start: 2019-10-18 | End: 2020-01-03

## 2019-10-21 ENCOUNTER — LAB VISIT (OUTPATIENT)
Dept: LAB | Facility: HOSPITAL | Age: 84
End: 2019-10-21
Attending: INTERNAL MEDICINE
Payer: MEDICARE

## 2019-10-21 DIAGNOSIS — E78.5 DYSLIPIDEMIA: ICD-10-CM

## 2019-10-21 DIAGNOSIS — I10 ESSENTIAL HYPERTENSION: ICD-10-CM

## 2019-10-21 LAB
ALBUMIN SERPL BCP-MCNC: 3.3 G/DL (ref 3.5–5.2)
ALP SERPL-CCNC: 64 U/L (ref 55–135)
ALT SERPL W/O P-5'-P-CCNC: <5 U/L (ref 10–44)
ANION GAP SERPL CALC-SCNC: 12 MMOL/L (ref 8–16)
AST SERPL-CCNC: 8 U/L (ref 10–40)
BILIRUB SERPL-MCNC: 0.8 MG/DL (ref 0.1–1)
BUN SERPL-MCNC: 21 MG/DL (ref 8–23)
CALCIUM SERPL-MCNC: 8.7 MG/DL (ref 8.7–10.5)
CHLORIDE SERPL-SCNC: 104 MMOL/L (ref 95–110)
CHOLEST SERPL-MCNC: 225 MG/DL (ref 120–199)
CHOLEST/HDLC SERPL: 3.7 {RATIO} (ref 2–5)
CO2 SERPL-SCNC: 27 MMOL/L (ref 23–29)
CREAT SERPL-MCNC: 1.7 MG/DL (ref 0.5–1.4)
EST. GFR  (AFRICAN AMERICAN): 40.4 ML/MIN/1.73 M^2
EST. GFR  (NON AFRICAN AMERICAN): 35 ML/MIN/1.73 M^2
GLUCOSE SERPL-MCNC: 82 MG/DL (ref 70–110)
HDLC SERPL-MCNC: 61 MG/DL (ref 40–75)
HDLC SERPL: 27.1 % (ref 20–50)
LDLC SERPL CALC-MCNC: 145.2 MG/DL (ref 63–159)
NONHDLC SERPL-MCNC: 164 MG/DL
POTASSIUM SERPL-SCNC: 4.1 MMOL/L (ref 3.5–5.1)
PROT SERPL-MCNC: 6.4 G/DL (ref 6–8.4)
SODIUM SERPL-SCNC: 143 MMOL/L (ref 136–145)
TRIGL SERPL-MCNC: 94 MG/DL (ref 30–150)
TSH SERPL DL<=0.005 MIU/L-ACNC: 2.98 UIU/ML (ref 0.4–4)

## 2019-10-21 PROCEDURE — 36415 COLL VENOUS BLD VENIPUNCTURE: CPT | Mod: PO

## 2019-10-21 PROCEDURE — 80053 COMPREHEN METABOLIC PANEL: CPT

## 2019-10-21 PROCEDURE — 84443 ASSAY THYROID STIM HORMONE: CPT

## 2019-10-21 PROCEDURE — 80061 LIPID PANEL: CPT

## 2019-10-22 ENCOUNTER — TELEPHONE (OUTPATIENT)
Dept: CARDIOLOGY | Facility: CLINIC | Age: 84
End: 2019-10-22

## 2019-10-22 NOTE — TELEPHONE ENCOUNTER
----- Message from Rody Morrison MD sent at 10/22/2019  8:00 AM CDT -----  Please review.  We will discuss the results during your upcoming visit with me. It  Is not much changed from the last one

## 2019-10-23 ENCOUNTER — PATIENT OUTREACH (OUTPATIENT)
Dept: ADMINISTRATIVE | Facility: OTHER | Age: 84
End: 2019-10-23

## 2019-10-25 ENCOUNTER — PATIENT OUTREACH (OUTPATIENT)
Dept: ADMINISTRATIVE | Facility: HOSPITAL | Age: 84
End: 2019-10-25

## 2019-10-28 ENCOUNTER — OFFICE VISIT (OUTPATIENT)
Dept: CARDIOLOGY | Facility: CLINIC | Age: 84
End: 2019-10-28
Payer: MEDICARE

## 2019-10-28 VITALS
WEIGHT: 147.38 LBS | SYSTOLIC BLOOD PRESSURE: 96 MMHG | HEART RATE: 69 BPM | HEIGHT: 71 IN | DIASTOLIC BLOOD PRESSURE: 57 MMHG | BODY MASS INDEX: 20.63 KG/M2

## 2019-10-28 DIAGNOSIS — G90.9 AUTONOMIC DYSFUNCTION: ICD-10-CM

## 2019-10-28 DIAGNOSIS — N18.30 CKD (CHRONIC KIDNEY DISEASE), STAGE III: Chronic | ICD-10-CM

## 2019-10-28 DIAGNOSIS — I10 ESSENTIAL HYPERTENSION: Primary | ICD-10-CM

## 2019-10-28 DIAGNOSIS — I82.501 CHRONIC DEEP VEIN THROMBOSIS (DVT) OF RIGHT LOWER EXTREMITY, UNSPECIFIED VEIN: ICD-10-CM

## 2019-10-28 DIAGNOSIS — G20.A1 PARKINSON DISEASE: ICD-10-CM

## 2019-10-28 DIAGNOSIS — J84.9 ILD (INTERSTITIAL LUNG DISEASE): ICD-10-CM

## 2019-10-28 DIAGNOSIS — R00.1 BRADYCARDIA: ICD-10-CM

## 2019-10-28 DIAGNOSIS — E78.5 DYSLIPIDEMIA: ICD-10-CM

## 2019-10-28 PROCEDURE — 99213 PR OFFICE/OUTPT VISIT, EST, LEVL III, 20-29 MIN: ICD-10-PCS | Mod: S$GLB,,, | Performed by: INTERNAL MEDICINE

## 2019-10-28 PROCEDURE — 99499 UNLISTED E&M SERVICE: CPT | Mod: S$GLB,,, | Performed by: INTERNAL MEDICINE

## 2019-10-28 PROCEDURE — 99213 OFFICE O/P EST LOW 20 MIN: CPT | Mod: S$GLB,,, | Performed by: INTERNAL MEDICINE

## 2019-10-28 PROCEDURE — 99999 PR PBB SHADOW E&M-EST. PATIENT-LVL III: CPT | Mod: PBBFAC,,, | Performed by: INTERNAL MEDICINE

## 2019-10-28 PROCEDURE — 1101F PT FALLS ASSESS-DOCD LE1/YR: CPT | Mod: CPTII,S$GLB,, | Performed by: INTERNAL MEDICINE

## 2019-10-28 PROCEDURE — 99999 PR PBB SHADOW E&M-EST. PATIENT-LVL III: ICD-10-PCS | Mod: PBBFAC,,, | Performed by: INTERNAL MEDICINE

## 2019-10-28 PROCEDURE — 1101F PR PT FALLS ASSESS DOC 0-1 FALLS W/OUT INJ PAST YR: ICD-10-PCS | Mod: CPTII,S$GLB,, | Performed by: INTERNAL MEDICINE

## 2019-10-28 PROCEDURE — 99499 RISK ADDL DX/OHS AUDIT: ICD-10-PCS | Mod: S$GLB,,, | Performed by: INTERNAL MEDICINE

## 2019-10-28 RX ORDER — POLYETHYLENE GLYCOL 3350 17 G/17G
POWDER, FOR SOLUTION ORAL
COMMUNITY

## 2019-10-28 RX ORDER — BUDESONIDE 3 MG/1
3 CAPSULE, COATED PELLETS ORAL DAILY
COMMUNITY
End: 2019-11-25 | Stop reason: SDUPTHER

## 2019-10-28 RX ORDER — ECONAZOLE NITRATE 10 MG/G
CREAM TOPICAL DAILY PRN
COMMUNITY
End: 2020-01-03

## 2019-10-28 RX ORDER — IBUPROFEN 200 MG
1 TABLET ORAL DAILY PRN
COMMUNITY

## 2019-10-28 NOTE — PROGRESS NOTES
"Subjective:   Patient ID:  Phi Sainz is a 89 y.o. male who presents for follow-up of Hypertension      HPI: BP fluctuates and now is predominantly elevated. Midodrine was stopped and patient was started on a low dose Amlodipine. It is held when BP is low. He is without complaints today. Participates in home physical therapy. In addition chair exercises with the sitter.    Lab Results   Component Value Date     10/21/2019    K 4.1 10/21/2019     10/21/2019    CO2 27 10/21/2019    BUN 21 10/21/2019    CREATININE 1.7 (H) 10/21/2019    GLU 82 10/21/2019    AST 8 (L) 10/21/2019    ALT <5 (L) 10/21/2019    ALBUMIN 3.3 (L) 10/21/2019    PROT 6.4 10/21/2019    BILITOT 0.8 10/21/2019    WBC 8.01 07/04/2019    HGB 10.3 (L) 07/04/2019    HCT 32.9 (L) 07/04/2019    MCV 93 07/04/2019     07/04/2019    PSA 0.82 05/03/2016    TSH 2.975 10/21/2019    CHOL 225 (H) 10/21/2019    HDL 61 10/21/2019    LDLCALC 145.2 10/21/2019    TRIG 94 10/21/2019       Review of Systems   Constitution: Positive for malaise/fatigue.   HENT: Negative.    Eyes: Negative.    Cardiovascular: Negative.  Negative for chest pain, claudication, dyspnea on exertion, irregular heartbeat, leg swelling, near-syncope, palpitations and syncope.   Respiratory: Negative.  Negative for cough, shortness of breath, snoring and wheezing.    Endocrine: Negative.  Negative for cold intolerance, heat intolerance, polydipsia, polyphagia and polyuria.   Skin: Negative.    Musculoskeletal: Positive for back pain and falls.   Gastrointestinal: Positive for abdominal pain, constipation and diarrhea.   Genitourinary: Negative.    Neurological: Positive for excessive daytime sleepiness and weakness.   Psychiatric/Behavioral: Negative.        Objective:   Physical Exam   Constitutional: He is oriented to person, place, and time. He appears well-developed and well-nourished.   BP (!) 96/57   Pulse 69   Ht 5' 11" (1.803 m)   Wt 66.8 kg (147 lb 6 oz)  "  BMI 20.55 kg/m²   Frail, wheel chair bound   HENT:   Head: Normocephalic.   Eyes: Pupils are equal, round, and reactive to light.   Neck: Normal range of motion. Neck supple. Carotid bruit is not present. No thyromegaly present.   Cardiovascular: Normal rate, regular rhythm, normal heart sounds and intact distal pulses. Exam reveals no gallop and no friction rub.   No murmur heard.  Pulses:       Carotid pulses are 2+ on the right side, and 2+ on the left side.       Radial pulses are 2+ on the right side, and 2+ on the left side.        Femoral pulses are 2+ on the right side, and 2+ on the left side.       Popliteal pulses are 2+ on the right side, and 2+ on the left side.        Dorsalis pedis pulses are 2+ on the right side, and 2+ on the left side.        Posterior tibial pulses are 2+ on the right side, and 2+ on the left side.   Pulmonary/Chest: Effort normal and breath sounds normal. No respiratory distress. He has no wheezes. He has no rales. He exhibits no tenderness.   Abdominal: Soft. Bowel sounds are normal.   Musculoskeletal: Normal range of motion. He exhibits no edema.   Neurological: He is alert and oriented to person, place, and time.   Skin: Skin is warm and dry.   Psychiatric: He has a normal mood and affect.   Nursing note and vitals reviewed.        Assessment and Plan:     Problem List Items Addressed This Visit        Cardiology Problems    Hypertension - Primary    Chronic deep vein thrombosis (DVT)       Other    CKD (chronic kidney disease), stage III (Chronic)    Dyslipidemia    Bradycardia    ILD (interstitial lung disease)    Parkinson disease    Autonomic dysfunction          Patient's Medications   New Prescriptions    No medications on file   Previous Medications    ACETAMINOPHEN (TYLENOL EXTRA STRENGTH) 500 MG TABLET    Take 500 mg by mouth every 6 (six) hours as needed for Pain.    AMLODIPINE (NORVASC) 5 MG TABLET    Take 1 tablet (5 mg total) by mouth once daily.    APRISO 0.375  GRAM CP24    TAKE 4 CAPSULES (1.5 G TOTAL) BY MOUTH ONCE DAILY. PT TAKING 4 PILLS IN AM.    BUDESONIDE (ENTOCORT EC) 3 MG CAPSULE    Take 3 capsules by mouth once daily.    CARBIDOPA-LEVODOPA  MG (SINEMET)  MG PER TABLET    Take 1 tablet three times daily.    DIAZEPAM (VALIUM) 2 MG TABLET    TAKE 1 TABLET BY MOUTH EVERY DAY AS NEEDED    DICYCLOMINE (BENTYL) 10 MG CAPSULE    Take 1 capsule (10 mg total) by mouth 3 (three) times daily as needed (abdominal pain).    DIPHENOXYLATE-ATROPINE 2.5-0.025 MG (LOMOTIL) 2.5-0.025 MG PER TABLET    Take 1 tablet by mouth 2 (two) times daily.    DONEPEZIL (ARICEPT) 5 MG TABLET    Take 1 tablet (5 mg total) by mouth every evening.    ECONAZOLE NITRATE 1 % CREAM    Apply topically daily as needed.    GABAPENTIN (NEURONTIN) 100 MG CAPSULE    Take 300 mg by mouth 2 (two) times daily. Pt taking one pill twice a day.    HYDROCODONE-ACETAMINOPHEN (NORCO) 5-325 MG PER TABLET    TK 1 T PO Q 8 H PRN    HYDROCORTISONE (ANUSOL-HC) 2.5 % RECTAL CREAM    Place rectally 2 (two) times daily.    IBUPROFEN (ADVIL) 200 MG TABLET    Take 1 tablet by mouth daily as needed.    POLYETHYLENE GLYCOL (MIRALAX) 17 GRAM PWPK    Take by mouth as needed.    QUETIAPINE (SEROQUEL) 25 MG TAB    GIVE 1-1/2 TABLET ONE TO TWO HOURS BEFORE SUNDOWNING.    RASAGILINE (AZILECT) 1 MG TAB    Take 1 tablet (1 mg total) by mouth once daily.   Modified Medications    No medications on file   Discontinued Medications    No medications on file   No change in the medical therapy.  Proper hydration encouraged.    Follow up in about 6 months (around 4/28/2020).

## 2019-11-03 RX ORDER — ONDANSETRON 4 MG/1
TABLET, FILM COATED ORAL
Qty: 28 TABLET | Refills: 0 | Status: SHIPPED | OUTPATIENT
Start: 2019-11-03 | End: 2020-06-18 | Stop reason: SDUPTHER

## 2019-11-06 ENCOUNTER — PATIENT OUTREACH (OUTPATIENT)
Dept: ADMINISTRATIVE | Facility: OTHER | Age: 84
End: 2019-11-06

## 2019-11-07 ENCOUNTER — OFFICE VISIT (OUTPATIENT)
Dept: UROLOGY | Facility: CLINIC | Age: 84
End: 2019-11-07
Payer: MEDICARE

## 2019-11-07 VITALS — SYSTOLIC BLOOD PRESSURE: 92 MMHG | DIASTOLIC BLOOD PRESSURE: 57 MMHG | HEART RATE: 66 BPM

## 2019-11-07 DIAGNOSIS — K64.9 HEMORRHOIDS, UNSPECIFIED HEMORRHOID TYPE: ICD-10-CM

## 2019-11-07 DIAGNOSIS — R31.0 GROSS HEMATURIA: ICD-10-CM

## 2019-11-07 DIAGNOSIS — R35.0 URINE FREQUENCY: ICD-10-CM

## 2019-11-07 DIAGNOSIS — R39.15 URINARY URGENCY: ICD-10-CM

## 2019-11-07 DIAGNOSIS — R30.0 DYSURIA: Primary | ICD-10-CM

## 2019-11-07 PROCEDURE — 99214 OFFICE O/P EST MOD 30 MIN: CPT | Mod: 25,S$GLB,, | Performed by: STUDENT IN AN ORGANIZED HEALTH CARE EDUCATION/TRAINING PROGRAM

## 2019-11-07 PROCEDURE — 99999 PR PBB SHADOW E&M-EST. PATIENT-LVL III: CPT | Mod: PBBFAC,,, | Performed by: STUDENT IN AN ORGANIZED HEALTH CARE EDUCATION/TRAINING PROGRAM

## 2019-11-07 PROCEDURE — 1101F PR PT FALLS ASSESS DOC 0-1 FALLS W/OUT INJ PAST YR: ICD-10-PCS | Mod: CPTII,S$GLB,, | Performed by: STUDENT IN AN ORGANIZED HEALTH CARE EDUCATION/TRAINING PROGRAM

## 2019-11-07 PROCEDURE — 51700 IRRIGATION OF BLADDER: CPT | Mod: S$GLB,,, | Performed by: STUDENT IN AN ORGANIZED HEALTH CARE EDUCATION/TRAINING PROGRAM

## 2019-11-07 PROCEDURE — 99999 PR PBB SHADOW E&M-EST. PATIENT-LVL III: ICD-10-PCS | Mod: PBBFAC,,, | Performed by: STUDENT IN AN ORGANIZED HEALTH CARE EDUCATION/TRAINING PROGRAM

## 2019-11-07 PROCEDURE — 1101F PT FALLS ASSESS-DOCD LE1/YR: CPT | Mod: CPTII,S$GLB,, | Performed by: STUDENT IN AN ORGANIZED HEALTH CARE EDUCATION/TRAINING PROGRAM

## 2019-11-07 PROCEDURE — 51700 PR IRRIGATION, BLADDER: ICD-10-PCS | Mod: S$GLB,,, | Performed by: STUDENT IN AN ORGANIZED HEALTH CARE EDUCATION/TRAINING PROGRAM

## 2019-11-07 PROCEDURE — 99214 PR OFFICE/OUTPT VISIT, EST, LEVL IV, 30-39 MIN: ICD-10-PCS | Mod: 25,S$GLB,, | Performed by: STUDENT IN AN ORGANIZED HEALTH CARE EDUCATION/TRAINING PROGRAM

## 2019-11-07 PROCEDURE — 87086 URINE CULTURE/COLONY COUNT: CPT

## 2019-11-07 NOTE — PROGRESS NOTES
"Subjective:       Patient ID: Phi Sainz is a 89 y.o. male.    Chief Complaint: Hematuria and Urinary Frequency  This is a 89 y.o.  male patient that is an established patient of [facility].  The patient is self referred to me for possible prostate issues. He has had hematuria in the past before (see previous notes about possible urothelial malignancy that he and the family do not want additional workup for). He experienced hematuria again recently with clot passage. The patient notes "burning" but his daughter and caretaker Camilo who are with him today state he has been having issues with burning pain from hemorrhoids recently so it is possible he is referring to that. He does have a history of BPH but cannot take flomax/finasteride due to low BP. In addition to hematuria, he also is experiencing urinary urgency and frequency. Sometimes after he is brought to the toilet he does not have to actually urinate.  He could not give us a urine sample today.      LAST PSA  Lab Results   Component Value Date    PSA 0.82 05/03/2016    PSA 0.93 03/17/2015    PSA 0.86 03/13/2014    PSA 0.96 02/29/2012    PSA 1.1 02/24/2011    PSA 0.75 02/24/2010    PSA 0.91 02/20/2009    PSA 0.5 02/18/2008    PSA 0.5 02/14/2007    PSA 0.4 01/25/2006    PSA 0.5 01/13/2005    PSA 0.4 01/29/2004    PSATOTAL 0.93 03/05/2013    PSAFREE 0.46 03/05/2013       Lab Results   Component Value Date    CREATININE 1.7 (H) 10/21/2019    ---  Past Medical History:   Diagnosis Date    Allergy     Anemia     Arthritis     Basal cell carcinoma     Depression     Herniation of intervertebral disc     Hyperlipidemia     Hypertension 3/4/2015    Macular degeneration     Parkinson disease     Spinal stenosis        Past Surgical History:   Procedure Laterality Date    CATARACT EXTRACTION W/  INTRAOCULAR LENS IMPLANT Left 10/31/07    Dr. Nunez    CATARACT EXTRACTION W/  INTRAOCULAR LENS IMPLANT Right 12/16/15    Dr. Nunez       Family History "   Problem Relation Age of Onset    Cancer Father         prostate    Colon cancer Father     Hearing loss Paternal Aunt     Amblyopia Neg Hx     Blindness Neg Hx     Cataracts Neg Hx     Diabetes Neg Hx     Glaucoma Neg Hx     Hypertension Neg Hx     Macular degeneration Neg Hx     Retinal detachment Neg Hx     Strabismus Neg Hx     Stroke Neg Hx     Thyroid disease Neg Hx     Heart attack Neg Hx     Heart disease Neg Hx     Heart failure Neg Hx     Hyperlipidemia Neg Hx     Melanoma Neg Hx        Social History     Tobacco Use    Smoking status: Never Smoker    Smokeless tobacco: Never Used   Substance Use Topics    Alcohol use: No     Alcohol/week: 0.0 standard drinks     Frequency: Never     Binge frequency: Never    Drug use: No       Current Outpatient Medications on File Prior to Visit   Medication Sig Dispense Refill    acetaminophen (TYLENOL EXTRA STRENGTH) 500 MG tablet Take 500 mg by mouth every 6 (six) hours as needed for Pain.      amLODIPine (NORVASC) 5 MG tablet Take 1 tablet (5 mg total) by mouth once daily. 30 tablet 6    APRISO 0.375 gram Cp24 TAKE 4 CAPSULES (1.5 G TOTAL) BY MOUTH ONCE DAILY. PT TAKING 4 PILLS IN AM. 360 capsule 1    budesonide (ENTOCORT EC) 3 mg capsule Take 3 capsules by mouth once daily.      carbidopa-levodopa  mg (SINEMET)  mg per tablet Take 1 tablet three times daily. 90 tablet 11    diazePAM (VALIUM) 2 MG tablet TAKE 1 TABLET BY MOUTH EVERY DAY AS NEEDED 30 tablet 3    dicyclomine (BENTYL) 10 MG capsule Take 1 capsule (10 mg total) by mouth 3 (three) times daily as needed (abdominal pain). 60 capsule 2    diphenoxylate-atropine 2.5-0.025 mg (LOMOTIL) 2.5-0.025 mg per tablet Take 1 tablet by mouth 2 (two) times daily. 100 tablet 2    donepezil (ARICEPT) 5 MG tablet Take 1 tablet (5 mg total) by mouth every evening. 30 tablet 11    econazole nitrate 1 % cream Apply topically daily as needed.      gabapentin (NEURONTIN) 100 MG  capsule Take 300 mg by mouth 2 (two) times daily. Pt taking one pill twice a day.      HYDROcodone-acetaminophen (NORCO) 5-325 mg per tablet TK 1 T PO Q 8 H PRN  0    ondansetron (ZOFRAN) 4 MG tablet TAKE 1 TABLET BY MOUTH EVERY 8 HOURS AS NEEDED FOR NAUSEA 28 tablet 0    polyethylene glycol (MIRALAX) 17 gram PwPk Take by mouth as needed.      QUEtiapine (SEROQUEL) 25 MG Tab GIVE 1-1/2 TABLET ONE TO TWO HOURS BEFORE SUNDOWNING. 45 tablet 1    rasagiline (AZILECT) 1 mg Tab Take 1 tablet (1 mg total) by mouth once daily. 30 tablet 5    ibuprofen (ADVIL) 200 MG tablet Take 1 tablet by mouth daily as needed.       No current facility-administered medications on file prior to visit.        Review of patient's allergies indicates:   Allergen Reactions    Combigan [brimonidine-timolol]      Made him dizziness    Contrast media Rash       Review of Systems   Constitutional: Negative for chills.   HENT: Negative for congestion.    Eyes: Negative for visual disturbance.   Respiratory: Negative for shortness of breath.    Cardiovascular: Negative for chest pain.   Gastrointestinal: Negative for abdominal distention.   Musculoskeletal: Negative for gait problem.   Skin: Negative for color change.   Neurological: Negative for dizziness.   Psychiatric/Behavioral: Negative for agitation.       Objective:      Physical Exam   Constitutional: He appears well-developed and well-nourished.   HENT:   Head: Normocephalic.   Eyes: Pupils are equal, round, and reactive to light.   Neck: Normal range of motion.   Cardiovascular: Intact distal pulses.   Pulmonary/Chest: Effort normal.   Abdominal: Soft. He exhibits no distension (nonpalpable bladder). There is no tenderness.   Genitourinary:   Genitourinary Comments: 20 french coude inserted, inflated with 10cc of sterile saline. Urine drained was light yellow, collected and sent for culture.  The bladder was completely drained of urine, total possibly about 50c - patient not in  urinary retention.  I irrigated the bladder twice with 60cc of sterile saline and no clots returned, only clear yellow light urine. Therefore the boyer catheter was removed.   Musculoskeletal: Normal range of motion.   Neurological: He is alert.   Skin: Skin is warm and dry.   Psychiatric: He has a normal mood and affect.       Assessment:       1. Dysuria    2. Gross hematuria    3. Urine frequency    4. Urinary urgency    5. Hemorrhoids, unspecified hemorrhoid type        Plan:       1. Dysuria, urinary urgency/frequency and gross hematuria. Offered boyer catheter placement to obtain urine sample for culture (as patient could not provide sample today in clinic) and irrigate to ensure no retained clots. Patient accepted.  2. 20 french placed, drained yellow urine which was sent for culture. Irrigation x2 with sterile saline did not demonstrate any clots. Therefore the boyer was removed.  3. Patient cannot tolerate BPH medications.  4. Offered empiric antibiotics for possible UTI now patient's caretakers would rather wait for culture results as he is very weak and sensitive to medication changes.  5. Will call with urine culture results.  6. Preparation H sent to pharmacy for hemorrhoids per patient's daughter's request.    Dysuria  -     Urine culture    Gross hematuria    Urine frequency    Urinary urgency    Hemorrhoids, unspecified hemorrhoid type    Other orders  -     [id number] (PREPARATION H MAXIMUM STRENGTH) 0.25-1 % Crea; Place 1 application rectally daily as needed (rectal pain).  Dispense: 1 Tube; Refill: 11

## 2019-11-08 ENCOUNTER — OFFICE VISIT (OUTPATIENT)
Dept: INTERNAL MEDICINE | Facility: CLINIC | Age: 84
End: 2019-11-08
Payer: MEDICARE

## 2019-11-08 VITALS
DIASTOLIC BLOOD PRESSURE: 62 MMHG | HEART RATE: 62 BPM | HEIGHT: 71 IN | RESPIRATION RATE: 18 BRPM | SYSTOLIC BLOOD PRESSURE: 105 MMHG | BODY MASS INDEX: 20.96 KG/M2 | WEIGHT: 149.69 LBS | TEMPERATURE: 97 F

## 2019-11-08 DIAGNOSIS — G20.A1 PARKINSON DISEASE: Primary | ICD-10-CM

## 2019-11-08 DIAGNOSIS — R26.9 GAIT ABNORMALITY: ICD-10-CM

## 2019-11-08 DIAGNOSIS — I10 ESSENTIAL HYPERTENSION: ICD-10-CM

## 2019-11-08 LAB — BACTERIA UR CULT: NO GROWTH

## 2019-11-08 PROCEDURE — 90662 FLU VACCINE - HIGH DOSE (65+) PRESERVATIVE FREE IM: ICD-10-PCS | Mod: S$GLB,,, | Performed by: INTERNAL MEDICINE

## 2019-11-08 PROCEDURE — 90662 IIV NO PRSV INCREASED AG IM: CPT | Mod: S$GLB,,, | Performed by: INTERNAL MEDICINE

## 2019-11-08 PROCEDURE — 99999 PR PBB SHADOW E&M-EST. PATIENT-LVL III: CPT | Mod: PBBFAC,,, | Performed by: INTERNAL MEDICINE

## 2019-11-08 PROCEDURE — 1101F PR PT FALLS ASSESS DOC 0-1 FALLS W/OUT INJ PAST YR: ICD-10-PCS | Mod: CPTII,S$GLB,, | Performed by: INTERNAL MEDICINE

## 2019-11-08 PROCEDURE — G0008 FLU VACCINE - HIGH DOSE (65+) PRESERVATIVE FREE IM: ICD-10-PCS | Mod: S$GLB,,, | Performed by: INTERNAL MEDICINE

## 2019-11-08 PROCEDURE — 1159F MED LIST DOCD IN RCRD: CPT | Mod: S$GLB,,, | Performed by: INTERNAL MEDICINE

## 2019-11-08 PROCEDURE — 1126F PR PAIN SEVERITY QUANTIFIED, NO PAIN PRESENT: ICD-10-PCS | Mod: S$GLB,,, | Performed by: INTERNAL MEDICINE

## 2019-11-08 PROCEDURE — 1126F AMNT PAIN NOTED NONE PRSNT: CPT | Mod: S$GLB,,, | Performed by: INTERNAL MEDICINE

## 2019-11-08 PROCEDURE — 1101F PT FALLS ASSESS-DOCD LE1/YR: CPT | Mod: CPTII,S$GLB,, | Performed by: INTERNAL MEDICINE

## 2019-11-08 PROCEDURE — 99213 PR OFFICE/OUTPT VISIT, EST, LEVL III, 20-29 MIN: ICD-10-PCS | Mod: 25,S$GLB,, | Performed by: INTERNAL MEDICINE

## 2019-11-08 PROCEDURE — 1159F PR MEDICATION LIST DOCUMENTED IN MEDICAL RECORD: ICD-10-PCS | Mod: S$GLB,,, | Performed by: INTERNAL MEDICINE

## 2019-11-08 PROCEDURE — G0008 ADMIN INFLUENZA VIRUS VAC: HCPCS | Mod: S$GLB,,, | Performed by: INTERNAL MEDICINE

## 2019-11-08 PROCEDURE — 99999 PR PBB SHADOW E&M-EST. PATIENT-LVL III: ICD-10-PCS | Mod: PBBFAC,,, | Performed by: INTERNAL MEDICINE

## 2019-11-08 PROCEDURE — 99213 OFFICE O/P EST LOW 20 MIN: CPT | Mod: 25,S$GLB,, | Performed by: INTERNAL MEDICINE

## 2019-11-08 RX ORDER — HYDROCORTISONE 25 MG/G
CREAM TOPICAL 2 TIMES DAILY
Qty: 20 G | Refills: 1 | Status: SHIPPED | OUTPATIENT
Start: 2019-11-08 | End: 2020-01-03

## 2019-11-11 ENCOUNTER — TELEPHONE (OUTPATIENT)
Dept: INTERNAL MEDICINE | Facility: CLINIC | Age: 84
End: 2019-11-11

## 2019-11-11 ENCOUNTER — PATIENT MESSAGE (OUTPATIENT)
Dept: INTERNAL MEDICINE | Facility: CLINIC | Age: 84
End: 2019-11-11

## 2019-11-11 ENCOUNTER — PATIENT MESSAGE (OUTPATIENT)
Dept: UROLOGY | Facility: CLINIC | Age: 84
End: 2019-11-11

## 2019-11-11 DIAGNOSIS — K64.9 HEMORRHOIDS, UNSPECIFIED HEMORRHOID TYPE: Primary | ICD-10-CM

## 2019-11-12 ENCOUNTER — TELEPHONE (OUTPATIENT)
Dept: INTERNAL MEDICINE | Facility: CLINIC | Age: 84
End: 2019-11-12

## 2019-11-14 ENCOUNTER — HOSPITAL ENCOUNTER (EMERGENCY)
Facility: HOSPITAL | Age: 84
Discharge: HOME | End: 2019-11-14
Attending: EMERGENCY MEDICINE
Payer: MEDICARE

## 2019-11-14 VITALS
HEIGHT: 71 IN | WEIGHT: 150 LBS | BODY MASS INDEX: 21 KG/M2 | HEART RATE: 18 BPM | OXYGEN SATURATION: 99 % | TEMPERATURE: 98 F | SYSTOLIC BLOOD PRESSURE: 176 MMHG | RESPIRATION RATE: 20 BRPM | DIASTOLIC BLOOD PRESSURE: 82 MMHG

## 2019-11-14 DIAGNOSIS — R06.02 SHORTNESS OF BREATH: ICD-10-CM

## 2019-11-14 LAB
ALBUMIN SERPL BCP-MCNC: 3.4 G/DL (ref 3.5–5.2)
ALP SERPL-CCNC: 67 U/L (ref 55–135)
ALT SERPL W/O P-5'-P-CCNC: <5 U/L (ref 10–44)
ANION GAP SERPL CALC-SCNC: 8 MMOL/L (ref 8–16)
AST SERPL-CCNC: 9 U/L (ref 10–40)
BACTERIA #/AREA URNS HPF: ABNORMAL /HPF
BASOPHILS # BLD AUTO: 0.01 K/UL (ref 0–0.2)
BASOPHILS NFR BLD: 0.1 % (ref 0–1.9)
BILIRUB SERPL-MCNC: 1 MG/DL (ref 0.1–1)
BILIRUB UR QL STRIP: NEGATIVE
BNP SERPL-MCNC: 103 PG/ML (ref 0–99)
BUN SERPL-MCNC: 19 MG/DL (ref 8–23)
CALCIUM SERPL-MCNC: 8.8 MG/DL (ref 8.7–10.5)
CHLORIDE SERPL-SCNC: 103 MMOL/L (ref 95–110)
CK SERPL-CCNC: 22 U/L (ref 20–200)
CLARITY UR: CLEAR
CO2 SERPL-SCNC: 31 MMOL/L (ref 23–29)
COLOR UR: ABNORMAL
CREAT SERPL-MCNC: 1.7 MG/DL (ref 0.5–1.4)
DIFFERENTIAL METHOD: ABNORMAL
EOSINOPHIL # BLD AUTO: 0 K/UL (ref 0–0.5)
EOSINOPHIL NFR BLD: 0.4 % (ref 0–8)
ERYTHROCYTE [DISTWIDTH] IN BLOOD BY AUTOMATED COUNT: 13.8 % (ref 11.5–14.5)
EST. GFR  (AFRICAN AMERICAN): 40 ML/MIN/1.73 M^2
EST. GFR  (NON AFRICAN AMERICAN): 35 ML/MIN/1.73 M^2
GLUCOSE SERPL-MCNC: 87 MG/DL (ref 70–110)
GLUCOSE UR QL STRIP: NEGATIVE
HCT VFR BLD AUTO: 38.1 % (ref 40–54)
HGB BLD-MCNC: 12.1 G/DL (ref 14–18)
HGB UR QL STRIP: ABNORMAL
HYALINE CASTS #/AREA URNS LPF: 1 /LPF
KETONES UR QL STRIP: NEGATIVE
LEUKOCYTE ESTERASE UR QL STRIP: ABNORMAL
LYMPHOCYTES # BLD AUTO: 1.1 K/UL (ref 1–4.8)
LYMPHOCYTES NFR BLD: 15.7 % (ref 18–48)
MCH RBC QN AUTO: 30.2 PG (ref 27–31)
MCHC RBC AUTO-ENTMCNC: 31.8 G/DL (ref 32–36)
MCV RBC AUTO: 95 FL (ref 82–98)
MICROSCOPIC COMMENT: ABNORMAL
MONOCYTES # BLD AUTO: 0.7 K/UL (ref 0.3–1)
MONOCYTES NFR BLD: 10.1 % (ref 4–15)
NEUTROPHILS # BLD AUTO: 5.3 K/UL (ref 1.8–7.7)
NEUTROPHILS NFR BLD: 73.7 % (ref 38–73)
NITRITE UR QL STRIP: NEGATIVE
PH UR STRIP: 7 [PH] (ref 5–8)
PLATELET # BLD AUTO: 181 K/UL (ref 150–350)
PMV BLD AUTO: 10.8 FL (ref 9.2–12.9)
POTASSIUM SERPL-SCNC: 4.4 MMOL/L (ref 3.5–5.1)
PROT SERPL-MCNC: 6.3 G/DL (ref 6–8.4)
PROT UR QL STRIP: ABNORMAL
RBC # BLD AUTO: 4.01 M/UL (ref 4.6–6.2)
RBC #/AREA URNS HPF: 40 /HPF (ref 0–4)
SODIUM SERPL-SCNC: 142 MMOL/L (ref 136–145)
SP GR UR STRIP: <=1.005 (ref 1–1.03)
SQUAMOUS #/AREA URNS HPF: 0 /HPF
TROPONIN I SERPL DL<=0.01 NG/ML-MCNC: <0.006 NG/ML (ref 0–0.03)
URN SPEC COLLECT METH UR: ABNORMAL
UROBILINOGEN UR STRIP-ACNC: NEGATIVE EU/DL
WBC # BLD AUTO: 7.25 K/UL (ref 3.9–12.7)
WBC #/AREA URNS HPF: 1 /HPF (ref 0–5)

## 2019-11-14 PROCEDURE — 83880 ASSAY OF NATRIURETIC PEPTIDE: CPT

## 2019-11-14 PROCEDURE — 85025 COMPLETE CBC W/AUTO DIFF WBC: CPT

## 2019-11-14 PROCEDURE — 99285 EMERGENCY DEPT VISIT HI MDM: CPT | Mod: 25

## 2019-11-14 PROCEDURE — 81000 URINALYSIS NONAUTO W/SCOPE: CPT

## 2019-11-14 PROCEDURE — 84484 ASSAY OF TROPONIN QUANT: CPT

## 2019-11-14 PROCEDURE — 80053 COMPREHEN METABOLIC PANEL: CPT

## 2019-11-14 PROCEDURE — 93005 ELECTROCARDIOGRAM TRACING: CPT

## 2019-11-14 PROCEDURE — 82550 ASSAY OF CK (CPK): CPT

## 2019-11-14 NOTE — ED NOTES
Patients daughter called to review medications. Let nurse know if he were to need his morning BP medications, he was to not get Amlodipine if systolic is 110 or below per his MD. Hx of low BP.     Yani Ellis [phone]

## 2019-11-14 NOTE — ED PROVIDER NOTES
Encounter Date: 11/14/2019    SCRIBE #1 NOTE: I, Joe Barragan, am scribing for, and in the presence of,  Dr. Ridley. I have scribed the entire note.       History     Chief Complaint   Patient presents with    Shortness of Breath     Pt. c/o shortness of breath that began this morning after pt. woke up to urinate. Denies c/o pain or discomfort. Pt.s care taker at the bedside.      Time seen by provider: 6:35 AM    This is a 89 y.o. male who presents via EMS from home with complaint of shortness of breath. He reports that he began with shortness of breath upon waking up this morning, gradually improving since onset. He notes that he felt normal last night, and states his symptoms have mostly resolved prior to arrival to the ED. On arrival, the patient is afebrile with SPO2 100% on RA. Patient denies any fever, CP, cough, or any other symptoms.    The history is provided by the patient and a caregiver (sitter).     Review of patient's allergies indicates:   Allergen Reactions    Combigan [brimonidine-timolol]      Made him dizziness    Contrast media Rash     Past Medical History:   Diagnosis Date    Allergy     Anemia     Arthritis     Basal cell carcinoma     Depression     Herniation of intervertebral disc     Hyperlipidemia     Hypertension 3/4/2015    Macular degeneration     Parkinson disease     Spinal stenosis      Past Surgical History:   Procedure Laterality Date    CATARACT EXTRACTION W/  INTRAOCULAR LENS IMPLANT Left 10/31/07    Dr. Nunez    CATARACT EXTRACTION W/  INTRAOCULAR LENS IMPLANT Right 12/16/15    Dr. Nunez     Family History   Problem Relation Age of Onset    Cancer Father         prostate    Colon cancer Father     Hearing loss Paternal Aunt     Amblyopia Neg Hx     Blindness Neg Hx     Cataracts Neg Hx     Diabetes Neg Hx     Glaucoma Neg Hx     Hypertension Neg Hx     Macular degeneration Neg Hx     Retinal detachment Neg Hx     Strabismus Neg Hx     Stroke  Neg Hx     Thyroid disease Neg Hx     Heart attack Neg Hx     Heart disease Neg Hx     Heart failure Neg Hx     Hyperlipidemia Neg Hx     Melanoma Neg Hx      Social History     Tobacco Use    Smoking status: Never Smoker    Smokeless tobacco: Never Used   Substance Use Topics    Alcohol use: No     Alcohol/week: 0.0 standard drinks     Frequency: Never     Binge frequency: Never    Drug use: No     Review of Systems   Respiratory: Positive for shortness of breath.    All other systems reviewed and are negative.      Physical Exam     Initial Vitals [11/14/19 0626]   BP Pulse Resp Temp SpO2   (!) 189/84 (!) 56 16 97.7 °F (36.5 °C) 98 %      MAP       --         Physical Exam    Nursing note and vitals reviewed.  Constitutional: He appears well-developed and well-nourished. He is not diaphoretic. No distress.   No distress   HENT:   Head: Normocephalic and atraumatic.   Mouth/Throat: Oropharynx is clear and moist.   Eyes: Conjunctivae and EOM are normal.   Normal conjunctiva   Neck: Normal range of motion. Neck supple.   Cardiovascular: Normal rate, regular rhythm and normal heart sounds.   Pulmonary/Chest: No respiratory distress. He has no wheezes.   Slightly diminished breath sounds bilaterally  No wheezing   Abdominal: Soft. There is no tenderness.   Musculoskeletal: Normal range of motion. He exhibits no edema or tenderness.   No LE edema   Neurological: He is alert and oriented to person, place, and time. He has normal strength.   Skin: Skin is warm and dry.         ED Course   Procedures  Labs Reviewed   CBC W/ AUTO DIFFERENTIAL - Abnormal; Notable for the following components:       Result Value    RBC 4.01 (*)     Hemoglobin 12.1 (*)     Hematocrit 38.1 (*)     Mean Corpuscular Hemoglobin Conc 31.8 (*)     Gran% 73.7 (*)     Lymph% 15.7 (*)     All other components within normal limits   COMPREHENSIVE METABOLIC PANEL - Abnormal; Notable for the following components:    CO2 31 (*)     Creatinine 1.7  (*)     Albumin 3.4 (*)     AST 9 (*)     ALT <5 (*)     eGFR if  40 (*)     eGFR if non  35 (*)     All other components within normal limits   B-TYPE NATRIURETIC PEPTIDE - Abnormal; Notable for the following components:     (*)     All other components within normal limits   URINALYSIS - Abnormal; Notable for the following components:    Color, UA Other (*)     Specific Gravity, UA <=1.005 (*)     Protein, UA 1+ (*)     Occult Blood UA 3+ (*)     Leukocytes, UA Trace (*)     All other components within normal limits   URINALYSIS MICROSCOPIC - Abnormal; Notable for the following components:    RBC, UA 40 (*)     Bacteria Few (*)     All other components within normal limits   CK   TROPONIN I     EKG Readings: (Independently Interpreted)   Rhythm: Sinus Bradycardia. Heart Rate: 59.   No ST elevations  Inverted T waves in aVR and V1     ECG Results          EKG 12-lead (In process)  Result time 11/14/19 08:30:14    In process by Interface, Lab In [facility] (11/14/19 08:30:14)                 Narrative:    Test Reason : R06.02,    Vent. Rate : 059 BPM     Atrial Rate : 059 BPM     P-R Int : 174 ms          QRS Dur : 092 ms      QT Int : 394 ms       P-R-T Axes : 052 012 051 degrees     QTc Int : 390 ms    Sinus bradycardia  Otherwise normal ECG  When compared with ECG of 08-JUN-2019 17:23,  No significant change was found    Referred By: [email or web address]   SELF           Confirmed By:                             Imaging Results          X-Ray Chest 1 View (Final result)  Result time 11/14/19 07:45:44    Final result by Britany Mercedes MD (11/14/19 07:45:44)                 Impression:      Stable chronic findings suggestive of underlying interstitial lung disease/pulmonary fibrosis.  No definite radiographic evidence of acute cardiopulmonary process on this single radiographic view of the chest.      Electronically signed by: Britany Mercedes MD  Date:    11/14/2019  Time:    07:45              Narrative:    EXAMINATION:  XR CHEST 1 VIEW    CLINICAL HISTORY:  shortness of breath;    TECHNIQUE:  Single frontal view of the chest was performed.    COMPARISON:  07/04/2019    FINDINGS:  Cardiac monitoring leads overlie the chest.  Cardiomediastinal silhouette is stable.  The lungs are symmetrically expanded with findings of chronic interstitial lung disease/pulmonary fibrosis, unchanged from prior exam.  No definite radiographic evidence of superimposed focal consolidation, significant volume of pleural fluid or pneumothorax.  Visualized osseous structures demonstrate degenerative change.                                 Medical Decision Making:   Clinical Tests:   Lab Tests: Ordered and Reviewed  Radiological Study: Ordered and Reviewed  Medical Tests: Ordered and Reviewed  ED Management:  89-year-old male who had an episode of shortness of breath while walking to the restroom this morning.  Upon arrival to the ED patient said his breathing difficulty had resolved.  Workup is unremarkable with chest x-ray showing stable chronic findings.  Oxygen saturations have been between 94 and 100% on room air.  Patient will be discharged in stable condition back home with his sitter.  I have suggested following up with his primary physician when able but to return to the ED for any recurring shortness of breath or any other concerns.                                 Clinical Impression:       ICD-10-CM ICD-9-CM   1. Shortness of breath R06.02 786.05       Disposition:   Disposition: Discharged  Condition: Stable      I, Dr. Tommy Mcmahon, personally performed the services described in this documentation. All medical record entries made by the scribe were at my direction and in my presence. I have reviewed the chart and agree that the record reflects my personal performance and is accurate and complete. Tommy Mcmahon MD.  3:22 PM 11/14/2019       Tommy Mcmahon MD  11/14/19 1523

## 2019-11-14 NOTE — ED TRIAGE NOTES
Patient arrived via EMS. AAO X4. Patient reports that he got up in WC to go to  this am and became SOB. Patient was being assisted by personal sitter. Sitter called 911. Patient states that SOB has since resolved. Denies previous episodes of SOB. Denies CP, N/V, or any other problems.

## 2019-11-15 ENCOUNTER — PATIENT MESSAGE (OUTPATIENT)
Dept: INTERNAL MEDICINE | Facility: CLINIC | Age: 84
End: 2019-11-15

## 2019-11-18 ENCOUNTER — PATIENT OUTREACH (OUTPATIENT)
Dept: ADMINISTRATIVE | Facility: OTHER | Age: 84
End: 2019-11-18

## 2019-11-20 ENCOUNTER — OFFICE VISIT (OUTPATIENT)
Dept: SURGERY | Facility: CLINIC | Age: 84
End: 2019-11-20
Payer: MEDICARE

## 2019-11-20 VITALS
HEIGHT: 71 IN | DIASTOLIC BLOOD PRESSURE: 71 MMHG | HEART RATE: 76 BPM | WEIGHT: 143.94 LBS | SYSTOLIC BLOOD PRESSURE: 114 MMHG | BODY MASS INDEX: 20.15 KG/M2

## 2019-11-20 DIAGNOSIS — K60.2 ANAL FISSURE: Primary | ICD-10-CM

## 2019-11-20 PROCEDURE — 1126F PR PAIN SEVERITY QUANTIFIED, NO PAIN PRESENT: ICD-10-PCS | Mod: S$GLB,,, | Performed by: NURSE PRACTITIONER

## 2019-11-20 PROCEDURE — 99213 PR OFFICE/OUTPT VISIT, EST, LEVL III, 20-29 MIN: ICD-10-PCS | Mod: S$GLB,,, | Performed by: NURSE PRACTITIONER

## 2019-11-20 PROCEDURE — 99999 PR PBB SHADOW E&M-EST. PATIENT-LVL IV: CPT | Mod: PBBFAC,,, | Performed by: NURSE PRACTITIONER

## 2019-11-20 PROCEDURE — 1159F PR MEDICATION LIST DOCUMENTED IN MEDICAL RECORD: ICD-10-PCS | Mod: S$GLB,,, | Performed by: NURSE PRACTITIONER

## 2019-11-20 PROCEDURE — 1159F MED LIST DOCD IN RCRD: CPT | Mod: S$GLB,,, | Performed by: NURSE PRACTITIONER

## 2019-11-20 PROCEDURE — 99213 OFFICE O/P EST LOW 20 MIN: CPT | Mod: S$GLB,,, | Performed by: NURSE PRACTITIONER

## 2019-11-20 PROCEDURE — 1101F PR PT FALLS ASSESS DOC 0-1 FALLS W/OUT INJ PAST YR: ICD-10-PCS | Mod: CPTII,S$GLB,, | Performed by: NURSE PRACTITIONER

## 2019-11-20 PROCEDURE — 1126F AMNT PAIN NOTED NONE PRSNT: CPT | Mod: S$GLB,,, | Performed by: NURSE PRACTITIONER

## 2019-11-20 PROCEDURE — 99999 PR PBB SHADOW E&M-EST. PATIENT-LVL IV: ICD-10-PCS | Mod: PBBFAC,,, | Performed by: NURSE PRACTITIONER

## 2019-11-20 PROCEDURE — 1101F PT FALLS ASSESS-DOCD LE1/YR: CPT | Mod: CPTII,S$GLB,, | Performed by: NURSE PRACTITIONER

## 2019-11-20 NOTE — LETTER
November 20, 2019      Yanna Santana MD  [address]  [place] [address]           [facility]  [address]  [facility] [address]  Phone: [phone]          Patient: Phi Sainz   MR Number: [id number]   YOB: 1929   Date of Visit: 11/20/2019       Dear Dr. Yanna Santana:    Thank you for referring Phi Sainz to me for evaluation. Attached you will find relevant portions of my assessment and plan of care.    If you have questions, please do not hesitate to call me. I look forward to following Phi Sainz along with you.    Sincerely,    Yissel Tejeda, NP    Enclosure  CC:  No Recipients    If you would like to receive this communication electronically, please contact [email or web address] or [phone] to request more information on [facility] Link access.    For providers and/or their staff who would like to refer a patient to [facility], please contact us through our one-stop-shop provider referral line, [facility], at [phone].    If you feel you have received this communication in error or would no longer like to receive these types of communications, please e-mail [email or web address]

## 2019-11-20 NOTE — PROGRESS NOTES
Patient ID:  Phi Sainz is a 89 y.o. male     Chief Complaint: No chief complaint on file.       HPI:  Phi Sainz presents to colon and rectal surgery with a complaint of toe pain and burning.  Pain primarily with bowel movements.  He has used hydrocortisone cream with no relief and reports that it worsens the burning sensation.  He is sitting on a doughnut pillow which also has worsened the pain. He is having 1-2 hard bowel movements every day.  No blood in his stool.  He is followed by GI, Dr. Martinez.  His bowels very from constipation to diarrhea so he is hesitant to start any stool softeners or fiber supplements.    ROS:     Review of Systems   Constitutional: Negative for appetite change, chills, fatigue, fever and unexpected weight change.   Respiratory: Negative for shortness of breath.    Cardiovascular: Negative for chest pain.   Gastrointestinal: Positive for rectal pain. Negative for abdominal distention, abdominal pain, anal bleeding, blood in stool, constipation, diarrhea, nausea and vomiting.       Review of patient's allergies indicates:   Allergen Reactions    Combigan [brimonidine-timolol]      Made him dizziness    Contrast media Rash     Past Medical History:   Diagnosis Date    Allergy     Anemia     Arthritis     Basal cell carcinoma     Depression     Herniation of intervertebral disc     Hyperlipidemia     Hypertension 3/4/2015    Macular degeneration     Parkinson disease     Spinal stenosis      Past Surgical History:   Procedure Laterality Date    CATARACT EXTRACTION W/  INTRAOCULAR LENS IMPLANT Left 10/31/07    Dr. Nunez    CATARACT EXTRACTION W/  INTRAOCULAR LENS IMPLANT Right 12/16/15    Dr. Nunez     Family History   Problem Relation Age of Onset    Cancer Father         prostate    Colon cancer Father     Hearing loss Paternal Aunt     Amblyopia Neg Hx     Blindness Neg Hx     Cataracts Neg Hx     Diabetes Neg Hx     Glaucoma Neg Hx      Hypertension Neg Hx     Macular degeneration Neg Hx     Retinal detachment Neg Hx     Strabismus Neg Hx     Stroke Neg Hx     Thyroid disease Neg Hx     Heart attack Neg Hx     Heart disease Neg Hx     Heart failure Neg Hx     Hyperlipidemia Neg Hx     Melanoma Neg Hx      Current Outpatient Medications on File Prior to Visit   Medication Sig    acetaminophen (TYLENOL EXTRA STRENGTH) 500 MG tablet Take 500 mg by mouth every 6 (six) hours as needed for Pain.    amLODIPine (NORVASC) 5 MG tablet Take 1 tablet (5 mg total) by mouth once daily.    APRISO 0.375 gram Cp24 TAKE 4 CAPSULES (1.5 G TOTAL) BY MOUTH ONCE DAILY. PT TAKING 4 PILLS IN AM.    budesonide (ENTOCORT EC) 3 mg capsule Take 3 capsules by mouth once daily.    carbidopa-levodopa  mg (SINEMET)  mg per tablet Take 1 tablet three times daily.    diazePAM (VALIUM) 2 MG tablet TAKE 1 TABLET BY MOUTH EVERY DAY AS NEEDED    dicyclomine (BENTYL) 10 MG capsule Take 1 capsule (10 mg total) by mouth 3 (three) times daily as needed (abdominal pain).    diphenoxylate-atropine 2.5-0.025 mg (LOMOTIL) 2.5-0.025 mg per tablet Take 1 tablet by mouth 2 (two) times daily.    donepezil (ARICEPT) 5 MG tablet Take 1 tablet (5 mg total) by mouth every evening.    econazole nitrate 1 % cream Apply topically daily as needed.    gabapentin (NEURONTIN) 100 MG capsule Take 300 mg by mouth 2 (two) times daily. Pt taking one pill twice a day.    HYDROcodone-acetaminophen (NORCO) 5-325 mg per tablet TK 1 T PO Q 8 H PRN    hydrocortisone 2.5 % cream Apply topically 2 (two) times daily.    ibuprofen (ADVIL) 200 MG tablet Take 1 tablet by mouth daily as needed.    ondansetron (ZOFRAN) 4 MG tablet TAKE 1 TABLET BY MOUTH EVERY 8 HOURS AS NEEDED FOR NAUSEA    wbplojiuw-qcumnnas-ttbrx-w.pet (PREPARATION H MAXIMUM STRENGTH) 0.25-1 % Crea Place 1 application rectally daily as needed (rectal pain).    polyethylene glycol (MIRALAX) 17 gram PwPk Take by  mouth as needed.    QUEtiapine (SEROQUEL) 25 MG Tab GIVE 1-1/2 TABLET ONE TO TWO HOURS BEFORE SUNDOWNING.    rasagiline (AZILECT) 1 mg Tab Take 1 tablet (1 mg total) by mouth once daily.     No current facility-administered medications on file prior to visit.        PE:  Vitals:    11/20/19 1026   BP: 114/71   Pulse: 76     Physical Exam   Constitutional: He is oriented to person, place, and time and well-developed, well-nourished, and in no distress. No distress.   HENT:   Head: Normocephalic and atraumatic.   Pulmonary/Chest: Effort normal. No respiratory distress.   Abdominal: Soft. He exhibits no distension. There is no tenderness.   Genitourinary:   Genitourinary Comments: Small posterior fissure, hypertonic tone    Musculoskeletal: Normal range of motion.   Neurological: He is alert and oriented to person, place, and time. GCS score is 15.   Skin: Skin is warm and dry. No rash noted. No erythema.   Psychiatric: Affect normal.       Impression:  Encounter Diagnosis   Name Primary?    Anal fissure Yes       PLAN:  Increased fiber intake (20-25 grams/day) and fluid intake (8-10 glasses water/day)  Daily fiber supplement  Soaks/sitz baths if possible  Avoid excessive trauma/straining  Topical diltiazem 2% tid.

## 2019-11-22 ENCOUNTER — CLINICAL SUPPORT (OUTPATIENT)
Dept: CARDIOLOGY | Facility: CLINIC | Age: 84
End: 2019-11-22
Attending: INTERNAL MEDICINE
Payer: MEDICARE

## 2019-11-22 DIAGNOSIS — I10 ESSENTIAL HYPERTENSION: ICD-10-CM

## 2019-11-22 LAB
LEFT ABI: 1.22
LEFT ARM BP: 120 MMHG
LEFT DORSALIS PEDIS: 149 MMHG
LEFT POSTERIOR TIBIAL: 92 MMHG
RIGHT ABI: 1.07
RIGHT ARM BP: 122 MMHG
RIGHT DORSALIS PEDIS: 111 MMHG
RIGHT POSTERIOR TIBIAL: 130 MMHG

## 2019-11-22 PROCEDURE — 93922 CV US ANKLE BRACHIAL INDICES RESTING (CUPID ONLY): ICD-10-PCS | Mod: S$GLB,,, | Performed by: INTERNAL MEDICINE

## 2019-11-22 PROCEDURE — 93922 UPR/L XTREMITY ART 2 LEVELS: CPT | Mod: S$GLB,,, | Performed by: INTERNAL MEDICINE

## 2019-11-25 DIAGNOSIS — K52.832 LYMPHOCYTIC COLITIS: ICD-10-CM

## 2019-11-25 RX ORDER — BUDESONIDE 3 MG/1
9 CAPSULE, COATED PELLETS ORAL DAILY
Qty: 90 CAPSULE | Refills: 1 | Status: SHIPPED | OUTPATIENT
Start: 2019-11-25 | End: 2019-12-31

## 2019-11-27 ENCOUNTER — TELEPHONE (OUTPATIENT)
Dept: INTERNAL MEDICINE | Facility: CLINIC | Age: 84
End: 2019-11-27

## 2019-11-29 ENCOUNTER — PATIENT MESSAGE (OUTPATIENT)
Dept: INTERNAL MEDICINE | Facility: CLINIC | Age: 84
End: 2019-11-29

## 2019-12-02 ENCOUNTER — PATIENT MESSAGE (OUTPATIENT)
Dept: SURGERY | Facility: CLINIC | Age: 84
End: 2019-12-02

## 2019-12-03 ENCOUNTER — TELEPHONE (OUTPATIENT)
Dept: SURGERY | Facility: CLINIC | Age: 84
End: 2019-12-03

## 2019-12-03 RX ORDER — LIDOCAINE 50 MG/G
OINTMENT TOPICAL 4 TIMES DAILY
Qty: 35.44 G | Refills: 2 | Status: SHIPPED | OUTPATIENT
Start: 2019-12-03 | End: 2020-01-03 | Stop reason: SDUPTHER

## 2019-12-03 RX ORDER — SENNOSIDES 25 MG/1
1 TABLET, FILM COATED ORAL 4 TIMES DAILY
Qty: 30 G | Refills: 2 | Status: SHIPPED | OUTPATIENT
Start: 2019-12-03 | End: 2019-12-03 | Stop reason: SDUPTHER

## 2019-12-03 NOTE — TELEPHONE ENCOUNTER
----- Message from Cherie Herring RN sent at 12/3/2019  4:13 PM CST -----  Please send prescription to [facility] for the Lidocaine 5%. Thanks!

## 2019-12-04 ENCOUNTER — OFFICE VISIT (OUTPATIENT)
Dept: NEUROLOGY | Facility: CLINIC | Age: 84
End: 2019-12-04
Payer: MEDICARE

## 2019-12-04 ENCOUNTER — PATIENT OUTREACH (OUTPATIENT)
Dept: ADMINISTRATIVE | Facility: OTHER | Age: 84
End: 2019-12-04

## 2019-12-04 VITALS
HEART RATE: 69 BPM | SYSTOLIC BLOOD PRESSURE: 113 MMHG | DIASTOLIC BLOOD PRESSURE: 67 MMHG | HEIGHT: 71 IN | WEIGHT: 143 LBS | BODY MASS INDEX: 20.02 KG/M2

## 2019-12-04 DIAGNOSIS — G20.A1 PARKINSON'S DISEASE: ICD-10-CM

## 2019-12-04 PROCEDURE — 99215 OFFICE O/P EST HI 40 MIN: CPT | Mod: S$GLB,,, | Performed by: PSYCHIATRY & NEUROLOGY

## 2019-12-04 PROCEDURE — 99999 PR PBB SHADOW E&M-EST. PATIENT-LVL III: ICD-10-PCS | Mod: PBBFAC,,, | Performed by: PSYCHIATRY & NEUROLOGY

## 2019-12-04 PROCEDURE — 99999 PR PBB SHADOW E&M-EST. PATIENT-LVL III: CPT | Mod: PBBFAC,,, | Performed by: PSYCHIATRY & NEUROLOGY

## 2019-12-04 PROCEDURE — 1101F PT FALLS ASSESS-DOCD LE1/YR: CPT | Mod: CPTII,S$GLB,, | Performed by: PSYCHIATRY & NEUROLOGY

## 2019-12-04 PROCEDURE — 1126F AMNT PAIN NOTED NONE PRSNT: CPT | Mod: S$GLB,,, | Performed by: PSYCHIATRY & NEUROLOGY

## 2019-12-04 PROCEDURE — 1101F PR PT FALLS ASSESS DOC 0-1 FALLS W/OUT INJ PAST YR: ICD-10-PCS | Mod: CPTII,S$GLB,, | Performed by: PSYCHIATRY & NEUROLOGY

## 2019-12-04 PROCEDURE — 1159F MED LIST DOCD IN RCRD: CPT | Mod: S$GLB,,, | Performed by: PSYCHIATRY & NEUROLOGY

## 2019-12-04 PROCEDURE — 99499 RISK ADDL DX/OHS AUDIT: ICD-10-PCS | Mod: S$GLB,,, | Performed by: PSYCHIATRY & NEUROLOGY

## 2019-12-04 PROCEDURE — 99215 PR OFFICE/OUTPT VISIT, EST, LEVL V, 40-54 MIN: ICD-10-PCS | Mod: S$GLB,,, | Performed by: PSYCHIATRY & NEUROLOGY

## 2019-12-04 PROCEDURE — 1159F PR MEDICATION LIST DOCUMENTED IN MEDICAL RECORD: ICD-10-PCS | Mod: S$GLB,,, | Performed by: PSYCHIATRY & NEUROLOGY

## 2019-12-04 PROCEDURE — 1126F PR PAIN SEVERITY QUANTIFIED, NO PAIN PRESENT: ICD-10-PCS | Mod: S$GLB,,, | Performed by: PSYCHIATRY & NEUROLOGY

## 2019-12-04 PROCEDURE — 99499 UNLISTED E&M SERVICE: CPT | Mod: S$GLB,,, | Performed by: PSYCHIATRY & NEUROLOGY

## 2019-12-04 NOTE — ASSESSMENT & PLAN NOTE
- continue the cd/ld at current dose  - d/c azilect now - might help dysautonomia and is unlikely having much benefit  - continue the aricept and seroquel as currently dosed  - if having more issues with sleepLESSness late at night, will add more seroquel

## 2019-12-04 NOTE — PROGRESS NOTES
"Name: Phi Sainz  MRN: [id number]   CSN: [id number]      Date: 12/04/2019      Chief Complaint / Interval History: here with caregiver and wife  - having spells of "able to hear, not able to move, usually in bed or a chair" - seems related to orthostatic changes  - Dr. Parker tweaked the seroquel prior to last visit, and the aricept has helped the sundowning signficantly    From Aug 2019:  - moving well, a little weight loss but ok  - not constipated, feels well  - "legs are more slow and weak below the knee"  - denies numbness or pain, but says gives out  -reviewed family email messages      From May 2019:   - generally more weak and slow over 3 months  - had ggod results with threeapy lasts time, then stopped it, ready again  - more aqbd pain at night, up from midnight onward usually  - rwecent tsh is good, though colder at night  - more sundowing at night  - saw shay 2 months ago  - using melatonin at midnight    From Jan 2019:  - generally more slow in the last three months  - still on current ldopa dosing  - walking has slowed down some  - still lower BP in the past, watching it now on less Flomax  - on Midodrine now until a few days ago, was running too high  - no recent staring spells  - ready for more meds if able  - no falls  - some speeding bowels, does have colitis      History of Present Illness (HPI):  Patient is 89 yo M presents with 4 years duration of tremors, bradykinesia, and gait abnormality. Patient was seen in neurologist 4 years ago with resting tremor, managed conservatively. His tremor had progressed, and progressively developed shuffling gait. Daughter and granddaughter also reports that patient had been having "staring spells" where patient would appear to be locked in with blank expression that self-resolves. Recently patient developed orthostatic hypotension, and was assessed by Cardiologist who then referred patient to Neurologist Dr. Lal. Patient was seen in Neurology " clinic last week, and his symptoms were suspicious for Parkinson's Disease. Patient was prescribed Sinemet 25/100 TID. Today in clinic, patient reports significant improvement of his symptoms. He has not had another staring spell since initiating medication. Patient continues to have dysphagia, and chokes on thin liquids. He is scheduled for Speech therapy and ENT assessment.     Nonmotor/Premotor ROS:  Hyposmia (HENT)?N/A  RBD/sleep issues (Constitutional)?Yes  Depression/anxiety (Psychiatric)?Yes  Fatigue (Constitutional)?No  Constipation (GI)?No  Urinary issues ()?No  Sexual dysfunction ()?N/A  Orthostasis (Cardiovascular)?Yes  Leg swelling (Cardiovascular)? No  Falls (Musculoskeletal)?Yes  Cognitive impairment (Neurologic)?No  Psychoses (Psychiatric)?No  Pain/Paresthesia (Neurologic)?No  Visual changes (Eyes)?No  Moles / skin changes (Skin)?N/A  Stridor / SOB (Pulm)?No  Bruising (Heme)?No    Past Medical History: The patient  has a past medical history of Allergy, Anemia, Arthritis, Basal cell carcinoma, Depression, Herniation of intervertebral disc, Hyperlipidemia, Hypertension (3/4/2015), Macular degeneration, Parkinson disease, and Spinal stenosis.    Social History: The patient  reports that he has never smoked. He has never used smokeless tobacco. He reports that he does not drink alcohol or use drugs.    Family History: Their family history includes Cancer in his father; Colon cancer in his father; Hearing loss in his paternal aunt.    Allergies: Combigan [brimonidine-timolol] and Contrast media     Meds:   Current Outpatient Medications on File Prior to Visit   Medication Sig Dispense Refill    acetaminophen (TYLENOL EXTRA STRENGTH) 500 MG tablet Take 500 mg by mouth every 6 (six) hours as needed for Pain.      amLODIPine (NORVASC) 5 MG tablet Take 1 tablet (5 mg total) by mouth once daily. (Patient taking differently: Take 5 mg by mouth once daily. Take 0.5 tablet daily) 30 tablet 6    APRISO  0.375 gram Cp24 TAKE 4 CAPSULES (1.5 G TOTAL) BY MOUTH ONCE DAILY. PT TAKING 4 PILLS IN AM. 360 capsule 1    budesonide (ENTOCORT EC) 3 mg capsule TAKE 3 CAPSULES (9 MG TOTAL) BY MOUTH ONCE DAILY. 90 capsule 1    carbidopa-levodopa  mg (SINEMET)  mg per tablet Take 1 tablet three times daily. 90 tablet 11    diazePAM (VALIUM) 2 MG tablet TAKE 1 TABLET BY MOUTH EVERY DAY AS NEEDED 30 tablet 3    dicyclomine (BENTYL) 10 MG capsule Take 1 capsule (10 mg total) by mouth 3 (three) times daily as needed (abdominal pain). 60 capsule 2    diltiazem HCl (DILTIAZEM 2% CREAM) Apply topically 3 (three) times daily. Apply topically to anal area. 30 g 2    diphenoxylate-atropine 2.5-0.025 mg (LOMOTIL) 2.5-0.025 mg per tablet Take 1 tablet by mouth 2 (two) times daily. 100 tablet 2    donepezil (ARICEPT) 5 MG tablet Take 1 tablet (5 mg total) by mouth every evening. 30 tablet 11    econazole nitrate 1 % cream Apply topically daily as needed.      gabapentin (NEURONTIN) 100 MG capsule Take 300 mg by mouth 2 (two) times daily. Pt taking one pill twice a day.      HYDROcodone-acetaminophen (NORCO) 5-325 mg per tablet TK 1 T PO Q 8 H PRN  0    hydrocortisone 2.5 % cream Apply topically 2 (two) times daily. 20 g 1    ibuprofen (ADVIL) 200 MG tablet Take 1 tablet by mouth daily as needed.      lidocaine (XYLOCAINE) 5 % Oint ointment Apply topically 4 (four) times daily. 35.44 g 2    ondansetron (ZOFRAN) 4 MG tablet TAKE 1 TABLET BY MOUTH EVERY 8 HOURS AS NEEDED FOR NAUSEA 28 tablet 0    wmmtbngii-wfipkkky-ggzzm-w.pet (PREPARATION H MAXIMUM STRENGTH) 0.25-1 % Crea Place 1 application rectally daily as needed (rectal pain). 1 Tube 11    polyethylene glycol (MIRALAX) 17 gram PwPk Take by mouth as needed.      QUEtiapine (SEROQUEL) 25 MG Tab GIVE 1-1/2 TABLET ONE TO TWO HOURS BEFORE SUNDOWNING. 45 tablet 1    rasagiline (AZILECT) 1 mg Tab Take 1 tablet (1 mg total) by mouth once daily. 30 tablet 5     No  "current facility-administered medications on file prior to visit.      Exam:  /67 (BP Location: Left arm, Patient Position: Sitting, BP Method: Medium (Automatic))   Pulse 69   Ht 5' 11" (1.803 m)   Wt 64.9 kg (143 lb)   BMI 19.94 kg/m²     * Specialized movement exam  ++ hypophonic speech.    + facial masking.   No cogwheel rigidity.     No bradykinesia.   Mild tremor on R with rest    No other dystonia, chorea, athetosis, myoclonus, or tics.   No motor impersistence.   Shuffled gait.   ++ shortened stride length.   ++ abnormal arm swing.     + postural instability, falls with narrow gait          Medical Record Review:  - Lab Results:      Problem List Items Addressed This Visit     Parkinson's disease    Overview     Diagnosed 2018, tremor dominant with prominent gait dysfunction, good early ldopa response.  Some non-motor complications of hypotension, cognitive impairment and sundowning.         Current Assessment & Plan     - continue the cd/ld at current dose  - d/c azilect now - might help dysautonomia and is unlikely having much benefit  - continue the aricept and seroquel as currently dosed  - if having more issues with sleepLESSness late at night, will add more seroquel                       Harshad Forrester MD, MPH  Division of Movement and Memory Disorders  [facility]  "

## 2019-12-05 ENCOUNTER — PATIENT MESSAGE (OUTPATIENT)
Dept: CARDIOLOGY | Facility: CLINIC | Age: 84
End: 2019-12-05

## 2019-12-05 ENCOUNTER — OFFICE VISIT (OUTPATIENT)
Dept: PODIATRY | Facility: CLINIC | Age: 84
End: 2019-12-05
Payer: MEDICARE

## 2019-12-05 VITALS — BODY MASS INDEX: 20.02 KG/M2 | HEIGHT: 71 IN | WEIGHT: 143 LBS

## 2019-12-05 DIAGNOSIS — M48.062 SPINAL STENOSIS, LUMBAR REGION, WITH NEUROGENIC CLAUDICATION: ICD-10-CM

## 2019-12-05 DIAGNOSIS — B35.1 DERMATOPHYTOSIS OF NAIL: ICD-10-CM

## 2019-12-05 DIAGNOSIS — G60.9 IDIOPATHIC PERIPHERAL NEUROPATHY: ICD-10-CM

## 2019-12-05 DIAGNOSIS — M51.37 DEGENERATION OF LUMBAR OR LUMBOSACRAL INTERVERTEBRAL DISC: Primary | ICD-10-CM

## 2019-12-05 PROCEDURE — 99999 PR PBB SHADOW E&M-EST. PATIENT-LVL III: ICD-10-PCS | Mod: PBBFAC,,, | Performed by: PODIATRIST

## 2019-12-05 PROCEDURE — 11721 DEBRIDE NAIL 6 OR MORE: CPT | Mod: Q9,S$GLB,, | Performed by: PODIATRIST

## 2019-12-05 PROCEDURE — 99499 RISK ADDL DX/OHS AUDIT: ICD-10-PCS | Mod: S$GLB,,, | Performed by: PODIATRIST

## 2019-12-05 PROCEDURE — 99499 UNLISTED E&M SERVICE: CPT | Mod: S$GLB,,, | Performed by: PODIATRIST

## 2019-12-05 PROCEDURE — 99999 PR PBB SHADOW E&M-EST. PATIENT-LVL III: CPT | Mod: PBBFAC,,, | Performed by: PODIATRIST

## 2019-12-05 PROCEDURE — 11721 ROUTINE FOOT CARE: ICD-10-PCS | Mod: Q9,S$GLB,, | Performed by: PODIATRIST

## 2019-12-05 NOTE — PROGRESS NOTES
Subjective:      Patient ID: Phi Sainz is a 89 y.o. male.    Chief Complaint: Follow-up (Bilateral Great toenails DR Santana 11-8-2019)    Phi Sainz is a 89 y.o. male who presents to the clinic for follow up toenails, history of painful ingrown hallux toenail on both feet.  Worse in tight shoes.  No drainage noted from the area.  No recent history of trauma to the feet.  He has history of neurogenic claudication and chronic DVT.  He is on Eliquis.    He is using kerasal on his toenails occasionally.        PCP:  Yanna Santana MD  Last PCP visit:    Chief Complaint   Patient presents with    Follow-up     Bilateral Great toenails DR Santana 11-8-2019             Patient Active Problem List   Diagnosis    Pseudophakia - Left Eye    Essential tremor    Gait disorder    Hypertension    Dyslipidemia    Lumbago    Thoracic or lumbosacral neuritis or radiculitis, unspecified    Acquired spondylolisthesis    Degeneration of lumbar or lumbosacral intervertebral disc    Spondylosis without myelopathy    Spinal stenosis, lumbar region, with neurogenic claudication    DDD (degenerative disc disease), lumbar    Lumbar facet arthropathy    Posterior vitreous detachment, both eyes    Osteoarthritis of spine    Leg weakness, bilateral    Bradycardia    Abnormal chest CT    ILD (interstitial lung disease)    Other dysphagia    Generalized weakness    Chronic deep vein thrombosis (DVT)    Parkinson's disease    Parkinson disease    Acute cystitis without hematuria    Dehydration    Lip cancer    Altered mental state    Physical debility    CKD (chronic kidney disease), stage III    Autonomic dysfunction           Current Outpatient Medications on File Prior to Visit   Medication Sig Dispense Refill    acetaminophen (TYLENOL EXTRA STRENGTH) 500 MG tablet Take 500 mg by mouth every 6 (six) hours as needed for Pain.      amLODIPine (NORVASC) 5 MG tablet Take 1 tablet (5 mg  total) by mouth once daily. (Patient taking differently: Take 5 mg by mouth once daily. Take 0.5 tablet daily) 30 tablet 6    APRISO 0.375 gram Cp24 TAKE 4 CAPSULES (1.5 G TOTAL) BY MOUTH ONCE DAILY. PT TAKING 4 PILLS IN AM. 360 capsule 1    budesonide (ENTOCORT EC) 3 mg capsule TAKE 3 CAPSULES (9 MG TOTAL) BY MOUTH ONCE DAILY. 90 capsule 1    carbidopa-levodopa  mg (SINEMET)  mg per tablet Take 1 tablet three times daily. 90 tablet 11    diazePAM (VALIUM) 2 MG tablet TAKE 1 TABLET BY MOUTH EVERY DAY AS NEEDED 30 tablet 3    dicyclomine (BENTYL) 10 MG capsule Take 1 capsule (10 mg total) by mouth 3 (three) times daily as needed (abdominal pain). 60 capsule 2    diltiazem HCl (DILTIAZEM 2% CREAM) Apply topically 3 (three) times daily. Apply topically to anal area. 30 g 2    diphenoxylate-atropine 2.5-0.025 mg (LOMOTIL) 2.5-0.025 mg per tablet Take 1 tablet by mouth 2 (two) times daily. 100 tablet 2    donepezil (ARICEPT) 5 MG tablet Take 1 tablet (5 mg total) by mouth every evening. 30 tablet 11    econazole nitrate 1 % cream Apply topically daily as needed.      gabapentin (NEURONTIN) 100 MG capsule Take 300 mg by mouth 2 (two) times daily. Pt taking one pill twice a day.      HYDROcodone-acetaminophen (NORCO) 5-325 mg per tablet TK 1 T PO Q 8 H PRN  0    hydrocortisone 2.5 % cream Apply topically 2 (two) times daily. 20 g 1    ibuprofen (ADVIL) 200 MG tablet Take 1 tablet by mouth daily as needed.      lidocaine (XYLOCAINE) 5 % Oint ointment Apply topically 4 (four) times daily. 35.44 g 2    ondansetron (ZOFRAN) 4 MG tablet TAKE 1 TABLET BY MOUTH EVERY 8 HOURS AS NEEDED FOR NAUSEA 28 tablet 0    ssfywggxy-vutypeab-wxguc-w.pet (PREPARATION H MAXIMUM STRENGTH) 0.25-1 % Crea Place 1 application rectally daily as needed (rectal pain). 1 Tube 11    polyethylene glycol (MIRALAX) 17 gram PwPk Take by mouth as needed.      QUEtiapine (SEROQUEL) 25 MG Tab GIVE 1-1/2 TABLET ONE TO TWO HOURS  BEFORE SUNDOWNING. 45 tablet 1    rasagiline (AZILECT) 1 mg Tab Take 1 tablet (1 mg total) by mouth once daily. 30 tablet 5     No current facility-administered medications on file prior to visit.          Review of patient's allergies indicates:   Allergen Reactions    Combigan [brimonidine-timolol]      Made him dizziness    Contrast media Rash         Social History     Socioeconomic History    Marital status:      Spouse name: Not on file    Number of children: Not on file    Years of education: Not on file    Highest education level: Not on file   Occupational History    Occupation: retired   Social Needs    Financial resource strain: Not hard at all    Food insecurity:     Worry: Never true     Inability: Never true    Transportation needs:     Medical: No     Non-medical: No   Tobacco Use    Smoking status: Never Smoker    Smokeless tobacco: Never Used   Substance and Sexual Activity    Alcohol use: No     Alcohol/week: 0.0 standard drinks     Frequency: Never     Binge frequency: Never    Drug use: No    Sexual activity: Yes     Partners: Female   Lifestyle    Physical activity:     Days per week: 0 days     Minutes per session: 0 min    Stress: To some extent   Relationships    Social connections:     Talks on phone: More than three times a week     Gets together: Three times a week     Attends [employer] service: Not on file     Active member of club or organization: No     Attends meetings of clubs or organizations: Never     Relationship status:    Other Topics Concern    Not on file   Social History Narrative    Not on file           Review of Systems   Constitution: Negative for chills, fever and malaise/fatigue.   HENT: Negative for hearing loss.    Cardiovascular: Positive for leg swelling. Negative for claudication.   Respiratory: Negative for shortness of breath.    Skin: Positive for color change, dry skin, nail changes and unusual hair distribution. Negative for  "flushing and rash.   Musculoskeletal: Negative for joint pain and myalgias.   Neurological: Negative for loss of balance, numbness, paresthesias and sensory change.   Psychiatric/Behavioral: Negative for altered mental status.           Objective:        Vitals:    12/05/19 1217   Weight: 64.9 kg (143 lb)   Height: 5' 11" (1.803 m)           Physical Exam   Constitutional: He is oriented to person, place, and time. He appears well-developed and well-nourished. He is cooperative.   Cardiovascular:   Pulses:       Dorsalis pedis pulses are 0 on the right side, and 0 on the left side.        Posterior tibial pulses are 0 on the right side, and 0 on the left side.   +2 PE left  +1 right   Musculoskeletal: He exhibits edema and tenderness.        Right knee: He exhibits no swelling and no ecchymosis.        Left knee: He exhibits no swelling and no ecchymosis.        Right ankle: He exhibits decreased range of motion and abnormal pulse. He exhibits no swelling and no ecchymosis. No lateral malleolus, no medial malleolus and no head of 5th metatarsal tenderness found. Achilles tendon exhibits no pain, no defect and normal Wilkinson's test results.        Left ankle: He exhibits decreased range of motion and abnormal pulse. He exhibits no swelling and no ecchymosis. No lateral malleolus, no medial malleolus and no head of 5th metatarsal tenderness found. Achilles tendon exhibits no pain and normal Wilkinson's test results.        Right lower leg: He exhibits no tenderness, no bony tenderness, no swelling, no edema and no deformity.        Left lower leg: He exhibits no tenderness, no swelling and no edema.        Right foot: There is decreased range of motion. There is no deformity.        Left foot: There is decreased range of motion. There is no deformity.   Decreased ROM with out joint pain nor crepitation to bilateral feet and ankle joints.  Muscle strength 4/5 in all groups bilaterally  Decreased height of medial arches " noted with loading of the foot b/L     Neurological: He is alert and oriented to person, place, and time.   Diminished protective sensation noted b/L       Skin: Skin is warm and dry. Capillary refill takes more than 3 seconds. No abrasion, no bruising, no burn and no ecchymosis noted.   B/L hallux medial borders mildly ingrowing. No erythema or paronychia noted. No drainage.  Nails x10 are elongated by  3-4mm's, thickened by 2-4 mm's, dystrophic, and are yellowish in  coloration . Xerosis Bilaterally. No open lesions noted.     Webspaces 1-4 b/L clean, dry, and intact.   Psychiatric: He has a normal mood and affect. His speech is normal and behavior is normal. He is attentive.   Vitals reviewed.                Assessment:       Problem List Items Addressed This Visit     Degeneration of lumbar or lumbosacral intervertebral disc - Primary    Spinal stenosis, lumbar region, with neurogenic claudication      Other Visit Diagnoses     Idiopathic peripheral neuropathy        Dermatophytosis of nail                  Plan:         - I counseled the patient on his conditions, their implications and medical management.    - Continue Wide toe box shoes.    - Shoe inspection. Patient instructed on proper foot hygeine. We discussed wearing proper shoe gear, daily foot inspections, never walking without protective shoe gear, never putting sharp instruments to feet, routine podiatric nail visits every 2-3 months.      - Continue Kerasal for toenails.       Routine Foot Care  Date/Time: 12/5/2019 12:00 PM  Performed by: Alissa Black DPM  Authorized by: Alissa Black DPM     Consent Done?:  Yes (Verbal)    Nail Care Type:  Debride  Location(s): All  (Left 1st Toe, Left 3rd Toe, Left 2nd Toe, Left 4th Toe, Left 5th Toe, Right 1st Toe, Right 2nd Toe, Right 3rd Toe, Right 4th Toe and Right 5th Toe)  Patient tolerance:  Patient tolerated the procedure well with no immediate complications     With patient's permission, the toenails  mentioned above were aggressively reduced and debrided using a nail nipper, removing all offending nail and debris. The patient will continue to monitor the areas daily, inspect the feet, wear protective shoe gear when ambulatory, and moisturizer to maintain skin integrity.                   Alissa Black DPM  NPI: [id number]         [facility] - PODIATRY  [address] [place] [id number]  Dept: [phone]  Dept Fax: [phone]

## 2019-12-06 ENCOUNTER — TELEPHONE (OUTPATIENT)
Dept: SURGERY | Facility: HOSPITAL | Age: 84
End: 2019-12-06

## 2019-12-06 NOTE — TELEPHONE ENCOUNTER
Returned call, addressed questions.   ----- Message from Albina Shepherd sent at 12/6/2019 12:07 PM CST -----  Contact: jose petersen daughter [phone]  She wants to speak with you about medication

## 2019-12-10 DIAGNOSIS — G47.00 INSOMNIA, UNSPECIFIED TYPE: ICD-10-CM

## 2019-12-10 DIAGNOSIS — F05 SUNDOWNING: ICD-10-CM

## 2019-12-10 RX ORDER — QUETIAPINE FUMARATE 25 MG/1
TABLET, FILM COATED ORAL
Qty: 45 TABLET | Refills: 1 | Status: SHIPPED | OUTPATIENT
Start: 2019-12-10 | End: 2020-02-11

## 2019-12-24 DIAGNOSIS — K52.832 LYMPHOCYTIC COLITIS: ICD-10-CM

## 2019-12-30 ENCOUNTER — PATIENT OUTREACH (OUTPATIENT)
Dept: ADMINISTRATIVE | Facility: OTHER | Age: 84
End: 2019-12-30

## 2019-12-31 ENCOUNTER — PATIENT MESSAGE (OUTPATIENT)
Dept: UROLOGY | Facility: CLINIC | Age: 84
End: 2019-12-31

## 2019-12-31 DIAGNOSIS — R31.0 GROSS HEMATURIA: Primary | ICD-10-CM

## 2019-12-31 RX ORDER — BUDESONIDE 3 MG/1
CAPSULE, COATED PELLETS ORAL
Qty: 90 CAPSULE | Refills: 1 | Status: SHIPPED | OUTPATIENT
Start: 2019-12-31 | End: 2020-04-15 | Stop reason: SDUPTHER

## 2019-12-31 RX ORDER — CIPROFLOXACIN 500 MG/1
500 TABLET ORAL EVERY 12 HOURS
Qty: 14 TABLET | Refills: 0 | Status: SHIPPED | OUTPATIENT
Start: 2019-12-31 | End: 2020-01-07

## 2020-01-02 ENCOUNTER — LAB VISIT (OUTPATIENT)
Dept: LAB | Facility: HOSPITAL | Age: 85
End: 2020-01-02
Attending: STUDENT IN AN ORGANIZED HEALTH CARE EDUCATION/TRAINING PROGRAM
Payer: MEDICARE

## 2020-01-02 DIAGNOSIS — R31.0 GROSS HEMATURIA: ICD-10-CM

## 2020-01-02 LAB
BACTERIA #/AREA URNS AUTO: ABNORMAL /HPF
BILIRUB UR QL STRIP: NEGATIVE
CLARITY UR REFRACT.AUTO: ABNORMAL
COLOR UR AUTO: YELLOW
GLUCOSE UR QL STRIP: NEGATIVE
HGB UR QL STRIP: ABNORMAL
HYALINE CASTS UR QL AUTO: 4 /LPF
KETONES UR QL STRIP: NEGATIVE
LEUKOCYTE ESTERASE UR QL STRIP: ABNORMAL
MICROSCOPIC COMMENT: ABNORMAL
NITRITE UR QL STRIP: NEGATIVE
PH UR STRIP: 5 [PH] (ref 5–8)
PROT UR QL STRIP: ABNORMAL
RBC #/AREA URNS AUTO: >100 /HPF (ref 0–4)
SP GR UR STRIP: 1.01 (ref 1–1.03)
SQUAMOUS #/AREA URNS AUTO: 1 /HPF
URN SPEC COLLECT METH UR: ABNORMAL
WBC #/AREA URNS AUTO: 26 /HPF (ref 0–5)

## 2020-01-02 PROCEDURE — 87086 URINE CULTURE/COLONY COUNT: CPT

## 2020-01-02 PROCEDURE — 81001 URINALYSIS AUTO W/SCOPE: CPT

## 2020-01-02 RX ORDER — MESALAMINE 0.38 G/1
CAPSULE, EXTENDED RELEASE ORAL
Qty: 360 CAPSULE | Refills: 1 | Status: SHIPPED | OUTPATIENT
Start: 2020-01-02 | End: 2020-06-18 | Stop reason: SDUPTHER

## 2020-01-02 NOTE — TELEPHONE ENCOUNTER
----- Message from Marita Aquino sent at 1/2/2020  2:24 PM CST -----  Contact: patient daughter   [phone]-please call above patient daughter  at number in message said patient need a refill on medication Simba said they called about this before aj and have not heard back from the office waiting on a call from the nurse thanks.

## 2020-01-03 ENCOUNTER — OFFICE VISIT (OUTPATIENT)
Dept: SURGERY | Facility: CLINIC | Age: 85
End: 2020-01-03
Payer: MEDICARE

## 2020-01-03 VITALS
DIASTOLIC BLOOD PRESSURE: 69 MMHG | WEIGHT: 142.44 LBS | HEIGHT: 71 IN | BODY MASS INDEX: 19.94 KG/M2 | SYSTOLIC BLOOD PRESSURE: 104 MMHG | HEART RATE: 61 BPM

## 2020-01-03 DIAGNOSIS — K60.2 ANAL FISSURE: Primary | ICD-10-CM

## 2020-01-03 LAB — BACTERIA UR CULT: NO GROWTH

## 2020-01-03 PROCEDURE — 1159F MED LIST DOCD IN RCRD: CPT | Mod: S$GLB,,, | Performed by: NURSE PRACTITIONER

## 2020-01-03 PROCEDURE — 99213 OFFICE O/P EST LOW 20 MIN: CPT | Mod: S$GLB,,, | Performed by: NURSE PRACTITIONER

## 2020-01-03 PROCEDURE — 1101F PT FALLS ASSESS-DOCD LE1/YR: CPT | Mod: CPTII,S$GLB,, | Performed by: NURSE PRACTITIONER

## 2020-01-03 PROCEDURE — 99999 PR PBB SHADOW E&M-EST. PATIENT-LVL III: ICD-10-PCS | Mod: PBBFAC,,, | Performed by: NURSE PRACTITIONER

## 2020-01-03 PROCEDURE — 1101F PR PT FALLS ASSESS DOC 0-1 FALLS W/OUT INJ PAST YR: ICD-10-PCS | Mod: CPTII,S$GLB,, | Performed by: NURSE PRACTITIONER

## 2020-01-03 PROCEDURE — 1159F PR MEDICATION LIST DOCUMENTED IN MEDICAL RECORD: ICD-10-PCS | Mod: S$GLB,,, | Performed by: NURSE PRACTITIONER

## 2020-01-03 PROCEDURE — 1126F PR PAIN SEVERITY QUANTIFIED, NO PAIN PRESENT: ICD-10-PCS | Mod: S$GLB,,, | Performed by: NURSE PRACTITIONER

## 2020-01-03 PROCEDURE — 99213 PR OFFICE/OUTPT VISIT, EST, LEVL III, 20-29 MIN: ICD-10-PCS | Mod: S$GLB,,, | Performed by: NURSE PRACTITIONER

## 2020-01-03 PROCEDURE — 1126F AMNT PAIN NOTED NONE PRSNT: CPT | Mod: S$GLB,,, | Performed by: NURSE PRACTITIONER

## 2020-01-03 PROCEDURE — 99999 PR PBB SHADOW E&M-EST. PATIENT-LVL III: CPT | Mod: PBBFAC,,, | Performed by: NURSE PRACTITIONER

## 2020-01-03 RX ORDER — LIDOCAINE 50 MG/G
OINTMENT TOPICAL 4 TIMES DAILY
Qty: 35.44 G | Refills: 2 | Status: SHIPPED | OUTPATIENT
Start: 2020-01-03 | End: 2020-03-19

## 2020-01-03 NOTE — PROGRESS NOTES
Patient ID:  Phi Sainz is a 89 y.o. male     Chief Complaint: No chief complaint on file.       HPI:  Phi Sainz presents to colon and rectal surgery with a complaint of toe pain and burning.  Pain primarily with bowel movements.  He has used hydrocortisone cream with no relief and reports that it worsens the burning sensation.  He is sitting on a doughnut pillow which also has worsened the pain. He is having 1-2 hard bowel movements every day.  No blood in his stool.  He is followed by GI, Dr. Martinez.  His bowels very from constipation to diarrhea so he is hesitant to start any stool softeners or fiber supplements.    Interval history 1/3/2019  Pain and burning after bowel movements continue. Used diltiazem. Lidocaine cream helps, but relief is temporary. Hard stools - takes colace on occasional, daily fiber. No blood. Per daughter, had three caregivers and not sure if cream is being applied to correct location.        ROS:     Review of Systems   Constitutional: Negative for appetite change, chills, fatigue, fever and unexpected weight change.   Respiratory: Negative for shortness of breath.    Cardiovascular: Negative for chest pain.   Gastrointestinal: Positive for rectal pain. Negative for abdominal distention, abdominal pain, anal bleeding, blood in stool, constipation, diarrhea, nausea and vomiting.       Review of patient's allergies indicates:   Allergen Reactions    Combigan [brimonidine-timolol]      Made him dizziness    Contrast media Rash     Past Medical History:   Diagnosis Date    Allergy     Anemia     Arthritis     Basal cell carcinoma     Depression     Herniation of intervertebral disc     Hyperlipidemia     Hypertension 3/4/2015    Macular degeneration     Parkinson disease     Spinal stenosis      Past Surgical History:   Procedure Laterality Date    CATARACT EXTRACTION W/  INTRAOCULAR LENS IMPLANT Left 10/31/07    Dr. Nunez    CATARACT EXTRACTION W/   INTRAOCULAR LENS IMPLANT Right 12/16/15    Dr. Nunez     Family History   Problem Relation Age of Onset    Cancer Father         prostate    Colon cancer Father     Hearing loss Paternal Aunt     Amblyopia Neg Hx     Blindness Neg Hx     Cataracts Neg Hx     Diabetes Neg Hx     Glaucoma Neg Hx     Hypertension Neg Hx     Macular degeneration Neg Hx     Retinal detachment Neg Hx     Strabismus Neg Hx     Stroke Neg Hx     Thyroid disease Neg Hx     Heart attack Neg Hx     Heart disease Neg Hx     Heart failure Neg Hx     Hyperlipidemia Neg Hx     Melanoma Neg Hx      Current Outpatient Medications on File Prior to Visit   Medication Sig    acetaminophen (TYLENOL EXTRA STRENGTH) 500 MG tablet Take 500 mg by mouth every 6 (six) hours as needed for Pain.    amLODIPine (NORVASC) 5 MG tablet Take 1 tablet (5 mg total) by mouth once daily. (Patient taking differently: Take 5 mg by mouth once daily. Take 0.5 tablet daily)    budesonide (ENTOCORT EC) 3 mg capsule TAKE 3 CAPSULES BY MOUTH EVERY DAY    carbidopa-levodopa  mg (SINEMET)  mg per tablet Take 1 tablet three times daily.    ciprofloxacin HCl (CIPRO) 500 MG tablet Take 1 tablet (500 mg total) by mouth every 12 (twelve) hours. for 7 days    diazePAM (VALIUM) 2 MG tablet TAKE 1 TABLET BY MOUTH EVERY DAY AS NEEDED    diltiazem HCl (DILTIAZEM 2% CREAM) Apply topically 3 (three) times daily. Apply topically to anal area.    diphenoxylate-atropine 2.5-0.025 mg (LOMOTIL) 2.5-0.025 mg per tablet Take 1 tablet by mouth 2 (two) times daily.    donepezil (ARICEPT) 5 MG tablet Take 1 tablet (5 mg total) by mouth every evening.    gabapentin (NEURONTIN) 100 MG capsule Take 300 mg by mouth 2 (two) times daily. Pt taking one pill twice a day.    HYDROcodone-acetaminophen (NORCO) 5-325 mg per tablet TK 1 T PO Q 8 H PRN    ibuprofen (ADVIL) 200 MG tablet Take 1 tablet by mouth daily as needed.    lidocaine (XYLOCAINE) 5 % Oint ointment  Apply topically 4 (four) times daily.    mesalamine (APRISO) 0.375 gram Cp24 TAKE 4 CAPSULES (1.5 G TOTAL) BY MOUTH ONCE DAILY. PT TAKING 4 PILLS IN AM.    ondansetron (ZOFRAN) 4 MG tablet TAKE 1 TABLET BY MOUTH EVERY 8 HOURS AS NEEDED FOR NAUSEA    polyethylene glycol (MIRALAX) 17 gram PwPk Take by mouth as needed.    QUEtiapine (SEROQUEL) 25 MG Tab GIVE 1-1/2 TABLET ONE TO TWO HOURS BEFORE SUNDOWNING.    rasagiline (AZILECT) 1 mg Tab Take 1 tablet (1 mg total) by mouth once daily.    [DISCONTINUED] dicyclomine (BENTYL) 10 MG capsule Take 1 capsule (10 mg total) by mouth 3 (three) times daily as needed (abdominal pain).    [DISCONTINUED] econazole nitrate 1 % cream Apply topically daily as needed.    [DISCONTINUED] hydrocortisone 2.5 % cream Apply topically 2 (two) times daily.     No current facility-administered medications on file prior to visit.        PE:  Vitals:    01/03/20 1354   BP: 104/69   Pulse: 61     Physical Exam   Constitutional: He is oriented to person, place, and time and well-developed, well-nourished, and in no distress. No distress.   HENT:   Head: Normocephalic and atraumatic.   Pulmonary/Chest: Effort normal. No respiratory distress.   Abdominal: Soft. He exhibits no distension. There is no tenderness.   Genitourinary:   Genitourinary Comments:  small midline posterior fissure.    Musculoskeletal: Normal range of motion.   Neurological: He is alert and oriented to person, place, and time. GCS score is 15.   Skin: Skin is warm and dry. No rash noted. No erythema.   Psychiatric: Affect normal.       Impression:  Encounter Diagnosis   Name Primary?    Anal fissure Yes       PLAN:  Showed caregiver with patient at visit location of fissure and how to apply diltiazem  Start daily stool softeners to avoid hard stools   Diltiazem TID  Lidocaine prn  Did discuss next steps , possible Botox or LIAS. Not ideal candidate due to age and debility. Would like to try conservative approach for at  least another month.

## 2020-01-03 NOTE — LETTER
January 3, 2020      Yanna Santana MD  [address] [facility]  [place] [address]           [facility]  [address]  [facility] [address]  Phone: [phone]          Patient: Phi Sainz   MR Number: [id number]   YOB: 1929   Date of Visit: 1/3/2020       Dear Dr. Yanna Santana:    Thank you for referring Phi Sainz to me for evaluation. Attached you will find relevant portions of my assessment and plan of care.    If you have questions, please do not hesitate to call me. I look forward to following Phi Sainz along with you.    Sincerely,    Yissel Tejeda, NP    Enclosure  CC:  No Recipients    If you would like to receive this communication electronically, please contact [email or web address] or [phone] to request more information on [employer] access.    For providers and/or their staff who would like to refer a patient to [facility], please contact us through our one-stop-shop provider referral line, [facility], at [phone].    If you feel you have received this communication in error or would no longer like to receive these types of communications, please e-mail [email or web address]

## 2020-01-06 ENCOUNTER — PATIENT MESSAGE (OUTPATIENT)
Dept: SURGERY | Facility: CLINIC | Age: 85
End: 2020-01-06

## 2020-01-29 ENCOUNTER — PATIENT MESSAGE (OUTPATIENT)
Dept: UROLOGY | Facility: CLINIC | Age: 85
End: 2020-01-29

## 2020-01-29 DIAGNOSIS — R31.0 GROSS HEMATURIA: Primary | ICD-10-CM

## 2020-01-29 RX ORDER — CIPROFLOXACIN 500 MG/1
500 TABLET ORAL EVERY 12 HOURS
Qty: 14 TABLET | Refills: 0 | Status: SHIPPED | OUTPATIENT
Start: 2020-01-29 | End: 2020-02-05

## 2020-01-30 ENCOUNTER — LAB VISIT (OUTPATIENT)
Dept: LAB | Facility: HOSPITAL | Age: 85
End: 2020-01-30
Attending: STUDENT IN AN ORGANIZED HEALTH CARE EDUCATION/TRAINING PROGRAM
Payer: MEDICARE

## 2020-01-30 ENCOUNTER — PATIENT MESSAGE (OUTPATIENT)
Dept: UROLOGY | Facility: CLINIC | Age: 85
End: 2020-01-30

## 2020-01-30 DIAGNOSIS — R31.0 GROSS HEMATURIA: ICD-10-CM

## 2020-01-30 LAB
BACTERIA #/AREA URNS AUTO: ABNORMAL /HPF
BILIRUB UR QL STRIP: NEGATIVE
CLARITY UR REFRACT.AUTO: ABNORMAL
COLOR UR AUTO: YELLOW
GLUCOSE UR QL STRIP: NEGATIVE
HGB UR QL STRIP: ABNORMAL
HYALINE CASTS UR QL AUTO: 0 /LPF
KETONES UR QL STRIP: NEGATIVE
LEUKOCYTE ESTERASE UR QL STRIP: ABNORMAL
MICROSCOPIC COMMENT: ABNORMAL
NITRITE UR QL STRIP: NEGATIVE
PH UR STRIP: 5 [PH] (ref 5–8)
PROT UR QL STRIP: ABNORMAL
RBC #/AREA URNS AUTO: >100 /HPF (ref 0–4)
SP GR UR STRIP: 1.01 (ref 1–1.03)
URN SPEC COLLECT METH UR: ABNORMAL
WBC #/AREA URNS AUTO: 47 /HPF (ref 0–5)

## 2020-01-30 PROCEDURE — 81001 URINALYSIS AUTO W/SCOPE: CPT

## 2020-01-30 PROCEDURE — 87086 URINE CULTURE/COLONY COUNT: CPT

## 2020-01-31 LAB
BACTERIA UR CULT: NORMAL
BACTERIA UR CULT: NORMAL

## 2020-02-11 DIAGNOSIS — F05 SUNDOWNING: ICD-10-CM

## 2020-02-11 DIAGNOSIS — G47.00 INSOMNIA, UNSPECIFIED TYPE: ICD-10-CM

## 2020-02-11 RX ORDER — QUETIAPINE FUMARATE 25 MG/1
TABLET, FILM COATED ORAL
Qty: 45 TABLET | Refills: 1 | Status: SHIPPED | OUTPATIENT
Start: 2020-02-11 | End: 2020-02-14 | Stop reason: SDUPTHER

## 2020-02-14 ENCOUNTER — OFFICE VISIT (OUTPATIENT)
Dept: INTERNAL MEDICINE | Facility: CLINIC | Age: 85
End: 2020-02-14
Payer: MEDICARE

## 2020-02-14 VITALS
TEMPERATURE: 98 F | BODY MASS INDEX: 19.91 KG/M2 | HEIGHT: 71 IN | SYSTOLIC BLOOD PRESSURE: 110 MMHG | HEART RATE: 56 BPM | DIASTOLIC BLOOD PRESSURE: 62 MMHG | RESPIRATION RATE: 18 BRPM | WEIGHT: 142.19 LBS

## 2020-02-14 DIAGNOSIS — R22.2 MASS ON BACK: ICD-10-CM

## 2020-02-14 DIAGNOSIS — G47.00 INSOMNIA, UNSPECIFIED TYPE: ICD-10-CM

## 2020-02-14 DIAGNOSIS — R22.31 LOCALIZED SWELLING, MASS AND LUMP, RIGHT UPPER LIMB: ICD-10-CM

## 2020-02-14 DIAGNOSIS — F05 SUNDOWNING: ICD-10-CM

## 2020-02-14 DIAGNOSIS — L98.9 SKIN LESION: ICD-10-CM

## 2020-02-14 DIAGNOSIS — J34.89 RHINORRHEA: Primary | ICD-10-CM

## 2020-02-14 PROCEDURE — 1101F PR PT FALLS ASSESS DOC 0-1 FALLS W/OUT INJ PAST YR: ICD-10-PCS | Mod: CPTII,S$GLB,, | Performed by: INTERNAL MEDICINE

## 2020-02-14 PROCEDURE — 99213 OFFICE O/P EST LOW 20 MIN: CPT | Mod: S$GLB,,, | Performed by: INTERNAL MEDICINE

## 2020-02-14 PROCEDURE — 1159F MED LIST DOCD IN RCRD: CPT | Mod: S$GLB,,, | Performed by: INTERNAL MEDICINE

## 2020-02-14 PROCEDURE — 99999 PR PBB SHADOW E&M-EST. PATIENT-LVL IV: CPT | Mod: PBBFAC,,, | Performed by: INTERNAL MEDICINE

## 2020-02-14 PROCEDURE — 1101F PT FALLS ASSESS-DOCD LE1/YR: CPT | Mod: CPTII,S$GLB,, | Performed by: INTERNAL MEDICINE

## 2020-02-14 PROCEDURE — 99213 PR OFFICE/OUTPT VISIT, EST, LEVL III, 20-29 MIN: ICD-10-PCS | Mod: S$GLB,,, | Performed by: INTERNAL MEDICINE

## 2020-02-14 PROCEDURE — 99999 PR PBB SHADOW E&M-EST. PATIENT-LVL IV: ICD-10-PCS | Mod: PBBFAC,,, | Performed by: INTERNAL MEDICINE

## 2020-02-14 PROCEDURE — 1126F PR PAIN SEVERITY QUANTIFIED, NO PAIN PRESENT: ICD-10-PCS | Mod: S$GLB,,, | Performed by: INTERNAL MEDICINE

## 2020-02-14 PROCEDURE — 1159F PR MEDICATION LIST DOCUMENTED IN MEDICAL RECORD: ICD-10-PCS | Mod: S$GLB,,, | Performed by: INTERNAL MEDICINE

## 2020-02-14 PROCEDURE — 1126F AMNT PAIN NOTED NONE PRSNT: CPT | Mod: S$GLB,,, | Performed by: INTERNAL MEDICINE

## 2020-02-14 RX ORDER — IPRATROPIUM BROMIDE 21 UG/1
1 SPRAY, METERED NASAL 2 TIMES DAILY
Qty: 30 ML | Refills: 0 | Status: SHIPPED | OUTPATIENT
Start: 2020-02-14 | End: 2020-03-21

## 2020-02-14 RX ORDER — QUETIAPINE FUMARATE 25 MG/1
TABLET, FILM COATED ORAL
Qty: 45 TABLET | Refills: 6 | Status: SHIPPED | OUTPATIENT
Start: 2020-02-14 | End: 2020-04-24 | Stop reason: SDUPTHER

## 2020-02-14 RX ORDER — MIDODRINE HYDROCHLORIDE 2.5 MG/1
2.5 TABLET ORAL
COMMUNITY
End: 2020-02-14 | Stop reason: SDUPTHER

## 2020-02-14 RX ORDER — MIDODRINE HYDROCHLORIDE 2.5 MG/1
2.5 TABLET ORAL DAILY PRN
Qty: 30 TABLET | Refills: 6 | Status: SHIPPED | OUTPATIENT
Start: 2020-02-14

## 2020-02-14 RX ORDER — GABAPENTIN 300 MG/1
300 CAPSULE ORAL 2 TIMES DAILY
COMMUNITY
Start: 2019-11-10

## 2020-02-14 NOTE — PROGRESS NOTES
CC: followup of hypertension  HPI:  The patient is a 89 y.o. year old male who presents to the office for followup of hypertension.  The patient denies any chest pain, headache, blurred vision, excessive fatigue, nausea or vomiting, but reports occasional shortness of breath.  His daughter reports skin breakdown.  She also reports rhinorrhea, worse with eating.  He has a soft tissue mass on his right scapula, and a possible change of a mole of left chest.    PAST MEDICAL HISTORY:  Past Medical History:   Diagnosis Date    Allergy     Anemia     Arthritis     Basal cell carcinoma     Depression     Herniation of intervertebral disc     Hyperlipidemia     Hypertension 3/4/2015    Macular degeneration     Parkinson disease     Spinal stenosis        SURGICAL HISTORY:  Past Surgical History:   Procedure Laterality Date    CATARACT EXTRACTION W/  INTRAOCULAR LENS IMPLANT Left 10/31/07    Dr. Nunez    CATARACT EXTRACTION W/  INTRAOCULAR LENS IMPLANT Right 12/16/15    Dr. Nunez       MEDS:  Medcard reviewed and updated    ALLERGIES: Allergy Card reviewed and updated    SOCIAL HISTORY:   The patient is a nonsmoker.    PE:   APPEARANCE: Well nourished, well developed, in no acute distress.    EYES: Sclerae anicteric. PERRL. EOMI.      EARS: TM's intact. No retraction or perforation.    NOSE: Mucosa pink. Airway clear.  MOUTH & THROAT: No tonsillar enlargement. No pharyngeal erythema or exudate. No stridor.  NECK: Supple, no thyromegaly.  NODES: No cervical, axillary or inguinal lymph node enlargement.  CHEST: Lungs clear to auscultation with unlabored respirations.  CARDIOVASCULAR: Normal S1, S2. No murmurs. No carotid bruits. No pedal edema.  ABDOMEN: Bowel sounds normal. Not distended. Soft. No tenderness or masses. No organomegaly.  MUSCULOSKELETAL:  Normal gait, no cyanosis or clubbing. Stength 5//5 in all 4 extremities.   SKIN: Normal skin turgor, warm and dry.  NEUROLOGIC:       Cranial Nerves:  Intact.      DTR's: Knees, 2+ and equal bilaterally.  PSYCHIATRIC: The patient is oriented to person, place, and time and has a pleasant affect.        ASSESSMENT/PLAN:  Phi was seen today for follow-up.    Diagnoses and all orders for this visit:    Rhinorrhea    Sundowning  -     QUEtiapine (SEROQUEL) 25 MG Tab; GIVE 1-1/2 TABLET ONE TO TWO HOURS BEFORE SUNDOWNING.    Insomnia, unspecified type  -     QUEtiapine (SEROQUEL) 25 MG Tab; GIVE 1-1/2 TABLET ONE TO TWO HOURS BEFORE SUNDOWNING.    Skin lesion  -     Ambulatory referral/consult to Dermatology; Future    Mass on back  -     US Soft Tissue Upper Extremity; Future    Localized swelling, mass and lump, right upper limb   -     US Soft Tissue Upper Extremity; Future    Other orders  -     ipratropium (ATROVENT) 0.03 % nasal spray; 1 spray by Nasal route 2 (two) times daily.  -     midodrine (PROAMATINE) 2.5 MG Tab; Take 1 tablet (2.5 mg total) by mouth daily as needed.

## 2020-02-19 ENCOUNTER — PATIENT MESSAGE (OUTPATIENT)
Dept: UROLOGY | Facility: CLINIC | Age: 85
End: 2020-02-19

## 2020-02-20 ENCOUNTER — PATIENT OUTREACH (OUTPATIENT)
Dept: ADMINISTRATIVE | Facility: OTHER | Age: 85
End: 2020-02-20

## 2020-02-20 ENCOUNTER — OFFICE VISIT (OUTPATIENT)
Dept: UROLOGY | Facility: CLINIC | Age: 85
End: 2020-02-20
Payer: MEDICARE

## 2020-02-20 DIAGNOSIS — N40.0 BENIGN PROSTATIC HYPERPLASIA, UNSPECIFIED WHETHER LOWER URINARY TRACT SYMPTOMS PRESENT: ICD-10-CM

## 2020-02-20 DIAGNOSIS — R39.12 WEAK URINARY STREAM: ICD-10-CM

## 2020-02-20 DIAGNOSIS — R39.16 STRAINING DURING URINATION: ICD-10-CM

## 2020-02-20 DIAGNOSIS — R31.0 GROSS HEMATURIA: Primary | ICD-10-CM

## 2020-02-20 DIAGNOSIS — Z87.898 HISTORY OF URINARY HESITANCY: ICD-10-CM

## 2020-02-20 DIAGNOSIS — R30.0 DYSURIA: ICD-10-CM

## 2020-02-20 LAB
BILIRUB SERPL-MCNC: ABNORMAL MG/DL
BLOOD URINE, POC: 250
COLOR, POC UA: ABNORMAL
GLUCOSE UR QL STRIP: ABNORMAL
KETONES UR QL STRIP: ABNORMAL
LEUKOCYTE ESTERASE URINE, POC: ABNORMAL
NITRITE, POC UA: ABNORMAL
PH, POC UA: 5
POC RESIDUAL URINE VOLUME: 48 ML (ref 0–100)
PROTEIN, POC: 100
SPECIFIC GRAVITY, POC UA: 1.01
UROBILINOGEN, POC UA: ABNORMAL

## 2020-02-20 PROCEDURE — 1159F PR MEDICATION LIST DOCUMENTED IN MEDICAL RECORD: ICD-10-PCS | Mod: S$GLB,,, | Performed by: STUDENT IN AN ORGANIZED HEALTH CARE EDUCATION/TRAINING PROGRAM

## 2020-02-20 PROCEDURE — 99214 PR OFFICE/OUTPT VISIT, EST, LEVL IV, 30-39 MIN: ICD-10-PCS | Mod: 25,S$GLB,, | Performed by: STUDENT IN AN ORGANIZED HEALTH CARE EDUCATION/TRAINING PROGRAM

## 2020-02-20 PROCEDURE — 51741 PR UROFLOWMETRY, COMPLEX: ICD-10-PCS | Mod: S$GLB,,, | Performed by: STUDENT IN AN ORGANIZED HEALTH CARE EDUCATION/TRAINING PROGRAM

## 2020-02-20 PROCEDURE — 87086 URINE CULTURE/COLONY COUNT: CPT

## 2020-02-20 PROCEDURE — 51798 POCT BLADDER SCAN: ICD-10-PCS | Mod: S$GLB,,, | Performed by: STUDENT IN AN ORGANIZED HEALTH CARE EDUCATION/TRAINING PROGRAM

## 2020-02-20 PROCEDURE — 99999 PR PBB SHADOW E&M-EST. PATIENT-LVL II: CPT | Mod: PBBFAC,,, | Performed by: STUDENT IN AN ORGANIZED HEALTH CARE EDUCATION/TRAINING PROGRAM

## 2020-02-20 PROCEDURE — 99214 OFFICE O/P EST MOD 30 MIN: CPT | Mod: 25,S$GLB,, | Performed by: STUDENT IN AN ORGANIZED HEALTH CARE EDUCATION/TRAINING PROGRAM

## 2020-02-20 PROCEDURE — 99999 PR PBB SHADOW E&M-EST. PATIENT-LVL II: ICD-10-PCS | Mod: PBBFAC,,, | Performed by: STUDENT IN AN ORGANIZED HEALTH CARE EDUCATION/TRAINING PROGRAM

## 2020-02-20 PROCEDURE — 1159F MED LIST DOCD IN RCRD: CPT | Mod: S$GLB,,, | Performed by: STUDENT IN AN ORGANIZED HEALTH CARE EDUCATION/TRAINING PROGRAM

## 2020-02-20 PROCEDURE — 51798 US URINE CAPACITY MEASURE: CPT | Mod: S$GLB,,, | Performed by: STUDENT IN AN ORGANIZED HEALTH CARE EDUCATION/TRAINING PROGRAM

## 2020-02-20 PROCEDURE — 81002 POCT URINE DIPSTICK WITHOUT MICROSCOPE: ICD-10-PCS | Mod: S$GLB,,, | Performed by: STUDENT IN AN ORGANIZED HEALTH CARE EDUCATION/TRAINING PROGRAM

## 2020-02-20 PROCEDURE — 51741 ELECTRO-UROFLOWMETRY FIRST: CPT | Mod: S$GLB,,, | Performed by: STUDENT IN AN ORGANIZED HEALTH CARE EDUCATION/TRAINING PROGRAM

## 2020-02-20 PROCEDURE — 1101F PR PT FALLS ASSESS DOC 0-1 FALLS W/OUT INJ PAST YR: ICD-10-PCS | Mod: CPTII,S$GLB,, | Performed by: STUDENT IN AN ORGANIZED HEALTH CARE EDUCATION/TRAINING PROGRAM

## 2020-02-20 PROCEDURE — 1101F PT FALLS ASSESS-DOCD LE1/YR: CPT | Mod: CPTII,S$GLB,, | Performed by: STUDENT IN AN ORGANIZED HEALTH CARE EDUCATION/TRAINING PROGRAM

## 2020-02-20 PROCEDURE — 81002 URINALYSIS NONAUTO W/O SCOPE: CPT | Mod: S$GLB,,, | Performed by: STUDENT IN AN ORGANIZED HEALTH CARE EDUCATION/TRAINING PROGRAM

## 2020-02-20 RX ORDER — CIPROFLOXACIN 500 MG/5ML
500 KIT ORAL 2 TIMES DAILY
Qty: 300 ML | Refills: 0 | Status: SHIPPED | OUTPATIENT
Start: 2020-02-20 | End: 2020-03-23

## 2020-02-20 RX ORDER — CIPROFLOXACIN 500 MG/1
500 TABLET ORAL EVERY 12 HOURS
Qty: 60 TABLET | Refills: 0 | Status: SHIPPED | OUTPATIENT
Start: 2020-02-20 | End: 2020-03-21

## 2020-02-20 NOTE — PROGRESS NOTES
"Subjective:       Patient ID: Phi Sainz is a 89 y.o. male.    Chief Complaint: Follow-up  This is a 89 y.o.  male patient that is an established patient of [facility].  The patient is self referred to me for possible prostate issues. He has had hematuria in the past before (see previous notes about possible urothelial malignancy that he and the family do not want additional workup for). He experienced hematuria again recently with clot passage. The patient notes "burning" but his daughter and caretaker Camilo who are with him today state he has been having issues with burning pain from hemorrhoids recently so it is possible he is referring to that. He does have a history of BPH but cannot take flomax/finasteride due to low BP. In addition to hematuria, he also is experiencing urinary urgency and frequency. Sometimes after he is brought to the toilet he does not have to actually urinate.    2/20/20  He returns back today after his daughter Yani messaged me with Endy also at the visit. He has been experiencing intermittent dysuria, straining with urination, occasional low urine output but he is able to urinate later. He sometimes has hesitancy also. In the past was responding well to flomax, then Dr. Morales added finasteride. Then 2 months later his cardiologist recommended that they stop both due to hypotension.   Uroflow today - low volume study 36cc  PVR 48cc      LAST PSA  Lab Results   Component Value Date    PSA 0.82 05/03/2016    PSA 0.93 03/17/2015    PSA 0.86 03/13/2014    PSA 0.96 02/29/2012    PSA 1.1 02/24/2011    PSA 0.75 02/24/2010    PSA 0.91 02/20/2009    PSA 0.5 02/18/2008    PSA 0.5 02/14/2007    PSA 0.4 01/25/2006    PSA 0.5 01/13/2005    PSA 0.4 01/29/2004    PSATOTAL 0.93 03/05/2013    PSAFREE 0.46 03/05/2013     Lab Results   Component Value Date    CREATININE 1.7 (H) 11/14/2019       Past Medical History:   Diagnosis Date    Allergy     Anemia     Arthritis     Basal cell carcinoma  "    Depression     Herniation of intervertebral disc     Hyperlipidemia     Hypertension 3/4/2015    Macular degeneration     Parkinson disease     Spinal stenosis        Past Surgical History:   Procedure Laterality Date    CATARACT EXTRACTION W/  INTRAOCULAR LENS IMPLANT Left 10/31/07    Dr. Nunez    CATARACT EXTRACTION W/  INTRAOCULAR LENS IMPLANT Right 12/16/15    Dr. Nunez       Family History   Problem Relation Age of Onset    Cancer Father         prostate    Colon cancer Father     Hearing loss Paternal Aunt     Amblyopia Neg Hx     Blindness Neg Hx     Cataracts Neg Hx     Diabetes Neg Hx     Glaucoma Neg Hx     Hypertension Neg Hx     Macular degeneration Neg Hx     Retinal detachment Neg Hx     Strabismus Neg Hx     Stroke Neg Hx     Thyroid disease Neg Hx     Heart attack Neg Hx     Heart disease Neg Hx     Heart failure Neg Hx     Hyperlipidemia Neg Hx     Melanoma Neg Hx        Social History     Tobacco Use    Smoking status: Never Smoker    Smokeless tobacco: Never Used   Substance Use Topics    Alcohol use: No     Alcohol/week: 0.0 standard drinks     Frequency: Never     Binge frequency: Never    Drug use: No       Current Outpatient Medications on File Prior to Visit   Medication Sig Dispense Refill    acetaminophen (TYLENOL EXTRA STRENGTH) 500 MG tablet Take 500 mg by mouth every 6 (six) hours as needed for Pain.      budesonide (ENTOCORT EC) 3 mg capsule TAKE 3 CAPSULES BY MOUTH EVERY DAY (Patient taking differently: Take 2 mg by mouth once daily. DO NOT CRUSH OR CHEW; SWALLOW WHOLE.) 90 capsule 1    carbidopa-levodopa  mg (SINEMET)  mg per tablet Take 1 tablet three times daily. 90 tablet 11    diazePAM (VALIUM) 2 MG tablet TAKE 1 TABLET BY MOUTH EVERY DAY AS NEEDED 30 tablet 3    diltiazem HCl (DILTIAZEM 2% CREAM) Apply topically 3 (three) times daily. Apply topically to anal area. 30 g 2    diphenoxylate-atropine 2.5-0.025 mg (LOMOTIL)  2.5-0.025 mg per tablet Take 1 tablet by mouth 2 (two) times daily. 100 tablet 2    donepezil (ARICEPT) 5 MG tablet Take 1 tablet (5 mg total) by mouth every evening. 30 tablet 11    gabapentin (NEURONTIN) 300 MG capsule       HYDROcodone-acetaminophen (NORCO) 5-325 mg per tablet TK 1 T PO Q 8 H PRN  0    ibuprofen (ADVIL) 200 MG tablet Take 1 tablet by mouth daily as needed.      ipratropium (ATROVENT) 0.03 % nasal spray 1 spray by Nasal route 2 (two) times daily. 30 mL 0    lidocaine (XYLOCAINE) 5 % Oint ointment Apply topically 4 (four) times daily. 35.44 g 2    mesalamine (APRISO) 0.375 gram Cp24 TAKE 4 CAPSULES (1.5 G TOTAL) BY MOUTH ONCE DAILY. PT TAKING 4 PILLS IN AM. 360 capsule 1    midodrine (PROAMATINE) 2.5 MG Tab Take 1 tablet (2.5 mg total) by mouth daily as needed. 30 tablet 6    ondansetron (ZOFRAN) 4 MG tablet TAKE 1 TABLET BY MOUTH EVERY 8 HOURS AS NEEDED FOR NAUSEA 28 tablet 0    polyethylene glycol (MIRALAX) 17 gram PwPk Take by mouth as needed.      QUEtiapine (SEROQUEL) 25 MG Tab GIVE 1-1/2 TABLET ONE TO TWO HOURS BEFORE SUNDOWNING. 45 tablet 6     No current facility-administered medications on file prior to visit.        Review of patient's allergies indicates:   Allergen Reactions    Combigan [brimonidine-timolol]      Made him dizziness    Contrast media Rash       Review of Systems   Constitutional: Negative for chills.   HENT: Negative for congestion.    Eyes: Negative for visual disturbance.   Respiratory: Negative for shortness of breath.    Cardiovascular: Negative for chest pain.   Gastrointestinal: Negative for abdominal distention.   Musculoskeletal: Negative for gait problem.   Skin: Negative for color change.   Neurological: Negative for dizziness.   Psychiatric/Behavioral: Negative for agitation.       Objective:      Physical Exam   Constitutional: He appears well-developed and well-nourished.   HENT:   Head: Normocephalic.   Eyes: Pupils are equal, round, and  reactive to light.   Neck: Normal range of motion.   Cardiovascular: Intact distal pulses.   Pulmonary/Chest: Effort normal.   Abdominal: Soft. He exhibits no distension. There is no tenderness.   Musculoskeletal: Normal range of motion.   Neurological: He is alert.   Skin: Skin is warm and dry.   Psychiatric: He has a normal mood and affect.       Assessment:       1. Gross hematuria    2. Dysuria    3. Benign prostatic hyperplasia, unspecified whether lower urinary tract symptoms present    4. Straining during urination    5. History of urinary hesitancy    6. Weak urinary stream        Plan:       1. Postvoid residual demonstrated 48cc, counseled pt and his family he is not in urinary retention and does not require a boyer catheter.  2. Uroflow study low volume study.  3. Will send urine for culture.  4. Based off of historical UTI data, one low growth of bacteria from ER urine culture 3/16/19 sensitive to cipro.   5. Discussed that his sx could be either due to an enlarged prostate versus a prostatitis (dysuria). Offered flomax versus finasteride vs antibiotics to treat prostatitis. After a long discussion between his daughter, Endy, and myself, we will proceed with cipro liquid x 30 days to treat an empiric prostatitis. If still with bothersome urinary sx after, will discuss restarting flomax vs finasteride. In light of his hypotension issues, may consider finasteride over flomax.     Gross hematuria  -     POCT URINE DIPSTICK WITHOUT MICROSCOPE  -     POCT Bladder Scan  -     Uroflowmetry; Future  -     ciprofloxacin HCl (CIPRO) 500 MG tablet; Take 1 tablet (500 mg total) by mouth every 12 (twelve) hours.  Dispense: 60 tablet; Refill: 0  -     ciprofloxacin (CIPRO) 500 mg/5 mL suspension; Take 5 mLs (500 mg total) by mouth 2 (two) times daily.  Dispense: 300 mL; Refill: 0  -     Urine culture    Dysuria  -     POCT URINE DIPSTICK WITHOUT MICROSCOPE  -     POCT Bladder Scan  -     Uroflowmetry; Future  -      ciprofloxacin HCl (CIPRO) 500 MG tablet; Take 1 tablet (500 mg total) by mouth every 12 (twelve) hours.  Dispense: 60 tablet; Refill: 0  -     ciprofloxacin (CIPRO) 500 mg/5 mL suspension; Take 5 mLs (500 mg total) by mouth 2 (two) times daily.  Dispense: 300 mL; Refill: 0    Benign prostatic hyperplasia, unspecified whether lower urinary tract symptoms present  -     POCT URINE DIPSTICK WITHOUT MICROSCOPE  -     POCT Bladder Scan  -     Uroflowmetry; Future  -     ciprofloxacin HCl (CIPRO) 500 MG tablet; Take 1 tablet (500 mg total) by mouth every 12 (twelve) hours.  Dispense: 60 tablet; Refill: 0  -     ciprofloxacin (CIPRO) 500 mg/5 mL suspension; Take 5 mLs (500 mg total) by mouth 2 (two) times daily.  Dispense: 300 mL; Refill: 0    Straining during urination    History of urinary hesitancy    Weak urinary stream

## 2020-02-21 ENCOUNTER — HOSPITAL ENCOUNTER (OUTPATIENT)
Dept: RADIOLOGY | Facility: HOSPITAL | Age: 85
Discharge: HOME | End: 2020-02-21
Attending: INTERNAL MEDICINE
Payer: MEDICARE

## 2020-02-21 DIAGNOSIS — R22.31 LOCALIZED SWELLING, MASS AND LUMP, RIGHT UPPER LIMB: ICD-10-CM

## 2020-02-21 DIAGNOSIS — R22.2 MASS ON BACK: ICD-10-CM

## 2020-02-21 PROCEDURE — 76882 US LMTD JT/FCL EVL NVASC XTR: CPT | Mod: 26,,, | Performed by: RADIOLOGY

## 2020-02-21 PROCEDURE — 76882 US SOFT TISSUE, UPPER EXTREMITY: ICD-10-PCS | Mod: 26,,, | Performed by: RADIOLOGY

## 2020-02-21 PROCEDURE — 76882 US LMTD JT/FCL EVL NVASC XTR: CPT | Mod: TC

## 2020-02-22 LAB
BACTERIA UR CULT: NORMAL
BACTERIA UR CULT: NORMAL

## 2020-02-26 ENCOUNTER — TELEPHONE (OUTPATIENT)
Dept: CARDIOLOGY | Facility: CLINIC | Age: 85
End: 2020-02-26

## 2020-02-26 ENCOUNTER — PATIENT MESSAGE (OUTPATIENT)
Dept: CARDIOLOGY | Facility: CLINIC | Age: 85
End: 2020-02-26

## 2020-02-26 DIAGNOSIS — I10 ESSENTIAL HYPERTENSION: ICD-10-CM

## 2020-02-26 DIAGNOSIS — E78.5 DYSLIPIDEMIA: Primary | ICD-10-CM

## 2020-02-26 DIAGNOSIS — G20.A1 PARKINSON DISEASE: ICD-10-CM

## 2020-02-26 NOTE — TELEPHONE ENCOUNTER
Spoke to Yani regarding fax from [facility] for refill on Amlodipine 5mg. Yani stated that pt is not taking Amlodipine and that she is unsure why they sent a fax. I advised Yani that the fax states that pt had the medication filled on 12/1/2019. Yani stated that she will check w/pt's sitter today to make sure that pt is not taking the medication and will call back to let me know.

## 2020-02-27 ENCOUNTER — PATIENT MESSAGE (OUTPATIENT)
Dept: INTERNAL MEDICINE | Facility: CLINIC | Age: 85
End: 2020-02-27

## 2020-02-27 ENCOUNTER — TELEPHONE (OUTPATIENT)
Dept: INTERNAL MEDICINE | Facility: CLINIC | Age: 85
End: 2020-02-27

## 2020-02-27 NOTE — TELEPHONE ENCOUNTER
Ultrasound is suggestive of lipoma, which is a benign process.  However, we are unable to definitively say.  Recommendation is to monitor for change in size and repeat study for any changes.

## 2020-03-03 ENCOUNTER — PATIENT OUTREACH (OUTPATIENT)
Dept: ADMINISTRATIVE | Facility: OTHER | Age: 85
End: 2020-03-03

## 2020-03-03 RX ORDER — CARBIDOPA AND LEVODOPA 25; 100 MG/1; MG/1
TABLET ORAL
Qty: 405 TABLET | Refills: 3 | Status: SHIPPED | OUTPATIENT
Start: 2020-03-03

## 2020-03-05 ENCOUNTER — OFFICE VISIT (OUTPATIENT)
Dept: SURGERY | Facility: CLINIC | Age: 85
End: 2020-03-05
Payer: MEDICARE

## 2020-03-05 ENCOUNTER — TELEPHONE (OUTPATIENT)
Dept: SURGERY | Facility: CLINIC | Age: 85
End: 2020-03-05

## 2020-03-05 VITALS
HEIGHT: 71 IN | HEART RATE: 59 BPM | SYSTOLIC BLOOD PRESSURE: 112 MMHG | BODY MASS INDEX: 19.83 KG/M2 | DIASTOLIC BLOOD PRESSURE: 67 MMHG

## 2020-03-05 DIAGNOSIS — K62.89 ANAL PAIN: ICD-10-CM

## 2020-03-05 DIAGNOSIS — K60.2 ANAL FISSURE: Primary | ICD-10-CM

## 2020-03-05 PROCEDURE — 46600 DIAGNOSTIC ANOSCOPY SPX: CPT | Mod: S$GLB,,, | Performed by: NURSE PRACTITIONER

## 2020-03-05 PROCEDURE — 99999 PR PBB SHADOW E&M-EST. PATIENT-LVL IV: CPT | Mod: PBBFAC,,, | Performed by: NURSE PRACTITIONER

## 2020-03-05 PROCEDURE — 1126F PR PAIN SEVERITY QUANTIFIED, NO PAIN PRESENT: ICD-10-PCS | Mod: S$GLB,,, | Performed by: NURSE PRACTITIONER

## 2020-03-05 PROCEDURE — 99999 PR PBB SHADOW E&M-EST. PATIENT-LVL IV: ICD-10-PCS | Mod: PBBFAC,,, | Performed by: NURSE PRACTITIONER

## 2020-03-05 PROCEDURE — 1101F PR PT FALLS ASSESS DOC 0-1 FALLS W/OUT INJ PAST YR: ICD-10-PCS | Mod: CPTII,S$GLB,, | Performed by: NURSE PRACTITIONER

## 2020-03-05 PROCEDURE — 1126F AMNT PAIN NOTED NONE PRSNT: CPT | Mod: S$GLB,,, | Performed by: NURSE PRACTITIONER

## 2020-03-05 PROCEDURE — 1159F PR MEDICATION LIST DOCUMENTED IN MEDICAL RECORD: ICD-10-PCS | Mod: S$GLB,,, | Performed by: NURSE PRACTITIONER

## 2020-03-05 PROCEDURE — 99212 PR OFFICE/OUTPT VISIT, EST, LEVL II, 10-19 MIN: ICD-10-PCS | Mod: 25,S$GLB,, | Performed by: NURSE PRACTITIONER

## 2020-03-05 PROCEDURE — 1101F PT FALLS ASSESS-DOCD LE1/YR: CPT | Mod: CPTII,S$GLB,, | Performed by: NURSE PRACTITIONER

## 2020-03-05 PROCEDURE — 99499 UNLISTED E&M SERVICE: CPT | Mod: S$GLB,,, | Performed by: NURSE PRACTITIONER

## 2020-03-05 PROCEDURE — 99212 OFFICE O/P EST SF 10 MIN: CPT | Mod: 25,S$GLB,, | Performed by: NURSE PRACTITIONER

## 2020-03-05 PROCEDURE — 46600 PR DIAG2STIC A2SCOPY: ICD-10-PCS | Mod: S$GLB,,, | Performed by: NURSE PRACTITIONER

## 2020-03-05 PROCEDURE — 1159F MED LIST DOCD IN RCRD: CPT | Mod: S$GLB,,, | Performed by: NURSE PRACTITIONER

## 2020-03-05 PROCEDURE — 99499 RISK ADDL DX/OHS AUDIT: ICD-10-PCS | Mod: S$GLB,,, | Performed by: NURSE PRACTITIONER

## 2020-03-05 NOTE — PATIENT INSTRUCTIONS
Anal Fissure (Child)    The anal canal is the end portion of the intestinal tract. It includes the rectum and anus. Stool is passed through the anus. Sometimes a crack or tear develops in the lining of the anal canal. This condition is called an anal fissure.  Anal fissures are caused by trauma or stretching of the anal canal. This is most often due to constipation (having hard, azedqsjko-rf-kvcj stools). Severe diarrhea or insertion of an object into the anal canal may also cause a fissure.  Symptoms include pain and bleeding, especially during a bowel movement. Sometimes there is swelling, itching, and skin irritation. The area may spasm, causing more pain and skin separation. The most common complication is infection, which may lead to an abscess (pocket of pus). When this happens, there may also be discharge from the fissure.  An anal fissure usually heals on its own with no special treatment. Home care instructions to help prevent constipation and relieve symptoms may be given. Once the area has healed, follow-up tests may be needed.  In some cases, a fissure does not heal on its own. Surgery may then be needed to close the tear.  Home care  Medicines  Your child may be prescribed medicines, such as pain relievers, stool softeners, or laxatives. Make sure to follow all instructions when giving any of these medicines to your child. Note: Do not give your child over-the-counter medicines without talking to the provider first.  General care  · To help ease constipation, you may be told to:  ¨ Increase the fiber in your childs diet. This includes foods such as whole grains, fresh fruits, and vegetables. Fiber adds bulk to stool and absorbs water to soften stool. This helps stool pass through the colon more easily. If needed, a fiber supplement may also be prescribed.  ¨ Encourage your child to drink lots of water. This can also help soften stool.  · To help relieve pain and relax the muscles in the anus, have  your child soak in a bath with a few inches of warm water. This is a called a sitz bath. Your child should do this for 10 to 15 minutes at time, a few times a day, or as advised.  · Keep a careful record of when your child has a bowel movement and the type of stool that was passed. This may help the provider determine future care for your child. Older children should be encouraged to keep their own record.  · Check your childs anus for bleeding or signs of infection (see below). Older children may be asked to help monitor their own symptoms.  Follow-up care  Follow up with your childs healthcare provider as advised. If testing was done, youll be told the results and any new findings that may affect your childs care.  When to seek medical advice  Unless your childs healthcare provider advises otherwise, call the provider right away if:  · Your child is 3 months old or younger and has a fever of 100.4°F (38°C) or higher. (Seek treatment right away. Fever in a young baby can be a sign of a serious infection.)  · Your child is younger than 2 years of age and has a fever of 100.4°F (38°C) that lasts for more than 1 day.  · Your child is 2 years old or older and has a fever of 100.4°F (38°C) that lasts for more than 3 days.  · Your child has repeated fevers above 104°F (40°C).  Also call the provider right away if:  · Your child symptoms worsen, or they dont improve with home care measures.  · Your child has signs of infection such as increased redness, swelling, or foul-smelling drainage in the area around the fissure.  · Your child has ongoing constipation or explosive diarrhea.  Call 911  Call 911 right away if:  · Your child has trouble breathing.  · Your child has trouble swallowing.  · Your child faints.  · Your child has an unusually fast heart rate.  · Your child has large amounts of bleeding from the anus or blood in the stool.  Date Last Reviewed: 6/15/2015  © 8157-4796 [employer] [address]  [place] [address]. All rights reserved. This information is not intended as a substitute for professional medical care. Always follow your healthcare professional's instructions.

## 2020-03-05 NOTE — PROGRESS NOTES
CRS Office Visit History and Physical    Referring Md:   No referring provider defined for this encounter.    SUBJECTIVE:     Chief Complaint: anal pain    History of Present Illness:  The patient is known patient to this practice, new to me.   Course is as follows:  Patient is a 89 y.o. male presents with history of non healing anal fissure. He was last seen in CRS clinic by brian on 1/3/20 for same. At that time, home caregivers were applying Diltiazem cream to the wrong area and it was recommended they continue. Today he presents with his daughter and a different care giver.   Symptoms have been present for months.  Has tried diltiazem cream with moderate improvement. He reports it still burns every time he has a bowl movement but this has improved.   Associated bleeding: no  Previous anorectal procedures: no  denies straining/prolonged time on toilet with bowel movements. He is currently having 1-2 soft, formed bowel movements per day.   is currently taking fiber supplement or stool softener  Blood thinners: No    Last Colonoscopy: -8/5/2008- normal - repeat in 7 years  Family history of colorectal cancer or IBD: none, personal history of colon polyps    Review of patient's allergies indicates:   Allergen Reactions    Combigan [brimonidine-timolol]      Made him dizziness    Contrast media Rash       Past Medical History:   Diagnosis Date    Allergy     Anemia     Arthritis     Basal cell carcinoma     Depression     Herniation of intervertebral disc     Hyperlipidemia     Hypertension 3/4/2015    Macular degeneration     Parkinson disease     Spinal stenosis      Past Surgical History:   Procedure Laterality Date    CATARACT EXTRACTION W/  INTRAOCULAR LENS IMPLANT Left 10/31/07    Dr. Nunez    CATARACT EXTRACTION W/  INTRAOCULAR LENS IMPLANT Right 12/16/15    Dr. Nunez     Family History   Problem Relation Age of Onset    Cancer Father         prostate    Colon cancer Father      "Hearing loss Paternal Aunt     Amblyopia Neg Hx     Blindness Neg Hx     Cataracts Neg Hx     Diabetes Neg Hx     Glaucoma Neg Hx     Hypertension Neg Hx     Macular degeneration Neg Hx     Retinal detachment Neg Hx     Strabismus Neg Hx     Stroke Neg Hx     Thyroid disease Neg Hx     Heart attack Neg Hx     Heart disease Neg Hx     Heart failure Neg Hx     Hyperlipidemia Neg Hx     Melanoma Neg Hx      Social History     Tobacco Use    Smoking status: Never Smoker    Smokeless tobacco: Never Used   Substance Use Topics    Alcohol use: No     Alcohol/week: 0.0 standard drinks     Frequency: Never     Binge frequency: Never    Drug use: No        Review of Systems:  Review of Systems   Constitutional: Negative for chills, fever and weight loss.   Cardiovascular: Negative for chest pain and palpitations.   Gastrointestinal: Negative for abdominal pain, blood in stool, constipation, diarrhea and melena.        Burning pain with bowel movements   Genitourinary: Positive for dysuria. Negative for hematuria.        Currently on CIPRO for this   Skin: Negative for itching and rash.        Anal pain and burning with bowel movements     Neurological: Positive for weakness. Negative for dizziness.        Chronic     Psychiatric/Behavioral: The patient is not nervous/anxious.    All other systems reviewed and are negative.      OBJECTIVE:     Vital Signs (Most Recent)  Blood Pressure 112/67   Pulse (Abnormal) 59   Height 5' 11" (1.803 m)   Body Mass Index 19.83 kg/m²     Physical Exam:  General: White male in no distress   Neuro: Alert and oriented to person, place, and time.  Moves all extremities.     HEENT: No icterus.  Trachea midline  Respiratory: Respirations are even and unlabored, no cough or audible wheezing  Abdomen: soft, flat  Skin: Warm dry and intact, No visible rashes, no jaundice    Labs reviewed today:  Lab Results   Component Value Date    WBC 7.25 11/14/2019    HGB 12.1 (L) 11/14/2019 "    HCT 38.1 (L) 11/14/2019     11/14/2019    CHOL 225 (H) 10/21/2019    TRIG 94 10/21/2019    HDL 61 10/21/2019    ALT <5 (L) 11/14/2019    AST 9 (L) 11/14/2019     11/14/2019    K 4.4 11/14/2019     11/14/2019    CREATININE 1.7 (H) 11/14/2019    BUN 19 11/14/2019    CO2 31 (H) 11/14/2019    TSH 2.975 10/21/2019    PSA 0.82 05/03/2016       Imaging reviewed today:  7/4/19 CT abdomen pelvis  -Enlarged left renal collecting system and proximal left ureter similar with 06/08/2019 with heterogeneous soft tissue density material.    -This most likely represents clot and hematoma in the collecting system.    -There is resulting moderate left hydronephrosis probably unchanged.    -No urolithiasis found.    -Follow-up to show complete resolution recommended as underlying neoplastic process is difficult to exclude.  -No bowel dilation.    -Normal appendix.    -No acute inflammatory process found.  -Findings of interstitial fibrotic lung disease unchanged.  -Three-vessel coronary disease and mild cardiomegaly unchanged.    Endoscopy reviewed today:  8/5/2008- normal - repeat in 7 years    Anorectal Exam:    Anal Skin: right anterior lateral fissure    Digital Rectal Exam:  Resting Tone high  Squeeze normal  Relaxation with bear down present  Mass none  Tenderness  absent    Anoscopy:  Verbal consent was obtained.   Clear plastic anoscope inserted.    Hemorrhoids  Present grade 1  Stigmata of bleeding  absent  Stigmata of prolapsed  absent  Distal rectal mucosa normal, below dentate line, right anterior lateral fissure  Specific location shown to home caregiver    ASSESSMENT/PLAN:     Phi was seen today for anal fissure.    Diagnoses and all orders for this visit:    Anal fissure  -     diltiazem HCl (DILTIAZEM 2% CREAM); Apply topically 3 (three) times daily. Apply topically to anal area.    Anal pain    Discussed in depth with pt and pt's daughter the next step in treatment would be botox vs  sphincterotomy. They are hesitant given his age and current health. The caregiver states the medication was still being placed in the wrong location. They would like to try another round of diltiazem cream to try to avoid surgery. Pts daugther will notify me if they wish to proceed with surgery for appt with MD.     The patient was instructed to:  Increase water intake to at least 8-10 glasses of water per day.  Continue daily fiber supplement (Konsyl, Benefiber, Metamucil) and increase dietary intake to 20-30 grams/day.  Avoid straining or spending >5min/event with bowel movements.  Follow-up in clinic prn.       SAL Licea  Colon and Rectal Surgery

## 2020-03-10 ENCOUNTER — PATIENT MESSAGE (OUTPATIENT)
Dept: UROLOGY | Facility: CLINIC | Age: 85
End: 2020-03-10

## 2020-03-11 ENCOUNTER — PATIENT MESSAGE (OUTPATIENT)
Dept: UROLOGY | Facility: CLINIC | Age: 85
End: 2020-03-11

## 2020-03-19 RX ORDER — LIDOCAINE 50 MG/G
OINTMENT TOPICAL
Qty: 35.44 G | Refills: 2 | Status: SHIPPED | OUTPATIENT
Start: 2020-03-19

## 2020-03-20 ENCOUNTER — PATIENT MESSAGE (OUTPATIENT)
Dept: UROLOGY | Facility: CLINIC | Age: 85
End: 2020-03-20

## 2020-03-20 DIAGNOSIS — N40.0 BENIGN PROSTATIC HYPERPLASIA, UNSPECIFIED WHETHER LOWER URINARY TRACT SYMPTOMS PRESENT: ICD-10-CM

## 2020-03-20 DIAGNOSIS — R31.0 GROSS HEMATURIA: ICD-10-CM

## 2020-03-20 DIAGNOSIS — R30.0 DYSURIA: ICD-10-CM

## 2020-03-21 RX ORDER — IPRATROPIUM BROMIDE 21 UG/1
SPRAY, METERED NASAL
Qty: 30 ML | Refills: 3 | Status: SHIPPED | OUTPATIENT
Start: 2020-03-21

## 2020-03-23 ENCOUNTER — PATIENT MESSAGE (OUTPATIENT)
Dept: UROLOGY | Facility: CLINIC | Age: 85
End: 2020-03-23

## 2020-03-23 RX ORDER — CIPROFLOXACIN 500 MG/5ML
500 KIT ORAL 2 TIMES DAILY
Qty: 300 ML | Refills: 0 | Status: SHIPPED | OUTPATIENT
Start: 2020-03-23 | End: 2020-03-23

## 2020-03-23 RX ORDER — CIPROFLOXACIN 500 MG/5ML
500 KIT ORAL 2 TIMES DAILY
Qty: 300 ML | Refills: 0 | Status: SHIPPED | OUTPATIENT
Start: 2020-03-23 | End: 2020-03-27

## 2020-03-27 ENCOUNTER — PATIENT MESSAGE (OUTPATIENT)
Dept: DERMATOLOGY | Facility: CLINIC | Age: 85
End: 2020-03-27

## 2020-03-27 ENCOUNTER — TELEPHONE (OUTPATIENT)
Dept: UROLOGY | Facility: CLINIC | Age: 85
End: 2020-03-27

## 2020-03-27 ENCOUNTER — PATIENT MESSAGE (OUTPATIENT)
Dept: UROLOGY | Facility: CLINIC | Age: 85
End: 2020-03-27

## 2020-03-27 DIAGNOSIS — N41.9 PROSTATITIS, UNSPECIFIED PROSTATITIS TYPE: Primary | ICD-10-CM

## 2020-03-27 RX ORDER — CIPROFLOXACIN 500 MG/1
500 TABLET ORAL EVERY 12 HOURS
Qty: 28 TABLET | Refills: 1 | Status: SHIPPED | OUTPATIENT
Start: 2020-03-27 | End: 2020-04-10

## 2020-03-27 NOTE — TELEPHONE ENCOUNTER
Spoke with patient's daughter, attempted to obtain prior authorization on 3/23 and contacted N for update.  Was referred to [facility], who cannot find patient in their system.  Questioned if patient can take in tablet form?  She said yes, will administer with pudding or ice cream if pharmacist says it's ok.  Cipro tablet form sent to [facility].

## 2020-03-27 NOTE — PROGRESS NOTES
Insurance hurdles/barriers with liquid cipro. Will send in tablets twice a day for 14 days to [facility].

## 2020-03-27 NOTE — TELEPHONE ENCOUNTER
----- Message from Cheyenne Kohelr sent at 3/27/2020  2:07 PM CDT -----  Contact: patient's daughter jose  Patient called to speak with a nurse.  Gave no further details.    She would like a callback at [phone]    Thanks  KB

## 2020-03-31 ENCOUNTER — TELEPHONE (OUTPATIENT)
Dept: DERMATOLOGY | Facility: CLINIC | Age: 85
End: 2020-03-31

## 2020-03-31 NOTE — TELEPHONE ENCOUNTER
----- Message from Mary Lou Corado sent at 3/31/2020  3:05 PM CDT -----  Contact: Patient's daughter  Reason: Patient's daughter would like a call in reference to her father's rash        Contact: [phone] Yani Hensley   Scrotal swelling

## 2020-04-06 DIAGNOSIS — K52.832 LYMPHOCYTIC COLITIS: ICD-10-CM

## 2020-04-06 RX ORDER — BUDESONIDE 3 MG/1
CAPSULE, COATED PELLETS ORAL
Qty: 90 CAPSULE | Refills: 2 | OUTPATIENT
Start: 2020-04-06

## 2020-04-11 ENCOUNTER — PATIENT MESSAGE (OUTPATIENT)
Dept: DERMATOLOGY | Facility: CLINIC | Age: 85
End: 2020-04-11

## 2020-04-15 ENCOUNTER — PATIENT MESSAGE (OUTPATIENT)
Dept: UROLOGY | Facility: CLINIC | Age: 85
End: 2020-04-15

## 2020-04-15 DIAGNOSIS — K52.832 LYMPHOCYTIC COLITIS: ICD-10-CM

## 2020-04-15 NOTE — TELEPHONE ENCOUNTER
----- Message from Hanny Hutchison MA sent at 4/15/2020  1:41 PM CDT -----  Contact:  Yani (daughter) [phone]                                Prescription Refill Request  Name of medication and dose  budesonide (ENTOCORT EC) 3 mg capsule    Pharmacy     [id number] - [place] - [address] [phone] (Phone)  [phone] (Fax)        Are you completely out of your medication No/pt has a couple days left     Additional Information: per pt's daughter, he get a 90 day Rx with refills.

## 2020-04-16 ENCOUNTER — PATIENT MESSAGE (OUTPATIENT)
Dept: NEUROLOGY | Facility: CLINIC | Age: 85
End: 2020-04-16

## 2020-04-16 RX ORDER — BUDESONIDE 3 MG/1
9 CAPSULE, COATED PELLETS ORAL DAILY
Qty: 90 CAPSULE | Refills: 1 | Status: SHIPPED | OUTPATIENT
Start: 2020-04-16 | End: 2020-05-11

## 2020-04-17 ENCOUNTER — PATIENT MESSAGE (OUTPATIENT)
Dept: UROLOGY | Facility: CLINIC | Age: 85
End: 2020-04-17

## 2020-04-23 ENCOUNTER — PATIENT OUTREACH (OUTPATIENT)
Dept: ADMINISTRATIVE | Facility: OTHER | Age: 85
End: 2020-04-23

## 2020-04-24 ENCOUNTER — PATIENT MESSAGE (OUTPATIENT)
Dept: NEUROLOGY | Facility: CLINIC | Age: 85
End: 2020-04-24

## 2020-04-24 ENCOUNTER — OFFICE VISIT (OUTPATIENT)
Dept: NEUROLOGY | Facility: CLINIC | Age: 85
End: 2020-04-24
Payer: MEDICARE

## 2020-04-24 DIAGNOSIS — G20.A1 PARKINSON DISEASE: ICD-10-CM

## 2020-04-24 DIAGNOSIS — F05 SUNDOWNING: Primary | ICD-10-CM

## 2020-04-24 DIAGNOSIS — G47.00 INSOMNIA, UNSPECIFIED TYPE: ICD-10-CM

## 2020-04-24 PROCEDURE — 1101F PR PT FALLS ASSESS DOC 0-1 FALLS W/OUT INJ PAST YR: ICD-10-PCS | Mod: CPTII,95,, | Performed by: PSYCHIATRY & NEUROLOGY

## 2020-04-24 PROCEDURE — 1159F MED LIST DOCD IN RCRD: CPT | Mod: 95,,, | Performed by: PSYCHIATRY & NEUROLOGY

## 2020-04-24 PROCEDURE — 99213 PR OFFICE/OUTPT VISIT, EST, LEVL III, 20-29 MIN: ICD-10-PCS | Mod: 95,,, | Performed by: PSYCHIATRY & NEUROLOGY

## 2020-04-24 PROCEDURE — 1159F PR MEDICATION LIST DOCUMENTED IN MEDICAL RECORD: ICD-10-PCS | Mod: 95,,, | Performed by: PSYCHIATRY & NEUROLOGY

## 2020-04-24 PROCEDURE — 1101F PT FALLS ASSESS-DOCD LE1/YR: CPT | Mod: CPTII,95,, | Performed by: PSYCHIATRY & NEUROLOGY

## 2020-04-24 PROCEDURE — 99213 OFFICE O/P EST LOW 20 MIN: CPT | Mod: 95,,, | Performed by: PSYCHIATRY & NEUROLOGY

## 2020-04-24 RX ORDER — QUETIAPINE FUMARATE 25 MG/1
TABLET, FILM COATED ORAL
Qty: 180 TABLET | Refills: 3 | Status: SHIPPED | OUTPATIENT
Start: 2020-04-24

## 2020-04-24 NOTE — PROGRESS NOTES
Neurology Telemedicine Note     Name: Phi Sainz  MRN: [id number]   CSN: [id number]      Date: 04/24/2020    The patient location is: Home  The chief complaint leading to consultation is: gait disorder, PD  Visit type: Virtual visit with synchronous audio and video    TOTAL TIME SPENT: 2:36 - 2:57    Each patient to whom I provide medical services by telemedicine is:  (1) informed of the relationship between the physician and patient and the respective role of any other health care provider with respect to management of the patient; and (2) notified that they may decline to receive medical services by telemedicine and may withdraw from such care at any time.    History of Present Illness (HPI):   - imbalance is the worst part  - not sleeping at night, falls aslepp early - then wakes up a few times  - seems confused during the day, not refreshed  - willing to take 2 tabs of seroquel at bedtime  - more increased tremors during the day  - 7 weeks of cipro, has helped some nighttime symptoms of ? prostatitis  - more urgency and hesitancy at night      Nonmotor/Premotor ROS: as per HPI, and all other systems are negative    Past Medical History: The patient  has a past medical history of Allergy, Anemia, Arthritis, Basal cell carcinoma, Depression, Herniation of intervertebral disc, Hyperlipidemia, Hypertension (3/4/2015), Macular degeneration, Parkinson disease, and Spinal stenosis.    Social History: The patient  reports that he has never smoked. He has never used smokeless tobacco. He reports that he does not drink alcohol or use drugs.    Family History: Their family history includes Cancer in his father; Colon cancer in his father; Hearing loss in his paternal aunt.    Allergies: Combigan [brimonidine-timolol] and Contrast media     Meds:   Current Outpatient Medications on File Prior to Visit   Medication Sig Dispense Refill    acetaminophen (TYLENOL EXTRA STRENGTH) 500 MG tablet Take 500 mg by mouth every  6 (six) hours as needed for Pain.      budesonide (ENTOCORT EC) 3 mg capsule Take 3 capsules (9 mg total) by mouth once daily. 90 capsule 1    carbidopa-levodopa  mg (SINEMET)  mg per tablet TAKE 1.5 TABLETS BY MOUTH 3 (THREE) TIMES DAILY. 405 tablet 3    diazePAM (VALIUM) 2 MG tablet TAKE 1 TABLET BY MOUTH EVERY DAY AS NEEDED 30 tablet 3    diltiazem HCl (DILTIAZEM 2% CREAM) Apply topically 3 (three) times daily. Apply topically to anal area. 30 g 2    diphenoxylate-atropine 2.5-0.025 mg (LOMOTIL) 2.5-0.025 mg per tablet Take 1 tablet by mouth 2 (two) times daily. 100 tablet 2    donepezil (ARICEPT) 5 MG tablet Take 1 tablet (5 mg total) by mouth every evening. 30 tablet 11    gabapentin (NEURONTIN) 300 MG capsule       HYDROcodone-acetaminophen (NORCO) 5-325 mg per tablet TK 1 T PO Q 8 H PRN  0    ibuprofen (ADVIL) 200 MG tablet Take 1 tablet by mouth daily as needed.      ipratropium (ATROVENT) 0.03 % nasal spray SPRAY 1 SPRAY INTO EACH NOSTRIL TWICE A DAY 30 mL 3    lidocaine (XYLOCAINE) 5 % Oint ointment APPLY TO AFFECTED AREA 4 TIMES A DAY 35.44 g 2    mesalamine (APRISO) 0.375 gram Cp24 TAKE 4 CAPSULES (1.5 G TOTAL) BY MOUTH ONCE DAILY. PT TAKING 4 PILLS IN AM. 360 capsule 1    midodrine (PROAMATINE) 2.5 MG Tab Take 1 tablet (2.5 mg total) by mouth daily as needed. 30 tablet 6    ondansetron (ZOFRAN) 4 MG tablet TAKE 1 TABLET BY MOUTH EVERY 8 HOURS AS NEEDED FOR NAUSEA 28 tablet 0    polyethylene glycol (MIRALAX) 17 gram PwPk Take by mouth as needed.      QUEtiapine (SEROQUEL) 25 MG Tab GIVE 1-1/2 TABLET ONE TO TWO HOURS BEFORE SUNDOWNING. 45 tablet 6     No current facility-administered medications on file prior to visit.        Exam:  Vital Signs deferred with home visit  120s/60s, hr60s, weight down to 135    * Coord/Movemt/Gait Alert but gets drowsy, engaged, low voice but conversant, following commands  + facial masking.  Gen body bradykinesia.  No tremor with rest, posture,  kinesis, or intention.   No chorea, athetosis, dystonia, myoclonus, or tics.   No motor impersistence.  Gait is deferred for safety.       Medical Record Review:  - Lab Results:  Office Visit on 02/20/2020   Component Date Value Ref Range Status    Color, UA 02/20/2020 dark   Final    Spec Grav UA 02/20/2020 1.015   Final    pH, UA 02/20/2020 5   Final    WBC, UA 02/20/2020 trace   Final    Nitrite, UA 02/20/2020 pos   Final    Protein 02/20/2020 100   Final    Glucose, UA 02/20/2020 norm   Final    Ketones, UA 02/20/2020 neg   Final    Urobilinogen, UA 02/20/2020 norm   Final    Bilirubin 02/20/2020 neg   Final    Blood, UA 02/20/2020 250   Final    POC Residual Urine Volume 02/20/2020 48  0 - 100 mL Final    Urine Culture, Routine 02/20/2020 Multiple organisms isolated. None in predominance.  Repeat if   Final    Urine Culture, Routine 02/20/2020 clinically necessary.   Final   Lab Visit on 01/30/2020   Component Date Value Ref Range Status    Specimen UA 01/30/2020 Urine, Clean Catch   Final    Color, UA 01/30/2020 Yellow  Yellow, Straw, Danna Final    Appearance, UA 01/30/2020 Cloudy* Clear Final    pH, UA 01/30/2020 5.0  5.0 - 8.0 Final    Specific [place], UA 01/30/2020 1.010  1.005 - 1.030 Final    Protein, UA 01/30/2020 1+* Negative Final    Glucose, UA 01/30/2020 Negative  Negative Final    Ketones, UA 01/30/2020 Negative  Negative Final    Bilirubin (UA) 01/30/2020 Negative  Negative Final    Occult Blood UA 01/30/2020 3+* Negative Final    Nitrite, UA 01/30/2020 Negative  Negative Final    Leukocytes, UA 01/30/2020 2+* Negative Final    RBC, UA 01/30/2020 >100* 0 - 4 /hpf Final    WBC, UA 01/30/2020 47* 0 - 5 /hpf Final    Bacteria 01/30/2020 Many* None-Occ /hpf Final    Hyaline Casts, UA 01/30/2020 0  0-1/lpf /lpf Final    Microscopic Comment 01/30/2020 SEE COMMENT   Final    Urine Culture, Routine 01/30/2020 Multiple organisms isolated. None in predominance.  Repeat if    Final    Urine Culture, Routine 2020 clinically necessary.   Final   barbra  Camera went out at 2:54 - tech issues. Visit otherwise complete and copy will be sent via [facility].    Diagnoses:   1) PDism  2)? Recurrent UTI versus prostatitis?  3) BPH  4) Insom    Medical Decision Makin) boost seroquel to 50 qhs  2) PCP for urinary issues  3) may ad breanna some ldopa  4) keep other meds the same  5)COVID19 precautions - hand washing, sanitizing home/deliveries, physical distancing, avoiding large groups, wearing mask in public    I spent 21 minutes with the patient with >50% of the time spent with counseling and education regarding:  - results of data, diagnosis, and recommendations stated above  - the prognosis of a PDism  - risks and benefits of boosting seroquel  - importance of diet and exercise        Harshad Forrester MD, MPH  Division of Movement and Memory Disorders  [facility]  [phone]

## 2020-05-08 ENCOUNTER — LAB VISIT (OUTPATIENT)
Dept: LAB | Facility: HOSPITAL | Age: 85
End: 2020-05-08
Attending: INTERNAL MEDICINE
Payer: MEDICARE

## 2020-05-08 DIAGNOSIS — E78.5 DYSLIPIDEMIA: ICD-10-CM

## 2020-05-08 DIAGNOSIS — I10 ESSENTIAL HYPERTENSION: ICD-10-CM

## 2020-05-08 LAB
ALBUMIN SERPL BCP-MCNC: 2.8 G/DL (ref 3.5–5.2)
ALP SERPL-CCNC: 55 U/L (ref 55–135)
ALT SERPL W/O P-5'-P-CCNC: <5 U/L (ref 10–44)
ANION GAP SERPL CALC-SCNC: 11 MMOL/L (ref 8–16)
AST SERPL-CCNC: 10 U/L (ref 10–40)
BILIRUB SERPL-MCNC: 0.5 MG/DL (ref 0.1–1)
BUN SERPL-MCNC: 20 MG/DL (ref 8–23)
CALCIUM SERPL-MCNC: 8.8 MG/DL (ref 8.7–10.5)
CHLORIDE SERPL-SCNC: 105 MMOL/L (ref 95–110)
CHOLEST SERPL-MCNC: 159 MG/DL (ref 120–199)
CHOLEST/HDLC SERPL: 4 {RATIO} (ref 2–5)
CO2 SERPL-SCNC: 26 MMOL/L (ref 23–29)
CREAT SERPL-MCNC: 1.4 MG/DL (ref 0.5–1.4)
EST. GFR  (AFRICAN AMERICAN): 50.8 ML/MIN/1.73 M^2
EST. GFR  (NON AFRICAN AMERICAN): 43.9 ML/MIN/1.73 M^2
GLUCOSE SERPL-MCNC: 81 MG/DL (ref 70–110)
HDLC SERPL-MCNC: 40 MG/DL (ref 40–75)
HDLC SERPL: 25.2 % (ref 20–50)
LDLC SERPL CALC-MCNC: 97.4 MG/DL (ref 63–159)
NONHDLC SERPL-MCNC: 119 MG/DL
POTASSIUM SERPL-SCNC: 4 MMOL/L (ref 3.5–5.1)
PROT SERPL-MCNC: 7.1 G/DL (ref 6–8.4)
SODIUM SERPL-SCNC: 142 MMOL/L (ref 136–145)
TRIGL SERPL-MCNC: 108 MG/DL (ref 30–150)
TSH SERPL DL<=0.005 MIU/L-ACNC: 2.94 UIU/ML (ref 0.4–4)

## 2020-05-08 PROCEDURE — 84443 ASSAY THYROID STIM HORMONE: CPT

## 2020-05-08 PROCEDURE — 80061 LIPID PANEL: CPT

## 2020-05-08 PROCEDURE — 80053 COMPREHEN METABOLIC PANEL: CPT

## 2020-05-08 PROCEDURE — 36415 COLL VENOUS BLD VENIPUNCTURE: CPT | Mod: PO

## 2020-05-10 DIAGNOSIS — K52.832 LYMPHOCYTIC COLITIS: ICD-10-CM

## 2020-05-11 ENCOUNTER — TELEPHONE (OUTPATIENT)
Dept: CARDIOLOGY | Facility: CLINIC | Age: 85
End: 2020-05-11

## 2020-05-11 RX ORDER — BUDESONIDE 3 MG/1
CAPSULE, COATED PELLETS ORAL
Qty: 90 CAPSULE | Refills: 1 | Status: SHIPPED | OUTPATIENT
Start: 2020-05-11 | End: 2020-06-04

## 2020-05-11 NOTE — TELEPHONE ENCOUNTER
----- Message from Rody Morrison MD sent at 5/11/2020  3:02 PM CDT -----  Please inform the patient that lipids are better, Chemistry is fine. Protein is still low.

## 2020-05-13 ENCOUNTER — PATIENT MESSAGE (OUTPATIENT)
Dept: UROLOGY | Facility: CLINIC | Age: 85
End: 2020-05-13

## 2020-05-14 ENCOUNTER — PATIENT OUTREACH (OUTPATIENT)
Dept: ADMINISTRATIVE | Facility: OTHER | Age: 85
End: 2020-05-14

## 2020-05-14 RX ORDER — CIPROFLOXACIN 500 MG/1
500 TABLET ORAL EVERY 12 HOURS
Qty: 60 TABLET | Refills: 1 | Status: SHIPPED | OUTPATIENT
Start: 2020-05-14 | End: 2020-06-13

## 2020-05-15 ENCOUNTER — DOCUMENTATION ONLY (OUTPATIENT)
Dept: CARDIOLOGY | Facility: CLINIC | Age: 85
End: 2020-05-15

## 2020-05-15 ENCOUNTER — OFFICE VISIT (OUTPATIENT)
Dept: INTERNAL MEDICINE | Facility: CLINIC | Age: 85
End: 2020-05-15
Payer: MEDICARE

## 2020-05-15 ENCOUNTER — OFFICE VISIT (OUTPATIENT)
Dept: CARDIOLOGY | Facility: CLINIC | Age: 85
End: 2020-05-15
Payer: MEDICARE

## 2020-05-15 DIAGNOSIS — R53.1 GENERALIZED WEAKNESS: ICD-10-CM

## 2020-05-15 DIAGNOSIS — I82.501 CHRONIC DEEP VEIN THROMBOSIS (DVT) OF RIGHT LOWER EXTREMITY, UNSPECIFIED VEIN: ICD-10-CM

## 2020-05-15 DIAGNOSIS — G20.A1 PARKINSON DISEASE: Primary | ICD-10-CM

## 2020-05-15 DIAGNOSIS — E78.5 DYSLIPIDEMIA: ICD-10-CM

## 2020-05-15 DIAGNOSIS — R53.81 PHYSICAL DEBILITY: ICD-10-CM

## 2020-05-15 DIAGNOSIS — I10 ESSENTIAL HYPERTENSION: Primary | ICD-10-CM

## 2020-05-15 DIAGNOSIS — R26.9 GAIT DISORDER: ICD-10-CM

## 2020-05-15 DIAGNOSIS — R00.1 BRADYCARDIA: ICD-10-CM

## 2020-05-15 DIAGNOSIS — G20.A1 PARKINSON DISEASE: ICD-10-CM

## 2020-05-15 DIAGNOSIS — N18.30 CKD (CHRONIC KIDNEY DISEASE) STAGE 3, GFR 30-59 ML/MIN: ICD-10-CM

## 2020-05-15 DIAGNOSIS — G90.9: ICD-10-CM

## 2020-05-15 DIAGNOSIS — G31.9: ICD-10-CM

## 2020-05-15 DIAGNOSIS — N18.30 CKD (CHRONIC KIDNEY DISEASE), STAGE III: Chronic | ICD-10-CM

## 2020-05-15 PROCEDURE — 1159F PR MEDICATION LIST DOCUMENTED IN MEDICAL RECORD: ICD-10-PCS | Mod: 95,,, | Performed by: INTERNAL MEDICINE

## 2020-05-15 PROCEDURE — 99213 PR OFFICE/OUTPT VISIT, EST, LEVL III, 20-29 MIN: ICD-10-PCS | Mod: 95,,, | Performed by: INTERNAL MEDICINE

## 2020-05-15 PROCEDURE — 99499 UNLISTED E&M SERVICE: CPT | Mod: 95,,, | Performed by: INTERNAL MEDICINE

## 2020-05-15 PROCEDURE — 1101F PT FALLS ASSESS-DOCD LE1/YR: CPT | Mod: CPTII,95,, | Performed by: INTERNAL MEDICINE

## 2020-05-15 PROCEDURE — 1159F MED LIST DOCD IN RCRD: CPT | Mod: 95,,, | Performed by: INTERNAL MEDICINE

## 2020-05-15 PROCEDURE — 1101F PR PT FALLS ASSESS DOC 0-1 FALLS W/OUT INJ PAST YR: ICD-10-PCS | Mod: CPTII,95,, | Performed by: INTERNAL MEDICINE

## 2020-05-15 PROCEDURE — 99213 OFFICE O/P EST LOW 20 MIN: CPT | Mod: 95,,, | Performed by: INTERNAL MEDICINE

## 2020-05-15 PROCEDURE — 99214 OFFICE O/P EST MOD 30 MIN: CPT | Mod: 95,,, | Performed by: INTERNAL MEDICINE

## 2020-05-15 PROCEDURE — 99214 PR OFFICE/OUTPT VISIT, EST, LEVL IV, 30-39 MIN: ICD-10-PCS | Mod: 95,,, | Performed by: INTERNAL MEDICINE

## 2020-05-15 PROCEDURE — 99499 RISK ADDL DX/OHS AUDIT: ICD-10-PCS | Mod: 95,,, | Performed by: INTERNAL MEDICINE

## 2020-05-15 NOTE — PROGRESS NOTES
Subjective:   Patient ID:  Phi Sainz is a 89 y.o. male who presents for follow-up of autonomic dysfunction and hypertension    HPI: The patient location is: [facility]    The chief complaint leading to consultation is: hypertensive heart disease and autonomic dysfunction.    Visit type: audiovisual  Total time spent with patient: 30 min.    Each patient to whom he or she provides medical services by telemedicine is:  (1) informed of the relationship between the physician and patient and the respective role of any other health care provider with respect to management of the patient; and (2) notified that he or she may decline to receive medical services by telemedicine and may withdraw from such care at any time.    Notes: Patient remains sedentary.  According to his sitter and daughter his appetite is poor and he lost sense of taste.  He has chronic complaints secondary to prostate problems and is followed by urology. Presently he is not on any alpha blocker due to symptomatic hypotension. He is presently on Midodrine, but takes it only prn.    He has no cardiovascular complaints at this time.    Lab Results   Component Value Date     05/08/2020    K 4.0 05/08/2020     05/08/2020    CO2 26 05/08/2020    BUN 20 05/08/2020    CREATININE 1.4 05/08/2020    GLU 81 05/08/2020    AST 10 05/08/2020    ALT <5 (L) 05/08/2020    ALBUMIN 2.8 (L) 05/08/2020    PROT 7.1 05/08/2020    BILITOT 0.5 05/08/2020    WBC 7.25 11/14/2019    HGB 12.1 (L) 11/14/2019    HCT 38.1 (L) 11/14/2019    MCV 95 11/14/2019     11/14/2019    PSA 0.82 05/03/2016    TSH 2.935 05/08/2020    CHOL 159 05/08/2020    HDL 40 05/08/2020    LDLCALC 97.4 05/08/2020    TRIG 108 05/08/2020       Review of Systems   Constitution: Positive for decreased appetite, malaise/fatigue and weight loss.   HENT: Negative.    Eyes: Negative.    Cardiovascular: Negative.  Negative for chest pain, claudication, dyspnea on exertion, irregular  heartbeat, leg swelling, near-syncope, palpitations and syncope.   Respiratory: Negative.  Negative for cough, shortness of breath, snoring and wheezing.    Endocrine: Negative.  Negative for cold intolerance, heat intolerance, polydipsia, polyphagia and polyuria.   Skin: Negative.    Musculoskeletal: Positive for arthritis, back pain and falls.   Gastrointestinal: Positive for abdominal pain, anorexia, constipation and diarrhea.   Genitourinary: Positive for hesitancy and incomplete emptying.   Neurological: Positive for excessive daytime sleepiness, dizziness, loss of balance and weakness.   Psychiatric/Behavioral: Negative.        Objective:   Physical Exam   Vitals reviewed.    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    BUDESONIDE (ENTOCORT EC) 3 MG CAPSULE    TAKE 3 CAPSULES BY MOUTH EVERY DAY    CARBIDOPA-LEVODOPA  MG (SINEMET)  MG PER TABLET    TAKE 1.5 TABLETS BY MOUTH 3 (THREE) TIMES DAILY.    CIPROFLOXACIN HCL (CIPRO) 500 MG TABLET    Take 1 tablet (500 mg total) by mouth every 12 (twelve) hours.    DIAZEPAM (VALIUM) 2 MG TABLET    TAKE 1 TABLET BY MOUTH EVERY DAY AS NEEDED    DILTIAZEM HCL (DILTIAZEM 2% CREAM)    Apply topically 3 (three) times daily. Apply topically to anal area.    DIPHENOXYLATE-ATROPINE 2.5-0.025 MG (LOMOTIL) 2.5-0.025 MG  PER TABLET    Take 1 tablet by mouth 2 (two) times daily.    DONEPEZIL (ARICEPT) 5 MG TABLET    Take 1 tablet (5 mg total) by mouth every evening.    GABAPENTIN (NEURONTIN) 300 MG CAPSULE    Take 300 mg by mouth 2 (two) times daily.     HYDROCODONE-ACETAMINOPHEN (NORCO) 5-325 MG PER TABLET    TK 1 T PO Q 8 H PRN    IBUPROFEN (ADVIL) 200 MG TABLET    Take 1 tablet by mouth daily as needed.    IPRATROPIUM (ATROVENT) 0.03 % NASAL SPRAY    SPRAY 1 SPRAY INTO EACH NOSTRIL TWICE A DAY    LIDOCAINE (XYLOCAINE) 5 % OINT OINTMENT    APPLY TO AFFECTED AREA 4 TIMES A DAY    MESALAMINE (APRISO) 0.375 GRAM CP24    TAKE 4 CAPSULES (1.5 G TOTAL) BY MOUTH ONCE DAILY. PT TAKING 4 PILLS IN AM.    MIDODRINE (PROAMATINE) 2.5 MG TAB    Take 1 tablet (2.5 mg total) by mouth daily as needed.    ONDANSETRON (ZOFRAN) 4 MG TABLET    TAKE 1 TABLET BY MOUTH EVERY 8 HOURS AS NEEDED FOR NAUSEA    POLYETHYLENE GLYCOL (MIRALAX) 17 GRAM PWPK    Take by mouth as needed.    QUETIAPINE (SEROQUEL) 25 MG TAB    GIVE 2 tabs ONE TO TWO HOURS BEFORE SUNDOWNING.   Modified Medications    No medications on file   Discontinued Medications    No medications on file     I do not object to trial of Flomax, but it should probably be given at night while patient is already supine. If however symptomatic hypotension occur would not continue it.  To discuss with Dr. Santana nutritional support, possibly dietician referral.  No change in the present cardiovascular meds.  No follow-ups on file.

## 2020-05-19 ENCOUNTER — TELEPHONE (OUTPATIENT)
Dept: INTERNAL MEDICINE | Facility: CLINIC | Age: 85
End: 2020-05-19

## 2020-05-20 ENCOUNTER — TELEPHONE (OUTPATIENT)
Dept: INTERNAL MEDICINE | Facility: CLINIC | Age: 85
End: 2020-05-20

## 2020-05-20 NOTE — PROGRESS NOTES
Subjective:      Patient ID: Phi Sainz is a 89 y.o. male.    Chief Complaint: Idiopathic peripheral neuropathy (Yanna Santana 5/15/20)    Phi Sainz is a 89 y.o. male who presents to the clinic for follow up toenails, history of painful ingrown hallux toenail on both feet.  He feels it today on the right foot.  Worse in tight shoes.  No drainage noted from the area.  No recent history of trauma to the feet.  He has history of neurogenic claudication and chronic DVT.  He reports he is not currently on any blood thinners.  He is using kerasal on his toenails occasionally.          PCP:  Yanna Santana MD  Last PCP visit:    Chief Complaint   Patient presents with    Idiopathic peripheral neuropathy     Yanna Santana 5/15/20             Patient Active Problem List   Diagnosis    Pseudophakia - Left Eye    Essential tremor    Gait disorder    Hypertension    Dyslipidemia    Lumbago    Thoracic or lumbosacral neuritis or radiculitis, unspecified    Acquired spondylolisthesis    Degeneration of lumbar or lumbosacral intervertebral disc    Spondylosis without myelopathy    Spinal stenosis, lumbar region, with neurogenic claudication    DDD (degenerative disc disease), lumbar    Lumbar facet arthropathy    Posterior vitreous detachment, both eyes    Osteoarthritis of spine    Leg weakness, bilateral    Bradycardia    Abnormal chest CT    ILD (interstitial lung disease)    Other dysphagia    Generalized weakness    Chronic deep vein thrombosis (DVT)    Parkinson's disease    Parkinson disease    Acute cystitis without hematuria    Dehydration    Lip cancer    Altered mental state    Physical debility    CKD (chronic kidney disease), stage III    Autonomic dysfunction    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      budesonide (ENTOCORT EC) 3 mg capsule TAKE 3 CAPSULES BY MOUTH EVERY DAY 90 capsule 1    carbidopa-levodopa  mg (SINEMET)  mg per tablet TAKE 1.5 TABLETS BY MOUTH 3 (THREE) TIMES DAILY. (Patient taking differently: TAKE 1.5 tablet in evening and 1 tablet and at noon.) 405 tablet 3    ciprofloxacin HCl (CIPRO) 500 MG tablet Take 1 tablet (500 mg total) by mouth every 12 (twelve) hours. 60 tablet 1    diazePAM (VALIUM) 2 MG tablet TAKE 1 TABLET BY MOUTH EVERY DAY AS NEEDED 30 tablet 3    diltiazem HCl (DILTIAZEM 2% CREAM) Apply topically 3 (three) times daily. Apply topically to anal area. 30 g 2    diphenoxylate-atropine 2.5-0.025 mg (LOMOTIL) 2.5-0.025 mg per tablet Take 1 tablet by mouth 2 (two) times daily. (Patient taking differently: Take 1 tablet by mouth 2 (two) times daily. Pt taking prn.) 100 tablet 2    donepezil (ARICEPT) 5 MG tablet Take 1 tablet (5 mg total) by mouth every evening. 30 tablet 11    gabapentin (NEURONTIN) 300 MG capsule Take 300 mg by mouth 2 (two) times daily.       HYDROcodone-acetaminophen (NORCO) 5-325 mg per tablet TK 1 T PO Q 8 H PRN  0    ibuprofen (ADVIL) 200 MG tablet Take 1 tablet by mouth daily as needed.      ipratropium (ATROVENT) 0.03 % nasal spray SPRAY 1 SPRAY INTO EACH NOSTRIL TWICE A DAY 30 mL 3    lidocaine (XYLOCAINE) 5 % Oint ointment APPLY TO AFFECTED AREA 4 TIMES A DAY 35.44 g 2    mesalamine (APRISO) 0.375 gram Cp24 TAKE 4 CAPSULES (1.5 G TOTAL) BY MOUTH ONCE DAILY. PT TAKING 4 PILLS IN AM. 360 capsule 1    midodrine (PROAMATINE) 2.5 MG Tab Take 1 tablet (2.5 mg total) by mouth daily as needed. 30 tablet 6    ondansetron (ZOFRAN) 4 MG tablet TAKE 1 TABLET BY MOUTH EVERY 8 HOURS AS NEEDED FOR NAUSEA 28 tablet 0    polyethylene glycol (MIRALAX) 17 gram PwPk Take by mouth as needed.      QUEtiapine (SEROQUEL) 25 MG Tab GIVE 2 tabs ONE TO TWO  HOURS BEFORE SUNDOWNING. 180 tablet 3     No current facility-administered medications on file prior to visit.          Review of patient's allergies indicates:   Allergen Reactions    Combigan [brimonidine-timolol]      Made him dizziness    Contrast media Rash         Social History     Socioeconomic History    Marital status:      Spouse name: Not on file    Number of children: Not on file    Years of education: Not on file    Highest education level: Not on file   Occupational History    Occupation: retired   Social Needs    Financial resource strain: Not hard at all    Food insecurity:     Worry: Never true     Inability: Never true    Transportation needs:     Medical: No     Non-medical: No   Tobacco Use    Smoking status: Never Smoker    Smokeless tobacco: Never Used   Substance and Sexual Activity    Alcohol use: No     Alcohol/week: 0.0 standard drinks     Frequency: Never     Drinks per session: Patient refused     Binge frequency: Never    Drug use: No    Sexual activity: Yes     Partners: Female   Lifestyle    Physical activity:     Days per week: 0 days     Minutes per session: 10 min    Stress: To some extent   Relationships    Social connections:     Talks on phone: Three times a week     Gets together: More than three times a week     Attends Anabaptism service: Not on file     Active member of club or organization: No     Attends meetings of clubs or organizations: Never     Relationship status:    Other Topics Concern    Not on file   Social History Narrative    Not on file           Review of Systems   Constitution: Negative for chills, fever and malaise/fatigue.   HENT: Negative for hearing loss.    Cardiovascular: Positive for leg swelling. Negative for claudication.   Respiratory: Negative for shortness of breath.    Skin: Positive for color change, dry skin, nail changes and unusual hair distribution. Negative for flushing and rash.   Musculoskeletal: Negative  "for joint pain and myalgias.   Neurological: Negative for loss of balance, numbness, paresthesias and sensory change.   Psychiatric/Behavioral: Negative for altered mental status.           Objective:        Vitals:    05/25/20 1614   BP: 112/63   BP Location: Right arm   Patient Position: Sitting   BP Method: Medium (Automatic)   Pulse: (!) 54   Weight: 64.5 kg (142 lb 3.2 oz)   Height: 5' 11" (1.803 m)           Physical Exam   Constitutional: He is oriented to person, place, and time. He appears well-developed and well-nourished. He is cooperative.   Cardiovascular:   Pulses:       Dorsalis pedis pulses are 0 on the right side, and 0 on the left side.        Posterior tibial pulses are 0 on the right side, and 0 on the left side.   +2 PE left  +1 right   Musculoskeletal: He exhibits edema and tenderness.        Right knee: He exhibits no swelling and no ecchymosis.        Left knee: He exhibits no swelling and no ecchymosis.        Right ankle: He exhibits decreased range of motion and abnormal pulse. He exhibits no swelling and no ecchymosis. No lateral malleolus, no medial malleolus and no head of 5th metatarsal tenderness found. Achilles tendon exhibits no pain, no defect and normal Wilkinson's test results.        Left ankle: He exhibits decreased range of motion and abnormal pulse. He exhibits no swelling and no ecchymosis. No lateral malleolus, no medial malleolus and no head of 5th metatarsal tenderness found. Achilles tendon exhibits no pain and normal Wilkinson's test results.        Right lower leg: He exhibits no tenderness, no bony tenderness, no swelling, no edema and no deformity.        Left lower leg: He exhibits no tenderness, no swelling and no edema.        Right foot: There is decreased range of motion. There is no deformity.        Left foot: There is decreased range of motion. There is no deformity.   Decreased ROM with out joint pain nor crepitation to bilateral feet and ankle joints.  Muscle " strength 4/5 in all groups bilaterally  Decreased height of medial arches noted with loading of the foot b/L     Neurological: He is alert and oriented to person, place, and time.   Diminished protective sensation noted b/L       Skin: Skin is warm and dry. Capillary refill takes more than 3 seconds. No abrasion, no bruising, no burn and no ecchymosis noted.   B/L hallux medial borders mildly ingrowing. No erythema or paronychia noted. No drainage.  Nails x10 are elongated by  3-4mm's, thickened by 2-4 mm's, dystrophic, and are yellowish in  coloration . Xerosis Bilaterally. No open lesions noted.     Webspaces 1-4 b/L clean, dry, and intact.   Psychiatric: He has a normal mood and affect. His speech is normal and behavior is normal. He is attentive.   Vitals reviewed.                Assessment:       Problem List Items Addressed This Visit     CKD (chronic kidney disease), stage III (Chronic)    Relevant Orders    Routine Foot Care    Degeneration of lumbar or lumbosacral intervertebral disc - Primary    Relevant Orders    Routine Foot Care    Spinal stenosis, lumbar region, with neurogenic claudication    Relevant Orders    Routine Foot Care      Other Visit Diagnoses     Dermatophytosis of nail                  Plan:         - I counseled the patient on his conditions, their implications and medical management.    - Continue Wide toe box shoes.    - Shoe inspection. Patient instructed on proper foot hygeine. We discussed wearing proper shoe gear, daily foot inspections, never walking without protective shoe gear, never putting sharp instruments to feet, routine podiatric nail visits every 2-3 months.      - Continue Kerasal for toenails.       Routine Foot Care  Date/Time: 5/25/2020 4:00 PM  Performed by: Alissa Black DPM  Authorized by: Alissa Black DPM     Consent Done?:  Yes (Verbal)    Nail Care Type:  Debride  Location(s): All  (Left 1st Toe, Left 3rd Toe, Left 2nd Toe, Left 4th Toe, Left 5th Toe, Right  1st Toe, Right 2nd Toe, Right 3rd Toe, Right 4th Toe and Right 5th Toe)  Patient tolerance:  Patient tolerated the procedure well with no immediate complications     With patient's permission, the toenails mentioned above were aggressively reduced and debrided using a nail nipper, removing all offending nail and debris. The patient will continue to monitor the areas daily, inspect the feet, wear protective shoe gear when ambulatory, and moisturizer to maintain skin integrity.                   Alissa Black DPM  NPI: [id number]         [facility] - PODIATRY  [address] [place] [id number]  Dept: [phone]  Dept Fax: [phone]

## 2020-05-22 ENCOUNTER — PATIENT OUTREACH (OUTPATIENT)
Dept: ADMINISTRATIVE | Facility: OTHER | Age: 85
End: 2020-05-22

## 2020-05-23 PROCEDURE — G0180 PR HOME HEALTH MD CERTIFICATION: ICD-10-PCS | Mod: ,,, | Performed by: INTERNAL MEDICINE

## 2020-05-23 PROCEDURE — G0180 MD CERTIFICATION HHA PATIENT: HCPCS | Mod: ,,, | Performed by: INTERNAL MEDICINE

## 2020-05-23 NOTE — PROGRESS NOTES
Chart reviewed.   Immunizations: Triggered Imm Registry     Orders placed: n/a  Upcoming appts to satisfy JOHN topics: n/a

## 2020-05-25 ENCOUNTER — OFFICE VISIT (OUTPATIENT)
Dept: PODIATRY | Facility: CLINIC | Age: 85
End: 2020-05-25
Payer: MEDICARE

## 2020-05-25 VITALS
HEART RATE: 54 BPM | WEIGHT: 142.19 LBS | HEIGHT: 71 IN | SYSTOLIC BLOOD PRESSURE: 112 MMHG | DIASTOLIC BLOOD PRESSURE: 63 MMHG | BODY MASS INDEX: 19.91 KG/M2

## 2020-05-25 DIAGNOSIS — N18.30 CKD (CHRONIC KIDNEY DISEASE), STAGE III: Chronic | ICD-10-CM

## 2020-05-25 DIAGNOSIS — M48.062 SPINAL STENOSIS, LUMBAR REGION, WITH NEUROGENIC CLAUDICATION: ICD-10-CM

## 2020-05-25 DIAGNOSIS — B35.1 DERMATOPHYTOSIS OF NAIL: ICD-10-CM

## 2020-05-25 DIAGNOSIS — M51.37 DEGENERATION OF LUMBAR OR LUMBOSACRAL INTERVERTEBRAL DISC: Primary | ICD-10-CM

## 2020-05-25 PROCEDURE — 11721 ROUTINE FOOT CARE: ICD-10-PCS | Mod: Q9,S$GLB,, | Performed by: PODIATRIST

## 2020-05-25 PROCEDURE — 99499 RISK ADDL DX/OHS AUDIT: ICD-10-PCS | Mod: S$GLB,,, | Performed by: PODIATRIST

## 2020-05-25 PROCEDURE — 99499 UNLISTED E&M SERVICE: CPT | Mod: S$GLB,,, | Performed by: PODIATRIST

## 2020-05-25 PROCEDURE — 11721 DEBRIDE NAIL 6 OR MORE: CPT | Mod: Q9,S$GLB,, | Performed by: PODIATRIST

## 2020-05-25 PROCEDURE — 99999 PR PBB SHADOW E&M-EST. PATIENT-LVL IV: CPT | Mod: PBBFAC,,, | Performed by: PODIATRIST

## 2020-05-25 PROCEDURE — 99999 PR PBB SHADOW E&M-EST. PATIENT-LVL IV: ICD-10-PCS | Mod: PBBFAC,,, | Performed by: PODIATRIST

## 2020-06-10 ENCOUNTER — EXTERNAL HOME HEALTH (OUTPATIENT)
Dept: HOME HEALTH SERVICES | Facility: HOSPITAL | Age: 85
End: 2020-06-10
Payer: MEDICARE

## 2020-06-10 ENCOUNTER — TELEPHONE (OUTPATIENT)
Dept: UROLOGY | Facility: CLINIC | Age: 85
End: 2020-06-10

## 2020-06-10 ENCOUNTER — PATIENT OUTREACH (OUTPATIENT)
Dept: ADMINISTRATIVE | Facility: OTHER | Age: 85
End: 2020-06-10

## 2020-06-10 NOTE — TELEPHONE ENCOUNTER
----- Message from Anne Stanton sent at 6/10/2020 10:29 AM CDT -----  Contact: Pt's daughter Yani [phone]  Pt's daughter Yani is requesting a callback in regards to scheduling an appt with the Doctor on Friday 6/12/2020.    Please call and advise

## 2020-06-10 NOTE — TELEPHONE ENCOUNTER
Returned call, requesting an appointment ASAP.  Offered tomorrow at 1320 hours.  Accepted and confirmed.  May need assistance with patient (daughter will call or message if needed)

## 2020-06-11 ENCOUNTER — LAB VISIT (OUTPATIENT)
Dept: LAB | Facility: HOSPITAL | Age: 85
End: 2020-06-11
Attending: STUDENT IN AN ORGANIZED HEALTH CARE EDUCATION/TRAINING PROGRAM
Payer: MEDICARE

## 2020-06-11 ENCOUNTER — TELEPHONE (OUTPATIENT)
Dept: GASTROENTEROLOGY | Facility: CLINIC | Age: 85
End: 2020-06-11

## 2020-06-11 ENCOUNTER — OFFICE VISIT (OUTPATIENT)
Dept: UROLOGY | Facility: CLINIC | Age: 85
End: 2020-06-11
Payer: MEDICARE

## 2020-06-11 VITALS
DIASTOLIC BLOOD PRESSURE: 74 MMHG | WEIGHT: 142 LBS | SYSTOLIC BLOOD PRESSURE: 119 MMHG | BODY MASS INDEX: 19.88 KG/M2 | HEART RATE: 59 BPM | TEMPERATURE: 98 F | HEIGHT: 71 IN

## 2020-06-11 DIAGNOSIS — R79.89 ELEVATED SERUM CREATININE: ICD-10-CM

## 2020-06-11 DIAGNOSIS — R30.0 DYSURIA: ICD-10-CM

## 2020-06-11 DIAGNOSIS — R79.82 ELEVATED C-REACTIVE PROTEIN: Primary | ICD-10-CM

## 2020-06-11 DIAGNOSIS — R53.1 WEAKNESS: ICD-10-CM

## 2020-06-11 DIAGNOSIS — R51.9 NONINTRACTABLE HEADACHE, UNSPECIFIED CHRONICITY PATTERN, UNSPECIFIED HEADACHE TYPE: ICD-10-CM

## 2020-06-11 DIAGNOSIS — N18.30 CKD (CHRONIC KIDNEY DISEASE), STAGE III: Primary | ICD-10-CM

## 2020-06-11 LAB
ANION GAP SERPL CALC-SCNC: 8 MMOL/L (ref 8–16)
BUN SERPL-MCNC: 18 MG/DL (ref 8–23)
CALCIUM SERPL-MCNC: 8.6 MG/DL (ref 8.7–10.5)
CHLORIDE SERPL-SCNC: 101 MMOL/L (ref 95–110)
CO2 SERPL-SCNC: 29 MMOL/L (ref 23–29)
CREAT SERPL-MCNC: 1.4 MG/DL (ref 0.5–1.4)
EST. GFR  (AFRICAN AMERICAN): 51 ML/MIN/1.73 M^2
EST. GFR  (NON AFRICAN AMERICAN): 44 ML/MIN/1.73 M^2
GLUCOSE SERPL-MCNC: 89 MG/DL (ref 70–110)
POTASSIUM SERPL-SCNC: 3.2 MMOL/L (ref 3.5–5.1)
SODIUM SERPL-SCNC: 138 MMOL/L (ref 136–145)

## 2020-06-11 PROCEDURE — 99214 OFFICE O/P EST MOD 30 MIN: CPT | Mod: S$GLB,,, | Performed by: STUDENT IN AN ORGANIZED HEALTH CARE EDUCATION/TRAINING PROGRAM

## 2020-06-11 PROCEDURE — 36415 COLL VENOUS BLD VENIPUNCTURE: CPT

## 2020-06-11 PROCEDURE — 80048 BASIC METABOLIC PNL TOTAL CA: CPT

## 2020-06-11 PROCEDURE — 1126F PR PAIN SEVERITY QUANTIFIED, NO PAIN PRESENT: ICD-10-PCS | Mod: S$GLB,,, | Performed by: STUDENT IN AN ORGANIZED HEALTH CARE EDUCATION/TRAINING PROGRAM

## 2020-06-11 PROCEDURE — 99999 PR PBB SHADOW E&M-EST. PATIENT-LVL III: ICD-10-PCS | Mod: PBBFAC,,, | Performed by: STUDENT IN AN ORGANIZED HEALTH CARE EDUCATION/TRAINING PROGRAM

## 2020-06-11 PROCEDURE — 1101F PT FALLS ASSESS-DOCD LE1/YR: CPT | Mod: CPTII,S$GLB,, | Performed by: STUDENT IN AN ORGANIZED HEALTH CARE EDUCATION/TRAINING PROGRAM

## 2020-06-11 PROCEDURE — 1126F AMNT PAIN NOTED NONE PRSNT: CPT | Mod: S$GLB,,, | Performed by: STUDENT IN AN ORGANIZED HEALTH CARE EDUCATION/TRAINING PROGRAM

## 2020-06-11 PROCEDURE — 99214 PR OFFICE/OUTPT VISIT, EST, LEVL IV, 30-39 MIN: ICD-10-PCS | Mod: S$GLB,,, | Performed by: STUDENT IN AN ORGANIZED HEALTH CARE EDUCATION/TRAINING PROGRAM

## 2020-06-11 PROCEDURE — 99999 PR PBB SHADOW E&M-EST. PATIENT-LVL III: CPT | Mod: PBBFAC,,, | Performed by: STUDENT IN AN ORGANIZED HEALTH CARE EDUCATION/TRAINING PROGRAM

## 2020-06-11 PROCEDURE — 1159F MED LIST DOCD IN RCRD: CPT | Mod: S$GLB,,, | Performed by: STUDENT IN AN ORGANIZED HEALTH CARE EDUCATION/TRAINING PROGRAM

## 2020-06-11 PROCEDURE — 1159F PR MEDICATION LIST DOCUMENTED IN MEDICAL RECORD: ICD-10-PCS | Mod: S$GLB,,, | Performed by: STUDENT IN AN ORGANIZED HEALTH CARE EDUCATION/TRAINING PROGRAM

## 2020-06-11 PROCEDURE — 1101F PR PT FALLS ASSESS DOC 0-1 FALLS W/OUT INJ PAST YR: ICD-10-PCS | Mod: CPTII,S$GLB,, | Performed by: STUDENT IN AN ORGANIZED HEALTH CARE EDUCATION/TRAINING PROGRAM

## 2020-06-11 NOTE — TELEPHONE ENCOUNTER
----- Message from Doris Uribe sent at 6/11/2020 10:59 AM CDT -----  Contact: pts daugther   Patient Requesting Sooner Appointment.     Reason for sooner appt.: pts daughter is calling to speak with the nurse pt needs to be seen asap for chrones flare up   When is the first available appointment? 8/2020  Communication Preference: can you please call pts daughter Yani at [phone]  Additional Information: none    EVARISTO

## 2020-06-11 NOTE — TELEPHONE ENCOUNTER
MA attempted to contact Yani/daughter, but she did not answer. Unable to leave a voicemail due to mailbox not being setup.     Portal message sent to patient.

## 2020-06-11 NOTE — PROGRESS NOTES
"Subjective:       Patient ID: Phi Sainz is a 89 y.o. male.    Chief Complaint: Other (discussion )  This is a 89 y.o.  male patient that is an established patient of [facility]. The patient is self referred to me for possible prostate issues. He has had hematuria in the past before (see previous notes about possible urothelial malignancy that he and the family do not want additional workup for). He experienced hematuria again recently with clot passage. The patient notes "burning" but his daughter and caretaker Camilo who are with him today state he has been having issues with burning pain from hemorrhoids recently so it is possible he is referring to that. He does have a history of BPH but cannot take flomax/finasteride due to low BP. In addition to hematuria, he also is experiencing urinary urgency and frequency. Sometimes after he is brought to the toilet he does not have to actually urinate.    2/20/20  He returns back today after his daughter Yani messaged me with Endy also at the visit. He has been experiencing intermittent dysuria, straining with urination, occasional low urine output but he is able to urinate later. He sometimes has hesitancy also. In the past was responding well to flomax, then Dr. Morales added finasteride. Then 2 months later his cardiologist recommended that they stop both due to hypotension.   Uroflow today - low volume study 36cc  PVR 48cc    6/11/20  He and his daughter Ms. Lomeli return back today after requesting a followup. Endy also sent a message over the portal detailing symptoms of leg weakness, muscle atrophy, lethargy, ecchymosis, myopathy, peripheral neuropathy, loss of motor coordination, anorexia, RIVERA, anemia, polyuria, headaches, and confusion. They are wondering if this could be a sequelae of renal failure.  He is still going to urinate frequently. The cipro antibiotic multiple courses we tried maybe helped minimally. He is still experiencing dysuria when he has " the urge to urinate. His other physicians are opposed to flomax as it caused significant drop in his BP per the family.      LAST PSA  Lab Results   Component Value Date    PSA 0.82 05/03/2016    PSA 0.93 03/17/2015    PSA 0.86 03/13/2014    PSA 0.96 02/29/2012    PSA 1.1 02/24/2011    PSA 0.75 02/24/2010    PSA 0.91 02/20/2009    PSA 0.5 02/18/2008    PSA 0.5 02/14/2007    PSA 0.4 01/25/2006    PSA 0.5 01/13/2005    PSA 0.4 01/29/2004    PSATOTAL 0.93 03/05/2013    PSAFREE 0.46 03/05/2013       Lab Results   Component Value Date    CREATININE 1.4 05/08/2020    ---  Past Medical History:   Diagnosis Date    Allergy     Anemia     Arthritis     Basal cell carcinoma     Depression     Herniation of intervertebral disc     Hyperlipidemia     Hypertension 3/4/2015    Macular degeneration     Parkinson disease     Spinal stenosis        Past Surgical History:   Procedure Laterality Date    CATARACT EXTRACTION W/  INTRAOCULAR LENS IMPLANT Left 10/31/07    Dr. Nunez    CATARACT EXTRACTION W/  INTRAOCULAR LENS IMPLANT Right 12/16/15    Dr. Nunez       Family History   Problem Relation Age of Onset    Cancer Father         prostate    Colon cancer Father     Hearing loss Paternal Aunt     Amblyopia Neg Hx     Blindness Neg Hx     Cataracts Neg Hx     Diabetes Neg Hx     Glaucoma Neg Hx     Hypertension Neg Hx     Macular degeneration Neg Hx     Retinal detachment Neg Hx     Strabismus Neg Hx     Stroke Neg Hx     Thyroid disease Neg Hx     Heart attack Neg Hx     Heart disease Neg Hx     Heart failure Neg Hx     Hyperlipidemia Neg Hx     Melanoma Neg Hx        Social History     Tobacco Use    Smoking status: Never Smoker    Smokeless tobacco: Never Used   Substance Use Topics    Alcohol use: No     Alcohol/week: 0.0 standard drinks     Frequency: Never     Drinks per session: Patient refused     Binge frequency: Never    Drug use: No       Current Outpatient Medications on File  Prior to Visit   Medication Sig Dispense Refill    acetaminophen (TYLENOL EXTRA STRENGTH) 500 MG tablet Take 500 mg by mouth every 6 (six) hours as needed for Pain.      budesonide (ENTOCORT EC) 3 mg capsule TAKE 3 CAPSULES BY MOUTH EVERY DAY 90 capsule 1    carbidopa-levodopa  mg (SINEMET)  mg per tablet TAKE 1.5 TABLETS BY MOUTH 3 (THREE) TIMES DAILY. (Patient taking differently: TAKE 1.5 tablet in evening and 1 tablet and at noon.) 405 tablet 3    ciprofloxacin HCl (CIPRO) 500 MG tablet Take 1 tablet (500 mg total) by mouth every 12 (twelve) hours. 60 tablet 1    diazePAM (VALIUM) 2 MG tablet TAKE 1 TABLET BY MOUTH EVERY DAY AS NEEDED 30 tablet 3    diltiazem HCl (DILTIAZEM 2% CREAM) Apply topically 3 (three) times daily. Apply topically to anal area. 30 g 2    diphenoxylate-atropine 2.5-0.025 mg (LOMOTIL) 2.5-0.025 mg per tablet Take 1 tablet by mouth 2 (two) times daily. (Patient taking differently: Take 1 tablet by mouth 2 (two) times daily. Pt taking prn.) 100 tablet 2    donepezil (ARICEPT) 5 MG tablet Take 1 tablet (5 mg total) by mouth every evening. 30 tablet 11    gabapentin (NEURONTIN) 300 MG capsule Take 300 mg by mouth 2 (two) times daily.       HYDROcodone-acetaminophen (NORCO) 5-325 mg per tablet TK 1 T PO Q 8 H PRN  0    ibuprofen (ADVIL) 200 MG tablet Take 1 tablet by mouth daily as needed.      ipratropium (ATROVENT) 0.03 % nasal spray SPRAY 1 SPRAY INTO EACH NOSTRIL TWICE A DAY 30 mL 3    lidocaine (XYLOCAINE) 5 % Oint ointment APPLY TO AFFECTED AREA 4 TIMES A DAY 35.44 g 2    mesalamine (APRISO) 0.375 gram Cp24 TAKE 4 CAPSULES (1.5 G TOTAL) BY MOUTH ONCE DAILY. PT TAKING 4 PILLS IN AM. 360 capsule 1    midodrine (PROAMATINE) 2.5 MG Tab Take 1 tablet (2.5 mg total) by mouth daily as needed. 30 tablet 6    ondansetron (ZOFRAN) 4 MG tablet TAKE 1 TABLET BY MOUTH EVERY 8 HOURS AS NEEDED FOR NAUSEA 28 tablet 0    polyethylene glycol (MIRALAX) 17 gram PwPk Take by mouth  as needed.      QUEtiapine (SEROQUEL) 25 MG Tab GIVE 2 tabs ONE TO TWO HOURS BEFORE SUNDOWNING. 180 tablet 3     No current facility-administered medications on file prior to visit.        Review of patient's allergies indicates:   Allergen Reactions    Combigan [brimonidine-timolol]      Made him dizziness    Contrast media Rash       Review of Systems   Constitutional: Negative for chills.   HENT: Negative for congestion.    Eyes: Negative for visual disturbance.   Respiratory: Negative for shortness of breath.    Cardiovascular: Negative for chest pain.   Gastrointestinal: Negative for abdominal distention.   Musculoskeletal: Positive for gait problem.   Skin: Negative for color change.   Neurological: Negative for dizziness.   Psychiatric/Behavioral: Negative for agitation.       Objective:      Physical Exam   Constitutional: He is oriented to person, place, and time.   Thin; in wheelchair   HENT:   Head: Normocephalic and atraumatic.   Eyes: EOM are normal.   Neck: Normal range of motion.   Pulmonary/Chest: Effort normal.   Abdominal: He exhibits no distension.   Musculoskeletal:   Limited mobility; uses wheelchair   Neurological: He is alert and oriented to person, place, and time.   Skin: Skin is warm and dry.   Psychiatric: He has a normal mood and affect. His behavior is normal. Judgment and thought content normal.   He is able to answer questions, most of history obtained from daughter Yani       Assessment:       1. CKD (chronic kidney disease), stage III    2. Weakness    3. Nonintractable headache, unspecified chronicity pattern, unspecified headache type    4. Dysuria        Plan:         I did not obtain a urine sample since the last 5 urine cultures all demonstrated no growth and his urinary symptoms sound about baseline.   Last renal function was checked about 1 month ago, Cr 1.4. Compared to some of the values in 2019 (1.7-2) this is actually improved.   Will check another BMP to evaluate  renal function per family's request. If elevated from baseline, nephrology referral.  Discussed palliative care might be beneficial for the patient. The patient's daughter declined at this point in time.   Will message and CC PCP on this note.        CKD (chronic kidney disease), stage III    Weakness    Nonintractable headache, unspecified chronicity pattern, unspecified headache type    Dysuria

## 2020-06-11 NOTE — TELEPHONE ENCOUNTER
----- Message from Doris Uribe sent at 6/11/2020 10:59 AM CDT -----  Contact: pts daugther   Patient Requesting Sooner Appointment.     Reason for sooner appt.: pts daughter is calling to speak with the nurse pt needs to be seen asap for chrones flare up   When is the first available appointment? 8/2020  Communication Preference: can you please call pts daughter Yani at [phone]  Additional Information: none    EVARISTO

## 2020-06-11 NOTE — Clinical Note
Hi Dr. Santana,CC'ing you on this note. They came to me with a multitude of symptoms... And wanted a repeat kidney function checked. They are going to get BMP drawn now. Wanted to alert you to the sx. I suggested palliative but they declined.

## 2020-06-11 NOTE — TELEPHONE ENCOUNTER
----- Message from Tommy Us sent at 6/11/2020  4:18 PM CDT -----  Contact: Gini ( daughter ) @ [phone]  Caller returning the missed call, pls return call

## 2020-06-11 NOTE — PROGRESS NOTES
"Subjective:       Patient ID: Phi Sainz is a 89 y.o. male.    Chief Complaint: No chief complaint on file.   ***  This is a 89 y.o.  male patient that is {Blank single:18239::"new to me.","new to me but not new to the system.","an established patient of mine."}  The patient is {Blank single:04490::"self referred","referred to me by"} *** for ***.         LAST PSA  Lab Results   Component Value Date    PSA 0.82 05/03/2016    PSA 0.93 03/17/2015    PSA 0.86 03/13/2014    PSA 0.96 02/29/2012    PSA 1.1 02/24/2011    PSA 0.75 02/24/2010    PSA 0.91 02/20/2009    PSA 0.5 02/18/2008    PSA 0.5 02/14/2007    PSA 0.4 01/25/2006    PSA 0.5 01/13/2005    PSA 0.4 01/29/2004    PSATOTAL 0.93 03/05/2013    PSAFREE 0.46 03/05/2013       Lab Results   Component Value Date    CREATININE 1.4 05/08/2020       I personally reviewed the images: ***  No results found in the last 24 hours.      ---  Past Medical History:   Diagnosis Date    Allergy     Anemia     Arthritis     Basal cell carcinoma     Depression     Herniation of intervertebral disc     Hyperlipidemia     Hypertension 3/4/2015    Macular degeneration     Parkinson disease     Spinal stenosis        Past Surgical History:   Procedure Laterality Date    CATARACT EXTRACTION W/  INTRAOCULAR LENS IMPLANT Left 10/31/07    Dr. Nunez    CATARACT EXTRACTION W/  INTRAOCULAR LENS IMPLANT Right 12/16/15    Dr. Nunez       Family History   Problem Relation Age of Onset    Cancer Father         prostate    Colon cancer Father     Hearing loss Paternal Aunt     Amblyopia Neg Hx     Blindness Neg Hx     Cataracts Neg Hx     Diabetes Neg Hx     Glaucoma Neg Hx     Hypertension Neg Hx     Macular degeneration Neg Hx     Retinal detachment Neg Hx     Strabismus Neg Hx     Stroke Neg Hx     Thyroid disease Neg Hx     Heart attack Neg Hx     Heart disease Neg Hx     Heart failure Neg Hx     Hyperlipidemia Neg Hx     Melanoma Neg Hx  "       Social History     Tobacco Use    Smoking status: Never Smoker    Smokeless tobacco: Never Used   Substance Use Topics    Alcohol use: No     Alcohol/week: 0.0 standard drinks     Frequency: Never     Drinks per session: Patient refused     Binge frequency: Never    Drug use: No       Current Outpatient Medications on File Prior to Visit   Medication Sig Dispense Refill    acetaminophen (TYLENOL EXTRA STRENGTH) 500 MG tablet Take 500 mg by mouth every 6 (six) hours as needed for Pain.      budesonide (ENTOCORT EC) 3 mg capsule TAKE 3 CAPSULES BY MOUTH EVERY DAY 90 capsule 1    carbidopa-levodopa  mg (SINEMET)  mg per tablet TAKE 1.5 TABLETS BY MOUTH 3 (THREE) TIMES DAILY. (Patient taking differently: TAKE 1.5 tablet in evening and 1 tablet and at noon.) 405 tablet 3    ciprofloxacin HCl (CIPRO) 500 MG tablet Take 1 tablet (500 mg total) by mouth every 12 (twelve) hours. 60 tablet 1    diazePAM (VALIUM) 2 MG tablet TAKE 1 TABLET BY MOUTH EVERY DAY AS NEEDED 30 tablet 3    diltiazem HCl (DILTIAZEM 2% CREAM) Apply topically 3 (three) times daily. Apply topically to anal area. 30 g 2    diphenoxylate-atropine 2.5-0.025 mg (LOMOTIL) 2.5-0.025 mg per tablet Take 1 tablet by mouth 2 (two) times daily. (Patient taking differently: Take 1 tablet by mouth 2 (two) times daily. Pt taking prn.) 100 tablet 2    donepezil (ARICEPT) 5 MG tablet Take 1 tablet (5 mg total) by mouth every evening. 30 tablet 11    gabapentin (NEURONTIN) 300 MG capsule Take 300 mg by mouth 2 (two) times daily.       HYDROcodone-acetaminophen (NORCO) 5-325 mg per tablet TK 1 T PO Q 8 H PRN  0    ibuprofen (ADVIL) 200 MG tablet Take 1 tablet by mouth daily as needed.      ipratropium (ATROVENT) 0.03 % nasal spray SPRAY 1 SPRAY INTO EACH NOSTRIL TWICE A DAY 30 mL 3    lidocaine (XYLOCAINE) 5 % Oint ointment APPLY TO AFFECTED AREA 4 TIMES A DAY 35.44 g 2    mesalamine (APRISO) 0.375 gram Cp24 TAKE 4 CAPSULES (1.5 G TOTAL)  BY MOUTH ONCE DAILY. PT TAKING 4 PILLS IN AM. 360 capsule 1    midodrine (PROAMATINE) 2.5 MG Tab Take 1 tablet (2.5 mg total) by mouth daily as needed. 30 tablet 6    ondansetron (ZOFRAN) 4 MG tablet TAKE 1 TABLET BY MOUTH EVERY 8 HOURS AS NEEDED FOR NAUSEA 28 tablet 0    polyethylene glycol (MIRALAX) 17 gram PwPk Take by mouth as needed.      QUEtiapine (SEROQUEL) 25 MG Tab GIVE 2 tabs ONE TO TWO HOURS BEFORE SUNDOWNING. 180 tablet 3     No current facility-administered medications on file prior to visit.        Review of patient's allergies indicates:   Allergen Reactions    Combigan [brimonidine-timolol]      Made him dizziness    Contrast media Rash       Review of Systems    Objective:      Physical Exam    Assessment:       No diagnosis found.    Plan:               There are no diagnoses linked to this encounter.

## 2020-06-12 ENCOUNTER — TELEPHONE (OUTPATIENT)
Dept: GASTROENTEROLOGY | Facility: CLINIC | Age: 85
End: 2020-06-12

## 2020-06-12 NOTE — TELEPHONE ENCOUNTER
----- Message from Adrian Heart sent at 6/12/2020  9:27 AM CDT -----  Contact: Yani (daughter):   Pt's daughter called to confirm 6/18 appt at 3:30

## 2020-06-17 ENCOUNTER — PATIENT OUTREACH (OUTPATIENT)
Dept: ADMINISTRATIVE | Facility: OTHER | Age: 85
End: 2020-06-17

## 2020-06-18 ENCOUNTER — OFFICE VISIT (OUTPATIENT)
Dept: GASTROENTEROLOGY | Facility: CLINIC | Age: 85
End: 2020-06-18
Payer: MEDICARE

## 2020-06-18 VITALS
DIASTOLIC BLOOD PRESSURE: 70 MMHG | SYSTOLIC BLOOD PRESSURE: 113 MMHG | BODY MASS INDEX: 18.79 KG/M2 | HEIGHT: 71 IN | HEART RATE: 57 BPM | WEIGHT: 134.25 LBS

## 2020-06-18 DIAGNOSIS — K58.0 IRRITABLE BOWEL SYNDROME WITH DIARRHEA: Primary | ICD-10-CM

## 2020-06-18 DIAGNOSIS — R63.0 ANOREXIA: ICD-10-CM

## 2020-06-18 DIAGNOSIS — K52.832 LYMPHOCYTIC COLITIS: ICD-10-CM

## 2020-06-18 PROCEDURE — 1159F MED LIST DOCD IN RCRD: CPT | Mod: S$GLB,,, | Performed by: INTERNAL MEDICINE

## 2020-06-18 PROCEDURE — 1101F PR PT FALLS ASSESS DOC 0-1 FALLS W/OUT INJ PAST YR: ICD-10-PCS | Mod: CPTII,S$GLB,, | Performed by: INTERNAL MEDICINE

## 2020-06-18 PROCEDURE — 99999 PR PBB SHADOW E&M-EST. PATIENT-LVL IV: CPT | Mod: PBBFAC,,, | Performed by: INTERNAL MEDICINE

## 2020-06-18 PROCEDURE — 1101F PT FALLS ASSESS-DOCD LE1/YR: CPT | Mod: CPTII,S$GLB,, | Performed by: INTERNAL MEDICINE

## 2020-06-18 PROCEDURE — 99999 PR PBB SHADOW E&M-EST. PATIENT-LVL IV: ICD-10-PCS | Mod: PBBFAC,,, | Performed by: INTERNAL MEDICINE

## 2020-06-18 PROCEDURE — 1126F AMNT PAIN NOTED NONE PRSNT: CPT | Mod: S$GLB,,, | Performed by: INTERNAL MEDICINE

## 2020-06-18 PROCEDURE — 1126F PR PAIN SEVERITY QUANTIFIED, NO PAIN PRESENT: ICD-10-PCS | Mod: S$GLB,,, | Performed by: INTERNAL MEDICINE

## 2020-06-18 PROCEDURE — 99213 PR OFFICE/OUTPT VISIT, EST, LEVL III, 20-29 MIN: ICD-10-PCS | Mod: S$GLB,,, | Performed by: INTERNAL MEDICINE

## 2020-06-18 PROCEDURE — 99213 OFFICE O/P EST LOW 20 MIN: CPT | Mod: S$GLB,,, | Performed by: INTERNAL MEDICINE

## 2020-06-18 PROCEDURE — 1159F PR MEDICATION LIST DOCUMENTED IN MEDICAL RECORD: ICD-10-PCS | Mod: S$GLB,,, | Performed by: INTERNAL MEDICINE

## 2020-06-18 RX ORDER — ONDANSETRON 4 MG/1
4 TABLET, FILM COATED ORAL EVERY 8 HOURS PRN
Qty: 28 TABLET | Refills: 2 | Status: SHIPPED | OUTPATIENT
Start: 2020-06-18 | End: 2020-09-16

## 2020-06-18 RX ORDER — MESALAMINE 0.38 G/1
CAPSULE, EXTENDED RELEASE ORAL
Qty: 360 CAPSULE | Refills: 1 | Status: SHIPPED | OUTPATIENT
Start: 2020-06-18

## 2020-06-18 NOTE — PROGRESS NOTES
[facility] Established Patient Visit    Reason for Visit:    Chief Complaint   Patient presents with    Diarrhea     lomotil stopped diarrhea    Constipation       PCP: Yanna Santana    HPI:  Phi Sainz is a 89 y.o. male here for follow-up of diarrhea and history of colitis.  I last saw him in September 2019.  He is here again with his family member as well as a caregiver who helps provide details of his history.  He had been doing well but noticed progressive worsening over the last 4-6 weeks.  He has had swings in his bowel movements between constipation and diarrhea.  Occasionally he has no control over his bowel movements.  Lomotil has worked to stop the diarrhea when used.  He had not needed Lomotil in many months up until now.  IBgard was working, but does not seem as effective now.  He is using a fiber supplementation.  He is still on Apriso, 4 tablets daily.  He has been on budesonide as well, 3 pills daily.  Ginger ale has helped mildly with his symptoms.  He feels his taste has changed.  He does not have an appetite.  He is not as interested in food.  There been no medication changes P he denies pain or discomfort with eating.  He has lost about 9 lb in the last 6 months.  Zofran help some with the nausea.  There is associated bloating and gas.  He denies heartburn or reflux.  He is seeing Dr. Jimenes tomorrow for treatment of an anal fissure.  An anoscopy was performed in March to evaluate for the fissure.  Rectal inflammation was not seen by visual inspection.          ROS:  Constitutional: No fevers, chills, No weight loss, normal appetite  ENT: No congestion, rhinorrhea, or chronic sinus problems  CV: No chest pain or palpitations  Pulm: No cough, No shortness of breath  Ophtho: No vision changes or pain  GI: see HPI  Derm: No rash or lesions            PMHX:  has a past medical history of Allergy, Anemia, Arthritis, Basal cell carcinoma, Depression, Herniation  of intervertebral disc, Hyperlipidemia, Hypertension (3/4/2015), Macular degeneration, Parkinson disease, and Spinal stenosis.    PSHX:  has a past surgical history that includes Cataract extraction w/  intraocular lens implant (Left, 10/31/07) and Cataract extraction w/  intraocular lens implant (Right, 12/16/15).    The patient's social and family histories were reviewed by me and updated in the appropriate section of the electronic medical record.    Review of patient's allergies indicates:   Allergen Reactions    Combigan [brimonidine-timolol]      Made him dizziness    Contrast media Rash       Prior to Admission medications    Medication Sig Start Date End Date Taking? Authorizing Provider   acetaminophen (TYLENOL EXTRA STRENGTH) 500 MG tablet Take 500 mg by mouth every 6 (six) hours as needed for Pain.   Yes Historical Provider, MD   budesonide (ENTOCORT EC) 3 mg capsule TAKE 3 CAPSULES BY MOUTH EVERY DAY 6/4/20  Yes Darshan Lara MD   carbidopa-levodopa  mg (SINEMET)  mg per tablet TAKE 1.5 TABLETS BY MOUTH 3 (THREE) TIMES DAILY.  Patient taking differently: 1 tablet 3 (three) times daily.  3/3/20  Yes Harshad Forrester MD   diazePAM (VALIUM) 2 MG tablet TAKE 1 TABLET BY MOUTH EVERY DAY AS NEEDED 6/6/19  Yes Yanna Santana MD   diltiazem HCl (DILTIAZEM 2% CREAM) Apply topically 3 (three) times daily. Apply topically to anal area. 3/5/20  Yes Felicita Louis NP   donepezil (ARICEPT) 5 MG tablet Take 1 tablet (5 mg total) by mouth every evening. 8/28/19 8/27/20 Yes Harshad Forrester MD   gabapentin (NEURONTIN) 300 MG capsule Take 300 mg by mouth 2 (two) times daily.  11/10/19  Yes Historical Provider, MD   HYDROcodone-acetaminophen (NORCO) 5-325 mg per tablet TK 1 T PO Q 8 H PRN 6/3/19  Yes Historical Provider, MD   ibuprofen (ADVIL) 200 MG tablet Take 1 tablet by mouth daily as needed.   Yes Historical Provider, MD   lidocaine (XYLOCAINE) 5 % Oint ointment APPLY TO AFFECTED AREA 4  "TIMES A DAY 3/19/20  Yes Felicita Louis NP   mesalamine (APRISO) 0.375 gram Cp24 TAKE 4 CAPSULES (1.5 G TOTAL) BY MOUTH ONCE DAILY. PT TAKING 4 PILLS IN AM. 6/18/20  Yes Darshan Lara MD   midodrine (PROAMATINE) 2.5 MG Tab Take 1 tablet (2.5 mg total) by mouth daily as needed. 2/14/20  Yes Yanna Santana MD   ondansetron (ZOFRAN) 4 MG tablet Take 1 tablet (4 mg total) by mouth every 8 (eight) hours as needed. 6/18/20 9/16/20 Yes Darshan Lara MD   polyethylene glycol (MIRALAX) 17 gram PwPk Take by mouth as needed.   Yes Historical Provider, MD   QUEtiapine (SEROQUEL) 25 MG Tab GIVE 2 tabs ONE TO TWO HOURS BEFORE SUNDOWNING. 4/24/20  Yes Harshad Forrester MD   mesalamine (APRISO) 0.375 gram Cp24 TAKE 4 CAPSULES (1.5 G TOTAL) BY MOUTH ONCE DAILY. PT TAKING 4 PILLS IN AM. 1/2/20 6/18/20 Yes Darshan Lara MD   ondansetron (ZOFRAN) 4 MG tablet TAKE 1 TABLET BY MOUTH EVERY 8 HOURS AS NEEDED FOR NAUSEA 11/3/19 6/18/20 Yes Darshan Lara MD   diphenoxylate-atropine 2.5-0.025 mg (LOMOTIL) 2.5-0.025 mg per tablet Take 1 tablet by mouth 2 (two) times daily.  Patient not taking: Reported on 6/18/2020 9/11/19   Darshan Lara MD   ipratropium (ATROVENT) 0.03 % nasal spray SPRAY 1 SPRAY INTO EACH NOSTRIL TWICE A DAY  Patient not taking: Reported on 6/18/2020 3/21/20   Yanna Santana MD   rifAXIMin (XIFAXAN) 550 mg Tab Take 1 tablet (550 mg total) by mouth 3 (three) times daily. for 14 days 6/18/20 7/2/20  Darshan Lara MD         Objective Findings:  Vital Signs:  /70   Pulse (!) 57   Ht 5' 11" (1.803 m)   Wt 60.9 kg (134 lb 4.2 oz)   BMI 18.73 kg/m²  Body mass index is 18.73 kg/m².    Physical Exam:  General Appearance:  Well appearing in no acute distress, appears stated age  Head:  Normocephalic, atraumatic  Eyes:  No scleral icterus or pallor, EOMI        Labs:  Lab Results   Component Value Date    WBC 7.25 11/14/2019    HGB 12.1 (L) 11/14/2019    HCT 38.1 (L) 11/14/2019    MCV 95 " 11/14/2019    RDW 13.8 11/14/2019     11/14/2019    GRAN 5.3 11/14/2019    GRAN 73.7 (H) 11/14/2019    LYMPH 1.1 11/14/2019    LYMPH 15.7 (L) 11/14/2019    MONO 0.7 11/14/2019    MONO 10.1 11/14/2019    EOS 0.0 11/14/2019    BASO 0.01 11/14/2019     Lab Results   Component Value Date     06/11/2020    K 3.2 (L) 06/11/2020     06/11/2020    CO2 29 06/11/2020    GLU 89 06/11/2020    BUN 18 06/11/2020    CREATININE 1.4 06/11/2020    CALCIUM 8.6 (L) 06/11/2020    PROT 7.1 05/08/2020    ALBUMIN 2.8 (L) 05/08/2020    BILITOT 0.5 05/08/2020    ALKPHOS 55 05/08/2020    AST 10 05/08/2020    ALT <5 (L) 05/08/2020                     Assessment:  Phi Sainz is a 89 y.o. male here with:  1. Irritable bowel syndrome with diarrhea    2. Anorexia    3. Lymphocytic colitis      This is a complex case.  He has a history of lymphocytic colitis noted in 2015 by biopsies during flexible sigmoidoscopy.  There also is a questionable history of inflammatory bowel disease. His fecal calprotectin was elevated.  Initial workup for infectious etiology was unremarkable with negative ova and parasite, Giardia/Cryptosporidium, C diff, and stool culture.  It is difficult to say whether his symptoms are secondary to microscopic colitis or IBD.   There also appears to be a component of irritable bowel syndrome with diarrhea.  He and his family do not want endoscopic evaluation, but recent anoscopy did not show any significant rectal inflammation.   He is on Apriso and budesonide, which should treat both IBD and lymphocytic colitis.      Recently noted anorexia with loss of interest in eating.  Mild weight loss noted.        Recommendations:  1.  I will check a fecal calprotectin.  I will also check an H pylori stool antigen.  2.  We will arrange a visit with our dietitian  3.  Apriso refilled  4.  Will give a course of rifaximin 550 mg p.o. t.i.d. times 14 days.          Order summary:  Orders Placed This Encounter    Procedures    Calprotectin    H. pylori antigen, stool         Darshan Lara MD

## 2020-06-19 ENCOUNTER — OFFICE VISIT (OUTPATIENT)
Dept: SURGERY | Facility: CLINIC | Age: 85
End: 2020-06-19
Payer: MEDICARE

## 2020-06-19 VITALS
BODY MASS INDEX: 18.79 KG/M2 | HEART RATE: 56 BPM | DIASTOLIC BLOOD PRESSURE: 62 MMHG | SYSTOLIC BLOOD PRESSURE: 105 MMHG | WEIGHT: 134.25 LBS | HEIGHT: 71 IN

## 2020-06-19 DIAGNOSIS — L29.0 ANAL ITCH: ICD-10-CM

## 2020-06-19 DIAGNOSIS — K58.2 IRRITABLE BOWEL SYNDROME WITH BOTH CONSTIPATION AND DIARRHEA: Primary | ICD-10-CM

## 2020-06-19 PROCEDURE — 99213 OFFICE O/P EST LOW 20 MIN: CPT | Mod: 25,S$GLB,, | Performed by: COLON & RECTAL SURGERY

## 2020-06-19 PROCEDURE — 1101F PT FALLS ASSESS-DOCD LE1/YR: CPT | Mod: CPTII,S$GLB,, | Performed by: COLON & RECTAL SURGERY

## 2020-06-19 PROCEDURE — 1125F AMNT PAIN NOTED PAIN PRSNT: CPT | Mod: S$GLB,,, | Performed by: COLON & RECTAL SURGERY

## 2020-06-19 PROCEDURE — 99213 PR OFFICE/OUTPT VISIT, EST, LEVL III, 20-29 MIN: ICD-10-PCS | Mod: 25,S$GLB,, | Performed by: COLON & RECTAL SURGERY

## 2020-06-19 PROCEDURE — 1159F PR MEDICATION LIST DOCUMENTED IN MEDICAL RECORD: ICD-10-PCS | Mod: S$GLB,,, | Performed by: COLON & RECTAL SURGERY

## 2020-06-19 PROCEDURE — 1101F PR PT FALLS ASSESS DOC 0-1 FALLS W/OUT INJ PAST YR: ICD-10-PCS | Mod: CPTII,S$GLB,, | Performed by: COLON & RECTAL SURGERY

## 2020-06-19 PROCEDURE — 46600 DIAGNOSTIC ANOSCOPY SPX: CPT | Mod: S$GLB,,, | Performed by: COLON & RECTAL SURGERY

## 2020-06-19 PROCEDURE — 99999 PR PBB SHADOW E&M-EST. PATIENT-LVL IV: CPT | Mod: PBBFAC,,, | Performed by: COLON & RECTAL SURGERY

## 2020-06-19 PROCEDURE — 46600 PR DIAG2STIC A2SCOPY: ICD-10-PCS | Mod: S$GLB,,, | Performed by: COLON & RECTAL SURGERY

## 2020-06-19 PROCEDURE — 1125F PR PAIN SEVERITY QUANTIFIED, PAIN PRESENT: ICD-10-PCS | Mod: S$GLB,,, | Performed by: COLON & RECTAL SURGERY

## 2020-06-19 PROCEDURE — 1159F MED LIST DOCD IN RCRD: CPT | Mod: S$GLB,,, | Performed by: COLON & RECTAL SURGERY

## 2020-06-19 PROCEDURE — 99999 PR PBB SHADOW E&M-EST. PATIENT-LVL IV: ICD-10-PCS | Mod: PBBFAC,,, | Performed by: COLON & RECTAL SURGERY

## 2020-06-19 RX ORDER — HYDROCORTISONE 25 MG/G
CREAM TOPICAL 2 TIMES DAILY
Qty: 1 TUBE | Refills: 5 | Status: SHIPPED | OUTPATIENT
Start: 2020-06-19 | End: 2020-06-29

## 2020-06-19 NOTE — PATIENT INSTRUCTIONS
I do not see any significant  fissure today.  You appear to have irritated perianal skin.    Plan:  Apply steroid cream (hydrocortisone 2.5%) daily for the next 7 days.  You can then use this as needed.  Additionally, use a nonsterile gauze against the anus and change this multiple times per day to keep the perianal skin dry.  During times of diarrhea, use the barrier cream (Calmoseptine) to protect the skin.    CONRAD Jimenes MD, FACS  Staff Surgeon  Colon & Rectal Surgery

## 2020-06-19 NOTE — PROGRESS NOTES
"CRS Office Visit Follow-up  Referring Md:   No referring provider defined for this encounter.    SUBJECTIVE:     Chief Complaint: anal fissure    History of Present Illness:  Patient is a 89 y.o. male presents with anal fissure. The patient is a established patient to this practice.       Course is as follows:  Patient is a 89 y.o. male presents with history of non healing anal fissure. He has been previously seen by the NPs and given diltiazem.     Major symptom is perianal skin irritation.  No significant bleeding.  Has intermittent constipation as well as diarrhea.  Will have 1-2 weeks of no symptoms followed by intense perianal irritation that is refractory to diltiazem cream.  He lives alone at home but has a 24 hr caretaker.    Last Colonoscopy: -8/5/2008- normal - repeat in 7 years  Family history of colorectal cancer or IBD: none, personal history of colon polyps      Review of Systems:  Review of Systems   Constitutional: Positive for malaise/fatigue. Negative for chills, diaphoresis, fever and weight loss.   HENT: Negative for congestion.    Respiratory: Negative for shortness of breath.    Cardiovascular: Negative for chest pain and leg swelling.   Gastrointestinal: Negative for abdominal pain, blood in stool, constipation, nausea and vomiting.   Genitourinary: Negative for dysuria.   Musculoskeletal: Negative for back pain and myalgias.   Skin: Positive for rash.   Neurological: Negative for dizziness and weakness.   Endo/Heme/Allergies: Does not bruise/bleed easily.   Psychiatric/Behavioral: Negative for depression.       OBJECTIVE:     Vital Signs (Most Recent)  /62   Pulse (!) 56   Ht 5' 11" (1.803 m)   Wt 60.9 kg (134 lb 4.2 oz)   BMI 18.73 kg/m²     Physical Exam:  General: White male in no distress   Neuro: alert and oriented x 4.  Moves all extremities.  In a wheelchair.  HEENT: no icterus.  Trachea midline  Respiratory: respirations are even and unlabored  Cardiac: regular " rate  Abdomen:  Soft, nontender, no masses  Extremities: Warm dry and intact  Skin: no rashes  Anorectal:  External exam demonstrates excoriated perianal tissue.  No significant hemorrhoidal skin tag seen.  No for anal fissure seen.  Digital exam performed.  Normal anal tone.  No tenderness in the posterior anterior midline.  Detailed anoscopy then performed.  Internal grade 1 hemorrhoids.  Prior fissure healed.    Labs: Alb 2.8    Imaging: CT from 7/4/19 personally reviewed and shows moderate stool throughout the colon.        ASSESSMENT/PLAN:     Diagnoses and all orders for this visit:    Irritable bowel syndrome with both constipation and diarrhea  -     hydrocortisone 2.5 % cream; Apply topically 2 (two) times daily. for 10 days    Anal itch  -     hydrocortisone 2.5 % cream; Apply topically 2 (two) times daily. for 10 days        89-year-old gentleman with resolved perianal fissure now with excoriated perianal tissue.  This appears likely secondary to intermittent fecal leakage.  Recommended placing a pad on the anal canal to absorb any excess moisture throughout the day.  Topical steroid cream to assist with perianal skin irritation.  Calmoseptine recommended for a barrier cream during times of diarrhea.  He can follow up with me as needed.    CONRAD Jimenes MD, FACS  Staff Surgeon  Colon & Rectal Surgery

## 2020-06-22 ENCOUNTER — TELEPHONE (OUTPATIENT)
Dept: ENDOSCOPY | Facility: HOSPITAL | Age: 85
End: 2020-06-22

## 2020-06-22 NOTE — TELEPHONE ENCOUNTER
----- Message from Adrian Heart sent at 6/22/2020  4:13 PM CDT -----  Contact: Yani (daughter)  Pt's daughter called to speak with Bee would like to know if tomorrow's appt can be pushed back to 3 p.m.         Please contact Yani (daughter): [phone]

## 2020-06-22 NOTE — TELEPHONE ENCOUNTER
Is there anyway the Video Visit to 3p? The caregiver have an exam and is not able to be there at 2p. If not, they will keep as scheduled.

## 2020-06-23 ENCOUNTER — CLINICAL SUPPORT (OUTPATIENT)
Dept: GASTROENTEROLOGY | Facility: CLINIC | Age: 85
End: 2020-06-23
Payer: MEDICARE

## 2020-06-23 DIAGNOSIS — N18.30 CKD (CHRONIC KIDNEY DISEASE), STAGE III: Chronic | ICD-10-CM

## 2020-06-23 DIAGNOSIS — G20.A1 PARKINSON DISEASE: ICD-10-CM

## 2020-06-23 DIAGNOSIS — R13.19 OTHER DYSPHAGIA: ICD-10-CM

## 2020-06-23 DIAGNOSIS — J84.9 ILD (INTERSTITIAL LUNG DISEASE): ICD-10-CM

## 2020-06-23 DIAGNOSIS — E86.0 DEHYDRATION: ICD-10-CM

## 2020-06-23 DIAGNOSIS — N28.89 LEFT RENAL MASS: ICD-10-CM

## 2020-06-23 PROCEDURE — 99499 UNLISTED E&M SERVICE: CPT | Mod: 95,,, | Performed by: DIETITIAN, REGISTERED

## 2020-06-23 PROCEDURE — 99499 NO LOS: ICD-10-PCS | Mod: 95,,, | Performed by: DIETITIAN, REGISTERED

## 2020-06-24 ENCOUNTER — TELEPHONE (OUTPATIENT)
Dept: GASTROENTEROLOGY | Facility: CLINIC | Age: 85
End: 2020-06-24

## 2020-06-24 NOTE — TELEPHONE ENCOUNTER
----- Message from Hanny Hutchison MA sent at 6/24/2020  3:57 PM CDT -----  Contact: jose /daughter [phone]  Pt's Rx for rifAXIMin (XIFAXAN) 550 mg Tab has not been able to be filled by [facility] due to [facility] waiting on Dr Lara's clinic notes from 6/18/2020 to approve it. Can you call the pt's daughter with an update ?        jose /daughter [phone]

## 2020-06-27 ENCOUNTER — LAB VISIT (OUTPATIENT)
Dept: LAB | Facility: HOSPITAL | Age: 85
End: 2020-06-27
Attending: INTERNAL MEDICINE
Payer: MEDICARE

## 2020-06-27 DIAGNOSIS — K52.832 LYMPHOCYTIC COLITIS: ICD-10-CM

## 2020-06-27 DIAGNOSIS — R63.0 ANOREXIA: ICD-10-CM

## 2020-06-27 DIAGNOSIS — K58.0 IRRITABLE BOWEL SYNDROME WITH DIARRHEA: ICD-10-CM

## 2020-06-27 PROCEDURE — 83993 ASSAY FOR CALPROTECTIN FECAL: CPT

## 2020-06-27 PROCEDURE — 87338 HPYLORI STOOL AG IA: CPT

## 2020-07-06 LAB
CALPROTECTIN STL-MCNT: 184 MCG/G
H PYLORI AG STL QL IA: NOT DETECTED

## 2020-07-07 ENCOUNTER — PATIENT MESSAGE (OUTPATIENT)
Dept: GASTROENTEROLOGY | Facility: CLINIC | Age: 85
End: 2020-07-07

## 2020-07-18 ENCOUNTER — PATIENT MESSAGE (OUTPATIENT)
Dept: GASTROENTEROLOGY | Facility: CLINIC | Age: 85
End: 2020-07-18

## 2020-07-27 ENCOUNTER — PATIENT OUTREACH (OUTPATIENT)
Dept: ADMINISTRATIVE | Facility: OTHER | Age: 85
End: 2020-07-27

## 2020-07-28 ENCOUNTER — OFFICE VISIT (OUTPATIENT)
Dept: PODIATRY | Facility: CLINIC | Age: 85
End: 2020-07-28
Payer: MEDICARE

## 2020-07-28 VITALS
HEART RATE: 57 BPM | DIASTOLIC BLOOD PRESSURE: 59 MMHG | HEIGHT: 71 IN | TEMPERATURE: 98 F | BODY MASS INDEX: 18.76 KG/M2 | WEIGHT: 134 LBS | SYSTOLIC BLOOD PRESSURE: 103 MMHG

## 2020-07-28 DIAGNOSIS — N18.30 CKD (CHRONIC KIDNEY DISEASE), STAGE III: ICD-10-CM

## 2020-07-28 DIAGNOSIS — B35.1 DERMATOPHYTOSIS OF NAIL: ICD-10-CM

## 2020-07-28 DIAGNOSIS — M48.062 SPINAL STENOSIS, LUMBAR REGION, WITH NEUROGENIC CLAUDICATION: ICD-10-CM

## 2020-07-28 DIAGNOSIS — M51.37 DEGENERATION OF LUMBAR OR LUMBOSACRAL INTERVERTEBRAL DISC: Primary | ICD-10-CM

## 2020-07-28 PROCEDURE — 1101F PR PT FALLS ASSESS DOC 0-1 FALLS W/OUT INJ PAST YR: ICD-10-PCS | Mod: CPTII,S$GLB,, | Performed by: PODIATRIST

## 2020-07-28 PROCEDURE — 1159F PR MEDICATION LIST DOCUMENTED IN MEDICAL RECORD: ICD-10-PCS | Mod: S$GLB,,, | Performed by: PODIATRIST

## 2020-07-28 PROCEDURE — 1101F PT FALLS ASSESS-DOCD LE1/YR: CPT | Mod: CPTII,S$GLB,, | Performed by: PODIATRIST

## 2020-07-28 PROCEDURE — 11721 DEBRIDE NAIL 6 OR MORE: CPT | Mod: Q9,S$GLB,, | Performed by: PODIATRIST

## 2020-07-28 PROCEDURE — 99999 PR PBB SHADOW E&M-EST. PATIENT-LVL V: CPT | Mod: PBBFAC,,, | Performed by: PODIATRIST

## 2020-07-28 PROCEDURE — 1125F PR PAIN SEVERITY QUANTIFIED, PAIN PRESENT: ICD-10-PCS | Mod: S$GLB,,, | Performed by: PODIATRIST

## 2020-07-28 PROCEDURE — 99499 RISK ADDL DX/OHS AUDIT: ICD-10-PCS | Mod: S$GLB,,, | Performed by: PODIATRIST

## 2020-07-28 PROCEDURE — 11721 ROUTINE FOOT CARE: ICD-10-PCS | Mod: Q9,S$GLB,, | Performed by: PODIATRIST

## 2020-07-28 PROCEDURE — 99499 UNLISTED E&M SERVICE: CPT | Mod: S$GLB,,, | Performed by: PODIATRIST

## 2020-07-28 PROCEDURE — 99999 PR PBB SHADOW E&M-EST. PATIENT-LVL V: ICD-10-PCS | Mod: PBBFAC,,, | Performed by: PODIATRIST

## 2020-07-28 PROCEDURE — 1125F AMNT PAIN NOTED PAIN PRSNT: CPT | Mod: S$GLB,,, | Performed by: PODIATRIST

## 2020-07-28 PROCEDURE — 1159F MED LIST DOCD IN RCRD: CPT | Mod: S$GLB,,, | Performed by: PODIATRIST

## 2020-07-28 NOTE — PROGRESS NOTES
Subjective:      Patient ID: Phi Sainz is a 89 y.o. male.    Chief Complaint: Peripheral Neuropathy and Foot Pain (Toes)    Phi Sainz is a 89 y.o. male who presents to the clinic for follow up toenails, history of painful ingrown hallux toenail on both feet.  He feels it today on the right foot.  Worse in tight shoes.  No drainage noted from the area.  No recent history of trauma to the feet.  He has history of neurogenic claudication and chronic DVT.  He reports he is not currently on any blood thinners.  He is using kerasal on his toenails occasionally.      He also has bilateral lower extremities weakness.  He was receiving Home health PT/OT with some improvement.          PCP:  Yanna Santana MD  Last PCP visit:    Chief Complaint   Patient presents with    Peripheral Neuropathy    Foot Pain     Toes             Patient Active Problem List   Diagnosis    Pseudophakia - Left Eye    Essential tremor    Gait disorder    Hypertension    Dyslipidemia    Lumbago    Thoracic or lumbosacral neuritis or radiculitis, unspecified    Acquired spondylolisthesis    Degeneration of lumbar or lumbosacral intervertebral disc    Spondylosis without myelopathy    Spinal stenosis, lumbar region, with neurogenic claudication    DDD (degenerative disc disease), lumbar    Lumbar facet arthropathy    Posterior vitreous detachment, both eyes    Osteoarthritis of spine    Leg weakness, bilateral    Bradycardia    Abnormal chest CT    ILD (interstitial lung disease)    Other dysphagia    Generalized weakness    Chronic deep vein thrombosis (DVT)    Parkinson's disease    Parkinson disease    Acute cystitis without hematuria    Dehydration    Lip cancer    Altered mental state    Physical debility    CKD (chronic kidney disease), stage III    Autonomic dysfunction    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      budesonide (ENTOCORT EC) 3 mg capsule TAKE 3 CAPSULES BY MOUTH EVERY DAY 90 capsule 1    carbidopa-levodopa  mg (SINEMET)  mg per tablet TAKE 1.5 TABLETS BY MOUTH 3 (THREE) TIMES DAILY. (Patient taking differently: 1 tablet 3 (three) times daily. ) 405 tablet 3    diazePAM (VALIUM) 2 MG tablet TAKE 1 TABLET BY MOUTH EVERY DAY AS NEEDED 30 tablet 3    diltiazem HCl (DILTIAZEM 2% CREAM) Apply topically 3 (three) times daily. Apply topically to anal area. 30 g 2    diphenoxylate-atropine 2.5-0.025 mg (LOMOTIL) 2.5-0.025 mg per tablet Take 1 tablet by mouth 2 (two) times daily. 100 tablet 2    donepezil (ARICEPT) 5 MG tablet Take 1 tablet (5 mg total) by mouth every evening. 30 tablet 11    gabapentin (NEURONTIN) 300 MG capsule Take 300 mg by mouth 2 (two) times daily.       HYDROcodone-acetaminophen (NORCO) 5-325 mg per tablet TK 1 T PO Q 8 H PRN  0    ibuprofen (ADVIL) 200 MG tablet Take 1 tablet by mouth daily as needed.      ipratropium (ATROVENT) 0.03 % nasal spray SPRAY 1 SPRAY INTO EACH NOSTRIL TWICE A DAY 30 mL 3    lidocaine (XYLOCAINE) 5 % Oint ointment APPLY TO AFFECTED AREA 4 TIMES A DAY 35.44 g 2    mesalamine (APRISO) 0.375 gram Cp24 TAKE 4 CAPSULES (1.5 G TOTAL) BY MOUTH ONCE DAILY. PT TAKING 4 PILLS IN AM. 360 capsule 1    midodrine (PROAMATINE) 2.5 MG Tab Take 1 tablet (2.5 mg total) by mouth daily as needed. 30 tablet 6    ondansetron (ZOFRAN) 4 MG tablet Take 1 tablet (4 mg total) by mouth every 8 (eight) hours as needed. 28 tablet 2    polyethylene glycol (MIRALAX) 17 gram PwPk Take by mouth as needed.      QUEtiapine (SEROQUEL) 25 MG Tab GIVE 2 tabs ONE TO TWO HOURS BEFORE SUNDOWNING. 180 tablet 3    hydrocortisone 2.5 % cream Apply topically 2 (two) times daily. for 10 days 1 Tube 5     No current  facility-administered medications on file prior to visit.          Review of patient's allergies indicates:   Allergen Reactions    Combigan [brimonidine-timolol]      Made him dizziness    Contrast media Rash         Social History     Socioeconomic History    Marital status:      Spouse name: Not on file    Number of children: Not on file    Years of education: Not on file    Highest education level: Not on file   Occupational History    Occupation: retired   Social Needs    Financial resource strain: Not hard at all    Food insecurity     Worry: Never true     Inability: Never true    Transportation needs     Medical: No     Non-medical: No   Tobacco Use    Smoking status: Never Smoker    Smokeless tobacco: Never Used   Substance and Sexual Activity    Alcohol use: No     Alcohol/week: 0.0 standard drinks     Frequency: Never     Drinks per session: Patient refused     Binge frequency: Never    Drug use: No    Sexual activity: Yes     Partners: Female   Lifestyle    Physical activity     Days per week: 0 days     Minutes per session: 10 min    Stress: To some extent   Relationships    Social connections     Talks on phone: Three times a week     Gets together: More than three times a week     Attends [employer] service: Not on file     Active member of club or organization: No     Attends meetings of clubs or organizations: Never     Relationship status:    Other Topics Concern    Not on file   Social History Narrative    Not on file           Review of Systems   Constitution: Negative for chills, fever and malaise/fatigue.   HENT: Negative for hearing loss.    Cardiovascular: Positive for leg swelling. Negative for claudication.   Respiratory: Negative for shortness of breath.    Skin: Positive for color change, dry skin, nail changes and unusual hair distribution. Negative for flushing and rash.   Musculoskeletal: Negative for joint pain and myalgias.   Neurological: Negative  "for loss of balance, numbness, paresthesias and sensory change.   Psychiatric/Behavioral: Negative for altered mental status.           Objective:        Vitals:    07/28/20 1513   BP: (!) 103/59   Pulse: (!) 57   Temp: 98.1 °F (36.7 °C)   Weight: 60.8 kg (134 lb)   Height: 5' 11" (1.803 m)           Physical Exam  Vitals signs reviewed.   Constitutional:       Appearance: He is well-developed.   Cardiovascular:      Pulses:           Dorsalis pedis pulses are 0 on the right side and 0 on the left side.        Posterior tibial pulses are 0 on the right side and 0 on the left side.      Comments: +2 PE left  +1 right  Musculoskeletal:         General: Tenderness present.      Right knee: He exhibits no swelling and no ecchymosis.      Left knee: He exhibits no swelling and no ecchymosis.      Right ankle: He exhibits decreased range of motion and abnormal pulse. He exhibits no swelling and no ecchymosis. No lateral malleolus, no medial malleolus and no head of 5th metatarsal tenderness found. Achilles tendon exhibits no pain, no defect and normal Wilkinson's test results.      Left ankle: He exhibits decreased range of motion and abnormal pulse. He exhibits no swelling and no ecchymosis. No lateral malleolus, no medial malleolus and no head of 5th metatarsal tenderness found. Achilles tendon exhibits no pain and normal Wilkinson's test results.      Right lower leg: He exhibits no tenderness, no bony tenderness, no swelling and no deformity. No edema.      Left lower leg: He exhibits no tenderness and no swelling. No edema.      Right foot: Decreased range of motion. No deformity.      Left foot: Decreased range of motion. No deformity.      Comments: Decreased ROM with out joint pain nor crepitation to bilateral feet and ankle joints.  Muscle strength 4/5 in all groups bilaterally  Decreased height of medial arches noted with loading of the foot b/L     Skin:     General: Skin is warm and dry.      Capillary Refill: " Capillary refill takes more than 3 seconds.      Findings: No abrasion, bruising, burn or ecchymosis.      Comments: B/L hallux medial borders mildly ingrowing. No erythema or paronychia noted. No drainage.  Nails x10 are elongated by  3-4mm's, thickened by 2-4 mm's, dystrophic, and are yellowish in  coloration . Xerosis Bilaterally. No open lesions noted.     Webspaces 1-4 b/L clean, dry, and intact.   Neurological:      Mental Status: He is alert and oriented to person, place, and time.      Comments: Diminished protective sensation noted b/L       Psychiatric:         Attention and Perception: He is attentive.         Speech: Speech normal.         Behavior: Behavior normal. Behavior is cooperative.                   Assessment:       Problem List Items Addressed This Visit     CKD (chronic kidney disease), stage III (Chronic)    Relevant Orders    Ambulatory referral/consult to Home Health    Routine Foot Care    Degeneration of lumbar or lumbosacral intervertebral disc - Primary    Relevant Orders    Ambulatory referral/consult to Home Health    Routine Foot Care    Spinal stenosis, lumbar region, with neurogenic claudication    Relevant Orders    Ambulatory referral/consult to Home Health    Routine Foot Care      Other Visit Diagnoses     Dermatophytosis of nail        Relevant Orders    Routine Foot Care              Plan:         - I counseled the patient on his conditions, their implications and medical management.    - Continue Wide toe box shoes.    - Shoe inspection. Patient instructed on proper foot hygeine. We discussed wearing proper shoe gear, daily foot inspections, never walking without protective shoe gear, never putting sharp instruments to feet, routine podiatric nail visits every 2-3 months.      - Continue Kerasal for toenails.       Routine Foot Care    Date/Time: 7/28/2020 2:45 PM  Performed by: Alissa Black DPM  Authorized by: Alissa Black DPM     Consent Done?:  Yes (Verbal)    Nail  Care Type:  Debride  Location(s): All  (Left 1st Toe, Left 3rd Toe, Left 2nd Toe, Left 4th Toe, Left 5th Toe, Right 1st Toe, Right 2nd Toe, Right 3rd Toe, Right 4th Toe and Right 5th Toe)  Patient tolerance:  Patient tolerated the procedure well with no immediate complications     With patient's permission, the toenails mentioned above were aggressively reduced and debrided using a nail nipper, removing all offending nail and debris. The patient will continue to monitor the areas daily, inspect the feet, wear protective shoe gear when ambulatory, and moisturizer to maintain skin integrity.                   Alissa Black DPM  NPI: [id number]         [facility] - PODIATRY  [address] [place] [id number]  Dept: [phone]  Dept Fax: [phone]

## 2020-07-31 ENCOUNTER — OFFICE VISIT (OUTPATIENT)
Dept: DERMATOLOGY | Facility: CLINIC | Age: 85
End: 2020-07-31
Payer: MEDICARE

## 2020-07-31 VITALS — WEIGHT: 134 LBS | BODY MASS INDEX: 18.69 KG/M2

## 2020-07-31 DIAGNOSIS — L82.1 SEBORRHEIC KERATOSES: ICD-10-CM

## 2020-07-31 DIAGNOSIS — L74.3 MILIARIA: ICD-10-CM

## 2020-07-31 DIAGNOSIS — L30.4 INTERTRIGO: Primary | ICD-10-CM

## 2020-07-31 DIAGNOSIS — Z85.828 HISTORY OF SKIN CANCER: ICD-10-CM

## 2020-07-31 PROCEDURE — 1101F PR PT FALLS ASSESS DOC 0-1 FALLS W/OUT INJ PAST YR: ICD-10-PCS | Mod: CPTII,S$GLB,, | Performed by: DERMATOLOGY

## 2020-07-31 PROCEDURE — 1126F PR PAIN SEVERITY QUANTIFIED, NO PAIN PRESENT: ICD-10-PCS | Mod: S$GLB,,, | Performed by: DERMATOLOGY

## 2020-07-31 PROCEDURE — 1159F MED LIST DOCD IN RCRD: CPT | Mod: S$GLB,,, | Performed by: DERMATOLOGY

## 2020-07-31 PROCEDURE — 99999 PR PBB SHADOW E&M-EST. PATIENT-LVL III: CPT | Mod: PBBFAC,,, | Performed by: DERMATOLOGY

## 2020-07-31 PROCEDURE — 99999 PR PBB SHADOW E&M-EST. PATIENT-LVL III: ICD-10-PCS | Mod: PBBFAC,,, | Performed by: DERMATOLOGY

## 2020-07-31 PROCEDURE — 1126F AMNT PAIN NOTED NONE PRSNT: CPT | Mod: S$GLB,,, | Performed by: DERMATOLOGY

## 2020-07-31 PROCEDURE — 1159F PR MEDICATION LIST DOCUMENTED IN MEDICAL RECORD: ICD-10-PCS | Mod: S$GLB,,, | Performed by: DERMATOLOGY

## 2020-07-31 PROCEDURE — 99213 OFFICE O/P EST LOW 20 MIN: CPT | Mod: S$GLB,,, | Performed by: DERMATOLOGY

## 2020-07-31 PROCEDURE — 99213 PR OFFICE/OUTPT VISIT, EST, LEVL III, 20-29 MIN: ICD-10-PCS | Mod: S$GLB,,, | Performed by: DERMATOLOGY

## 2020-07-31 PROCEDURE — 1101F PT FALLS ASSESS-DOCD LE1/YR: CPT | Mod: CPTII,S$GLB,, | Performed by: DERMATOLOGY

## 2020-07-31 RX ORDER — ECONAZOLE NITRATE 10 MG/G
CREAM TOPICAL
Qty: 30 G | Refills: 2 | Status: SHIPPED | OUTPATIENT
Start: 2020-07-31

## 2020-07-31 NOTE — PROGRESS NOTES
Subjective:       Patient ID:  Phi Sainz is a 89 y.o. male who presents for   Chief Complaint   Patient presents with    Fungus     groin    Skin Check     UBSE     See patient message, the intertrigo has improved with the lamisil and zeasorb, some rash on right side remains left side has cleared.  Had bcc removed last year from upper lip, This is a high risk patient here to check for the development of new lesions.      Fungus - Initial  Affected locations: back, groin, chest, torso, left arm, right arm, face, left buttock, right buttock and scalp  Signs / symptoms: irritated  Aggravated by: nothing  Relieving factors/Treatments tried: nothing        Review of Systems   Constitutional: Negative for fever, chills, weight loss, weight gain, fatigue, night sweats and malaise.   Skin: Positive for rash and wears hat. Negative for daily sunscreen use and activity-related sunscreen use.   Hematologic/Lymphatic: Negative for adenopathy. Bruises/bleeds easily.        Objective:    Physical Exam   Constitutional: He appears well-developed and well-nourished. No distress.   Neurological: He is alert and oriented to person, place, and time. He is not disoriented.   Psychiatric: He has a normal mood and affect.   Skin:   Areas Examined (abnormalities noted in diagram):   Head / Face Inspection Performed  Neck Inspection Performed  Chest / Axilla Inspection Performed  Genitals / Buttocks / Groin Inspection Performed  Back Inspection Performed  RUE Inspected  LUE Inspection Performed                   Diagram Legend     Erythematous scaling macule/papule c/w actinic keratosis       Vascular papule c/w angioma      Pigmented verrucoid papule/plaque c/w seborrheic keratosis      Yellow umbilicated papule c/w sebaceous hyperplasia      Irregularly shaped tan macule c/w lentigo     1-2 mm smooth white papules consistent with Milia      Movable subcutaneous cyst with punctum c/w epidermal inclusion cyst       Subcutaneous movable cyst c/w pilar cyst      Firm pink to brown papule c/w dermatofibroma      Pedunculated fleshy papule(s) c/w skin tag(s)      Evenly pigmented macule c/w junctional nevus     Mildly variegated pigmented, slightly irregular-bordered macule c/w mildly atypical nevus      Flesh colored to evenly pigmented papule c/w intradermal nevus       Pink pearly papule/plaque c/w basal cell carcinoma      Erythematous hyperkeratotic cursted plaque c/w SCC      Surgical scar with no sign of skin cancer recurrence      Open and closed comedones      Inflammatory papules and pustules      Verrucoid papule consistent consistent with wart     Erythematous eczematous patches and plaques     Dystrophic onycholytic nail with subungual debris c/w onychomycosis     Umbilicated papule    Erythematous-base heme-crusted tan verrucoid plaque consistent with inflamed seborrheic keratosis     Erythematous Silvery Scaling Plaque c/w Psoriasis     See annotation      Assessment / Plan:        Intertrigo  -     econazole nitrate 1 % cream; Use bid  Dispense: 30 g; Refill: 2   Zeasorb powder    Miliaria  Benzoyl peroxide    Seborrheic keratoses  reassurance      History of skin cancer  Comments:  bcc upper lip removed mohs surgery last year             Follow up in about 6 months (around 1/31/2021).

## 2020-08-04 ENCOUNTER — TELEPHONE (OUTPATIENT)
Dept: UROLOGY | Facility: CLINIC | Age: 85
End: 2020-08-04

## 2020-08-04 NOTE — TELEPHONE ENCOUNTER
----- Message from Duc Guy sent at 8/4/2020  3:18 PM CDT -----  Type:  Needs Medical Advice    Who Called: daughter  Reason:Needs a referral for nephrology. Was discussed before but she doesn't remember who you told her to go see   Would the patient rather a call back or a response via [facility]? callback  Best Call Back Number: [phone]/ Yani   Additional Information: none

## 2020-08-04 NOTE — TELEPHONE ENCOUNTER
Finasteride and Flomax discontinued in 2018 due to hypotension.  Has never restarted the two in fear of hypotension.  Cardiologist added midodrine to his regimen and inform family it is up to them if they want to restart BPH medications, but be sure to monitor patient and maybe gradually start it.  Daughter would like your thoughts on restarting one or two of the mentioned medications due to nocturia.  Please advise.

## 2020-08-05 ENCOUNTER — PATIENT MESSAGE (OUTPATIENT)
Dept: UROLOGY | Facility: CLINIC | Age: 85
End: 2020-08-05

## 2020-08-05 DIAGNOSIS — N13.8 BPH WITH OBSTRUCTION/LOWER URINARY TRACT SYMPTOMS: Primary | ICD-10-CM

## 2020-08-05 DIAGNOSIS — N40.1 BPH WITH OBSTRUCTION/LOWER URINARY TRACT SYMPTOMS: Primary | ICD-10-CM

## 2020-08-06 RX ORDER — TAMSULOSIN HYDROCHLORIDE 0.4 MG/1
0.4 CAPSULE ORAL DAILY
Qty: 90 CAPSULE | Refills: 3 | Status: SHIPPED | OUTPATIENT
Start: 2020-08-06 | End: 2021-08-06

## 2020-08-07 ENCOUNTER — HOSPITAL ENCOUNTER (OUTPATIENT)
Facility: HOSPITAL | Age: 85
Discharge: HOSPICE | End: 2020-08-10
Attending: EMERGENCY MEDICINE | Admitting: EMERGENCY MEDICINE
Payer: MEDICARE

## 2020-08-07 ENCOUNTER — TELEPHONE (OUTPATIENT)
Dept: UROLOGY | Facility: CLINIC | Age: 85
End: 2020-08-07

## 2020-08-07 ENCOUNTER — HOSPITAL ENCOUNTER (EMERGENCY)
Facility: HOSPITAL | Age: 85
Discharge: ANOTHER HOSPITAL | End: 2020-08-07
Attending: EMERGENCY MEDICINE
Payer: MEDICARE

## 2020-08-07 VITALS
DIASTOLIC BLOOD PRESSURE: 89 MMHG | HEART RATE: 70 BPM | TEMPERATURE: 99 F | SYSTOLIC BLOOD PRESSURE: 160 MMHG | RESPIRATION RATE: 20 BRPM | OXYGEN SATURATION: 98 %

## 2020-08-07 DIAGNOSIS — E87.6 HYPOKALEMIA: ICD-10-CM

## 2020-08-07 DIAGNOSIS — N28.89 RENAL MASS: ICD-10-CM

## 2020-08-07 DIAGNOSIS — D62 ACUTE BLOOD LOSS ANEMIA: ICD-10-CM

## 2020-08-07 DIAGNOSIS — N28.89 RENAL MASS, LEFT: Primary | ICD-10-CM

## 2020-08-07 DIAGNOSIS — D72.829 LEUKOCYTOSIS, UNSPECIFIED TYPE: ICD-10-CM

## 2020-08-07 DIAGNOSIS — J90 BILATERAL PLEURAL EFFUSION: ICD-10-CM

## 2020-08-07 LAB
ABO + RH BLD: NORMAL
ALBUMIN SERPL BCP-MCNC: 2.4 G/DL (ref 3.5–5.2)
ALP SERPL-CCNC: 81 U/L (ref 55–135)
ALT SERPL W/O P-5'-P-CCNC: <5 U/L (ref 10–44)
ANION GAP SERPL CALC-SCNC: 9 MMOL/L (ref 8–16)
AST SERPL-CCNC: 8 U/L (ref 10–40)
BACTERIA #/AREA URNS HPF: ABNORMAL /HPF
BASOPHILS # BLD AUTO: 0.03 K/UL (ref 0–0.2)
BASOPHILS # BLD AUTO: 0.03 K/UL (ref 0–0.2)
BASOPHILS NFR BLD: 0.2 % (ref 0–1.9)
BASOPHILS NFR BLD: 0.2 % (ref 0–1.9)
BILIRUB SERPL-MCNC: 0.5 MG/DL (ref 0.1–1)
BILIRUB UR QL STRIP: NEGATIVE
BLD GP AB SCN CELLS X3 SERPL QL: NORMAL
BUN SERPL-MCNC: 16 MG/DL (ref 8–23)
CALCIUM SERPL-MCNC: 8.4 MG/DL (ref 8.7–10.5)
CHLORIDE SERPL-SCNC: 99 MMOL/L (ref 95–110)
CLARITY UR: CLEAR
CO2 SERPL-SCNC: 32 MMOL/L (ref 23–29)
COLOR UR: YELLOW
CREAT SERPL-MCNC: 1.2 MG/DL (ref 0.5–1.4)
DIFFERENTIAL METHOD: ABNORMAL
DIFFERENTIAL METHOD: ABNORMAL
EOSINOPHIL # BLD AUTO: 0 K/UL (ref 0–0.5)
EOSINOPHIL # BLD AUTO: 0 K/UL (ref 0–0.5)
EOSINOPHIL NFR BLD: 0.1 % (ref 0–8)
EOSINOPHIL NFR BLD: 0.1 % (ref 0–8)
ERYTHROCYTE [DISTWIDTH] IN BLOOD BY AUTOMATED COUNT: 15.6 % (ref 11.5–14.5)
ERYTHROCYTE [DISTWIDTH] IN BLOOD BY AUTOMATED COUNT: 16.1 % (ref 11.5–14.5)
EST. GFR  (AFRICAN AMERICAN): >60 ML/MIN/1.73 M^2
EST. GFR  (NON AFRICAN AMERICAN): 53 ML/MIN/1.73 M^2
GLUCOSE SERPL-MCNC: 86 MG/DL (ref 70–110)
GLUCOSE UR QL STRIP: NEGATIVE
HCT VFR BLD AUTO: 28.4 % (ref 40–54)
HCT VFR BLD AUTO: 30.7 % (ref 40–54)
HGB BLD-MCNC: 8.3 G/DL (ref 14–18)
HGB BLD-MCNC: 9.1 G/DL (ref 14–18)
HGB UR QL STRIP: ABNORMAL
IMM GRANULOCYTES # BLD AUTO: 0.11 K/UL (ref 0–0.04)
IMM GRANULOCYTES # BLD AUTO: 0.14 K/UL (ref 0–0.04)
IMM GRANULOCYTES NFR BLD AUTO: 0.6 % (ref 0–0.5)
IMM GRANULOCYTES NFR BLD AUTO: 0.7 % (ref 0–0.5)
INR PPP: 1.1 (ref 0.8–1.2)
KETONES UR QL STRIP: NEGATIVE
LACTATE SERPL-SCNC: 1.3 MMOL/L (ref 0.5–2.2)
LEUKOCYTE ESTERASE UR QL STRIP: NEGATIVE
LIPASE SERPL-CCNC: 7 U/L (ref 4–60)
LYMPHOCYTES # BLD AUTO: 0.9 K/UL (ref 1–4.8)
LYMPHOCYTES # BLD AUTO: 1.1 K/UL (ref 1–4.8)
LYMPHOCYTES NFR BLD: 5.1 % (ref 18–48)
LYMPHOCYTES NFR BLD: 5.9 % (ref 18–48)
MCH RBC QN AUTO: 26.1 PG (ref 27–31)
MCH RBC QN AUTO: 26.3 PG (ref 27–31)
MCHC RBC AUTO-ENTMCNC: 29.2 G/DL (ref 32–36)
MCHC RBC AUTO-ENTMCNC: 29.6 G/DL (ref 32–36)
MCV RBC AUTO: 88 FL (ref 82–98)
MCV RBC AUTO: 90 FL (ref 82–98)
MICROSCOPIC COMMENT: ABNORMAL
MONOCYTES # BLD AUTO: 1 K/UL (ref 0.3–1)
MONOCYTES # BLD AUTO: 1 K/UL (ref 0.3–1)
MONOCYTES NFR BLD: 5.2 % (ref 4–15)
MONOCYTES NFR BLD: 5.7 % (ref 4–15)
NEUTROPHILS # BLD AUTO: 15.8 K/UL (ref 1.8–7.7)
NEUTROPHILS # BLD AUTO: 16.8 K/UL (ref 1.8–7.7)
NEUTROPHILS NFR BLD: 87.9 % (ref 38–73)
NEUTROPHILS NFR BLD: 88.3 % (ref 38–73)
NITRITE UR QL STRIP: NEGATIVE
NRBC BLD-RTO: 0 /100 WBC
NRBC BLD-RTO: 0 /100 WBC
PH UR STRIP: 7 [PH] (ref 5–8)
PLATELET # BLD AUTO: 234 K/UL (ref 150–350)
PLATELET # BLD AUTO: 260 K/UL (ref 150–350)
PMV BLD AUTO: 10.5 FL (ref 9.2–12.9)
PMV BLD AUTO: 12.9 FL (ref 9.2–12.9)
POTASSIUM SERPL-SCNC: 2.9 MMOL/L (ref 3.5–5.1)
PROT SERPL-MCNC: 6.1 G/DL (ref 6–8.4)
PROT UR QL STRIP: NEGATIVE
PROTHROMBIN TIME: 12.4 SEC (ref 9–12.5)
RBC # BLD AUTO: 3.15 M/UL (ref 4.6–6.2)
RBC # BLD AUTO: 3.48 M/UL (ref 4.6–6.2)
RBC #/AREA URNS HPF: 78 /HPF (ref 0–4)
SARS-COV-2 RDRP RESP QL NAA+PROBE: NEGATIVE
SODIUM SERPL-SCNC: 140 MMOL/L (ref 136–145)
SP GR UR STRIP: 1.01 (ref 1–1.03)
SQUAMOUS #/AREA URNS HPF: 1 /HPF
URN SPEC COLLECT METH UR: ABNORMAL
UROBILINOGEN UR STRIP-ACNC: NEGATIVE EU/DL
WBC # BLD AUTO: 17.92 K/UL (ref 3.9–12.7)
WBC # BLD AUTO: 19.08 K/UL (ref 3.9–12.7)
WBC #/AREA URNS HPF: 0 /HPF (ref 0–5)

## 2020-08-07 PROCEDURE — 99220 PR INITIAL OBSERVATION CARE,LEVL III: CPT | Mod: ,,, | Performed by: NURSE PRACTITIONER

## 2020-08-07 PROCEDURE — 93005 ELECTROCARDIOGRAM TRACING: CPT

## 2020-08-07 PROCEDURE — 63600175 PHARM REV CODE 636 W HCPCS: Performed by: NURSE PRACTITIONER

## 2020-08-07 PROCEDURE — 96374 THER/PROPH/DIAG INJ IV PUSH: CPT

## 2020-08-07 PROCEDURE — 99285 EMERGENCY DEPT VISIT HI MDM: CPT | Mod: ,,, | Performed by: EMERGENCY MEDICINE

## 2020-08-07 PROCEDURE — 96375 TX/PRO/DX INJ NEW DRUG ADDON: CPT

## 2020-08-07 PROCEDURE — 96365 THER/PROPH/DIAG IV INF INIT: CPT

## 2020-08-07 PROCEDURE — 99285 PR EMERGENCY DEPT VISIT,LEVEL V: ICD-10-PCS | Mod: ,,, | Performed by: EMERGENCY MEDICINE

## 2020-08-07 PROCEDURE — 99285 EMERGENCY DEPT VISIT HI MDM: CPT | Mod: 25

## 2020-08-07 PROCEDURE — 86850 RBC ANTIBODY SCREEN: CPT

## 2020-08-07 PROCEDURE — 96374 THER/PROPH/DIAG INJ IV PUSH: CPT | Performed by: EMERGENCY MEDICINE

## 2020-08-07 PROCEDURE — 85610 PROTHROMBIN TIME: CPT

## 2020-08-07 PROCEDURE — 83605 ASSAY OF LACTIC ACID: CPT

## 2020-08-07 PROCEDURE — G0378 HOSPITAL OBSERVATION PER HR: HCPCS

## 2020-08-07 PROCEDURE — 80053 COMPREHEN METABOLIC PANEL: CPT

## 2020-08-07 PROCEDURE — 81000 URINALYSIS NONAUTO W/SCOPE: CPT

## 2020-08-07 PROCEDURE — 25000003 PHARM REV CODE 250: Performed by: NURSE PRACTITIONER

## 2020-08-07 PROCEDURE — 85025 COMPLETE CBC W/AUTO DIFF WBC: CPT

## 2020-08-07 PROCEDURE — 63700000 PHARM REV CODE 250 ALT 637 W/O HCPCS: Performed by: NURSE PRACTITIONER

## 2020-08-07 PROCEDURE — 96376 TX/PRO/DX INJ SAME DRUG ADON: CPT

## 2020-08-07 PROCEDURE — 85025 COMPLETE CBC W/AUTO DIFF WBC: CPT | Mod: 91

## 2020-08-07 PROCEDURE — 36415 COLL VENOUS BLD VENIPUNCTURE: CPT

## 2020-08-07 PROCEDURE — U0002 COVID-19 LAB TEST NON-CDC: HCPCS

## 2020-08-07 PROCEDURE — 99220 PR INITIAL OBSERVATION CARE,LEVL III: ICD-10-PCS | Mod: ,,, | Performed by: NURSE PRACTITIONER

## 2020-08-07 PROCEDURE — 63600175 PHARM REV CODE 636 W HCPCS: Performed by: STUDENT IN AN ORGANIZED HEALTH CARE EDUCATION/TRAINING PROGRAM

## 2020-08-07 PROCEDURE — 96361 HYDRATE IV INFUSION ADD-ON: CPT

## 2020-08-07 PROCEDURE — 99285 EMERGENCY DEPT VISIT HI MDM: CPT | Mod: 25,27

## 2020-08-07 PROCEDURE — 96372 THER/PROPH/DIAG INJ SC/IM: CPT | Mod: 59 | Performed by: EMERGENCY MEDICINE

## 2020-08-07 PROCEDURE — 83690 ASSAY OF LIPASE: CPT

## 2020-08-07 PROCEDURE — 87040 BLOOD CULTURE FOR BACTERIA: CPT | Mod: 59

## 2020-08-07 RX ORDER — SODIUM CHLORIDE 9 MG/ML
1000 INJECTION, SOLUTION INTRAVENOUS
Status: COMPLETED | OUTPATIENT
Start: 2020-08-07 | End: 2020-08-07

## 2020-08-07 RX ORDER — ONDANSETRON 4 MG/1
8 TABLET, ORALLY DISINTEGRATING ORAL EVERY 8 HOURS PRN
Status: DISCONTINUED | OUTPATIENT
Start: 2020-08-07 | End: 2020-08-10 | Stop reason: HOSPADM

## 2020-08-07 RX ORDER — LANOLIN ALCOHOL/MO/W.PET/CERES
800 CREAM (GRAM) TOPICAL
Status: DISCONTINUED | OUTPATIENT
Start: 2020-08-07 | End: 2020-08-10 | Stop reason: HOSPADM

## 2020-08-07 RX ORDER — QUETIAPINE FUMARATE 25 MG/1
25 TABLET, FILM COATED ORAL NIGHTLY
Status: DISCONTINUED | OUTPATIENT
Start: 2020-08-07 | End: 2020-08-07

## 2020-08-07 RX ORDER — HEPARIN SODIUM 5000 [USP'U]/ML
5000 INJECTION, SOLUTION INTRAVENOUS; SUBCUTANEOUS EVERY 8 HOURS
Status: DISCONTINUED | OUTPATIENT
Start: 2020-08-07 | End: 2020-08-10 | Stop reason: HOSPADM

## 2020-08-07 RX ORDER — MORPHINE SULFATE 2 MG/ML
2 INJECTION, SOLUTION INTRAMUSCULAR; INTRAVENOUS
Status: COMPLETED | OUTPATIENT
Start: 2020-08-07 | End: 2020-08-07

## 2020-08-07 RX ORDER — ONDANSETRON 2 MG/ML
4 INJECTION INTRAMUSCULAR; INTRAVENOUS EVERY 8 HOURS PRN
Status: DISCONTINUED | OUTPATIENT
Start: 2020-08-07 | End: 2020-08-10 | Stop reason: HOSPADM

## 2020-08-07 RX ORDER — CARBIDOPA AND LEVODOPA 25; 100 MG/1; MG/1
1 TABLET ORAL 3 TIMES DAILY
Status: DISCONTINUED | OUTPATIENT
Start: 2020-08-07 | End: 2020-08-07 | Stop reason: SDUPTHER

## 2020-08-07 RX ORDER — MORPHINE SULFATE 2 MG/ML
6 INJECTION, SOLUTION INTRAMUSCULAR; INTRAVENOUS
Status: COMPLETED | OUTPATIENT
Start: 2020-08-07 | End: 2020-08-07

## 2020-08-07 RX ORDER — QUETIAPINE FUMARATE 25 MG/1
25 TABLET, FILM COATED ORAL NIGHTLY
Status: DISCONTINUED | OUTPATIENT
Start: 2020-08-07 | End: 2020-08-10 | Stop reason: HOSPADM

## 2020-08-07 RX ORDER — POTASSIUM CHLORIDE 20 MEQ/15ML
40 SOLUTION ORAL
Status: COMPLETED | OUTPATIENT
Start: 2020-08-07 | End: 2020-08-07

## 2020-08-07 RX ORDER — SODIUM,POTASSIUM PHOSPHATES 280-250MG
2 POWDER IN PACKET (EA) ORAL
Status: DISCONTINUED | OUTPATIENT
Start: 2020-08-07 | End: 2020-08-10 | Stop reason: HOSPADM

## 2020-08-07 RX ORDER — GABAPENTIN 100 MG/1
300 CAPSULE ORAL 2 TIMES DAILY
Status: DISCONTINUED | OUTPATIENT
Start: 2020-08-07 | End: 2020-08-10 | Stop reason: HOSPADM

## 2020-08-07 RX ORDER — MESALAMINE 0.38 G/1
1.5 CAPSULE, EXTENDED RELEASE ORAL DAILY
Status: DISCONTINUED | OUTPATIENT
Start: 2020-08-08 | End: 2020-08-09

## 2020-08-07 RX ORDER — SODIUM CHLORIDE 0.9 % (FLUSH) 0.9 %
10 SYRINGE (ML) INJECTION
Status: DISCONTINUED | OUTPATIENT
Start: 2020-08-07 | End: 2020-08-10 | Stop reason: HOSPADM

## 2020-08-07 RX ORDER — BUDESONIDE 3 MG/1
9 CAPSULE, COATED PELLETS ORAL DAILY
Status: DISCONTINUED | OUTPATIENT
Start: 2020-08-08 | End: 2020-08-10 | Stop reason: HOSPADM

## 2020-08-07 RX ORDER — MIDODRINE HYDROCHLORIDE 2.5 MG/1
2.5 TABLET ORAL DAILY
Status: DISCONTINUED | OUTPATIENT
Start: 2020-08-08 | End: 2020-08-10 | Stop reason: HOSPADM

## 2020-08-07 RX ORDER — DONEPEZIL HYDROCHLORIDE 5 MG/1
5 TABLET, FILM COATED ORAL NIGHTLY
Status: DISCONTINUED | OUTPATIENT
Start: 2020-08-07 | End: 2020-08-10 | Stop reason: HOSPADM

## 2020-08-07 RX ORDER — ACETAMINOPHEN 325 MG/1
650 TABLET ORAL EVERY 8 HOURS PRN
Status: DISCONTINUED | OUTPATIENT
Start: 2020-08-07 | End: 2020-08-10 | Stop reason: HOSPADM

## 2020-08-07 RX ORDER — POTASSIUM CHLORIDE 1.5 G/1.58G
40 POWDER, FOR SOLUTION ORAL
Status: DISCONTINUED | OUTPATIENT
Start: 2020-08-07 | End: 2020-08-10 | Stop reason: HOSPADM

## 2020-08-07 RX ADMIN — DONEPEZIL HYDROCHLORIDE 5 MG: 5 TABLET, FILM COATED ORAL at 10:08

## 2020-08-07 RX ADMIN — CEFTRIAXONE 1 G: 1 INJECTION, SOLUTION INTRAVENOUS at 01:08

## 2020-08-07 RX ADMIN — SODIUM CHLORIDE 1000 ML: 0.9 INJECTION, SOLUTION INTRAVENOUS at 11:08

## 2020-08-07 RX ADMIN — MORPHINE SULFATE 6 MG: 2 INJECTION, SOLUTION INTRAMUSCULAR; INTRAVENOUS at 05:08

## 2020-08-07 RX ADMIN — ACETAMINOPHEN 650 MG: 325 TABLET ORAL at 10:08

## 2020-08-07 RX ADMIN — MORPHINE SULFATE 2 MG: 2 INJECTION, SOLUTION INTRAMUSCULAR; INTRAVENOUS at 10:08

## 2020-08-07 RX ADMIN — QUETIAPINE FUMARATE 25 MG: 25 TABLET ORAL at 11:08

## 2020-08-07 RX ADMIN — ONDANSETRON 8 MG: 8 TABLET, ORALLY DISINTEGRATING ORAL at 09:08

## 2020-08-07 RX ADMIN — HEPARIN SODIUM 5000 UNITS: 5000 INJECTION INTRAVENOUS; SUBCUTANEOUS at 10:08

## 2020-08-07 RX ADMIN — GABAPENTIN 300 MG: 100 CAPSULE ORAL at 10:08

## 2020-08-07 RX ADMIN — MORPHINE SULFATE 2 MG: 2 INJECTION, SOLUTION INTRAMUSCULAR; INTRAVENOUS at 01:08

## 2020-08-07 RX ADMIN — POTASSIUM CHLORIDE 40 MEQ: 20 SOLUTION ORAL at 01:08

## 2020-08-07 RX ADMIN — CARBIDOPA AND LEVODOPA 3 SPLIT TABLET: 25; 100 TABLET ORAL at 11:08

## 2020-08-07 NOTE — ED NOTES
[facility]. Pt is in hospital [facility], administered morphine as ordered. Pt requested to use urinal but unable to urinate at this time.     Nicole Benjamin RN  08/07/20 [id number]

## 2020-08-07 NOTE — ASSESSMENT & PLAN NOTE
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     - Hgb drifting slowly, however patient is answering questions appropriately  - CTA still an option, however it would be unlikely to result in an active bleed with how slowly his Hgb is dropping  - Would recommend transfusing patient if Hgb < 7 and discussing with family how aggressive they would like to be with his treatment (transfusions vs possible IR involvement for embolization vs comfort care, etc.)  - Recommend q6 or q8 CBCs  - Replenish electrolytes as clinically indicated.   - Recommend goals of care with family  - Urologic will continue to follow along

## 2020-08-07 NOTE — ASSESSMENT & PLAN NOTE
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     - CT non-con unable to accurately delineate the source or cause of the significantly enlarged left renal lesion   - Repeat CBC to appraise for acute bleed vs chronic anemia  - Repeat CMP  - Replenish electrolytes as clinically indicated.   - CTA with urogram phase might be helpful if labs indicate an acute and active bleeding source and simultaneously allow us to visualize a possible obstructive uropathy component that may require intervention.  - Bladder scan with 110cc.    - Recommend goals of care with family and palliative care involvement  - Urologic intervention pending talks with family and repeat lab work, specifically H/H.

## 2020-08-07 NOTE — HPI
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  Pt states that he's had dysuria "for a few months" and recently developed hematuria.  He is a know patient to Dr. Fernández and has been having intermittent hematuria and LUTS for at least a year. A CT urogram was obtained in 04/2019 that showed a hyper attenuated 4cm mass within the renal collecting system and proximal ureter. The right collecting system was normal. The bladder was diffusely enlarged and the prostate was mildly enlarged. He had a cystoscopy that showed no abnormalities pertaining to his hematuria. The family did not want to any further diagnostic work-up at that time. Today the patient endorses passing small blood clots and at times bright red blood for the past few weeks. He has never required a catheter for retention issues. His pain complain is suprapubic pain and diffuse abdominal tenderness. His UA in [place] was notable for 74R, no bacteria, nitrite negative. His labs were significant for a WBC of 19. Hemoglobin 9.1 (12.1 a year ago). Creatinine was at baseline.     A CT non-contrast showed a 12cm lesion within the left renal fossa, completely obscuring the left renal landmarks. Posteriorly there is abutment with the left psoas and superiolaterally it is adjacent to the abdominal side wall. There are scattered lesions present in the abdomen and retroperitoneum which are not delineated well in a non-contrasted study.     Bedside Renal US performed personally was unhelpful in demarcating left kidney.   Bladder scan performed showed 110cc in the bladder.     "

## 2020-08-07 NOTE — TELEPHONE ENCOUNTER
Returned call, spoke with patient's daughter.  Informed patient is in good hands with the Urologist at [facility].  Keep us updated on Mr Jay.

## 2020-08-07 NOTE — ED NOTES
Pt daughter called. She states pt has been having dysuria x 2 weeks. He has a mass of some sort in his left kidney but a biopsy was not done secondary to his age. She states he passes large blood clots occasionally that cause pain and the last one was passed was this past Sunday morning.She states the lower back pain started 2 weeks ago. They use tylenol and salon pas at home but it has not been helping this time. She states he has been having these episodes x 2 years. She states he takes his carvadopa 3 x a day and received his morning dose today. His next dose is at noon and she states he will begin to sundown if he does not receive these medications on time daily.      Nicole Benjamin RN  08/07/20 [id number]      Yani Ellis daughter and [facility] [phone]     Nicole Benjamin RN  08/07/20 [id number]

## 2020-08-07 NOTE — ED PROVIDER NOTES
[facility]: [facility]       History     Chief Complaint   Patient presents with    Hip Pain     Pt has pelvic pain secondary to a mass. Pt sent from [facility] for urology evaluation     89yoM with HTN, HLD, anemia, parkinson's disease, and left renal mass presents as a transfer from [facility] ED for evaluation of hematuria and left abdominal/back pain in the setting of marked enlargement of left renal mass. Per chart review, patient saw Dr. Fernández in 4/2019 for hematuria and was found to have a left renal mass. Patient opted for non-surgical intervention given age. Presented to [facility] ED today due to dysuria, hematuria, and increased pain in left abdomen/back. CT abdomen and pelvis was repeated and showed marked enlargement of left renal mass. Patient was transferred to [facility] for further evaluation by urology team.         Review of patient's allergies indicates:   Allergen Reactions    Combigan [brimonidine-timolol]      Made him dizziness    Contrast media Rash     Past Medical History:   Diagnosis Date    Allergy     Anemia     Arthritis     Basal cell carcinoma     Depression     Herniation of intervertebral disc     Hyperlipidemia     Hypertension 3/4/2015    Macular degeneration     Parkinson disease     Spinal stenosis      Past Surgical History:   Procedure Laterality Date    CATARACT EXTRACTION W/  INTRAOCULAR LENS IMPLANT Left 10/31/07    Dr. Nunez    CATARACT EXTRACTION W/  INTRAOCULAR LENS IMPLANT Right 12/16/15    Dr. Nunez     Family History   Problem Relation Age of Onset    Cancer Father         prostate    Colon cancer Father     Hearing loss Paternal Aunt     Amblyopia Neg Hx     Blindness Neg Hx     Cataracts Neg Hx     Diabetes Neg Hx     Glaucoma Neg Hx     Hypertension Neg Hx     Macular degeneration Neg Hx     Retinal detachment Neg Hx     Strabismus Neg Hx     Stroke Neg Hx     Thyroid disease Neg Hx     Heart attack Neg Hx      Heart disease Neg Hx     Heart failure Neg Hx     Hyperlipidemia Neg Hx     Melanoma Neg Hx      Social History     Tobacco Use    Smoking status: Never Smoker    Smokeless tobacco: Never Used   Substance Use Topics    Alcohol use: No     Alcohol/week: 0.0 standard drinks     Frequency: Never     Drinks per session: Patient refused     Binge frequency: Never    Drug use: No     Review of Systems   Constitutional: Negative for diaphoresis and fever.   HENT: Negative for ear pain, hearing loss and sore throat.    Eyes: Negative for pain and visual disturbance.   Respiratory: Negative for cough, chest tightness and shortness of breath.    Cardiovascular: Negative for chest pain.   Gastrointestinal: Positive for abdominal distention, abdominal pain and constipation. Negative for nausea and vomiting.   Genitourinary: Positive for flank pain and hematuria.   Musculoskeletal: Positive for back pain. Negative for neck pain.   Neurological: Negative for seizures and syncope.   Psychiatric/Behavioral: Negative.        Physical Exam     Initial Vitals [08/07/20 1444]   BP Pulse Resp Temp SpO2   (!) 148/88 62 20 -- 97 %      MAP       --         Physical Exam    Constitutional: No distress.   Elderly male laying in hospital bed   HENT:   Head: Normocephalic and atraumatic.   Right Ear: External ear normal.   Left Ear: External ear normal.   Eyes: Conjunctivae and EOM are normal. Right eye exhibits no discharge. Left eye exhibits no discharge.   Neck: Normal range of motion. Neck supple.   Cardiovascular: Normal rate.   Pulmonary/Chest: Breath sounds normal. He exhibits no tenderness.   Abdominal: He exhibits mass. There is abdominal tenderness.   Left sided abdominal and left flank tenderness with palpation, palpable rigidity left flank   Musculoskeletal: Normal range of motion. No tenderness or edema.   Neurological: He is alert and oriented to person, place, and time. GCS score is 15. GCS eye subscore is 4. GCS  verbal subscore is 5. GCS motor subscore is 6.   Skin: Skin is warm and dry.   Psychiatric: He has a normal mood and affect.         ED Course   Procedures  Labs Reviewed   CBC W/ AUTO DIFFERENTIAL - Abnormal; Notable for the following components:       Result Value    WBC 17.92 (*)     RBC 3.15 (*)     Hemoglobin 8.3 (*)     Hematocrit 28.4 (*)     Mean Corpuscular Hemoglobin 26.3 (*)     Mean Corpuscular Hemoglobin Conc 29.2 (*)     RDW 16.1 (*)     Immature Granulocytes 0.6 (*)     Gran # (ANC) 15.8 (*)     Immature Grans (Abs) 0.11 (*)     Lymph # 0.9 (*)     Gran% 88.3 (*)     Lymph% 5.1 (*)     All other components within normal limits   PROTIME-INR   TYPE & SCREEN     EKG Readings: (Independently Interpreted)   Initial Reading: No STEMI.   EKG reviewed       Imaging Results    None          Medical Decision Making:   Initial Assessment:   89yoM with pmhx parkinsons and left renal mass now with worsening left abdominal pain, hematuria, and dysuria most likely due to enlargement of left renal mass.  Differential Diagnosis:   DDX  -left renal mass: dx 4/2019, significantly enlarged on repeat CT A/P today at OSH  -acute anemia: Hb 9.1 this AM, 8.3 on repeat  -UTI: unlikely given UA negative for WBC/nitrate  Clinical Tests:   Lab Tests: Ordered and Reviewed  Radiological Study: Ordered and Reviewed  Medical Tests: Reviewed and Ordered  ED Management:  On initial evaluation patient in pain but NAD, A&Ox3  VS wnl except mild HTN, non-toxic, non-septic  Urology aware of patient and actually in ED upon initial evaluation  Urology recommends serial Hb; will also order PT/INR and type/screen   Morphine 6mg IV for pain control      7:18 PM  Repeat Hb 8.3 from 9.1  Medicine to admit for serial h&h  Per urology, if hemoglobin continues to downtrend may need IR embolization  Otherwise urology with no plans to intervene procedurally  Hospice and palliation discussions to be held                Attending Attestation:    Physician Attestation Statement for Resident:  As the supervising MD   Physician Attestation Statement: I have personally seen and examined this patient.   I agree with the above history. -:   As the supervising MD I agree with the above PE.    As the supervising MD I agree with the above treatment, course, plan, and disposition.                                  Clinical Impression:       ICD-10-CM ICD-9-CM   1. Renal mass  N28.89 593.9             ED Disposition Condition    Observation                           Sherry Connor MD  Resident  08/07/20 1920       Tana Lauren MD  08/07/20 3930

## 2020-08-07 NOTE — ED TRIAGE NOTES
Phi Sainz, a 89 y.o. male presents to the ED w/ complaint of pelvic pain secondary to mass.     Triage note:  Chief Complaint   Patient presents with    Hip Pain     Pt has pelvic pain secondary to a mass. Pt sent from [facility] for urology evaluation     Review of patient's allergies indicates:   Allergen Reactions    Combigan [brimonidine-timolol]      Made him dizziness    Contrast media Rash     Past Medical History:   Diagnosis Date    Allergy     Anemia     Arthritis     Basal cell carcinoma     Depression     Herniation of intervertebral disc     Hyperlipidemia     Hypertension 3/4/2015    Macular degeneration     Parkinson disease     Spinal stenosis      LOC: Patient name and date of birth verified. The patient is awake, alert and aware of environment with an appropriate affect, the patient is oriented x 3 and speaking appropriately.   APPEARANCE: Patient resting comfortably, patient is clean and well groomed, patient's clothing is properly fastened.  SKIN: The skin is warm and dry, color consistent with ethnicity, patient has normal skin turgor and moist mucus membranes, skin intact, no breakdown or bruising noted.  MUSCULOSKELETAL: Patient moving all extremities well, no obvious swelling or deformities noted.   RESPIRATORY: Respirations are spontaneous, patient has a normal effort and rate, no accessory muscle use noted.  CARDIAC: Patient has a normal rate and rhythm, no periphreal edema noted, capillary refill < 3 seconds.  ABDOMEN: Soft and non tender to palpation, no distention noted. Bowel sounds present in all four quadrants.  NEUROLOGIC: Eyes open spontaneously, behavior appropriate to situation, follows commands, facial expression symmetrical, bilateral hand grasp equal and even, purposeful motor response noted, normal sensation in all extremities when touched with a finger.

## 2020-08-07 NOTE — ED NOTES
ER to ER transfer to [facility]. Accepted by Dr Brandon. [phone] for report.      Nicole Benjamin RN  08/07/20 9665

## 2020-08-07 NOTE — TELEPHONE ENCOUNTER
----- Message from Kaur Lazaro sent at 8/7/2020  4:12 PM CDT -----  Pt daughter called stating pt in in the hospital main campus the mass is enlarged please reach out to her at [phone]

## 2020-08-07 NOTE — SUBJECTIVE & OBJECTIVE
Past Medical History:   Diagnosis Date    Allergy     Anemia     Arthritis     Basal cell carcinoma     Depression     Herniation of intervertebral disc     Hyperlipidemia     Hypertension 3/4/2015    Macular degeneration     Parkinson disease     Spinal stenosis        Past Surgical History:   Procedure Laterality Date    CATARACT EXTRACTION W/  INTRAOCULAR LENS IMPLANT Left 10/31/07    Dr. Nunez    CATARACT EXTRACTION W/  INTRAOCULAR LENS IMPLANT Right 12/16/15    Dr. Nunez       Review of patient's allergies indicates:   Allergen Reactions    Combigan [brimonidine-timolol]      Made him dizziness    Contrast media Rash       Family History     Problem Relation (Age of Onset)    Cancer Father    Colon cancer Father    Hearing loss Paternal Aunt          Tobacco Use    Smoking status: Never Smoker    Smokeless tobacco: Never Used   Substance and Sexual Activity    Alcohol use: No     Alcohol/week: 0.0 standard drinks     Frequency: Never     Drinks per session: Patient refused     Binge frequency: Never    Drug use: No    Sexual activity: Yes     Partners: Female       Review of Systems   Constitutional: Positive for appetite change and chills. Negative for fever.   HENT: Negative for mouth sores.    Eyes: Negative for discharge.   Respiratory: Negative for chest tightness and shortness of breath.    Cardiovascular: Negative for chest pain.   Gastrointestinal: Positive for abdominal distention and abdominal pain. Negative for nausea and vomiting.   Genitourinary: Positive for difficulty urinating, dysuria and hematuria. Negative for flank pain.   Musculoskeletal: Negative for arthralgias.   Neurological: Negative for dizziness.   Psychiatric/Behavioral: Negative for agitation.       Objective:     Temp:  [98.4 °F (36.9 °C)-98.9 °F (37.2 °C)] 98.9 °F (37.2 °C)  Pulse:  [61-86] 62  Resp:  [20-22] 20  SpO2:  [95 %-100 %] 97 %  BP: (148-165)/(71-89) 148/88     Body mass index is 18.65  kg/m².           Drains     None                 Physical Exam   Constitutional: He is oriented to person, place, and time.   HENT:   Head: Atraumatic.   Neck: Neck supple.   Cardiovascular: Normal rate.    Pulmonary/Chest: Effort normal. No respiratory distress.   Abdominal: Soft.   Diffusely tender to palpation and tympanic.   No right or left CVA tenderness. No palpable mass felt in the left flank or LUQ.   Suprapubic tenderness.        Genitourinary:    Genitourinary Comments: Penis is uncircumcised, there are no lesions, masses, plaques. Scrotum showed no rashes or lesions. Testicles and epididymes were symmetric and showed no masses or tenderness. Urethral meatus is orthotopic, patent and without lesions or discharge.        Musculoskeletal: Normal range of motion.   Neurological: He is alert and oriented to person, place, and time.   Skin: Skin is warm and dry.         Significant Labs:    BMP:  Recent Labs   Lab 08/07/20  1048      K 2.9*   CL 99   CO2 32*   BUN 16   CREATININE 1.2   CALCIUM 8.4*       CBC:  Recent Labs   Lab 08/07/20  1048   WBC 19.08*   HGB 9.1*   HCT 30.7*          Blood Culture: No results for input(s): LABBLOO in the last 168 hours.  Urine Culture: No results for input(s): LABURIN in the last 168 hours.  Urine Studies:   Recent Labs   Lab 08/07/20  1048   COLORU Yellow   APPEARANCEUA Clear   PHUR 7.0   SPECGRAV 1.015   PROTEINUA Negative   GLUCUA Negative   KETONESU Negative   BILIRUBINUA Negative   OCCULTUA 3+*   NITRITE Negative   UROBILINOGEN Negative   LEUKOCYTESUR Negative   RBCUA 78*   WBCUA 0   BACTERIA None   SQUAMEPITHEL 1       Significant Imaging:  All pertinent imaging results/findings from the past 24 hours have been reviewed.  See HPI

## 2020-08-07 NOTE — CONSULTS
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  Pt states that he's had dysuria "for a few months" and recently developed hematuria.  He is a know patient to Dr. Fernández and has been having intermittent hematuria and LUTS for at least a year. A CT urogram was obtained in 04/2019 that showed a hyper attenuated 4cm mass within the renal collecting system and proximal ureter. The right collecting system was normal. The bladder was diffusely enlarged and the prostate was mildly enlarged. He had a cystoscopy that showed no abnormalities pertaining to his hematuria. The family did not want to any further diagnostic work-up at that time. Today the patient endorses passing small blood clots and at times bright red blood for the past few weeks. He has never required a catheter for retention issues. His pain complain is suprapubic pain and diffuse abdominal tenderness. His UA in [place] was notable for 74R, no bacteria, nitrite negative. His labs were significant for a WBC of 19. Hemoglobin 9.1 (12.1 a year ago). Creatinine was at baseline.     A CT non-contrast showed a 12cm lesion within the left renal fossa, completely obscuring the left renal landmarks. Posteriorly there is abutment with the left psoas and superiolaterally it is adjacent to the abdominal side wall. There are scattered lesions present in " the abdomen and retroperitoneum which are not delineated well in a non-contrasted study.     Bedside Renal US performed personally was unhelpful in demarcating left kidney.   Bladder scan performed showed 110cc in the bladder.       Past Medical History:   Diagnosis Date    Allergy     Anemia     Arthritis     Basal cell carcinoma     Depression     Herniation of intervertebral disc     Hyperlipidemia     Hypertension 3/4/2015    Macular degeneration     Parkinson disease     Spinal stenosis        Past Surgical History:   Procedure Laterality Date    CATARACT EXTRACTION W/  INTRAOCULAR LENS IMPLANT Left 10/31/07    Dr. Nunez    CATARACT EXTRACTION W/  INTRAOCULAR LENS IMPLANT Right 12/16/15    Dr. Nunez       Review of patient's allergies indicates:   Allergen Reactions    Combigan [brimonidine-timolol]      Made him dizziness    Contrast media Rash       Family History     Problem Relation (Age of Onset)    Cancer Father    Colon cancer Father    Hearing loss Paternal Aunt          Tobacco Use    Smoking status: Never Smoker    Smokeless tobacco: Never Used   Substance and Sexual Activity    Alcohol use: No     Alcohol/week: 0.0 standard drinks     Frequency: Never     Drinks per session: Patient refused     Binge frequency: Never    Drug use: No    Sexual activity: Yes     Partners: Female       Review of Systems   Constitutional: Positive for appetite change and chills. Negative for fever.   HENT: Negative for mouth sores.    Eyes: Negative for discharge.   Respiratory: Negative for chest tightness and shortness of breath.    Cardiovascular: Negative for chest pain.   Gastrointestinal: Positive for abdominal distention and abdominal pain. Negative for nausea and vomiting.   Genitourinary: Positive for difficulty urinating, dysuria and hematuria. Negative for flank pain.   Musculoskeletal: Negative for arthralgias.   Neurological: Negative for dizziness.   Psychiatric/Behavioral:  Negative for agitation.       Objective:     Temp:  [98.4 °F (36.9 °C)-98.9 °F (37.2 °C)] 98.9 °F (37.2 °C)  Pulse:  [61-86] 62  Resp:  [20-22] 20  SpO2:  [95 %-100 %] 97 %  BP: (148-165)/(71-89) 148/88     Body mass index is 18.65 kg/m².           Drains     None                 Physical Exam   Constitutional: He is oriented to person, place, and time.   HENT:   Head: Atraumatic.   Neck: Neck supple.   Cardiovascular: Normal rate.    Pulmonary/Chest: Effort normal. No respiratory distress.   Abdominal: Soft.   Diffusely tender to palpation and tympanic.   No right or left CVA tenderness. No palpable mass felt in the left flank or LUQ.   Suprapubic tenderness.        Genitourinary:    Genitourinary Comments: Penis is uncircumcised, there are no lesions, masses, plaques. Scrotum showed no rashes or lesions. Testicles and epididymes were symmetric and showed no masses or tenderness. Urethral meatus is orthotopic, patent and without lesions or discharge.        Musculoskeletal: Normal range of motion.   Neurological: He is alert and oriented to person, place, and time.   Skin: Skin is warm and dry.         Significant Labs:    BMP:  Recent Labs   Lab 08/07/20  1048      K 2.9*   CL 99   CO2 32*   BUN 16   CREATININE 1.2   CALCIUM 8.4*       CBC:  Recent Labs   Lab 08/07/20  1048   WBC 19.08*   HGB 9.1*   HCT 30.7*          Blood Culture: No results for input(s): LABBLOO in the last 168 hours.  Urine Culture: No results for input(s): LABURIN in the last 168 hours.  Urine Studies:   Recent Labs   Lab 08/07/20  1048   COLORU Yellow   APPEARANCEUA Clear   PHUR 7.0   SPECGRAV 1.015   PROTEINUA Negative   GLUCUA Negative   KETONESU Negative   BILIRUBINUA Negative   OCCULTUA 3+*   NITRITE Negative   UROBILINOGEN Negative   LEUKOCYTESUR Negative   RBCUA 78*   WBCUA 0   BACTERIA None   SQUAMEPITHEL 1       Significant Imaging:  All pertinent imaging results/findings from the past 24 hours have been reviewed.  See  HPI                  Assessment and Plan:     Left renal mass  89 y/o male with a known left renal mass, likely upper tract carcinoma which was stable on past imaging studies who presents with abdominal pain and an interval significant increase in size of left renal mass.     - CT non-con unable to accurately delineate the source or cause of the significantly enlarged left renal lesion   - Repeat CBC to appraise for acute bleed vs chronic anemia  - Repeat CMP  - Replenish electrolytes as clinically indicated.   - CTA with urogram phase might be helpful if labs indicate an acute and active bleeding source and simultaneously allow us to visualize a possible obstructive uropathy component that may require intervention.  - Bladder scan with 110cc.    - Recommend goals of care with family and palliative care involvement  - Urologic intervention pending talks with family and repeat lab work, specifically H/H.         VTE Risk Mitigation (From admission, onward)    None          Thank you for your consult. I will follow-up with patient. Please contact us if you have any additional questions.    Arnaud Conrad MD  Urology  [facility]

## 2020-08-07 NOTE — ED NOTES
Pt returned from CT scan without distress. Reports some improvement to pain, still moaning. Pt connected to cardiac monitor, auto bp cuff, and cont pulse ox. Call bell in reach.      Nicole Benjamin RN  08/07/20 4967

## 2020-08-07 NOTE — ED PROVIDER NOTES
"Encounter Date: 8/7/2020       History     Chief Complaint   Patient presents with    Pelvic Pain     c/o lower abdominal/pelvic pain today with no N/V. Family reported to EMS that he has been having hematuria. Presents awake, alert, oriented. Skin w/d. Lower abdominal area tender to palpation. States that he is normally bedbound.      89yo male with pmhx of HTN, HLD, anemia, parkinson's disease, and reported history of Chron's disease (per EMS) by EMS for lower abd pain.  Pt states that he's had dysuria "for a few months" and recently developed hematuria.  He has also had lower abd pain for the past several days.  Pt reports chronic low back pain which is unchanged.  No trauma, fever/chills, CP, SOB, n/v/d.  He reports chronic generalized weakness.  No other complaints at this time.  Pt's daughter reports that he has a known mass on his kidney and due to age, surgery was deferred.  Pt's daughter states that he's had similar episodes for the past two years and usually after passing a clot (urinary) his pain improves.  However, the patient did pass a clot on Sunday and the pain has persisted.     The history is provided by the patient, the EMS personnel and medical records.     Review of patient's allergies indicates:   Allergen Reactions    Combigan [brimonidine-timolol]      Made him dizziness    Contrast media Rash     Past Medical History:   Diagnosis Date    Allergy     Anemia     Arthritis     Basal cell carcinoma     Depression     Herniation of intervertebral disc     Hyperlipidemia     Hypertension 3/4/2015    Macular degeneration     Parkinson disease     Spinal stenosis      Past Surgical History:   Procedure Laterality Date    CATARACT EXTRACTION W/  INTRAOCULAR LENS IMPLANT Left 10/31/07    Dr. Nunez    CATARACT EXTRACTION W/  INTRAOCULAR LENS IMPLANT Right 12/16/15    Dr. Nunez     Family History   Problem Relation Age of Onset    Cancer Father         prostate    Colon cancer " Father     Hearing loss Paternal Aunt     Amblyopia Neg Hx     Blindness Neg Hx     Cataracts Neg Hx     Diabetes Neg Hx     Glaucoma Neg Hx     Hypertension Neg Hx     Macular degeneration Neg Hx     Retinal detachment Neg Hx     Strabismus Neg Hx     Stroke Neg Hx     Thyroid disease Neg Hx     Heart attack Neg Hx     Heart disease Neg Hx     Heart failure Neg Hx     Hyperlipidemia Neg Hx     Melanoma Neg Hx      Social History     Tobacco Use    Smoking status: Never Smoker    Smokeless tobacco: Never Used   Substance Use Topics    Alcohol use: No     Alcohol/week: 0.0 standard drinks     Frequency: Never     Drinks per session: Patient refused     Binge frequency: Never    Drug use: No     Review of Systems   Constitutional: Positive for appetite change and fatigue. Negative for activity change and fever.   HENT: Negative for congestion.    Respiratory: Negative for cough, chest tightness and shortness of breath.    Cardiovascular: Negative for chest pain.   Gastrointestinal: Positive for abdominal pain. Negative for blood in stool, diarrhea, nausea and vomiting.   Genitourinary: Positive for hematuria.   Musculoskeletal: Negative for back pain, joint swelling, myalgias and neck pain.   Skin: Negative.    Neurological: Negative for weakness and headaches.   Psychiatric/Behavioral: Negative for confusion.   All other systems reviewed and are negative.      Physical Exam     Initial Vitals [08/07/20 0946]   BP Pulse Resp Temp SpO2   (!) 164/86 86 20 98.4 °F (36.9 °C) 100 %      MAP       --         Physical Exam    Nursing note and vitals reviewed.  Constitutional: Vital signs are normal. He appears well-developed and well-nourished. He is active and cooperative. He is easily aroused.  Non-toxic appearance. He does not have a sickly appearance. He does not appear ill. No distress.   HENT:   Head: Normocephalic and atraumatic.   Mouth/Throat: Mucous membranes are normal.   Eyes: Conjunctivae  are normal.   Neck: Normal range of motion and phonation normal.   Cardiovascular: Normal rate, regular rhythm and normal heart sounds.   Pulmonary/Chest: Effort normal and breath sounds normal.   Abdominal: Soft. Normal appearance and bowel sounds are normal. He exhibits distension. There is abdominal tenderness in the suprapubic area. There is no rigidity, no rebound, no guarding and no CVA tenderness.   Neurological: He is alert, oriented to person, place, and time and easily aroused. He has normal strength. Coordination normal. GCS eye subscore is 4. GCS verbal subscore is 5. GCS motor subscore is 6.   Skin: Skin is warm, dry and intact. No bruising and no rash noted. No erythema. No pallor.   Psychiatric: He has a normal mood and affect. His speech is normal and behavior is normal. Judgment and thought content normal. Cognition and memory are normal.         ED Course   Procedures  Labs Reviewed   CBC W/ AUTO DIFFERENTIAL - Abnormal; Notable for the following components:       Result Value    WBC 19.08 (*)     RBC 3.48 (*)     Hemoglobin 9.1 (*)     Hematocrit 30.7 (*)     Mean Corpuscular Hemoglobin 26.1 (*)     Mean Corpuscular Hemoglobin Conc 29.6 (*)     RDW 15.6 (*)     Immature Granulocytes 0.7 (*)     Gran # (ANC) 16.8 (*)     Immature Grans (Abs) 0.14 (*)     Gran% 87.9 (*)     Lymph% 5.9 (*)     All other components within normal limits   URINALYSIS, REFLEX TO URINE CULTURE - Abnormal; Notable for the following components:    Occult Blood UA 3+ (*)     All other components within normal limits    Narrative:     Specimen Source->Urine   URINALYSIS MICROSCOPIC - Abnormal; Notable for the following components:    RBC, UA 78 (*)     All other components within normal limits    Narrative:     Specimen Source->Urine   CULTURE, BLOOD   CULTURE, BLOOD   LACTIC ACID, PLASMA   COMPREHENSIVE METABOLIC PANEL   LIPASE     EKG Readings: (Independently Interpreted)   Initial: 1302. Rhythm: Atrial Fibrillation. Heart  Rate: 65. Ectopy: PVCs. ST Segments: Normal ST Segments. T Waves: Normal. Clinical Impression: Atrial Fibrillation       Imaging Results           CT Abdomen Pelvis  Without Contrast (Final result)  Result time 08/07/20 11:26:00    Final result by Phil Colbert MD (08/07/20 11:26:00)                 Impression:      Significant interval increase in size of large mass likely originating from the left collecting system.  This mass is now indistinguishable from the left renal parenchyma and there is probable invasion of the left psoas muscle.  Recommend urology evaluation as clinically appropriate.    Retroperitoneal and peritoneal soft tissue masses worrisome for metastatic disease.    New small bilateral pleural effusions.  Interval increase in size of pericardial effusion.  Trace abdominopelvic free fluid.    Anasarca.    Grossly stable appearance of chronic interstitial lung disease/fibrotic changes in the lung bases.    Additional stable findings discussed in the body of the report.    This report was flagged in Epic as abnormal.      Electronically signed by: Phil Colbert MD  Date:    08/07/2020  Time:    11:26             Narrative:    EXAMINATION:  CT ABDOMEN PELVIS WITHOUT CONTRAST    CLINICAL HISTORY:  Hematuria, unknown cause;Abdominal pain, acute, nonlocalized;    TECHNIQUE:  Low dose axial images, sagittal and coronal reformations were obtained from the lung bases to the pubic symphysis.  Contrast was not administered.    COMPARISON:  CT abdomen pelvis without contrast, 07/04/2019.  CT urogram, 04/11/2019.    FINDINGS:  Lower chest: Heart is at the upper limits of normal in size.  Moderate volume pericardial fluid, increased when compared with the prior examination.  Pericardial fluid measures up to 1.4 cm in maximum thickness.  Coronary artery calcifications are present.  There are small bilateral pleural effusions with adjacent passive atelectasis.  Stable coarse reticular subpleural opacities  bilaterally suggestive of chronic interstitial lung disease.  Associated calcifications in the lung bases.    Abdomen:    Evaluation of the solid abdominal organs and bowel is limited in the absence of IV contrast.    Liver is normal in size and contour.  No focal hepatic mass.  Gallbladder is unremarkable.  No intra-or extrahepatic biliary ductal dilatation.    Spleen is normal in size.  Pancreas and adrenal glands appear stable and negative for acute finding.    Marked interval enlargement of a large mass which likely originates from the left collecting system.  This mass measures approximately 12 x 9 x 12 cm (axial image 56 and coronal image 46).  There is apparent invasion of the left psoas muscle muscle, noting asymmetric enlargement of this structure.  Normal renal parenchyma is indistinguishable from the large mass.  Right kidney is unremarkable.  No hydronephrosis.    There are nodular soft tissue masses in the right retroperitoneum at the posterior aspect of the right kidney.  For example, a soft tissue mass measures approximately 2.7 cm on axial image 58.  Additional mass in the right retroperitoneum measures 2.7 cm (axial image 66).  Peritoneal nodule in the right mid abdomen measures approximately 2.8 cm (axial image 80).  Possible additional peritoneal nodule which is difficult to separate from the previously described large left renal mass (axial image 77).    No distended loops of small bowel. There is mild diffuse mesenteric edema and small volume abdominopelvic free fluid.  No organized fluid collection.  No pneumoperitoneum.    Abdominal aorta is normal in course and caliber with moderate to advanced calcific atherosclerosis.    No bulky retroperitoneal lymphadenopathy.    Pelvis: Urinary bladder is relatively decompressed but otherwise unremarkable.  Rectum is unremarkable.  Trace pelvic free fluid and presacral edema.  Prostate appears stable.    Bones and soft tissues: No aggressive osseous  lesions. Probable old right-sided rib fractures.  Degenerative changes throughout the lumbar spine.  Mild diffuse body wall edema.                                 Medical Decision Making:   History:   Old Medical Records: I decided to obtain old medical records.  Old Records Summarized: other records and records from clinic visits.  Differential Diagnosis:   UTI, NIOCLLE, renal mass, obstruction  Clinical Tests:   Lab Tests: Ordered and Reviewed  Radiological Study: Ordered and Reviewed  Medical Tests: Ordered and Reviewed  ED Management:  Labs, CT scan    WBC [place] elevated at [address].  UA is negative for infection.  He was given Rocephin for possible underlying infection.  The CT scan reveals increase of the size of the renal mass that is likely originating from the left collecting system.  He also has retroperitoneal peritoneal soft tissue masses worrisome for metastasis.  H&H is mildly decreased at 9.1 and 30.7 however the patient does not require transfusion at this time.  Bilateral pleural effusions noted. K decreased and oral replacement was given.     Discussed with Dr. Ramirez, urology.  Germain is on urology diversion today.  He feels the patient is bleeding into the kidney tumor which has caused the tumor to appear larger than previous.  He states the bleeding usually stops with supportive care however he may require embolization under IR guidance.  He states that the patient may be transferred to the ER at [facility] for urology consultation.  After discussion with Yani, the patient's POA, she does desire transfer to [facility] for urology consultation.  at [facility] ED has accepted.  Pt agrees with plan.   Other:   I have discussed this case with another health care provider.  1135:  Discussed CT findings with the patient's daughter, Yani.              Attending Attestation:     Physician Attestation Statement for NP/PA:       Other NP/PA Attestation Additions:      Medical Decision  Making: The patient is a 89-year-old male who presents to the emergency department with suprapubic pain today.  He is weak also.  The patient had a history of a mass on his left kidney.  This was with family in the past and the decision to not operate on the mass was made.  Currently the mass has enlarged.  The patient will be transferred at this time to [facility] for urology evaluation.  The patient appears comfortable at this time.                               Clinical Impression:       ICD-10-CM ICD-9-CM   1. Renal mass, left  N28.89 593.9   2. Hypokalemia  E87.6 276.8   3. Bilateral pleural effusion  J90 511.9   4. Leukocytosis, unspecified type  D72.829 288.60                                Karen Schaffer, ZAHRA  08/07/20 1165

## 2020-08-07 NOTE — ED NOTES
Pt snoring and arousable to voice. Reports pain is 2/10 and he feels more comfortable. Call bell in reach and rails up x 2.      Nicole Benjamin RN  08/07/20 0415

## 2020-08-07 NOTE — ED NOTES
Pt is awake and moaning in pain. Rates pain 7/10 in his lower abdomen and lower back. Secure chat message sent to YAQUELIN Jones RN  08/07/20 5090

## 2020-08-07 NOTE — ED NOTES
Pt is c/o 10/10 lower abdominal pain and 8/10 chronic lower back pain. HOB lowered per request and pillow provided. Secure chat message sent to YAQUELIN Jones.      Nicole Benjamin RN  08/07/20 4141

## 2020-08-07 NOTE — ED NOTES
Pt is alert and oriented to self, time and situation, not place. Pt c/o lower abdominal pain x 2 weeks with hematuria and dysuria along with hesitation. Pt denies n/v, dizziness, sob, flank pain.      Nicole Benjamin RN  08/07/20 7538

## 2020-08-08 LAB
ALBUMIN SERPL BCP-MCNC: 2 G/DL (ref 3.5–5.2)
ALP SERPL-CCNC: 106 U/L (ref 55–135)
ALT SERPL W/O P-5'-P-CCNC: <5 U/L (ref 10–44)
ANION GAP SERPL CALC-SCNC: 7 MMOL/L (ref 8–16)
AST SERPL-CCNC: 10 U/L (ref 10–40)
BASOPHILS # BLD AUTO: 0.03 K/UL (ref 0–0.2)
BASOPHILS # BLD AUTO: 0.03 K/UL (ref 0–0.2)
BASOPHILS # BLD AUTO: 0.04 K/UL (ref 0–0.2)
BASOPHILS NFR BLD: 0.2 % (ref 0–1.9)
BILIRUB SERPL-MCNC: 0.4 MG/DL (ref 0.1–1)
BUN SERPL-MCNC: 14 MG/DL (ref 8–23)
CALCIUM SERPL-MCNC: 7.9 MG/DL (ref 8.7–10.5)
CHLORIDE SERPL-SCNC: 103 MMOL/L (ref 95–110)
CO2 SERPL-SCNC: 31 MMOL/L (ref 23–29)
CREAT SERPL-MCNC: 1 MG/DL (ref 0.5–1.4)
DIFFERENTIAL METHOD: ABNORMAL
EOSINOPHIL # BLD AUTO: 0 K/UL (ref 0–0.5)
EOSINOPHIL # BLD AUTO: 0 K/UL (ref 0–0.5)
EOSINOPHIL # BLD AUTO: 0.1 K/UL (ref 0–0.5)
EOSINOPHIL NFR BLD: 0.1 % (ref 0–8)
EOSINOPHIL NFR BLD: 0.2 % (ref 0–8)
EOSINOPHIL NFR BLD: 0.3 % (ref 0–8)
ERYTHROCYTE [DISTWIDTH] IN BLOOD BY AUTOMATED COUNT: 15.4 % (ref 11.5–14.5)
ERYTHROCYTE [DISTWIDTH] IN BLOOD BY AUTOMATED COUNT: 15.6 % (ref 11.5–14.5)
ERYTHROCYTE [DISTWIDTH] IN BLOOD BY AUTOMATED COUNT: 15.7 % (ref 11.5–14.5)
EST. GFR  (AFRICAN AMERICAN): >60 ML/MIN/1.73 M^2
EST. GFR  (NON AFRICAN AMERICAN): >60 ML/MIN/1.73 M^2
GLUCOSE SERPL-MCNC: 74 MG/DL (ref 70–110)
HCT VFR BLD AUTO: 27.4 % (ref 40–54)
HCT VFR BLD AUTO: 28.9 % (ref 40–54)
HCT VFR BLD AUTO: 29.3 % (ref 40–54)
HGB BLD-MCNC: 7.7 G/DL (ref 14–18)
HGB BLD-MCNC: 8.3 G/DL (ref 14–18)
HGB BLD-MCNC: 8.4 G/DL (ref 14–18)
IMM GRANULOCYTES # BLD AUTO: 0.11 K/UL (ref 0–0.04)
IMM GRANULOCYTES # BLD AUTO: 0.12 K/UL (ref 0–0.04)
IMM GRANULOCYTES # BLD AUTO: 0.14 K/UL (ref 0–0.04)
IMM GRANULOCYTES NFR BLD AUTO: 0.6 % (ref 0–0.5)
IMM GRANULOCYTES NFR BLD AUTO: 0.6 % (ref 0–0.5)
IMM GRANULOCYTES NFR BLD AUTO: 0.7 % (ref 0–0.5)
LYMPHOCYTES # BLD AUTO: 0.7 K/UL (ref 1–4.8)
LYMPHOCYTES # BLD AUTO: 0.7 K/UL (ref 1–4.8)
LYMPHOCYTES # BLD AUTO: 0.8 K/UL (ref 1–4.8)
LYMPHOCYTES NFR BLD: 3.5 % (ref 18–48)
LYMPHOCYTES NFR BLD: 3.6 % (ref 18–48)
LYMPHOCYTES NFR BLD: 4.7 % (ref 18–48)
MAGNESIUM SERPL-MCNC: 1.8 MG/DL (ref 1.6–2.6)
MCH RBC QN AUTO: 25.8 PG (ref 27–31)
MCH RBC QN AUTO: 26.1 PG (ref 27–31)
MCH RBC QN AUTO: 26.2 PG (ref 27–31)
MCHC RBC AUTO-ENTMCNC: 28.1 G/DL (ref 32–36)
MCHC RBC AUTO-ENTMCNC: 28.7 G/DL (ref 32–36)
MCHC RBC AUTO-ENTMCNC: 28.7 G/DL (ref 32–36)
MCV RBC AUTO: 91 FL (ref 82–98)
MCV RBC AUTO: 91 FL (ref 82–98)
MCV RBC AUTO: 92 FL (ref 82–98)
MONOCYTES # BLD AUTO: 0.8 K/UL (ref 0.3–1)
MONOCYTES # BLD AUTO: 0.9 K/UL (ref 0.3–1)
MONOCYTES # BLD AUTO: 1.1 K/UL (ref 0.3–1)
MONOCYTES NFR BLD: 4.5 % (ref 4–15)
MONOCYTES NFR BLD: 4.9 % (ref 4–15)
MONOCYTES NFR BLD: 5.3 % (ref 4–15)
NEUTROPHILS # BLD AUTO: 15.3 K/UL (ref 1.8–7.7)
NEUTROPHILS # BLD AUTO: 17.3 K/UL (ref 1.8–7.7)
NEUTROPHILS # BLD AUTO: 18.6 K/UL (ref 1.8–7.7)
NEUTROPHILS NFR BLD: 89.4 % (ref 38–73)
NEUTROPHILS NFR BLD: 90.1 % (ref 38–73)
NEUTROPHILS NFR BLD: 90.9 % (ref 38–73)
NRBC BLD-RTO: 0 /100 WBC
PHOSPHATE SERPL-MCNC: 3.3 MG/DL (ref 2.7–4.5)
PLATELET # BLD AUTO: 226 K/UL (ref 150–350)
PLATELET # BLD AUTO: 244 K/UL (ref 150–350)
PLATELET # BLD AUTO: 247 K/UL (ref 150–350)
PMV BLD AUTO: 10.5 FL (ref 9.2–12.9)
PMV BLD AUTO: 10.5 FL (ref 9.2–12.9)
PMV BLD AUTO: 10.7 FL (ref 9.2–12.9)
POTASSIUM SERPL-SCNC: 3.5 MMOL/L (ref 3.5–5.1)
PROT SERPL-MCNC: 5.2 G/DL (ref 6–8.4)
RBC # BLD AUTO: 2.98 M/UL (ref 4.6–6.2)
RBC # BLD AUTO: 3.18 M/UL (ref 4.6–6.2)
RBC # BLD AUTO: 3.21 M/UL (ref 4.6–6.2)
SODIUM SERPL-SCNC: 141 MMOL/L (ref 136–145)
WBC # BLD AUTO: 17.11 K/UL (ref 3.9–12.7)
WBC # BLD AUTO: 19.07 K/UL (ref 3.9–12.7)
WBC # BLD AUTO: 20.57 K/UL (ref 3.9–12.7)

## 2020-08-08 PROCEDURE — 83735 ASSAY OF MAGNESIUM: CPT

## 2020-08-08 PROCEDURE — 99226 PR SUBSEQUENT OBSERVATION CARE,LEVEL III: CPT | Mod: ,,, | Performed by: PHYSICIAN ASSISTANT

## 2020-08-08 PROCEDURE — G0378 HOSPITAL OBSERVATION PER HR: HCPCS

## 2020-08-08 PROCEDURE — 85025 COMPLETE CBC W/AUTO DIFF WBC: CPT | Mod: 91

## 2020-08-08 PROCEDURE — 63600175 PHARM REV CODE 636 W HCPCS: Performed by: NURSE PRACTITIONER

## 2020-08-08 PROCEDURE — 36415 COLL VENOUS BLD VENIPUNCTURE: CPT

## 2020-08-08 PROCEDURE — 84100 ASSAY OF PHOSPHORUS: CPT

## 2020-08-08 PROCEDURE — 96372 THER/PROPH/DIAG INJ SC/IM: CPT | Mod: 59 | Performed by: EMERGENCY MEDICINE

## 2020-08-08 PROCEDURE — 25000003 PHARM REV CODE 250: Performed by: NURSE PRACTITIONER

## 2020-08-08 PROCEDURE — 80053 COMPREHEN METABOLIC PANEL: CPT

## 2020-08-08 PROCEDURE — 99226 PR SUBSEQUENT OBSERVATION CARE,LEVEL III: ICD-10-PCS | Mod: ,,, | Performed by: PHYSICIAN ASSISTANT

## 2020-08-08 RX ADMIN — ACETAMINOPHEN 650 MG: 325 TABLET ORAL at 11:08

## 2020-08-08 RX ADMIN — CARBIDOPA AND LEVODOPA 3 SPLIT TABLET: 25; 100 TABLET ORAL at 03:08

## 2020-08-08 RX ADMIN — GABAPENTIN 300 MG: 100 CAPSULE ORAL at 09:08

## 2020-08-08 RX ADMIN — HEPARIN SODIUM 5000 UNITS: 5000 INJECTION INTRAVENOUS; SUBCUTANEOUS at 09:08

## 2020-08-08 RX ADMIN — BUDESONIDE 9 MG: 3 CAPSULE, GELATIN COATED ORAL at 11:08

## 2020-08-08 RX ADMIN — HEPARIN SODIUM 5000 UNITS: 5000 INJECTION INTRAVENOUS; SUBCUTANEOUS at 03:08

## 2020-08-08 RX ADMIN — DONEPEZIL HYDROCHLORIDE 5 MG: 5 TABLET, FILM COATED ORAL at 09:08

## 2020-08-08 RX ADMIN — CARBIDOPA AND LEVODOPA 3 SPLIT TABLET: 25; 100 TABLET ORAL at 09:08

## 2020-08-08 RX ADMIN — QUETIAPINE FUMARATE 25 MG: 25 TABLET ORAL at 09:08

## 2020-08-08 RX ADMIN — MIDODRINE HYDROCHLORIDE 2.5 MG: 2.5 TABLET ORAL at 09:08

## 2020-08-08 RX ADMIN — HEPARIN SODIUM 5000 UNITS: 5000 INJECTION INTRAVENOUS; SUBCUTANEOUS at 05:08

## 2020-08-08 NOTE — PLAN OF CARE
Problem: Adult Inpatient Plan of Care  Goal: Plan of Care Review  Outcome: Ongoing, Progressing  Goal: Patient-Specific Goal (Individualization)  Outcome: Ongoing, Progressing  Goal: Absence of Hospital-Acquired Illness or Injury  Outcome: Ongoing, Progressing  Goal: Optimal Comfort and Wellbeing  Outcome: Ongoing, Progressing  Goal: Readiness for Transition of Care  Outcome: Ongoing, Progressing  Goal: Rounds/Family Conference  Outcome: Ongoing, Progressing     Problem: Infection  Goal: Infection Symptom Resolution  Outcome: Ongoing, Progressing     Problem: Fall Injury Risk  Goal: Absence of Fall and Fall-Related Injury  Outcome: Ongoing, Progressing     Problem: Skin Injury Risk Increased  Goal: Skin Health and Integrity  Outcome: Ongoing, Progressing     Patient alert and oriented. Medicated as ordered. No noted c/o pain. Repositioned q2 hours and as needed. Patient resting comfortably ant will continue to monitor

## 2020-08-08 NOTE — ASSESSMENT & PLAN NOTE
-Recorded hx of HTN, not currently on anti-HTN meds 2/2 hypotensive episodes  -Continue home midodrine

## 2020-08-08 NOTE — UM SECONDARY REVIEW
Physician Advisor External    Level of Care Issue    Approved Observation     Patient Name: Phi Sainz  Account Number: [id number]  Age/Gender: 89 / Male Admit Date: 08/07/2020  Attending Physician: Simba Crawford Md Date Received: 08/08/2020  hospitals Physician Advisor: Anne Marie Rodriguez MD  08/08/2020 8:34AM  [phone]  The order for hospitals Review: Observation  This recommendation is based upon review of the clinical information provided to hospitals [facility] as of  the date of this recommendation.  Recommendation Summary   08/07/2020: Observation  Recommendation for 08/07/2020  Supporting Clinical Factors:  ? Evaluation of mass or possible metastases in a clinically stable patient  ? No respiratory distress  ? No urgent intervention indicated or planned  ? No hemodynamic instability  The Attending Physician Concern:  The concern of the physician is for left renal mass.  Rationale:  Observation services are appropriate for this patient presenting with abdominal pain with left renal mass in the setting  of hemodynamic and respiratory stability and without plans for urgent surgical intervention.  Plan of Care Includes:  The plan of care includes serial laboratory studies, CTA with urogram, bladder scan, urology consultation and further  orders pending clinical course.

## 2020-08-08 NOTE — SUBJECTIVE & OBJECTIVE
Interval History:   Hgb drifting slowly overnight, now down to 7.7  Patient resting in bed this morning, window shades were opened  He's answering questions appropriately  UOP low, possibly due to inaccurate recording, creatinine normal    Review of Systems  Objective:     Temp:  [97.8 °F (36.6 °C)-98.9 °F (37.2 °C)] 97.8 °F (36.6 °C)  Pulse:  [61-86] 63  Resp:  [16-22] 19  SpO2:  [92 %-100 %] 98 %  BP: (138-174)/(68-95) 147/71     Body mass index is 18.65 kg/m².           Drains     None                 Physical Exam   Constitutional: He is oriented to person, place, and time.   HENT:   Head: Atraumatic.   Neck: Neck supple.   Cardiovascular: Normal rate.    Pulmonary/Chest: Effort normal. No respiratory distress.   Abdominal: Soft.   Still with mild abdominal tenderness   No right or left CVA tenderness.   No palpable mass felt in the left flank or LUQ.   Suprapubic tenderness.    Musculoskeletal: Normal range of motion.   Neurological: He is alert and oriented to person, place, and time.   Skin: Skin is warm and dry.         Significant Labs:    BMP:  Recent Labs   Lab 08/07/20  1048 08/08/20  0412    141   K 2.9* 3.5   CL 99 103   CO2 32* 31*   BUN 16 14   CREATININE 1.2 1.0   CALCIUM 8.4* 7.9*       CBC:   Recent Labs   Lab 08/07/20  1657 08/07/20  2353 08/08/20  0412   WBC 17.92* 20.57* 17.11*   HGB 8.3* 8.3* 7.7*   HCT 28.4* 28.9* 27.4*    244 226       All pertinent labs results from the past 24 hours have been reviewed.    Significant Imaging:  All pertinent imaging results/findings from the past 24 hours have been reviewed.

## 2020-08-08 NOTE — ASSESSMENT & PLAN NOTE
-Known L renal mass that is now larger on imaging than previous.  -Urology consulted, appreciate the consult and recs  -Plan as above.  If Hgb decreasing consider CTA w/urogram.

## 2020-08-08 NOTE — PROGRESS NOTES
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  Pt states that he's had dysuria "for a few months" and recently developed hematuria.  He is a know patient to Dr. Fernández and has been having intermittent hematuria and LUTS for at least a year. A CT urogram was obtained in 04/2019 that showed a hyper attenuated 4cm mass within the renal collecting system and proximal ureter. The right collecting system was normal. The bladder was diffusely enlarged and the prostate was mildly enlarged. He had a cystoscopy that showed no abnormalities pertaining to his hematuria. The family did not want to any further diagnostic work-up at that time. Today the patient endorses passing small blood clots and at times bright red blood for the past few weeks. He has never required a catheter for retention issues. His pain complain is suprapubic pain and diffuse abdominal tenderness. His UA in [place] was notable for 74R, no bacteria, nitrite negative. His labs were significant for a WBC of 19. Hemoglobin 9.1 (12.1 a year ago). Creatinine was at baseline.     A CT non-contrast showed a 12cm lesion within the left renal fossa, completely obscuring the left renal landmarks. Posteriorly there is abutment with the left psoas and superiolaterally it is adjacent to the abdominal side wall. There are scattered lesions present in the abdomen and retroperitoneum which are not delineated well in a non-contrasted study.     Bedside Renal US performed personally was unhelpful in demarcating left kidney.   Bladder scan performed showed 110cc " in the bladder.       Interval History:   Hgb drifting slowly overnight, now down to 7.7  Patient resting in bed this morning, window shades were opened  He's answering questions appropriately  UOP low, possibly due to inaccurate recording, creatinine normal    Review of Systems  Objective:     Temp:  [97.8 °F (36.6 °C)-98.9 °F (37.2 °C)] 97.8 °F (36.6 °C)  Pulse:  [61-86] 63  Resp:  [16-22] 19  SpO2:  [92 %-100 %] 98 %  BP: (138-174)/(68-95) 147/71     Body mass index is 18.65 kg/m².           Drains     None                 Physical Exam   Constitutional: He is oriented to person, place, and time.   HENT:   Head: Atraumatic.   Neck: Neck supple.   Cardiovascular: Normal rate.    Pulmonary/Chest: Effort normal. No respiratory distress.   Abdominal: Soft.   Still with mild abdominal tenderness   No right or left CVA tenderness.   No palpable mass felt in the left flank or LUQ.   Suprapubic tenderness.    Musculoskeletal: Normal range of motion.   Neurological: He is alert and oriented to person, place, and time.   Skin: Skin is warm and dry.         Significant Labs:    BMP:  Recent Labs   Lab 08/07/20  1048 08/08/20  0412    141   K 2.9* 3.5   CL 99 103   CO2 32* 31*   BUN 16 14   CREATININE 1.2 1.0   CALCIUM 8.4* 7.9*       CBC:   Recent Labs   Lab 08/07/20  1657 08/07/20  2353 08/08/20  0412   WBC 17.92* 20.57* 17.11*   HGB 8.3* 8.3* 7.7*   HCT 28.4* 28.9* 27.4*    244 226       All pertinent labs results from the past 24 hours have been reviewed.    Significant Imaging:  All pertinent imaging results/findings from the past 24 hours have been reviewed.                  Assessment/Plan:     Left renal mass  89 y/o male with a known left renal mass, likely upper tract carcinoma which was stable on past imaging studies who presents with abdominal pain and an interval significant increase in size of left renal mass.     - Hgb drifting slowly, however patient is answering questions appropriately  - CTA  still an option, however it would be unlikely to result in an active bleed with how slowly his Hgb is dropping  - Would recommend transfusing patient if Hgb < 7 and discussing with family how aggressive they would like to be with his treatment (transfusions vs possible IR involvement for embolization vs comfort care, etc.)  - Recommend q6 or q8 CBCs  - Replenish electrolytes as clinically indicated.   - Recommend goals of care with family  - Urologic will continue to follow along        VTE Risk Mitigation (From admission, onward)         Ordered     heparin (porcine) injection 5,000 Units  Every 8 hours      08/07/20 2111     IP VTE HIGH RISK PATIENT  Once      08/07/20 2111     Place sequential compression device  Until discontinued      08/07/20 2111                Ilya Miller MD  Urology  [facility]

## 2020-08-08 NOTE — NURSING
Patient arrived to IMTA unit from ED via transport in stable condition. AAOx3. Vitals wnl. On O2 at 3L per nasal cannula. Patient oriented to room. Call light and urinal within reach. Bed in lowest position. Monitoring.

## 2020-08-08 NOTE — ASSESSMENT & PLAN NOTE
-Likely 2/2 hemorrhage in L renal tumor.    -Hgb 8.3<9.1<12.1(baseline)  -Trend H/H, transfuse if Hgb<7

## 2020-08-08 NOTE — SUBJECTIVE & OBJECTIVE
Past Medical History:   Diagnosis Date    Allergy     Anemia     Arthritis     Basal cell carcinoma     Depression     Herniation of intervertebral disc     Hyperlipidemia     Hypertension 3/4/2015    Macular degeneration     Parkinson disease     Spinal stenosis        Past Surgical History:   Procedure Laterality Date    CATARACT EXTRACTION W/  INTRAOCULAR LENS IMPLANT Left 10/31/07    Dr. Nunez    CATARACT EXTRACTION W/  INTRAOCULAR LENS IMPLANT Right 12/16/15    Dr. Nunez       Review of patient's allergies indicates:   Allergen Reactions    Combigan [brimonidine-timolol]      Made him dizziness    Contrast media Rash       Current Facility-Administered Medications on File Prior to Encounter   Medication    [COMPLETED] 0.9%  NaCl infusion    [COMPLETED] cefTRIAXone (ROCEPHIN) 1 g/50 mL D5W IVPB    [COMPLETED] morphine injection 2 mg    [COMPLETED] morphine injection 2 mg    [COMPLETED] potassium chloride 10% oral solution 40 mEq     Current Outpatient Medications on File Prior to Encounter   Medication Sig    acetaminophen (TYLENOL EXTRA STRENGTH) 500 MG tablet Take 500 mg by mouth every 6 (six) hours as needed for Pain.    budesonide (ENTOCORT EC) 3 mg capsule TAKE 3 CAPSULES BY MOUTH EVERY DAY    carbidopa-levodopa  mg (SINEMET)  mg per tablet TAKE 1.5 TABLETS BY MOUTH 3 (THREE) TIMES DAILY. (Patient taking differently: 1 tablet 3 (three) times daily. )    diazePAM (VALIUM) 2 MG tablet TAKE 1 TABLET BY MOUTH EVERY DAY AS NEEDED    diltiazem HCl (DILTIAZEM 2% CREAM) Apply topically 3 (three) times daily. Apply topically to anal area.    diphenoxylate-atropine 2.5-0.025 mg (LOMOTIL) 2.5-0.025 mg per tablet Take 1 tablet by mouth 2 (two) times daily.    donepezil (ARICEPT) 5 MG tablet Take 1 tablet (5 mg total) by mouth every evening.    econazole nitrate 1 % cream Use bid    gabapentin (NEURONTIN) 300 MG capsule Take 300 mg by mouth 2 (two) times daily.      HYDROcodone-acetaminophen (NORCO) 5-325 mg per tablet TK 1 T PO Q 8 H PRN    hydrocortisone 2.5 % cream Apply topically 2 (two) times daily. for 10 days    ibuprofen (ADVIL) 200 MG tablet Take 1 tablet by mouth daily as needed.    ipratropium (ATROVENT) 0.03 % nasal spray SPRAY 1 SPRAY INTO EACH NOSTRIL TWICE A DAY    lidocaine (XYLOCAINE) 5 % Oint ointment APPLY TO AFFECTED AREA 4 TIMES A DAY    mesalamine (APRISO) 0.375 gram Cp24 TAKE 4 CAPSULES (1.5 G TOTAL) BY MOUTH ONCE DAILY. PT TAKING 4 PILLS IN AM.    midodrine (PROAMATINE) 2.5 MG Tab Take 1 tablet (2.5 mg total) by mouth daily as needed.    ondansetron (ZOFRAN) 4 MG tablet Take 1 tablet (4 mg total) by mouth every 8 (eight) hours as needed.    polyethylene glycol (MIRALAX) 17 gram PwPk Take by mouth as needed.    QUEtiapine (SEROQUEL) 25 MG Tab GIVE 2 tabs ONE TO TWO HOURS BEFORE SUNDOWNING.    tamsulosin (FLOMAX) 0.4 mg Cap Take 1 capsule (0.4 mg total) by mouth once daily.     Family History     Problem Relation (Age of Onset)    Cancer Father    Colon cancer Father    Hearing loss Paternal Aunt        Tobacco Use    Smoking status: Never Smoker    Smokeless tobacco: Never Used   Substance and Sexual Activity    Alcohol use: No     Alcohol/week: 0.0 standard drinks     Frequency: Never     Drinks per session: Patient refused     Binge frequency: Never    Drug use: No    Sexual activity: Yes     Partners: Female     Review of Systems   Constitutional: Negative for chills, fatigue and fever.   HENT: Negative for drooling, facial swelling and trouble swallowing.    Eyes: Negative for photophobia, pain and visual disturbance.   Respiratory: Negative for cough, shortness of breath and wheezing.    Cardiovascular: Negative for chest pain, palpitations and leg swelling.   Gastrointestinal: Positive for abdominal distention and abdominal pain. Negative for constipation, diarrhea, nausea and vomiting.   Endocrine: Negative for cold intolerance and  heat intolerance.   Genitourinary: Positive for hematuria. Negative for dysuria.   Musculoskeletal: Negative for neck pain and neck stiffness.   Skin: Negative for rash.   Allergic/Immunologic: Negative for environmental allergies and food allergies.   Neurological: Negative for dizziness, tremors, weakness, light-headedness, numbness and headaches.   Hematological: Negative for adenopathy. Does not bruise/bleed easily.   Psychiatric/Behavioral: Negative for agitation, confusion and hallucinations. The patient is nervous/anxious.      Objective:     Vital Signs (Most Recent):  Pulse: 74 (08/07/20 1826)  Resp: 16 (08/07/20 1701)  BP: (!) 162/88 (08/07/20 1825)  SpO2: (!) 92 % (08/07/20 1826) Vital Signs (24h Range):  Temp:  [98.4 °F (36.9 °C)-98.9 °F (37.2 °C)] 98.9 °F (37.2 °C)  Pulse:  [61-86] 74  Resp:  [16-22] 16  SpO2:  [92 %-100 %] 92 %  BP: (148-165)/(71-89) 162/88     Weight: 59 kg (130 lb)  Body mass index is 18.65 kg/m².    Physical Exam  Vitals signs and nursing note reviewed.   Constitutional:       General: He is not in acute distress.     Appearance: He is well-developed. He is not diaphoretic.   HENT:      Head: Normocephalic and atraumatic.      Right Ear: External ear normal.      Left Ear: External ear normal.      Nose: Nose normal.   Eyes:      General: No scleral icterus.        Right eye: No discharge.         Left eye: No discharge.      Conjunctiva/sclera: Conjunctivae normal.      Pupils: Pupils are equal, round, and reactive to light.   Neck:      Musculoskeletal: Normal range of motion and neck supple.   Cardiovascular:      Rate and Rhythm: Normal rate and regular rhythm.      Heart sounds: Normal heart sounds. No murmur.   Pulmonary:      Effort: Pulmonary effort is normal. No respiratory distress.      Breath sounds: Normal breath sounds.   Abdominal:      General: Bowel sounds are normal. There is distension.      Palpations: Abdomen is soft.      Tenderness: There is abdominal  tenderness in the suprapubic area. There is guarding.   Musculoskeletal:         General: No swelling.   Skin:     General: Skin is warm and dry.      Capillary Refill: Capillary refill takes less than 2 seconds.   Neurological:      Mental Status: He is alert and oriented to person, place, and time.   Psychiatric:         Attention and Perception: Attention normal.           CRANIAL NERVES     CN III, IV, VI   Pupils are equal, round, and reactive to light.       Significant Labs: All pertinent labs within the past 24 hours have been reviewed.    Significant Imaging: I have reviewed all pertinent imaging results/findings within the past 24 hours.

## 2020-08-08 NOTE — H&P
[facility] Hospital Medicine  History & Physical    Patient Name: Phi Sainz  MRN: [id number]  Admission Date: 8/7/2020  Attending Physician: Simba Crawford MD   Primary Care Provider: Yanna Santana MD    [facility] Medicine Team: [facility] MED F Sofia Ward, TANMAY, NP     Patient information was obtained from patient, relative(s), past medical records and ER records.     Subjective:     Principal Problem:Acute blood loss anemia    Chief Complaint:   Chief Complaint   Patient presents with    Hip Pain     Pt has pelvic pain secondary to a mass. Pt sent from [facility] for urology evaluation        HPI: Phi Sainz is a 89 y.o. 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  The patient presented to the OSH c/o pelvic pain/lower abdominal pain that began on Sunday (8/2/2020).  He denies trauma, fever, N/V, CP, SOB, diarrhea or constipation.  The patient's daughter reported the patient has had similar episodes over the past two years and that the patient's pain usually improves after passing a urinary clot.  The patient passed a urinary clot on Sunday but the pain has persisted.  He is known to have a renal mass but surgery was deferred 2/2 to his age.  The ED workup at the OSH included labs that revealed WBC 19.08, H/H 9.1/30.7, UA w/occult blood.  A CT Abdomen/Pelvis w/o contrast was obtained and revealed increase in the size of the mass.  Urology at the [facility] was reportedly on diversion.  The patient was transferred to [facility] for higher level of care, Urology consult.  Currently the patient is AA&Ox 3.  He is c/o lower abdomina/suprapubic pain.  The patient denies CP, N/V, dizziness, or HA.  The patient's daughter Yani called the ED for an update on the patient  2/2 current visitor restrictions.  After I received permission from the patient to discuss his condition/plan with his daughter, and verified her identity, she was updated on the patient's condition and plan.  He is admitted to Hospital Medicine.    Past Medical History:   Diagnosis Date    Allergy     Anemia     Arthritis     Basal cell carcinoma     Depression     Herniation of intervertebral disc     Hyperlipidemia     Hypertension 3/4/2015    Macular degeneration     Parkinson disease     Spinal stenosis        Past Surgical History:   Procedure Laterality Date    CATARACT EXTRACTION W/  INTRAOCULAR LENS IMPLANT Left 10/31/07    Dr. Nunez    CATARACT EXTRACTION W/  INTRAOCULAR LENS IMPLANT Right 12/16/15    Dr. Nunez       Review of patient's allergies indicates:   Allergen Reactions    Combigan [brimonidine-timolol]      Made him dizziness    Contrast media Rash       Current Facility-Administered Medications on File Prior to Encounter   Medication    [COMPLETED] 0.9%  NaCl infusion    [COMPLETED] cefTRIAXone (ROCEPHIN) 1 g/50 mL D5W IVPB    [COMPLETED] morphine injection 2 mg    [COMPLETED] morphine injection 2 mg    [COMPLETED] potassium chloride 10% oral solution 40 mEq     Current Outpatient Medications on File Prior to Encounter   Medication Sig    acetaminophen (TYLENOL EXTRA STRENGTH) 500 MG tablet Take 500 mg by mouth every 6 (six) hours as needed for Pain.    budesonide (ENTOCORT EC) 3 mg capsule TAKE 3 CAPSULES BY MOUTH EVERY DAY    carbidopa-levodopa  mg (SINEMET)  mg per tablet TAKE 1.5 TABLETS BY MOUTH 3 (THREE) TIMES DAILY. (Patient taking differently: 1 tablet 3 (three) times daily. )    diazePAM (VALIUM) 2 MG tablet TAKE 1 TABLET BY MOUTH EVERY DAY AS NEEDED    diltiazem HCl (DILTIAZEM 2% CREAM) Apply topically 3 (three) times daily. Apply topically to anal area.    diphenoxylate-atropine 2.5-0.025 mg (LOMOTIL) 2.5-0.025 mg per tablet Take 1 tablet  by mouth 2 (two) times daily.    donepezil (ARICEPT) 5 MG tablet Take 1 tablet (5 mg total) by mouth every evening.    econazole nitrate 1 % cream Use bid    gabapentin (NEURONTIN) 300 MG capsule Take 300 mg by mouth 2 (two) times daily.     HYDROcodone-acetaminophen (NORCO) 5-325 mg per tablet TK 1 T PO Q 8 H PRN    hydrocortisone 2.5 % cream Apply topically 2 (two) times daily. for 10 days    ibuprofen (ADVIL) 200 MG tablet Take 1 tablet by mouth daily as needed.    ipratropium (ATROVENT) 0.03 % nasal spray SPRAY 1 SPRAY INTO EACH NOSTRIL TWICE A DAY    lidocaine (XYLOCAINE) 5 % Oint ointment APPLY TO AFFECTED AREA 4 TIMES A DAY    mesalamine (APRISO) 0.375 gram Cp24 TAKE 4 CAPSULES (1.5 G TOTAL) BY MOUTH ONCE DAILY. PT TAKING 4 PILLS IN AM.    midodrine (PROAMATINE) 2.5 MG Tab Take 1 tablet (2.5 mg total) by mouth daily as needed.    ondansetron (ZOFRAN) 4 MG tablet Take 1 tablet (4 mg total) by mouth every 8 (eight) hours as needed.    polyethylene glycol (MIRALAX) 17 gram PwPk Take by mouth as needed.    QUEtiapine (SEROQUEL) 25 MG Tab GIVE 2 tabs ONE TO TWO HOURS BEFORE SUNDOWNING.    tamsulosin (FLOMAX) 0.4 mg Cap Take 1 capsule (0.4 mg total) by mouth once daily.     Family History     Problem Relation (Age of Onset)    Cancer Father    Colon cancer Father    Hearing loss Paternal Aunt        Tobacco Use    Smoking status: Never Smoker    Smokeless tobacco: Never Used   Substance and Sexual Activity    Alcohol use: No     Alcohol/week: 0.0 standard drinks     Frequency: Never     Drinks per session: Patient refused     Binge frequency: Never    Drug use: No    Sexual activity: Yes     Partners: Female     Review of Systems   Constitutional: Negative for chills, fatigue and fever.   HENT: Negative for drooling, facial swelling and trouble swallowing.    Eyes: Negative for photophobia, pain and visual disturbance.   Respiratory: Negative for cough, shortness of breath and wheezing.     Cardiovascular: Negative for chest pain, palpitations and leg swelling.   Gastrointestinal: Positive for abdominal distention and abdominal pain. Negative for constipation, diarrhea, nausea and vomiting.   Endocrine: Negative for cold intolerance and heat intolerance.   Genitourinary: Positive for hematuria. Negative for dysuria.   Musculoskeletal: Negative for neck pain and neck stiffness.   Skin: Negative for rash.   Allergic/Immunologic: Negative for environmental allergies and food allergies.   Neurological: Negative for dizziness, tremors, weakness, light-headedness, numbness and headaches.   Hematological: Negative for adenopathy. Does not bruise/bleed easily.   Psychiatric/Behavioral: Negative for agitation, confusion and hallucinations. The patient is nervous/anxious.      Objective:     Vital Signs (Most Recent):  Pulse: 74 (08/07/20 1826)  Resp: 16 (08/07/20 1701)  BP: (!) 162/88 (08/07/20 1825)  SpO2: (!) 92 % (08/07/20 1826) Vital Signs (24h Range):  Temp:  [98.4 °F (36.9 °C)-98.9 °F (37.2 °C)] 98.9 °F (37.2 °C)  Pulse:  [61-86] 74  Resp:  [16-22] 16  SpO2:  [92 %-100 %] 92 %  BP: (148-165)/(71-89) 162/88     Weight: 59 kg (130 lb)  Body mass index is 18.65 kg/m².    Physical Exam  Vitals signs and nursing note reviewed.   Constitutional:       General: He is not in acute distress.     Appearance: He is well-developed. He is not diaphoretic.   HENT:      Head: Normocephalic and atraumatic.      Right Ear: External ear normal.      Left Ear: External ear normal.      Nose: Nose normal.   Eyes:      General: No scleral icterus.        Right eye: No discharge.         Left eye: No discharge.      Conjunctiva/sclera: Conjunctivae normal.      Pupils: Pupils are equal, round, and reactive to light.   Neck:      Musculoskeletal: Normal range of motion and neck supple.   Cardiovascular:      Rate and Rhythm: Normal rate and regular rhythm.      Heart sounds: Normal heart sounds. No murmur.   Pulmonary:       Effort: Pulmonary effort is normal. No respiratory distress.      Breath sounds: Normal breath sounds.   Abdominal:      General: Bowel sounds are normal. There is distension.      Palpations: Abdomen is soft.      Tenderness: There is abdominal tenderness in the suprapubic area. There is guarding.   Musculoskeletal:         General: No swelling.   Skin:     General: Skin is warm and dry.      Capillary Refill: Capillary refill takes less than 2 seconds.   Neurological:      Mental Status: He is alert and oriented to person, place, and time.   Psychiatric:         Attention and Perception: Attention normal.           CRANIAL NERVES     CN III, IV, VI   Pupils are equal, round, and reactive to light.       Significant Labs: All pertinent labs within the past 24 hours have been reviewed.    Significant Imaging: I have reviewed all pertinent imaging results/findings within the past 24 hours.    Assessment/Plan:     * Acute blood loss anemia  -Likely 2/2 hemorrhage in L renal tumor.    -Hgb 8.3<9.1<12.1(baseline)  -Trend H/H, transfuse if Hgb<7        Left renal mass  -Known L renal mass that is now larger on imaging than previous.  -Urology consulted, appreciate the consult and recs  -Plan as above.  If Hgb decreasing consider CTA w/urogram.        Hypertension  -Recorded hx of HTN, not currently on anti-HTN meds 2/2 hypotensive episodes  -Continue home midodrine      Parkinson's disease  -Continue Sinamet and Seroquel  -Delirium precautions        VTE Risk Mitigation (From admission, onward)         Ordered     heparin (porcine) injection 5,000 Units  Every 8 hours      08/07/20 2111     IP VTE HIGH RISK PATIENT  Once      08/07/20 2111     Place sequential compression device  Until discontinued      08/07/20 2111                   Sofia Ward, TANMAY, NP  Department of Hospital Medicine   [facility]

## 2020-08-08 NOTE — HPI
Phi Sainz is a 89 y.o. 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  The patient presented to the OSH c/o pelvic pain/lower abdominal pain that began on Sunday (8/2/2020).  He denies trauma, fever, N/V, CP, SOB, diarrhea or constipation.  The patient's daughter reported the patient has had similar episodes over the past two years and that the patient's pain usually improves after passing a urinary clot.  The patient passed a urinary clot on Sunday but the pain has persisted.  He is known to have a renal mass but surgery was deferred 2/2 to his age.  The ED workup at the OSH included labs that revealed WBC 19.08, H/H 9.1/30.7, UA w/occult blood.  A CT Abdomen/Pelvis w/o contrast was obtained and revealed increase in the size of the mass.  Urology at the OSH was reportedly on diversion.  The patient was transferred to [facility] for higher level of care, Urology consult.  Currently the patient is AA&Ox 3.  He is c/o lower abdomina/suprapubic pain.  The patient denies CP, N/V, dizziness, or HA.  The patient's daughter Yani called the ED for an update on the patient 2/2 current visitor restrictions.  After I received permission from the patient to discuss his condition/plan with his daughter, and verified her identity, she was updated on the patient's condition and plan.  He is admitted to Hospital Medicine.

## 2020-08-09 LAB
ALBUMIN SERPL BCP-MCNC: 2.1 G/DL (ref 3.5–5.2)
ALP SERPL-CCNC: 86 U/L (ref 55–135)
ALT SERPL W/O P-5'-P-CCNC: <5 U/L (ref 10–44)
ANION GAP SERPL CALC-SCNC: 11 MMOL/L (ref 8–16)
AST SERPL-CCNC: 13 U/L (ref 10–40)
BASOPHILS # BLD AUTO: 0.02 K/UL (ref 0–0.2)
BASOPHILS # BLD AUTO: 0.03 K/UL (ref 0–0.2)
BASOPHILS NFR BLD: 0.1 % (ref 0–1.9)
BASOPHILS NFR BLD: 0.1 % (ref 0–1.9)
BILIRUB SERPL-MCNC: 0.4 MG/DL (ref 0.1–1)
BUN SERPL-MCNC: 16 MG/DL (ref 8–23)
CALCIUM SERPL-MCNC: 8.1 MG/DL (ref 8.7–10.5)
CHLORIDE SERPL-SCNC: 102 MMOL/L (ref 95–110)
CO2 SERPL-SCNC: 26 MMOL/L (ref 23–29)
CREAT SERPL-MCNC: 1 MG/DL (ref 0.5–1.4)
DIFFERENTIAL METHOD: ABNORMAL
DIFFERENTIAL METHOD: ABNORMAL
EOSINOPHIL # BLD AUTO: 0 K/UL (ref 0–0.5)
EOSINOPHIL # BLD AUTO: 0 K/UL (ref 0–0.5)
EOSINOPHIL NFR BLD: 0 % (ref 0–8)
EOSINOPHIL NFR BLD: 0.2 % (ref 0–8)
ERYTHROCYTE [DISTWIDTH] IN BLOOD BY AUTOMATED COUNT: 15.5 % (ref 11.5–14.5)
ERYTHROCYTE [DISTWIDTH] IN BLOOD BY AUTOMATED COUNT: 15.5 % (ref 11.5–14.5)
EST. GFR  (AFRICAN AMERICAN): >60 ML/MIN/1.73 M^2
EST. GFR  (NON AFRICAN AMERICAN): >60 ML/MIN/1.73 M^2
GLUCOSE SERPL-MCNC: 92 MG/DL (ref 70–110)
HCT VFR BLD AUTO: 27.1 % (ref 40–54)
HCT VFR BLD AUTO: 29.8 % (ref 40–54)
HGB BLD-MCNC: 7.6 G/DL (ref 14–18)
HGB BLD-MCNC: 8.5 G/DL (ref 14–18)
IMM GRANULOCYTES # BLD AUTO: 0.13 K/UL (ref 0–0.04)
IMM GRANULOCYTES # BLD AUTO: 0.21 K/UL (ref 0–0.04)
IMM GRANULOCYTES NFR BLD AUTO: 0.7 % (ref 0–0.5)
IMM GRANULOCYTES NFR BLD AUTO: 0.9 % (ref 0–0.5)
LYMPHOCYTES # BLD AUTO: 0.6 K/UL (ref 1–4.8)
LYMPHOCYTES # BLD AUTO: 0.9 K/UL (ref 1–4.8)
LYMPHOCYTES NFR BLD: 3.2 % (ref 18–48)
LYMPHOCYTES NFR BLD: 3.8 % (ref 18–48)
MCH RBC QN AUTO: 25.9 PG (ref 27–31)
MCH RBC QN AUTO: 26.1 PG (ref 27–31)
MCHC RBC AUTO-ENTMCNC: 28 G/DL (ref 32–36)
MCHC RBC AUTO-ENTMCNC: 28.5 G/DL (ref 32–36)
MCV RBC AUTO: 91 FL (ref 82–98)
MCV RBC AUTO: 93 FL (ref 82–98)
MONOCYTES # BLD AUTO: 0.5 K/UL (ref 0.3–1)
MONOCYTES # BLD AUTO: 1 K/UL (ref 0.3–1)
MONOCYTES NFR BLD: 2.5 % (ref 4–15)
MONOCYTES NFR BLD: 4.4 % (ref 4–15)
NEUTROPHILS # BLD AUTO: 17.3 K/UL (ref 1.8–7.7)
NEUTROPHILS # BLD AUTO: 20.8 K/UL (ref 1.8–7.7)
NEUTROPHILS NFR BLD: 90.8 % (ref 38–73)
NEUTROPHILS NFR BLD: 93.3 % (ref 38–73)
NRBC BLD-RTO: 0 /100 WBC
NRBC BLD-RTO: 0 /100 WBC
PLATELET # BLD AUTO: 202 K/UL (ref 150–350)
PLATELET # BLD AUTO: 242 K/UL (ref 150–350)
PMV BLD AUTO: 10.4 FL (ref 9.2–12.9)
PMV BLD AUTO: 10.5 FL (ref 9.2–12.9)
POTASSIUM SERPL-SCNC: 3.7 MMOL/L (ref 3.5–5.1)
PROT SERPL-MCNC: 5.7 G/DL (ref 6–8.4)
RBC # BLD AUTO: 2.91 M/UL (ref 4.6–6.2)
RBC # BLD AUTO: 3.28 M/UL (ref 4.6–6.2)
SODIUM SERPL-SCNC: 139 MMOL/L (ref 136–145)
WBC # BLD AUTO: 18.48 K/UL (ref 3.9–12.7)
WBC # BLD AUTO: 22.87 K/UL (ref 3.9–12.7)

## 2020-08-09 PROCEDURE — 36415 COLL VENOUS BLD VENIPUNCTURE: CPT

## 2020-08-09 PROCEDURE — 85025 COMPLETE CBC W/AUTO DIFF WBC: CPT

## 2020-08-09 PROCEDURE — 63600175 PHARM REV CODE 636 W HCPCS: Performed by: NURSE PRACTITIONER

## 2020-08-09 PROCEDURE — 99226 PR SUBSEQUENT OBSERVATION CARE,LEVEL III: ICD-10-PCS | Mod: ,,, | Performed by: PHYSICIAN ASSISTANT

## 2020-08-09 PROCEDURE — G0378 HOSPITAL OBSERVATION PER HR: HCPCS

## 2020-08-09 PROCEDURE — 99226 PR SUBSEQUENT OBSERVATION CARE,LEVEL III: CPT | Mod: ,,, | Performed by: PHYSICIAN ASSISTANT

## 2020-08-09 PROCEDURE — 96372 THER/PROPH/DIAG INJ SC/IM: CPT | Mod: 59 | Performed by: EMERGENCY MEDICINE

## 2020-08-09 PROCEDURE — 25000003 PHARM REV CODE 250: Performed by: NURSE PRACTITIONER

## 2020-08-09 PROCEDURE — 25000003 PHARM REV CODE 250: Performed by: PHYSICIAN ASSISTANT

## 2020-08-09 PROCEDURE — 94761 N-INVAS EAR/PLS OXIMETRY MLT: CPT

## 2020-08-09 PROCEDURE — 80053 COMPREHEN METABOLIC PANEL: CPT

## 2020-08-09 RX ADMIN — MESALAMINE 1000 MG: 250 CAPSULE ORAL at 09:08

## 2020-08-09 RX ADMIN — HEPARIN SODIUM 5000 UNITS: 5000 INJECTION INTRAVENOUS; SUBCUTANEOUS at 06:08

## 2020-08-09 RX ADMIN — CARBIDOPA AND LEVODOPA 3 SPLIT TABLET: 25; 100 TABLET ORAL at 08:08

## 2020-08-09 RX ADMIN — HEPARIN SODIUM 5000 UNITS: 5000 INJECTION INTRAVENOUS; SUBCUTANEOUS at 09:08

## 2020-08-09 RX ADMIN — BUDESONIDE 9 MG: 3 CAPSULE, GELATIN COATED ORAL at 08:08

## 2020-08-09 RX ADMIN — GABAPENTIN 300 MG: 100 CAPSULE ORAL at 09:08

## 2020-08-09 RX ADMIN — DONEPEZIL HYDROCHLORIDE 5 MG: 5 TABLET, FILM COATED ORAL at 09:08

## 2020-08-09 RX ADMIN — MESALAMINE 1000 MG: 250 CAPSULE ORAL at 12:08

## 2020-08-09 RX ADMIN — HEPARIN SODIUM 5000 UNITS: 5000 INJECTION INTRAVENOUS; SUBCUTANEOUS at 03:08

## 2020-08-09 RX ADMIN — GABAPENTIN 300 MG: 100 CAPSULE ORAL at 08:08

## 2020-08-09 RX ADMIN — MESALAMINE 1000 MG: 250 CAPSULE ORAL at 05:08

## 2020-08-09 RX ADMIN — CARBIDOPA AND LEVODOPA 3 SPLIT TABLET: 25; 100 TABLET ORAL at 03:08

## 2020-08-09 RX ADMIN — QUETIAPINE FUMARATE 25 MG: 25 TABLET ORAL at 09:08

## 2020-08-09 NOTE — ASSESSMENT & PLAN NOTE
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     - Hgb relatively stable.   - CTA still an option, however it would be unlikely to show an active bleed with how slowly his Hgb is dropping.   - Talks with family seems to favor a more conservative approach given patient's age, condition, and clinical stability.   - Would recommend transfusing patient if Hgb < 7.  - Replenish electrolytes as clinically indicated.   - Urologic will continue to follow along.  - Consider initiating talks with palliative care to aid decision making for family.

## 2020-08-09 NOTE — SUBJECTIVE & OBJECTIVE
Interval History: No events overnight. Patient made DNR with Yani, daughter at bedside today. Family agreeable to hospice after long discussion regarding prognosis. Orders sent, awaiting acceptance.     Review of Systems   Constitutional: Negative for chills and fever.   Respiratory: Negative for chest tightness and shortness of breath.    Cardiovascular: Negative for chest pain and leg swelling.   Gastrointestinal: Positive for abdominal pain. Negative for nausea.   Genitourinary: Positive for flank pain and hematuria.   Neurological: Negative for dizziness and weakness.     Objective:     Vital Signs (Most Recent):  Temp: 98.1 °F (36.7 °C) (08/09/20 1251)  Pulse: 69 (08/09/20 1251)  Resp: 16 (08/09/20 1321)  BP: (!) 148/72 (08/09/20 1251)  SpO2: 97 % (08/09/20 1321) Vital Signs (24h Range):  Temp:  [97.6 °F (36.4 °C)-98.3 °F (36.8 °C)] 98.1 °F (36.7 °C)  Pulse:  [62-78] 69  Resp:  [16-20] 16  SpO2:  [94 %-98 %] 97 %  BP: (134-164)/(67-81) 148/72     Weight: 59 kg (130 lb)  Body mass index is 18.65 kg/m².    Intake/Output Summary (Last 24 hours) at 8/9/2020 1525  Last data filed at 8/9/2020 0855  Gross per 24 hour   Intake 240 ml   Output --   Net 240 ml      Physical Exam  Vitals signs and nursing note reviewed.   Constitutional:       Appearance: He is well-developed.   Eyes:      Pupils: Pupils are equal, round, and reactive to light.   Cardiovascular:      Rate and Rhythm: Normal rate and regular rhythm.   Pulmonary:      Effort: Pulmonary effort is normal.      Breath sounds: Normal breath sounds.   Abdominal:      Palpations: Abdomen is soft.      Tenderness: There is abdominal tenderness in the suprapubic area.   Musculoskeletal:         General: No tenderness.   Skin:     General: Skin is warm and dry.   Neurological:      Mental Status: He is alert and oriented to person, place, and time.   Psychiatric:         Behavior: Behavior normal.         Significant Labs:   BMP:   Recent Labs   Lab 08/08/20  1588  08/09/20  0409   GLU 74 92    139   K 3.5 3.7    102   CO2 31* 26   BUN 14 16   CREATININE 1.0 1.0   CALCIUM 7.9* 8.1*   MG 1.8  --      CBC:   Recent Labs   Lab 08/08/20  1547 08/08/20  2344 08/09/20  0856   WBC 19.07* 18.48* 22.87*   HGB 8.4* 7.6* 8.5*   HCT 29.3* 27.1* 29.8*    202 242       Significant Imaging: I have reviewed all pertinent imaging results/findings within the past 24 hours.

## 2020-08-09 NOTE — SUBJECTIVE & OBJECTIVE
Interval History:   NAEON, resting comfortably in bed.   Pain well controlled.   H/H slightly fluctuating but remains >7.   Cr normal and patient voiding well.     Objective:     Temp:  [97.5 °F (36.4 °C)-98.3 °F (36.8 °C)] 97.6 °F (36.4 °C)  Pulse:  [62-78] 62  Resp:  [16-20] 18  SpO2:  [94 %-99 %] 95 %  BP: (111-146)/(67-77) 134/74     Body mass index is 18.65 kg/m².      Drains     None                 Physical Exam   Constitutional: He is oriented to person, place, and time.   HENT:   Head: Atraumatic.   Neck: Neck supple.   Cardiovascular: Normal rate.    Pulmonary/Chest: Effort normal. No respiratory distress.   Abdominal: Soft.   Still with mild abdominal tenderness   No right or left CVA tenderness.   No palpable mass felt in the left flank or LUQ.   Suprapubic tenderness.    Genitourinary:    Prostate normal.     Musculoskeletal: Normal range of motion.   Neurological: He is alert and oriented to person, place, and time.   Skin: Skin is warm and dry.         Significant Labs:    BMP:  Recent Labs   Lab 08/07/20  1048 08/08/20  0412 08/09/20  0409    141 139   K 2.9* 3.5 3.7   CL 99 103 102   CO2 32* 31* 26   BUN 16 14 16   CREATININE 1.2 1.0 1.0   CALCIUM 8.4* 7.9* 8.1*       CBC:   Recent Labs   Lab 08/08/20  0412 08/08/20  1547 08/08/20  2344   WBC 17.11* 19.07* 18.48*   HGB 7.7* 8.4* 7.6*   HCT 27.4* 29.3* 27.1*    247 202       Blood Culture:   Recent Labs   Lab 08/07/20  1233 08/07/20  1239   LABBLOO No Growth to date  No Growth to date No Growth to date  No Growth to date     Urine Culture: No results for input(s): LABURIN in the last 168 hours.  Urine Studies:   Recent Labs   Lab 08/07/20  1048   COLORU Yellow   APPEARANCEUA Clear   PHUR 7.0   SPECGRAV 1.015   PROTEINUA Negative   GLUCUA Negative   KETONESU Negative   BILIRUBINUA Negative   OCCULTUA 3+*   NITRITE Negative   UROBILINOGEN Negative   LEUKOCYTESUR Negative   RBCUA 78*   WBCUA 0   BACTERIA None   SQUAMEPITHEL 1     All  pertinent labs results from the past 24 hours have been reviewed.    Significant Imaging:  All pertinent imaging results/findings from the past 24 hours have been reviewed.

## 2020-08-09 NOTE — ASSESSMENT & PLAN NOTE
-Likely 2/2 hemorrhage in L renal tumor.    -Hgb 8.3<9.1<12.1(baseline)  -Trend H/H, transfuse if Hgb<7  - currently stable

## 2020-08-09 NOTE — PLAN OF CARE
Problem: Adult Inpatient Plan of Care  Goal: Plan of Care Review  Outcome: Ongoing, Progressing  Goal: Patient-Specific Goal (Individualization)  Outcome: Ongoing, Progressing  Goal: Absence of Hospital-Acquired Illness or Injury  Outcome: Ongoing, Progressing  Goal: Optimal Comfort and Wellbeing  Outcome: Ongoing, Progressing  Goal: Readiness for Transition of Care  Outcome: Ongoing, Progressing  Goal: Rounds/Family Conference  Outcome: Ongoing, Progressing     Problem: Infection  Goal: Infection Symptom Resolution  Outcome: Ongoing, Progressing     Problem: Fall Injury Risk  Goal: Absence of Fall and Fall-Related Injury  Outcome: Ongoing, Progressing     Problem: Skin Injury Risk Increased  Goal: Skin Health and Integrity  Outcome: Ongoing, Progressing     Patient is alert with some confusion. Medicated as ordered. Poor appetite. Repositioned q 2 hours and as needed. Will continue to monitor

## 2020-08-09 NOTE — NURSING
"Attempted to assist patient with breakfast. Patient refusing to eat stating " im not hungry right now". Will try again  "

## 2020-08-09 NOTE — PLAN OF CARE
CM to bedside to complete assessment, pt sleeping.        08/09/20 3902   Discharge Assessment   Assessment Type Discharge Planning Assessment     Bisi Mesa RN   - [facility]  [phone]

## 2020-08-09 NOTE — HOSPITAL COURSE
Patient admitted to observation for hematuria. Urology consulted, known urothelial growth seen on CT. Family declining aggressive treatment. Given poor prognosis, family agreeable to home hospice. Patient accepted to hospice and discharged home with services.

## 2020-08-09 NOTE — PLAN OF CARE
CM [facility] with [facility].    CM will follow up and continue to assist team with d/c needs.    Bisi Mesa RN   - [facility]  [phone]

## 2020-08-09 NOTE — PLAN OF CARE
SW received call from [facility] rep stating that she will be meeting with pt's daughter at 9:00 AM tomorrow morning. Case management will be notified once DME has been delivered to the house to have pt transported home.     Fabi Geronimo LMSW  [facility]

## 2020-08-09 NOTE — ASSESSMENT & PLAN NOTE
-Known L renal mass that is now larger on imaging than previous.  -Urology consulted   Hgb drifting slowly, however patient is answering questions appropriately  - CTA still an option, however it would be unlikely to result in an active bleed with how slowly his Hgb is dropping  - Would recommend transfusing patient if Hgb < 7 and discussing with family how aggressive they would like to be with his treatment (transfusions vs possible IR involvement for embolization vs comfort care, etc.)  - Recommend q6 or q8 CBCs  - Replenish electrolytes as clinically indicated.   - Recommend goals of care with family  - Urologic will continue to follow along

## 2020-08-09 NOTE — ASSESSMENT & PLAN NOTE
-Known L renal mass that is now larger on imaging than previous.  -Urology consulted   Hgb drifting slowly, however patient is answering questions appropriately  - CTA still an option, however it would be unlikely to result in an active bleed with how slowly his Hgb is dropping  - Would recommend transfusing patient if Hgb < 7 and discussing with family how aggressive they would like to be with his treatment (transfusions vs possible IR involvement for embolization vs comfort care, etc.)  - Recommend q6 or q8 CBCs  - Replenish electrolytes as clinically indicated.   - Recommend goals of care with family  - Urologic will continue to follow along  - family requesting home hospice, orders completed  - awaiting acceptance

## 2020-08-09 NOTE — PROGRESS NOTES
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  Pt states that he's had dysuria "for a few months" and recently developed hematuria.  He is a know patient to Dr. Fernández and has been having intermittent hematuria and LUTS for at least a year. A CT urogram was obtained in 04/2019 that showed a hyper attenuated 4cm mass within the renal collecting system and proximal ureter. The right collecting system was normal. The bladder was diffusely enlarged and the prostate was mildly enlarged. He had a cystoscopy that showed no abnormalities pertaining to his hematuria. The family did not want to any further diagnostic work-up at that time. Today the patient endorses passing small blood clots and at times bright red blood for the past few weeks. He has never required a catheter for retention issues. His pain complain is suprapubic pain and diffuse abdominal tenderness. His UA in [place] was notable for 74R, no bacteria, nitrite negative. His labs were significant for a WBC of 19. Hemoglobin 9.1 (12.1 a year ago). Creatinine was at baseline.     A CT non-contrast showed a 12cm lesion within the left renal fossa, completely obscuring the left renal landmarks. Posteriorly there is abutment with the left psoas and superiolaterally it is adjacent to the abdominal side wall. There are scattered lesions present in the abdomen and retroperitoneum which are not delineated well in a non-contrasted study.     Bedside Renal US performed personally was unhelpful in demarcating left kidney.   Bladder scan performed showed 110cc " in the bladder.       Interval History:   NAEON, resting comfortably in bed.   Pain well controlled.   H/H slightly fluctuating but remains >7.   Cr normal and patient voiding well.     Objective:     Temp:  [97.5 °F (36.4 °C)-98.3 °F (36.8 °C)] 97.6 °F (36.4 °C)  Pulse:  [62-78] 62  Resp:  [16-20] 18  SpO2:  [94 %-99 %] 95 %  BP: (111-146)/(67-77) 134/74     Body mass index is 18.65 kg/m².      Drains     None                 Physical Exam   Constitutional: He is oriented to person, place, and time.   HENT:   Head: Atraumatic.   Neck: Neck supple.   Cardiovascular: Normal rate.    Pulmonary/Chest: Effort normal. No respiratory distress.   Abdominal: Soft.   Still with mild abdominal tenderness   No right or left CVA tenderness.   No palpable mass felt in the left flank or LUQ.   Suprapubic tenderness.    Genitourinary:    Prostate normal.     Musculoskeletal: Normal range of motion.   Neurological: He is alert and oriented to person, place, and time.   Skin: Skin is warm and dry.         Significant Labs:    BMP:  Recent Labs   Lab 08/07/20  1048 08/08/20  0412 08/09/20  0409    141 139   K 2.9* 3.5 3.7   CL 99 103 102   CO2 32* 31* 26   BUN 16 14 16   CREATININE 1.2 1.0 1.0   CALCIUM 8.4* 7.9* 8.1*       CBC:   Recent Labs   Lab 08/08/20  0412 08/08/20  1547 08/08/20  2344   WBC 17.11* 19.07* 18.48*   HGB 7.7* 8.4* 7.6*   HCT 27.4* 29.3* 27.1*    247 202       Blood Culture:   Recent Labs   Lab 08/07/20  1233 08/07/20  1239   LABBLOO No Growth to date  No Growth to date No Growth to date  No Growth to date     Urine Culture: No results for input(s): LABURIN in the last 168 hours.  Urine Studies:   Recent Labs   Lab 08/07/20  1048   COLORU Yellow   APPEARANCEUA Clear   PHUR 7.0   SPECGRAV 1.015   PROTEINUA Negative   GLUCUA Negative   KETONESU Negative   BILIRUBINUA Negative   OCCULTUA 3+*   NITRITE Negative   UROBILINOGEN Negative   LEUKOCYTESUR Negative   RBCUA 78*   WBCUA 0   BACTERIA None    SQUAMEPITHEL 1     All pertinent labs results from the past 24 hours have been reviewed.    Significant Imaging:  All pertinent imaging results/findings from the past 24 hours have been reviewed.                  Assessment/Plan:     Left renal mass  89 y/o male with a known left renal mass, likely upper tract carcinoma which was stable on past imaging studies who presents with abdominal pain and an interval significant increase in size of left renal mass.     - Hgb relatively stable.   - CTA still an option, however it would be unlikely to show an active bleed with how slowly his Hgb is dropping.   - Talks with family seems to favor a more conservative approach given patient's age, condition, and clinical stability.   - Would recommend transfusing patient if Hgb < 7.  - Replenish electrolytes as clinically indicated.   - Urologic will continue to follow along.  - Consider initiating talks with palliative care to aid decision making for family.         VTE Risk Mitigation (From admission, onward)         Ordered     heparin (porcine) injection 5,000 Units  Every 8 hours      08/07/20 2111     IP VTE HIGH RISK PATIENT  Once      08/07/20 2111     Place sequential compression device  Until discontinued      08/07/20 2111                Arnaud Conrad MD  Urology  [facility]

## 2020-08-09 NOTE — SUBJECTIVE & OBJECTIVE
Interval History: No events overnight. Discussed plan with daughter via phone, they are going to consider goals of care discussion with family and decide tomorrow. She is considering hospice, does not want aggressive measures.    Review of Systems   Constitutional: Negative for chills and fever.   Respiratory: Negative for chest tightness and shortness of breath.    Cardiovascular: Negative for chest pain and leg swelling.   Gastrointestinal: Positive for abdominal pain. Negative for nausea.   Genitourinary: Positive for flank pain and hematuria.   Neurological: Negative for dizziness and weakness.     Objective:     Vital Signs (Most Recent):  Temp: 98.3 °F (36.8 °C) (08/08/20 1600)  Pulse: 78 (08/08/20 1600)  Resp: 17 (08/08/20 1600)  BP: (!) 140/69 (08/08/20 1600)  SpO2: 97 % (08/08/20 1600) Vital Signs (24h Range):  Temp:  [97.5 °F (36.4 °C)-98.3 °F (36.8 °C)] 98.3 °F (36.8 °C)  Pulse:  [63-78] 78  Resp:  [16-19] 17  SpO2:  [94 %-100 %] 97 %  BP: (111-174)/(68-95) 140/69     Weight: 59 kg (130 lb)  Body mass index is 18.65 kg/m².    Intake/Output Summary (Last 24 hours) at 8/8/2020 1911  Last data filed at 8/8/2020 0220  Gross per 24 hour   Intake --   Output 150 ml   Net -150 ml      Physical Exam  Vitals signs and nursing note reviewed.   Constitutional:       Appearance: He is well-developed.   Eyes:      Pupils: Pupils are equal, round, and reactive to light.   Cardiovascular:      Rate and Rhythm: Normal rate and regular rhythm.   Pulmonary:      Effort: Pulmonary effort is normal.      Breath sounds: Normal breath sounds.   Abdominal:      Palpations: Abdomen is soft.      Tenderness: There is abdominal tenderness in the suprapubic area.   Musculoskeletal:         General: No tenderness.   Skin:     General: Skin is warm and dry.   Neurological:      Mental Status: He is alert and oriented to person, place, and time.   Psychiatric:         Behavior: Behavior normal.         Significant Labs:   BMP:    Recent Labs   Lab 08/08/20  0412   GLU 74      K 3.5      CO2 31*   BUN 14   CREATININE 1.0   CALCIUM 7.9*   MG 1.8     CBC:   Recent Labs   Lab 08/07/20  2353 08/08/20  0412 08/08/20  1547   WBC 20.57* 17.11* 19.07*   HGB 8.3* 7.7* 8.4*   HCT 28.9* 27.4* 29.3*    226 247       Significant Imaging: I have reviewed all pertinent imaging results/findings within the past 24 hours.

## 2020-08-09 NOTE — PROGRESS NOTES
[facility] Hospital Medicine  Progress Note    Patient Name: Phi Sainz  MRN: [id number]  Patient Class: OP- Observation   Admission Date: 8/7/2020  Length of Stay: 0 days  Attending Physician: Simba Crawford MD  Primary Care Provider: Yanna Santana MD    [facility] Medicine Team: [facility] MED E Laurel Higginbotham PA-C    Subjective:     Principal Problem:Acute blood loss anemia        HPI:  Phi Sainz is a 89 y.o. 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  The patient presented to the OSH c/o pelvic pain/lower abdominal pain that began on Sunday (8/2/2020).  He denies trauma, fever, N/V, CP, SOB, diarrhea or constipation.  The patient's daughter reported the patient has had similar episodes over the past two years and that the patient's pain usually improves after passing a urinary clot.  The patient passed a urinary clot on Sunday but the pain has persisted.  He is known to have a renal mass but surgery was deferred 2/2 to his age.  The ED workup at the OSH included labs that revealed WBC 19.08, H/H 9.1/30.7, UA w/occult blood.  A CT Abdomen/Pelvis w/o contrast was obtained and revealed increase in the size of the mass.  Urology at the OSH was reportedly on diversion.  The patient was transferred to [facility] for higher level of care, Urology consult.  Currently the patient is AA&Ox 3.  He is c/o lower abdomina/suprapubic pain.  The patient denies CP, N/V, dizziness, or HA.  The patient's daughter Yani called the ED for an update on the patient 2/2 current visitor restrictions.  After I received permission from the patient to discuss his condition/plan with his daughter, and verified her identity, she was updated on the patient's condition and plan.  He is  admitted to Hospital Medicine.    Overview/Hospital Course:  Patient admitted to observation for hematuria. Urology consulted, known urothelial growth seen on CT. Family declining aggressive treatment. Given poor prognosis, family agreeable to home hospice. Pending acceptance, likely discharge home with hospice tomorrow.    Interval History: No events overnight. Patient made DNR with Yani, daughter at bedside today. Family agreeable to hospice after long discussion regarding prognosis. Orders sent, awaiting acceptance.     Review of Systems   Constitutional: Negative for chills and fever.   Respiratory: Negative for chest tightness and shortness of breath.    Cardiovascular: Negative for chest pain and leg swelling.   Gastrointestinal: Positive for abdominal pain. Negative for nausea.   Genitourinary: Positive for flank pain and hematuria.   Neurological: Negative for dizziness and weakness.     Objective:     Vital Signs (Most Recent):  Temp: 98.1 °F (36.7 °C) (08/09/20 1251)  Pulse: 69 (08/09/20 1251)  Resp: 16 (08/09/20 1321)  BP: (!) 148/72 (08/09/20 1251)  SpO2: 97 % (08/09/20 1321) Vital Signs (24h Range):  Temp:  [97.6 °F (36.4 °C)-98.3 °F (36.8 °C)] 98.1 °F (36.7 °C)  Pulse:  [62-78] 69  Resp:  [16-20] 16  SpO2:  [94 %-98 %] 97 %  BP: (134-164)/(67-81) 148/72     Weight: 59 kg (130 lb)  Body mass index is 18.65 kg/m².    Intake/Output Summary (Last 24 hours) at 8/9/2020 1525  Last data filed at 8/9/2020 0855  Gross per 24 hour   Intake 240 ml   Output --   Net 240 ml      Physical Exam  Vitals signs and nursing note reviewed.   Constitutional:       Appearance: He is well-developed.   Eyes:      Pupils: Pupils are equal, round, and reactive to light.   Cardiovascular:      Rate and Rhythm: Normal rate and regular rhythm.   Pulmonary:      Effort: Pulmonary effort is normal.      Breath sounds: Normal breath sounds.   Abdominal:      Palpations: Abdomen is soft.      Tenderness: There is abdominal tenderness  in the suprapubic area.   Musculoskeletal:         General: No tenderness.   Skin:     General: Skin is warm and dry.   Neurological:      Mental Status: He is alert and oriented to person, place, and time.   Psychiatric:         Behavior: Behavior normal.         Significant Labs:   BMP:   Recent Labs   Lab 08/08/20  0412 08/09/20  0409   GLU 74 92    139   K 3.5 3.7    102   CO2 31* 26   BUN 14 16   CREATININE 1.0 1.0   CALCIUM 7.9* 8.1*   MG 1.8  --      CBC:   Recent Labs   Lab 08/08/20  1547 08/08/20  2344 08/09/20  0856   WBC 19.07* 18.48* 22.87*   HGB 8.4* 7.6* 8.5*   HCT 29.3* 27.1* 29.8*    202 242       Significant Imaging: I have reviewed all pertinent imaging results/findings within the past 24 hours.      Assessment/Plan:      * Acute blood loss anemia  -Likely 2/2 hemorrhage in L renal tumor.    -Hgb 8.3<9.1<12.1(baseline)  -Trend H/H, transfuse if Hgb<7  - currently stable    Left renal mass  -Known L renal mass that is now larger on imaging than previous.  -Urology consulted   Hgb drifting slowly, however patient is answering questions appropriately  - CTA still an option, however it would be unlikely to result in an active bleed with how slowly his Hgb is dropping  - Would recommend transfusing patient if Hgb < 7 and discussing with family how aggressive they would like to be with his treatment (transfusions vs possible IR involvement for embolization vs comfort care, etc.)  - Recommend q6 or q8 CBCs  - Replenish electrolytes as clinically indicated.   - Recommend goals of care with family  - Urologic will continue to follow along  - family requesting home hospice, orders completed  - awaiting acceptance    Parkinson's disease  -Continue Sinamet and Seroquel  -Delirium precautions    Hypertension  -Recorded hx of HTN, not currently on anti-HTN meds 2/2 hypotensive episodes  -Continue home midodrine      VTE Risk Mitigation (From admission, onward)         Ordered     heparin  (porcine) injection 5,000 Units  Every 8 hours      08/07/20 2111     IP VTE HIGH RISK PATIENT  Once      08/07/20 2111     Place sequential compression device  Until discontinued      08/07/20 2111                      Laurel Higginbotham PA-C  Department of Hospital Medicine   [facility]

## 2020-08-09 NOTE — PLAN OF CARE
[facility]  Department of Hospital Medicine  [address] [place] [address]  [phone] after hours  [phone] fax    HOSPICE  ORDERS    08/09/2020    Admit to Hospice:  Home Service     Diagnoses:   Active Hospital Problems    Diagnosis  POA    *Acute blood loss anemia [D62]  Yes     Priority: 1 - High    Left renal mass [N28.89]  Yes     Priority: 2     Parkinson's disease [G20]  Yes     Diagnosed 2018, tremor dominant with prominent gait dysfunction, good early ldopa response.  Some non-motor complications of hypotension, cognitive impairment and sundowning.      Hypertension [I10]  Yes      Resolved Hospital Problems   No resolved problems to display.       Hospice Qualifying Diagnoses: Malignant Renal Mass       Patient has a life expectancy < 6 months due to:  1) Primary Hospice Diagnosis: Malignant Renal Mass  2) Comorbid Conditions Contributing to Decline:  Parkinson's Disease    Vital Signs: Routine per Hospice Protocol.    Code Status: DNR    Allergies:   Review of patient's allergies indicates:   Allergen Reactions    Combigan [brimonidine-timolol]      Made him dizziness    Contrast media Rash       Diet: Cardiac Diet     Activities: As tolerated    Nursing: Per Hospice Routine.      Routine Skin for Bedridden Patients: Apply moisture barrier cream to all skin folds and   wet areas in perineal area daily and after baths and all bowel movements.      Oxygen: PRN     Other Miscellaneous Care: None      Medications:      Pierre Sainz   Home Medication Instructions SABAS:[id number]    Printed on:08/09/20 2517   Medication Information                      acetaminophen (TYLENOL EXTRA STRENGTH) 500 MG tablet  Take 500 mg by mouth every 6 (six) hours as needed for Pain.             budesonide (ENTOCORT EC) 3 mg capsule  TAKE 3 CAPSULES BY MOUTH EVERY DAY             carbidopa-levodopa  mg (SINEMET)  mg per tablet  TAKE 1.5 TABLETS BY MOUTH  3 (THREE) TIMES DAILY.             diazePAM (VALIUM) 2 MG tablet  TAKE 1 TABLET BY MOUTH EVERY DAY AS NEEDED             donepezil (ARICEPT) 5 MG tablet  Take 1 tablet (5 mg total) by mouth every evening.             gabapentin (NEURONTIN) 300 MG capsule  Take 300 mg by mouth 2 (two) times daily.              HYDROcodone-acetaminophen (NORCO) 5-325 mg per tablet  TK 1 T PO Q 8 H PRN             ibuprofen (ADVIL) 200 MG tablet  Take 1 tablet by mouth daily as needed.             ipratropium (ATROVENT) 0.03 % nasal spray  SPRAY 1 SPRAY INTO EACH NOSTRIL TWICE A DAY             lidocaine (XYLOCAINE) 5 % Oint ointment  APPLY TO AFFECTED AREA 4 TIMES A DAY             mesalamine (APRISO) 0.375 gram Cp24  TAKE 4 CAPSULES (1.5 G TOTAL) BY MOUTH ONCE DAILY. PT TAKING 4 PILLS IN AM.             midodrine (PROAMATINE) 2.5 MG Tab  Take 1 tablet (2.5 mg total) by mouth daily as needed.             polyethylene glycol (MIRALAX) 17 gram PwPk  Take by mouth as needed.             QUEtiapine (SEROQUEL) 25 MG Tab  GIVE 2 tabs ONE TO TWO HOURS BEFORE SUNDOWNING.             tamsulosin (FLOMAX) 0.4 mg Cap  Take 1 capsule (0.4 mg total) by mouth once daily.                         Future Orders:  Hospice Medical Director may dictate new orders for comfortable care measures & sign death certificate.        _________________________________  Laurel Higginbotham PA-C  08/09/2020

## 2020-08-09 NOTE — PROGRESS NOTES
[facility] Hospital Medicine  Progress Note    Patient Name: Phi Sainz  MRN: [id number]  Patient Class: OP- Observation   Admission Date: 8/7/2020  Length of Stay: 0 days  Attending Physician: Simba Crawford MD  Primary Care Provider: Yanna Santana MD    [facility] Medicine Team: [facility] MED E Laurel Higginbotham PA-C    Subjective:     Principal Problem:Acute blood loss anemia        HPI:  Phi Sainz is a 89 y.o. 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  The patient presented to the OSH c/o pelvic pain/lower abdominal pain that began on Sunday (8/2/2020).  He denies trauma, fever, N/V, CP, SOB, diarrhea or constipation.  The patient's daughter reported the patient has had similar episodes over the past two years and that the patient's pain usually improves after passing a urinary clot.  The patient passed a urinary clot on Sunday but the pain has persisted.  He is known to have a renal mass but surgery was deferred 2/2 to his age.  The ED workup at the OSH included labs that revealed WBC 19.08, H/H 9.1/30.7, UA w/occult blood.  A CT Abdomen/Pelvis w/o contrast was obtained and revealed increase in the size of the mass.  Urology at the OSH was reportedly on diversion.  The patient was transferred to [facility] for higher level of care, Urology consult.  Currently the patient is AA&Ox 3.  He is c/o lower abdomina/suprapubic pain.  The patient denies CP, N/V, dizziness, or HA.  The patient's daughter Yani called the ED for an update on the patient 2/2 current visitor restrictions.  After I received permission from the patient to discuss his condition/plan with his daughter, and verified her identity, she was updated on the patient's condition and plan.  He is  admitted to Hospital Medicine.    Overview/Hospital Course:  Patient admitted to observation for hematuria. Urology consulted, known urothelial growth seen on CT. Given likely prognosis, family considering goals of care. Likely discharge in 1-2 days.    Interval History: No events overnight. Discussed plan with daughter via phone, they are going to consider goals of care discussion with family and decide tomorrow. She is considering hospice, does not want aggressive measures.    Review of Systems   Constitutional: Negative for chills and fever.   Respiratory: Negative for chest tightness and shortness of breath.    Cardiovascular: Negative for chest pain and leg swelling.   Gastrointestinal: Positive for abdominal pain. Negative for nausea.   Genitourinary: Positive for flank pain and hematuria.   Neurological: Negative for dizziness and weakness.     Objective:     Vital Signs (Most Recent):  Temp: 98.3 °F (36.8 °C) (08/08/20 1600)  Pulse: 78 (08/08/20 1600)  Resp: 17 (08/08/20 1600)  BP: (!) 140/69 (08/08/20 1600)  SpO2: 97 % (08/08/20 1600) Vital Signs (24h Range):  Temp:  [97.5 °F (36.4 °C)-98.3 °F (36.8 °C)] 98.3 °F (36.8 °C)  Pulse:  [63-78] 78  Resp:  [16-19] 17  SpO2:  [94 %-100 %] 97 %  BP: (111-174)/(68-95) 140/69     Weight: 59 kg (130 lb)  Body mass index is 18.65 kg/m².    Intake/Output Summary (Last 24 hours) at 8/8/2020 1911  Last data filed at 8/8/2020 0220  Gross per 24 hour   Intake --   Output 150 ml   Net -150 ml      Physical Exam  Vitals signs and nursing note reviewed.   Constitutional:       Appearance: He is well-developed.   Eyes:      Pupils: Pupils are equal, round, and reactive to light.   Cardiovascular:      Rate and Rhythm: Normal rate and regular rhythm.   Pulmonary:      Effort: Pulmonary effort is normal.      Breath sounds: Normal breath sounds.   Abdominal:      Palpations: Abdomen is soft.      Tenderness: There is abdominal tenderness in the suprapubic area.   Musculoskeletal:          General: No tenderness.   Skin:     General: Skin is warm and dry.   Neurological:      Mental Status: He is alert and oriented to person, place, and time.   Psychiatric:         Behavior: Behavior normal.         Significant Labs:   BMP:   Recent Labs   Lab 08/08/20  0412   GLU 74      K 3.5      CO2 31*   BUN 14   CREATININE 1.0   CALCIUM 7.9*   MG 1.8     CBC:   Recent Labs   Lab 08/07/20  2353 08/08/20  0412 08/08/20  1547   WBC 20.57* 17.11* 19.07*   HGB 8.3* 7.7* 8.4*   HCT 28.9* 27.4* 29.3*    226 247       Significant Imaging: I have reviewed all pertinent imaging results/findings within the past 24 hours.      Assessment/Plan:      * Acute blood loss anemia  -Likely 2/2 hemorrhage in L renal tumor.    -Hgb 8.3<9.1<12.1(baseline)  -Trend H/H, transfuse if Hgb<7    Left renal mass  -Known L renal mass that is now larger on imaging than previous.  -Urology consulted   Hgb drifting slowly, however patient is answering questions appropriately  - CTA still an option, however it would be unlikely to result in an active bleed with how slowly his Hgb is dropping  - Would recommend transfusing patient if Hgb < 7 and discussing with family how aggressive they would like to be with his treatment (transfusions vs possible IR involvement for embolization vs comfort care, etc.)  - Recommend q6 or q8 CBCs  - Replenish electrolytes as clinically indicated.   - Recommend goals of care with family  - Urologic will continue to follow along    Parkinson's disease  -Continue Sinamet and Seroquel  -Delirium precautions    Hypertension  -Recorded hx of HTN, not currently on anti-HTN meds 2/2 hypotensive episodes  -Continue home midodrine      VTE Risk Mitigation (From admission, onward)         Ordered     heparin (porcine) injection 5,000 Units  Every 8 hours      08/07/20 2111     IP VTE HIGH RISK PATIENT  Once      08/07/20 2111     Place sequential compression device  Until discontinued      08/07/20  3902                      Laurel Higginbotham PA-C  Department of Hospital Medicine   [facility]

## 2020-08-10 VITALS
OXYGEN SATURATION: 95 % | HEART RATE: 63 BPM | SYSTOLIC BLOOD PRESSURE: 160 MMHG | BODY MASS INDEX: 18.61 KG/M2 | DIASTOLIC BLOOD PRESSURE: 86 MMHG | TEMPERATURE: 98 F | RESPIRATION RATE: 18 BRPM | WEIGHT: 130 LBS | HEIGHT: 70 IN

## 2020-08-10 LAB
ALBUMIN SERPL BCP-MCNC: 2 G/DL (ref 3.5–5.2)
ALP SERPL-CCNC: 71 U/L (ref 55–135)
ALT SERPL W/O P-5'-P-CCNC: <5 U/L (ref 10–44)
ANION GAP SERPL CALC-SCNC: 10 MMOL/L (ref 8–16)
AST SERPL-CCNC: 8 U/L (ref 10–40)
BASOPHILS # BLD AUTO: 0.02 K/UL (ref 0–0.2)
BASOPHILS NFR BLD: 0.1 % (ref 0–1.9)
BILIRUB SERPL-MCNC: 0.4 MG/DL (ref 0.1–1)
BUN SERPL-MCNC: 17 MG/DL (ref 8–23)
CALCIUM SERPL-MCNC: 7.9 MG/DL (ref 8.7–10.5)
CHLORIDE SERPL-SCNC: 101 MMOL/L (ref 95–110)
CO2 SERPL-SCNC: 28 MMOL/L (ref 23–29)
CREAT SERPL-MCNC: 1.2 MG/DL (ref 0.5–1.4)
DIFFERENTIAL METHOD: ABNORMAL
EOSINOPHIL # BLD AUTO: 0.1 K/UL (ref 0–0.5)
EOSINOPHIL NFR BLD: 0.3 % (ref 0–8)
ERYTHROCYTE [DISTWIDTH] IN BLOOD BY AUTOMATED COUNT: 15.6 % (ref 11.5–14.5)
EST. GFR  (AFRICAN AMERICAN): >60 ML/MIN/1.73 M^2
EST. GFR  (NON AFRICAN AMERICAN): 52.9 ML/MIN/1.73 M^2
GLUCOSE SERPL-MCNC: 87 MG/DL (ref 70–110)
HCT VFR BLD AUTO: 27.1 % (ref 40–54)
HGB BLD-MCNC: 7.7 G/DL (ref 14–18)
IMM GRANULOCYTES # BLD AUTO: 0.1 K/UL (ref 0–0.04)
IMM GRANULOCYTES NFR BLD AUTO: 0.6 % (ref 0–0.5)
LYMPHOCYTES # BLD AUTO: 0.8 K/UL (ref 1–4.8)
LYMPHOCYTES NFR BLD: 5.1 % (ref 18–48)
MCH RBC QN AUTO: 25.9 PG (ref 27–31)
MCHC RBC AUTO-ENTMCNC: 28.4 G/DL (ref 32–36)
MCV RBC AUTO: 91 FL (ref 82–98)
MONOCYTES # BLD AUTO: 0.9 K/UL (ref 0.3–1)
MONOCYTES NFR BLD: 5.4 % (ref 4–15)
NEUTROPHILS # BLD AUTO: 14.4 K/UL (ref 1.8–7.7)
NEUTROPHILS NFR BLD: 88.5 % (ref 38–73)
NRBC BLD-RTO: 0 /100 WBC
PLATELET # BLD AUTO: 229 K/UL (ref 150–350)
PMV BLD AUTO: 10.7 FL (ref 9.2–12.9)
POTASSIUM SERPL-SCNC: 3.2 MMOL/L (ref 3.5–5.1)
PROT SERPL-MCNC: 5.3 G/DL (ref 6–8.4)
RBC # BLD AUTO: 2.97 M/UL (ref 4.6–6.2)
SODIUM SERPL-SCNC: 139 MMOL/L (ref 136–145)
WBC # BLD AUTO: 16.26 K/UL (ref 3.9–12.7)

## 2020-08-10 PROCEDURE — 25000003 PHARM REV CODE 250: Performed by: PHYSICIAN ASSISTANT

## 2020-08-10 PROCEDURE — 25000003 PHARM REV CODE 250: Performed by: NURSE PRACTITIONER

## 2020-08-10 PROCEDURE — 99217 PR OBSERVATION CARE DISCHARGE: CPT | Mod: ,,, | Performed by: PHYSICIAN ASSISTANT

## 2020-08-10 PROCEDURE — 36415 COLL VENOUS BLD VENIPUNCTURE: CPT

## 2020-08-10 PROCEDURE — 85025 COMPLETE CBC W/AUTO DIFF WBC: CPT

## 2020-08-10 PROCEDURE — G0378 HOSPITAL OBSERVATION PER HR: HCPCS

## 2020-08-10 PROCEDURE — 80053 COMPREHEN METABOLIC PANEL: CPT

## 2020-08-10 PROCEDURE — 99217 PR OBSERVATION CARE DISCHARGE: ICD-10-PCS | Mod: ,,, | Performed by: PHYSICIAN ASSISTANT

## 2020-08-10 PROCEDURE — 96372 THER/PROPH/DIAG INJ SC/IM: CPT | Mod: 59 | Performed by: EMERGENCY MEDICINE

## 2020-08-10 PROCEDURE — 63600175 PHARM REV CODE 636 W HCPCS: Performed by: NURSE PRACTITIONER

## 2020-08-10 RX ADMIN — HEPARIN SODIUM 5000 UNITS: 5000 INJECTION INTRAVENOUS; SUBCUTANEOUS at 01:08

## 2020-08-10 RX ADMIN — CARBIDOPA AND LEVODOPA 3 SPLIT TABLET: 25; 100 TABLET ORAL at 02:08

## 2020-08-10 RX ADMIN — MESALAMINE 1000 MG: 250 CAPSULE ORAL at 09:08

## 2020-08-10 RX ADMIN — MIDODRINE HYDROCHLORIDE 2.5 MG: 2.5 TABLET ORAL at 09:08

## 2020-08-10 RX ADMIN — BUDESONIDE 9 MG: 3 CAPSULE, GELATIN COATED ORAL at 09:08

## 2020-08-10 RX ADMIN — GABAPENTIN 300 MG: 100 CAPSULE ORAL at 09:08

## 2020-08-10 RX ADMIN — HEPARIN SODIUM 5000 UNITS: 5000 INJECTION INTRAVENOUS; SUBCUTANEOUS at 05:08

## 2020-08-10 RX ADMIN — CARBIDOPA AND LEVODOPA 3 SPLIT TABLET: 25; 100 TABLET ORAL at 09:08

## 2020-08-10 RX ADMIN — MESALAMINE 1000 MG: 250 CAPSULE ORAL at 12:08

## 2020-08-10 NOTE — ASSESSMENT & PLAN NOTE
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     - Hgb relatively stable. Follow up morning labs.   - Talks with family seems to favor a more conservative approach given patient's age, condition, and clinical stability.   - Would recommend transfusing patient if Hgb < 7.  - PVR.  - Replenish electrolytes as clinically indicated.   - Urologic will continue to follow along.  - Consider initiating talks with palliative care to aid decision making for family.

## 2020-08-10 NOTE — PROGRESS NOTES
"[facility]  Urology  Progress Note    Patient Name: Phi Sainz  MRN: [id number]  Admission Date: 8/7/2020  Hospital Length of Stay: 0 days  Code Status: DNR   Attending Provider: Simba Crawford MD   Primary Care Physician: Yanna Santana MD    Subjective:     HPI:  89yoM with a PMHx of a left renal mass, most likely upper tract urothelial cancer, HTN, HLD, anemia, parkinson's disease presented to [place] ED for abdominal pain.  Pt states that he's had dysuria "for a few months" and recently developed hematuria.  He is a know patient to Dr. Fernández and has been having intermittent hematuria and LUTS for at least a year. A CT urogram was obtained in 04/2019 that showed a hyper attenuated 4cm mass within the renal collecting system and proximal ureter. The right collecting system was normal. The bladder was diffusely enlarged and the prostate was mildly enlarged. He had a cystoscopy that showed no abnormalities pertaining to his hematuria. The family did not want to any further diagnostic work-up at that time. Today the patient endorses passing small blood clots and at times bright red blood for the past few weeks. He has never required a catheter for retention issues. His pain complain is suprapubic pain and diffuse abdominal tenderness. His UA in [place] was notable for 74R, no bacteria, nitrite negative. His labs were significant for a WBC of 19. Hemoglobin 9.1 (12.1 a year ago). Creatinine was at baseline.     A CT non-contrast showed a 12cm lesion within the left renal fossa, completely obscuring the left renal landmarks. Posteriorly there is abutment with the left psoas and superiolaterally it is adjacent to the abdominal side wall. There are scattered lesions present in the abdomen and retroperitoneum which are not delineated well in a non-contrasted study.     Bedside Renal US performed personally was unhelpful in demarcating left kidney.   Bladder scan performed showed 110cc in the " bladder.       Interval History:   NAEON. Resting comfortably in bed. Making adequate urine.   Morning labs pending.     Objective:     Temp:  [97.5 °F (36.4 °C)-98.8 °F (37.1 °C)] 97.5 °F (36.4 °C)  Pulse:  [62-69] 63  Resp:  [14-18] 18  SpO2:  [95 %-100 %] 95 %  BP: (128-164)/(72-86) 160/86     Body mass index is 18.65 kg/m².      Bladder Scan Volume (mL): 17 mL (08/09/20 1100)    Drains     None                 Physical Exam   Constitutional: He is oriented to person, place, and time.   HENT:   Head: Atraumatic.   Neck: Neck supple.   Cardiovascular: Normal rate.    Pulmonary/Chest: Effort normal. No respiratory distress.   Abdominal: Soft.   Still with mild abdominal tenderness   No right or left CVA tenderness.   No palpable mass felt in the left flank or LUQ.   Suprapubic tenderness.    Genitourinary:    Prostate normal.     Musculoskeletal: Normal range of motion.   Neurological: He is alert and oriented to person, place, and time.   Skin: Skin is warm and dry.         Significant Labs:    BMP:  Recent Labs   Lab 08/07/20  1048 08/08/20  0412 08/09/20  0409    141 139   K 2.9* 3.5 3.7   CL 99 103 102   CO2 32* 31* 26   BUN 16 14 16   CREATININE 1.2 1.0 1.0   CALCIUM 8.4* 7.9* 8.1*       CBC:   Recent Labs   Lab 08/08/20  1547 08/08/20  2344 08/09/20  0856   WBC 19.07* 18.48* 22.87*   HGB 8.4* 7.6* 8.5*   HCT 29.3* 27.1* 29.8*    202 242       All pertinent labs results from the past 24 hours have been reviewed.    Significant Imaging:  All pertinent imaging results/findings from the past 24 hours have been reviewed.                  Assessment/Plan:     Left renal mass  89 y/o male with a known left renal mass, likely upper tract carcinoma which was stable on past imaging studies who presents with abdominal pain and an interval significant increase in size of left renal mass.     - Hgb relatively stable. Follow up morning labs.   - Talks with family seems to favor a more conservative approach  given patient's age, condition, and clinical stability.   - Would recommend transfusing patient if Hgb < 7.  - PVR.  - Replenish electrolytes as clinically indicated.   - Urologic will continue to follow along.  - Consider initiating talks with palliative care to aid decision making for family.         VTE Risk Mitigation (From admission, onward)         Ordered     heparin (porcine) injection 5,000 Units  Every 8 hours      08/07/20 2111     IP VTE HIGH RISK PATIENT  Once      08/07/20 2111     Place sequential compression device  Until discontinued      08/07/20 2111                Arnaud Conrad MD  Urology  [facility]

## 2020-08-10 NOTE — PLAN OF CARE
AMANDA spoke with Brock [phone] with [facility]. who reported he is meeting with the patient's family at 1:15 pm to sign paperwork. Per Brock, he will follow up with AMANDA once that is completed. AMANDA will continue to follow.     Darleen Diaz LMSW   - [facility]  Ext. [phone]

## 2020-08-10 NOTE — SUBJECTIVE & OBJECTIVE
Interval History:   NAEON. Resting comfortably in bed. Making adequate urine.   Morning labs pending.     Objective:     Temp:  [97.5 °F (36.4 °C)-98.8 °F (37.1 °C)] 97.5 °F (36.4 °C)  Pulse:  [62-69] 63  Resp:  [14-18] 18  SpO2:  [95 %-100 %] 95 %  BP: (128-164)/(72-86) 160/86     Body mass index is 18.65 kg/m².      Bladder Scan Volume (mL): 17 mL (08/09/20 1100)    Drains     None                 Physical Exam   Constitutional: He is oriented to person, place, and time.   HENT:   Head: Atraumatic.   Neck: Neck supple.   Cardiovascular: Normal rate.    Pulmonary/Chest: Effort normal. No respiratory distress.   Abdominal: Soft.   Still with mild abdominal tenderness   No right or left CVA tenderness.   No palpable mass felt in the left flank or LUQ.   Suprapubic tenderness.    Genitourinary:    Prostate normal.     Musculoskeletal: Normal range of motion.   Neurological: He is alert and oriented to person, place, and time.   Skin: Skin is warm and dry.         Significant Labs:    BMP:  Recent Labs   Lab 08/07/20  1048 08/08/20  0412 08/09/20  0409    141 139   K 2.9* 3.5 3.7   CL 99 103 102   CO2 32* 31* 26   BUN 16 14 16   CREATININE 1.2 1.0 1.0   CALCIUM 8.4* 7.9* 8.1*       CBC:   Recent Labs   Lab 08/08/20  1547 08/08/20  2344 08/09/20  0856   WBC 19.07* 18.48* 22.87*   HGB 8.4* 7.6* 8.5*   HCT 29.3* 27.1* 29.8*    202 242       All pertinent labs results from the past 24 hours have been reviewed.    Significant Imaging:  All pertinent imaging results/findings from the past 24 hours have been reviewed.

## 2020-08-10 NOTE — PLAN OF CARE
AMANDA scheduled d/c transportation to Home through [facility]. Patient is scheduled to be picked up at 4:30 pm; [facility] Auth#: [id number] - [employer]. AMANDA provided patient's nurse with the patient's d/c transportation plans. AMANDA is in communication with patient's CM, and patient's Care Team.        08/10/20 1515   Post-Acute Status   Post-Acute Authorization Hospice   Hospice Status Set-up Complete       Darleen Diaz LMSW   - [facility]  Ext. [phone]

## 2020-08-10 NOTE — SUBJECTIVE & OBJECTIVE
Past Medical History:   Diagnosis Date    Allergy     Anemia     Arthritis     Basal cell carcinoma     Depression     Herniation of intervertebral disc     Hyperlipidemia     Hypertension 3/4/2015    Macular degeneration     Parkinson disease     Spinal stenosis        Past Surgical History:   Procedure Laterality Date    CATARACT EXTRACTION W/  INTRAOCULAR LENS IMPLANT Left 10/31/07    Dr. Nunez    CATARACT EXTRACTION W/  INTRAOCULAR LENS IMPLANT Right 12/16/15    Dr. Nunez       Review of patient's allergies indicates:   Allergen Reactions    Combigan [brimonidine-timolol]      Made him dizziness    Contrast media Rash       No current facility-administered medications on file prior to encounter.      Current Outpatient Medications on File Prior to Encounter   Medication Sig    acetaminophen (TYLENOL EXTRA STRENGTH) 500 MG tablet Take 500 mg by mouth every 6 (six) hours as needed for Pain.    budesonide (ENTOCORT EC) 3 mg capsule TAKE 3 CAPSULES BY MOUTH EVERY DAY    carbidopa-levodopa  mg (SINEMET)  mg per tablet TAKE 1.5 TABLETS BY MOUTH 3 (THREE) TIMES DAILY. (Patient taking differently: 1 tablet 3 (three) times daily. )    diazePAM (VALIUM) 2 MG tablet TAKE 1 TABLET BY MOUTH EVERY DAY AS NEEDED    diltiazem HCl (DILTIAZEM 2% CREAM) Apply topically 3 (three) times daily. Apply topically to anal area.    diphenoxylate-atropine 2.5-0.025 mg (LOMOTIL) 2.5-0.025 mg per tablet Take 1 tablet by mouth 2 (two) times daily.    donepezil (ARICEPT) 5 MG tablet Take 1 tablet (5 mg total) by mouth every evening.    econazole nitrate 1 % cream Use bid    gabapentin (NEURONTIN) 300 MG capsule Take 300 mg by mouth 2 (two) times daily.     HYDROcodone-acetaminophen (NORCO) 5-325 mg per tablet TK 1 T PO Q 8 H PRN    hydrocortisone 2.5 % cream Apply topically 2 (two) times daily. for 10 days    ibuprofen (ADVIL) 200 MG tablet Take 1 tablet by mouth daily as needed.    ipratropium  (ATROVENT) 0.03 % nasal spray SPRAY 1 SPRAY INTO EACH NOSTRIL TWICE A DAY    lidocaine (XYLOCAINE) 5 % Oint ointment APPLY TO AFFECTED AREA 4 TIMES A DAY    mesalamine (APRISO) 0.375 gram Cp24 TAKE 4 CAPSULES (1.5 G TOTAL) BY MOUTH ONCE DAILY. PT TAKING 4 PILLS IN AM.    midodrine (PROAMATINE) 2.5 MG Tab Take 1 tablet (2.5 mg total) by mouth daily as needed. (Patient taking differently: Take 2.5 mg by mouth 2 (two) times daily as needed. 1 tablet and 1/2 in the evening)    ondansetron (ZOFRAN) 4 MG tablet Take 1 tablet (4 mg total) by mouth every 8 (eight) hours as needed.    polyethylene glycol (MIRALAX) 17 gram PwPk Take by mouth as needed.    QUEtiapine (SEROQUEL) 25 MG Tab GIVE 2 tabs ONE TO TWO HOURS BEFORE SUNDOWNING.    tamsulosin (FLOMAX) 0.4 mg Cap Take 1 capsule (0.4 mg total) by mouth once daily.     Family History     Problem Relation (Age of Onset)    Cancer Father    Colon cancer Father    Hearing loss Paternal Aunt        Tobacco Use    Smoking status: Never Smoker    Smokeless tobacco: Never Used   Substance and Sexual Activity    Alcohol use: No     Alcohol/week: 0.0 standard drinks     Frequency: Never     Drinks per session: Patient refused     Binge frequency: Never    Drug use: No    Sexual activity: Yes     Partners: Female     Review of Systems  Objective:     Vital Signs (Most Recent):  Temp: 97.5 °F (36.4 °C) (08/10/20 0517)  Pulse: 63 (08/10/20 0517)  Resp: 18 (08/10/20 0517)  BP: (!) 160/86 (08/10/20 0517)  SpO2: 95 % (08/10/20 0517) Vital Signs (24h Range):  Temp:  [97.5 °F (36.4 °C)-98.5 °F (36.9 °C)] 97.5 °F (36.4 °C)  Pulse:  [62-63] 63  Resp:  [14-18] 18  SpO2:  [95 %-99 %] 95 %  BP: (128-160)/(72-86) 160/86     Weight: 59 kg (130 lb)  Body mass index is 18.65 kg/m².    Physical Exam        Significant Labs: {Results:24982}    Significant Imaging: {Imaging Review:81791}

## 2020-08-10 NOTE — PLAN OF CARE
08/10/20 1220   Discharge Assessment   Assessment Type Discharge Planning Assessment   Confirmed/corrected address and phone number on facesheet? Yes   Assessment information obtained from? Caregiver  (daughter, Yani Ellis ([phone]))   Expected Length of Stay (days) 3   Communicated expected length of stay with patient/caregiver yes   Prior to hospitilization cognitive status: Not Oriented to Place;Not Oriented to Time;Not Oriented to Person  (hx: Parkinson Disease)   Prior to hospitalization functional status: Wheelchair Bound   Current cognitive status: Not Oriented to Time;Not Oriented to Place;Not Oriented to Person  (hx: Parkinson Disease)   Current Functional Status: Completely Dependent   Lives With other (see comments)  (24/7 private duty caregiver lives w/pt)   Able to Return to Prior Arrangements yes   Is patient able to care for self after discharge? No   Patient's perception of discharge disposition hospice/home  ([facility])   Readmission Within the Last 30 Days no previous admission in last 30 days   Patient currently being followed by outpatient case management? No   Patient currently receives any other outside agency services? Yes   How many hours a day does the patient receive services? 24   Name and contact number of agency or person providing outside services private duty   Is it the patient/care giver preference to resume care with the current outside agency? Yes   Equipment Currently Used at Home wheelchair;bedside commode;shower chair;raised toilet;grab bar;other (see comments)  (lift chair & electric bed)   Do you have any problems affording any of your prescribed medications? No   Is the patient taking medications as prescribed? yes   Does the patient have transportation home? No   Does the patient receive services at the [facility]? No   Discharge Plan A Hospice/home  (with 24/7 private duty caregiver)   Discharge Plan B Inpatient Hospice   DME Needed Upon  Discharge  oxygen   Patient/Family in Agreement with Plan yes       Dx: acute blood loss anemia      [id number] [place] - [address]  [address]  [place] [address]  Phone: [phone] Fax: [phone]    [facility]  [address]  [place] [address]  Phone: [phone] Fax: [phone]    [facility] [place] - [place], [place] - [address]  [address]  [place] [address]  Phone: [phone] Fax: [phone]    Yanna Santana MD  [address] / [place] [address]  [phone]    Payor: [employer] MEDICARE / Plan: [employer] [facility] MEDICARE / Product Type: Medicare Advantage /     CM was informed by RIN Higginbotham that the patient will discharge home with home hospice today. PRISCA was informed by AMANDA Morejon that [facility] will meet with the patient's daughter, Yani Lacey ([phone]), today at 1300 to sign hospice paperwork.     CM was informed by Yani via phone that the patient will return to his house with his 24/7 private duty caregiver that lives with him when he is discharged. Yani stated that the patient was not using oxygen at home prior to this hospitalization. PRISCA informed AMANDA Morejon that the patient will need ambulance transportation with home oxygen at time of discharge.     PRISCA informed the patient's nurse, Shante ([phone]), of discharge status.     Will continue to follow.

## 2020-08-10 NOTE — PROGRESS NOTES
Urology Progress notes:    Patient remains debilitated and bed-bound.   Random bladder scans showing 220cc and 160cc.   Unable to obtain dedicated PVR as patient is incontinent and unable to voice when he is voiding.   H/H, Cr. Remains stable    We will sign off  Please call with questions    Arnaud Conrad,   Uro 2

## 2020-08-10 NOTE — PLAN OF CARE
Pt remains free from falls and injuries during shift. Awake, alert, and oriented x 3. Vital signs stable. Denied pain/discomfort. No signs of acute distress noted at this time. Bed in lowest position, wheels locked, and call light in reach. Will continue to monitor, safety maintained.

## 2020-08-11 NOTE — PLAN OF CARE
08/11/20 0745   Final Note   Assessment Type Final Discharge Note     Patient discharged home on 8/10/2020 with [facility] & a private duty 24/7 caregiver. Ambulance transportation arranged by AMANDA Morejon.

## 2020-08-12 LAB
BACTERIA BLD CULT: NORMAL
BACTERIA BLD CULT: NORMAL

## 2020-08-12 NOTE — DISCHARGE SUMMARY
[facility] Hospital Medicine  Discharge Summary      Patient Name: Phi Sainz  MRN: [id number]  Admission Date: 8/7/2020  Hospital Length of Stay: 0 days  Discharge Date and Time: 8/10/2020  7:02 PM  Attending Physician: Cass att. providers found   Discharging Provider: Laurel Higginbotham PA-C  Primary Care Provider: Yanna Santana MD  [facility] Medicine Team: [facility] E Laurel Higginbotham PA-C    HPI:   Phi Sainz is a 89 y.o. 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  The patient presented to the OSH c/o pelvic pain/lower abdominal pain that began on Sunday (8/2/2020).  He denies trauma, fever, N/V, CP, SOB, diarrhea or constipation.  The patient's daughter reported the patient has had similar episodes over the past two years and that the patient's pain usually improves after passing a urinary clot.  The patient passed a urinary clot on Sunday but the pain has persisted.  He is known to have a renal mass but surgery was deferred 2/2 to his age.  The ED workup at the OSH included labs that revealed WBC 19.08, H/H 9.1/30.7, UA w/occult blood.  A CT Abdomen/Pelvis w/o contrast was obtained and revealed increase in the size of the mass.  Urology at the OSH was reportedly on diversion.  The patient was transferred to [facility] for higher level of care, Urology consult.  Currently the patient is AA&Ox 3.  He is c/o lower abdomina/suprapubic pain.  The patient denies CP, N/V, dizziness, or HA.  The patient's daughter Yani called the ED for an update on the patient 2/2 current visitor restrictions.  After I received permission from the patient to discuss his condition/plan with his daughter, and verified her identity, she was updated on the patient's condition and  plan.  He is admitted to Hospital Medicine.    * No surgery found *      Hospital Course:   Patient admitted to observation for hematuria. Urology consulted, known urothelial growth seen on CT. Family declining aggressive treatment. Given poor prognosis, family agreeable to home hospice. Patient accepted to hospice and discharged home with services.       Consults:   Consults (From admission, onward)        Status Ordering Provider     Inpatient consult to Urology  Once     Provider:  (Not yet assigned)    Completed KIMBERLY UMANZOR          * Acute blood loss anemia  -Likely 2/2 hemorrhage in L renal tumor.    -Hgb 8.3<9.1<12.1(baseline)  -Trend H/H, transfuse if Hgb<7  - currently stable    Left renal mass  -Known L renal mass that is now larger on imaging than previous.  -Urology consulted   Hgb drifting slowly, however patient is answering questions appropriately  - CTA still an option, however it would be unlikely to result in an active bleed with how slowly his Hgb is dropping  - Would recommend transfusing patient if Hgb < 7 and discussing with family how aggressive they would like to be with his treatment (transfusions vs possible IR involvement for embolization vs comfort care, etc.)  - Recommend q6 or q8 CBCs  - Replenish electrolytes as clinically indicated.   - Recommend goals of care with family  - Urologic will continue to follow along  - family requesting home hospice, orders completed  - discharged with hospice    Parkinson's disease  -Continue Sinamet and Seroquel  -Delirium precautions    Hypertension  -Recorded hx of HTN, not currently on anti-HTN meds 2/2 hypotensive episodes  -Continue home midodrine      Final Active Diagnoses:    Diagnosis Date Noted POA    PRINCIPAL PROBLEM:  Acute blood loss anemia [D62] 08/07/2020 Yes    Left renal mass [N28.89] 08/07/2020 Yes    Parkinson's disease [G20] 10/11/2018 Yes    Hypertension [I10] 03/04/2015 Yes      Problems Resolved During this Admission:        Discharged Condition: good    Disposition: Hospice/Home    Follow Up:    Patient Instructions:   No discharge procedures on file.    Significant Diagnostic Studies: Labs: All labs within the past 24 hours have been reviewed    Pending Diagnostic Studies:     None         Medications:  Reconciled Home Medications:      Medication List      CHANGE how you take these medications    carbidopa-levodopa  mg  mg per tablet  Commonly known as: SINEMET  TAKE 1.5 TABLETS BY MOUTH 3 (THREE) TIMES DAILY.  What changed:   · how much to take  · when to take this  · additional instructions     midodrine 2.5 MG Tab  Commonly known as: PROAMATINE  Take 1 tablet (2.5 mg total) by mouth daily as needed.  What changed:   · when to take this  · additional instructions        CONTINUE taking these medications    AdviL 200 MG tablet  Generic drug: ibuprofen  Take 1 tablet by mouth daily as needed.     budesonide 3 mg capsule  Commonly known as: ENTOCORT EC  TAKE 3 CAPSULES BY MOUTH EVERY DAY     diazePAM 2 MG tablet  Commonly known as: VALIUM  TAKE 1 TABLET BY MOUTH EVERY DAY AS NEEDED     DILTIAZEM 2% CREAM  Apply topically 3 (three) times daily. Apply topically to anal area.     diphenoxylate-atropine 2.5-0.025 mg 2.5-0.025 mg per tablet  Commonly known as: LOMOTIL  Take 1 tablet by mouth 2 (two) times daily.     donepeziL 5 MG tablet  Commonly known as: ARICEPT  Take 1 tablet (5 mg total) by mouth every evening.     econazole nitrate 1 % cream  Use bid     gabapentin 300 MG capsule  Commonly known as: NEURONTIN  Take 300 mg by mouth 2 (two) times daily.     HYDROcodone-acetaminophen 5-325 mg per tablet  Commonly known as: NORCO  TK 1 T PO Q 8 H PRN     hydrocortisone 2.5 % cream  Apply topically 2 (two) times daily. for 10 days     ipratropium 0.03 % nasal spray  Commonly known as: ATROVENT  SPRAY 1 SPRAY INTO EACH NOSTRIL TWICE A DAY     lidocaine 5 % Oint ointment  Commonly known as: XYLOCAINE  APPLY TO AFFECTED  AREA 4 TIMES A DAY     mesalamine 0.375 gram Cp24  Commonly known as: APRISO  TAKE 4 CAPSULES (1.5 G TOTAL) BY MOUTH ONCE DAILY. PT TAKING 4 PILLS IN AM.     MIRALAX 17 gram Pwpk  Generic drug: polyethylene glycol  Take by mouth as needed.     ondansetron 4 MG tablet  Commonly known as: ZOFRAN  Take 1 tablet (4 mg total) by mouth every 8 (eight) hours as needed.     QUEtiapine 25 MG Tab  Commonly known as: SEROQUEL  GIVE 2 tabs ONE TO TWO HOURS BEFORE SUNDOWNING.     tamsulosin 0.4 mg Cap  Commonly known as: FLOMAX  Take 1 capsule (0.4 mg total) by mouth once daily.     TYLENOL EXTRA STRENGTH 500 MG tablet  Generic drug: acetaminophen  Take 500 mg by mouth every 6 (six) hours as needed for Pain.            Indwelling Lines/Drains at time of discharge:   Lines/Drains/Airways     None                 Time spent on the discharge of patient: 32 minutes  Patient was seen and examined on the date of discharge and determined to be suitable for discharge.         Laurel Higginbotham PA-C  Department of Hospital Medicine  [facility]

## 2020-08-12 NOTE — ASSESSMENT & PLAN NOTE
-Known L renal mass that is now larger on imaging than previous.  -Urology consulted   Hgb drifting slowly, however patient is answering questions appropriately  - CTA still an option, however it would be unlikely to result in an active bleed with how slowly his Hgb is dropping  - Would recommend transfusing patient if Hgb < 7 and discussing with family how aggressive they would like to be with his treatment (transfusions vs possible IR involvement for embolization vs comfort care, etc.)  - Recommend q6 or q8 CBCs  - Replenish electrolytes as clinically indicated.   - Recommend goals of care with family  - Urologic will continue to follow along  - family requesting home hospice, orders completed  - discharged with hospice

## 2020-08-23 NOTE — PATIENT INSTRUCTIONS
Low Residue Diet    The low residue diet is designed to reduce the residue from food that remains in the digestive tract after you eat.  Examples of foods excluded from this diet are fruits with skins and raw vegetables.  Small, frequent meals and chewing your food well is important. ______________________________________________________________________  Meats/Poultry    Fish (tender)  Beef  Chicken  Eggs/Egg Beaters  Fish/Seafood  Lamb  Fresh Pork  Shellfish  Tuna  [place]    Dairy (use w/caution)  Butter  Cream Cheese   Cottage Cheese   Cheeses   Ice cream  Milk  Yogurt     Meat/Dairy  Alternatives   [place]/rice milk Imitation meats/poultry   Peanut butter (creamy)  Soft tofu   Soy milk     Grains (w/o bran, dried fruit, nuts, raisins, seeds and whole grains  Bagels   Bread (white)  Bread sticks   Buns  Cold cereals   Dinner Rolls  English muffins  Hot cereals (cream of wheat, strained oatmeal)  Flour   Muffins   Noodles  Pancakes  Pasta  Margie bread  Rice (white only)  Tortillas (corn, flour)  Waffles    Desserts (w/o nuts, seeds, coconut or raisins, dried fruit  Cake   Cookies   Custard  Ice Cream  Jello  Pie  Popsicles  Puddings  Sherbet  Vanilla Wafers    Fruits (peeled)  Apples w/o skin  Apricots   Avocado  Bananas  Canned fruits   Cherries  Cranberries  Grapefruit  Melons  Pears  Peaches  Plums    Vegetables (cooked)  Artichokes  Asparagus (tips)  Carrots  Green beans  Potatoes w/o skin  Pumpkin  Spinach  Squash  Tomato puree/sauce  Zucchini  Wax beans    Beverages   All juices w/o pulp  Decaf Coffee  Lemonade  Tea     Seasonings/  Condiments (w/o seeds or peels or fruit skins)  All Condiments  All Oils  Jams or marmalade Butter/Margarine    Snacks  Animal crackers  Applesauce  Shemar crackers  [place] toast  Rice cakes  Pretzels  Rice Cakes  [place]

## 2020-08-23 NOTE — PROGRESS NOTES
Virtual Visit   The patient location is: home   The chief complaint leading to consultation is: diarrhea and colitis   Visit type: Virtual visit with synchronous audio and video  Total time spent with patient: 60 minutes   Each patient to whom he or she provides medical services by telemedicine is:  (1) informed of the relationship between the physician and patient and the respective role of any other health care provider with respect to management of the patient; and (2) notified that he or she may decline to receive medical services by telemedicine and may withdraw from such care at any time.    Notes: daughter sylwia provided most of information     GI NUTRITIONAL ASSESSMENT  Referring Provider: Dr. Darshan Lara    Phi Sainz is a 89 y.o. male referred regarding the following problems:  Reason for Visit: Nutrition assessment and recommendations related to:   Chief Complaint   Patient presents with    IBS with diarrhea     consumes Ensure as supplement to maintain weight in the past     Crohn's Disease     using metamucil with pedialyte to make BM consistency consistent     Parkinsons 5-6 years    ckd stage II       Symptoms: diarrhea   Patient Nutrition Goals :   Improvement of abdominal pain with better food choices    Reduction in diarrhea with better food choices    Improvement in bowel function with better food choices    Improvement in Reflux Symptoms with better food choices and lifestyle modification   Improvement in bloating with better food choices    Weight gain /loss with better food choices       Medical/Surgical History  Pertinent Social History:  Social History     Tobacco Use    Smoking status: Never Smoker    Smokeless tobacco: Never Used   Substance Use Topics    Alcohol use: No     Alcohol/week: 0.0 standard drinks     Frequency: Never     Drinks per session: Patient refused     Binge frequency: Never    Drug use: No        Previous Medical History:  Past Medical  "History:   Diagnosis Date    Allergy     Anemia     Arthritis     Basal cell carcinoma     Depression     Herniation of intervertebral disc     Hyperlipidemia     Hypertension 3/4/2015    Macular degeneration     Parkinson disease     Spinal stenosis        Previous Surgical History:  Past Surgical History:   Procedure Laterality Date    CATARACT EXTRACTION W/  INTRAOCULAR LENS IMPLANT Left 10/31/07    Dr. Nunez    CATARACT EXTRACTION W/  INTRAOCULAR LENS IMPLANT Right 12/16/15    Dr. Nunez      Anthropometric Data Usual Weight: 143#   has decreased 10# pounds over last 6 months   Estimated body mass index is 18.65 kg/m² as calculated from the following:    Height as of 8/10/20: 5' 10" (1.778 m).    Weight as of 8/7/20: 59 kg (130 lb).  Weight History:  Wt Readings from Last 12 Encounters:   08/07/20 59 kg (130 lb)   08/07/20 60.8 kg (134 lb)   07/31/20 60.8 kg (134 lb)   07/28/20 60.8 kg (134 lb)   06/19/20 60.9 kg (134 lb 4.2 oz)   06/18/20 60.9 kg (134 lb 4.2 oz)   06/11/20 64.4 kg (142 lb)   05/25/20 64.5 kg (142 lb 3.2 oz)   02/14/20 64.5 kg (142 lb 3.2 oz)   01/03/20 64.6 kg (142 lb 6.7 oz)   12/05/19 64.9 kg (143 lb)   12/04/19 64.9 kg (143 lb)   ]  BMI: There is no height or weight on file to calculate BMI.  Calculated calorie needs : 1641 calories (MSJ x 1.3)  Calculated Protein needs :60  ( 1 grams per kg)   Nutrition Focused Physical Findings:   Unable to assess via video    Meds  and Biochemical Data   Labs  @BRIEFLAB(GLU,K,PHOS,MG,CHOL,HDL,LDL,TRIG,ALBUMIN,PREALBUMIN,AMMONIA,HGBA1C,CALCIUM)    Lab Results   Component Value Date    FERRITIN 12 (L) 04/29/2011       Lab Results   Component Value Date    TSH 2.935 05/08/2020     No results found for: CRP  Phosphorus   Date Value Ref Range Status   08/08/2020 3.3 2.7 - 4.5 mg/dL Final     No results found for: PREALBUMIN  Cholesterol   Date Value Ref Range Status   05/08/2020 159 120 - 199 mg/dL Final     Comment:     The National " Cholesterol Education Program (NCEP) has set the  following guidelines (reference ranges) for Cholesterol:  Optimal.....................<200 mg/dL  Borderline High.............200-239 mg/dL  High........................> or = 240 mg/dL     10/21/2019 225 (H) 120 - 199 mg/dL Final     Comment:     The National Cholesterol Education Program (NCEP) has set the  following guidelines (reference ranges) for Cholesterol:  Optimal.....................<200 mg/dL  Borderline High.............200-239 mg/dL  High........................> or = 240 mg/dL       Iron   Date Value Ref Range Status   04/06/2011 90 45 - 160 ug/dL Final   03/21/2006 133 45 - 160 mcg/dl Final     Folate   Date Value Ref Range Status   04/06/2011 16.8 4.0 - 24.0 ng/ml Final     No components found for: OIAKSWCE24  No components found for: RBCXWZQD98  No components found for: VITAMIND2  No components found for: VITAMIND    Lab Results   Component Value Date    FERRITIN 12 (L) 04/29/2011     No results found for: CRP  No results found for: SEDRATE  Assessment of Lab Values:   Medication:  Current Outpatient Medications   Medication Sig    acetaminophen (TYLENOL EXTRA STRENGTH) 500 MG tablet Take 500 mg by mouth every 6 (six) hours as needed for Pain.    budesonide (ENTOCORT EC) 3 mg capsule TAKE 3 CAPSULES BY MOUTH EVERY DAY    carbidopa-levodopa  mg (SINEMET)  mg per tablet TAKE 1.5 TABLETS BY MOUTH 3 (THREE) TIMES DAILY. (Patient taking differently: 1 tablet 3 (three) times daily. )    diazePAM (VALIUM) 2 MG tablet TAKE 1 TABLET BY MOUTH EVERY DAY AS NEEDED    diltiazem HCl (DILTIAZEM 2% CREAM) Apply topically 3 (three) times daily. Apply topically to anal area.    diphenoxylate-atropine 2.5-0.025 mg (LOMOTIL) 2.5-0.025 mg per tablet Take 1 tablet by mouth 2 (two) times daily.    donepezil (ARICEPT) 5 MG tablet Take 1 tablet (5 mg total) by mouth every evening.    econazole nitrate 1 % cream Use bid    gabapentin (NEURONTIN) 300 MG  capsule Take 300 mg by mouth 2 (two) times daily.     HYDROcodone-acetaminophen (NORCO) 5-325 mg per tablet TK 1 T PO Q 8 H PRN    hydrocortisone 2.5 % cream Apply topically 2 (two) times daily. for 10 days    ibuprofen (ADVIL) 200 MG tablet Take 1 tablet by mouth daily as needed.    ipratropium (ATROVENT) 0.03 % nasal spray SPRAY 1 SPRAY INTO EACH NOSTRIL TWICE A DAY    lidocaine (XYLOCAINE) 5 % Oint ointment APPLY TO AFFECTED AREA 4 TIMES A DAY    mesalamine (APRISO) 0.375 gram Cp24 TAKE 4 CAPSULES (1.5 G TOTAL) BY MOUTH ONCE DAILY. PT TAKING 4 PILLS IN AM.    midodrine (PROAMATINE) 2.5 MG Tab Take 1 tablet (2.5 mg total) by mouth daily as needed. (Patient taking differently: Take 2.5 mg by mouth 2 (two) times daily as needed. 1 tablet and 1/2 in the evening)    ondansetron (ZOFRAN) 4 MG tablet Take 1 tablet (4 mg total) by mouth every 8 (eight) hours as needed.    polyethylene glycol (MIRALAX) 17 gram PwPk Take by mouth as needed.    QUEtiapine (SEROQUEL) 25 MG Tab GIVE 2 tabs ONE TO TWO HOURS BEFORE SUNDOWNING.    tamsulosin (FLOMAX) 0.4 mg Cap Take 1 capsule (0.4 mg total) by mouth once daily.     No current facility-administered medications for this visit.      Nutritionally significant meds: increasing bowel incontinence with increasing lomotil dose needed to maintain firm consistency of stool with metamucil .   Vitamin/Supplements/Herbs: Has taken Ensure in the past to supplement calories   Potential Food drug Interaction:   Allergies:  Review of patient's allergies indicates:   Allergen Reactions    Combigan [brimonidine-timolol]      Made him dizziness    Contrast media Rash      Dietary Data  Current Diet: Patient's daughter reports her dad is a creature of habit with his diet . Does not consume sandwiches , pasta or bread . He consumes the same breakfast , lunch and supper for years :   Breakfast: muffin and coffee  Lunch: white rice and gravy ; salad and vegetable   Supper Cereal and milk  or waffle with peaches when they are in season .   Fluid Intake /quantity: water and coffee ; has been consuming gatorade , pedialyte to help stabilize electrolytes   Fruit: canned or fresh melon, banana   Vegetables: salad and cooked vegetable for lunch daily   Meal preparation/shopping: daughter   Dining out: none   Foods poorly tolerated : nuts   Milk tolerance : consumes little except with cereal, will consume Ensure   Fiber tolerance : as metamucil , consumes fruit and vegetables in natural form  ; avoids Nuts/seeds  Fat tolerance : avoids spicy  or high fat pepperoni, sausage    Sugars and desserts; enjoys cookie or muffin   Supplements/ MVI: none at current time   Review of patient's allergies indicates:   Allergen Reactions    Combigan [brimonidine-timolol]      Made him dizziness    Contrast media Rash      Nutrition Diagnosis:   · Altered GI Function related to chronic diarrhea with hx of Crohns and IBS .  · Weight loss secondary to change in appetite and oral intake    · Renal mass     Recommendations:   Updated CMP   Use of meal replacement acceptable to patient ( Ensure) with decrease in raw vegetables and fruit to see if diarrhea decreases.   Trial of low residue foods- rice, potato to see if improvement . Patient has an established repertoire of foods and is resistant to trying new ones.   Trial of toast /noodles if acceptable to assist with firming stool   Education Provided by email : mechanical soft /low residue to daughter's email       Patient and/or family comprehend instructions: yes  Outcome: Verbalizes understanding  Monitoring:weight changes, CMP   Follow-up: I have added myself as team member for ease of daughter's contact   Counseling Time: 45 minutes

## 2021-07-06 ENCOUNTER — PATIENT MESSAGE (OUTPATIENT)
Dept: ADMINISTRATIVE | Facility: HOSPITAL | Age: 86
End: 2021-07-06

## 2021-10-04 ENCOUNTER — PATIENT MESSAGE (OUTPATIENT)
Dept: ADMINISTRATIVE | Facility: HOSPITAL | Age: 86
End: 2021-10-04

## 2022-01-18 ENCOUNTER — PATIENT MESSAGE (OUTPATIENT)
Dept: ADMINISTRATIVE | Facility: HOSPITAL | Age: 87
End: 2022-01-18
Payer: MEDICARE

## 2022-03-16 ENCOUNTER — PATIENT MESSAGE (OUTPATIENT)
Dept: ADMINISTRATIVE | Facility: HOSPITAL | Age: 87
End: 2022-03-16
Payer: MEDICARE

## 2023-01-31 ENCOUNTER — PATIENT OUTREACH (OUTPATIENT)
Dept: ADMINISTRATIVE | Facility: HOSPITAL | Age: 88
End: 2023-01-31
Payer: MEDICARE

## 2024-07-22 NOTE — TELEPHONE ENCOUNTER
Reason for Disposition   Very strange or paranoid behavior    Protocols used: CONFUSION - DELIRIUM-A-AH    Pt daughter calling because pt sundowning has gotten worse. States they have been dealing with this for some time now, but pt is now cursing which is out of his behavior. Also crawling on the ground. Daughter states they have on their own increased his Seroquel and valium. ED advised. Informed pt that there may be something medically going on causing symptoms to worsen such as UTI. Unsure if pt family will want to take to the ED. Informed message would be sent to PCP and neuro per daughter request for call back in the AM. Please call pt thanks.    Left vm on mom's vm and scheduled pt with her sister at 3:40 on 8/12.